# Patient Record
Sex: FEMALE | Race: WHITE | NOT HISPANIC OR LATINO | Employment: OTHER | URBAN - METROPOLITAN AREA
[De-identification: names, ages, dates, MRNs, and addresses within clinical notes are randomized per-mention and may not be internally consistent; named-entity substitution may affect disease eponyms.]

---

## 2021-07-09 ENCOUNTER — NURSE TRIAGE (OUTPATIENT)
Dept: NURSING | Facility: CLINIC | Age: 56
End: 2021-07-09

## 2023-12-29 ENCOUNTER — APPOINTMENT (OUTPATIENT)
Dept: GENERAL RADIOLOGY | Facility: CLINIC | Age: 58
End: 2023-12-29
Attending: EMERGENCY MEDICINE
Payer: COMMERCIAL

## 2023-12-29 ENCOUNTER — APPOINTMENT (OUTPATIENT)
Dept: ULTRASOUND IMAGING | Facility: CLINIC | Age: 58
End: 2023-12-29
Attending: EMERGENCY MEDICINE
Payer: COMMERCIAL

## 2023-12-29 ENCOUNTER — HOSPITAL ENCOUNTER (EMERGENCY)
Facility: CLINIC | Age: 58
Discharge: HOME | End: 2023-12-29
Attending: EMERGENCY MEDICINE | Admitting: EMERGENCY MEDICINE
Payer: COMMERCIAL

## 2023-12-29 VITALS
TEMPERATURE: 98 F | RESPIRATION RATE: 20 BRPM | OXYGEN SATURATION: 96 % | DIASTOLIC BLOOD PRESSURE: 84 MMHG | HEART RATE: 109 BPM | SYSTOLIC BLOOD PRESSURE: 144 MMHG

## 2023-12-29 DIAGNOSIS — M10.062 ACUTE IDIOPATHIC GOUT OF LEFT KNEE: ICD-10-CM

## 2023-12-29 LAB
ANION GAP SERPL CALCULATED.3IONS-SCNC: 13 MMOL/L (ref 7–15)
BASOPHILS # BLD AUTO: 0 10E3/UL (ref 0–0.2)
BASOPHILS NFR BLD AUTO: 1 %
BUN SERPL-MCNC: 27.2 MG/DL (ref 8–23)
CALCIUM SERPL-MCNC: 9.5 MG/DL (ref 8.6–10)
CHLORIDE SERPL-SCNC: 100 MMOL/L (ref 98–107)
CREAT SERPL-MCNC: 1.03 MG/DL (ref 0.51–0.95)
CRP SERPL-MCNC: 4.6 MG/L
DEPRECATED HCO3 PLAS-SCNC: 23 MMOL/L (ref 22–29)
EGFRCR SERPLBLD CKD-EPI 2021: 62 ML/MIN/1.73M2
EOSINOPHIL # BLD AUTO: 0.1 10E3/UL (ref 0–0.7)
EOSINOPHIL NFR BLD AUTO: 1 %
ERYTHROCYTE [DISTWIDTH] IN BLOOD BY AUTOMATED COUNT: 13.2 % (ref 10–15)
ERYTHROCYTE [SEDIMENTATION RATE] IN BLOOD BY WESTERGREN METHOD: 5 MM/HR (ref 0–30)
GLUCOSE SERPL-MCNC: 276 MG/DL (ref 70–99)
HCT VFR BLD AUTO: 42.9 % (ref 35–47)
HGB BLD-MCNC: 14.2 G/DL (ref 11.7–15.7)
IMM GRANULOCYTES # BLD: 0 10E3/UL
IMM GRANULOCYTES NFR BLD: 0 %
LYMPHOCYTES # BLD AUTO: 2.2 10E3/UL (ref 0.8–5.3)
LYMPHOCYTES NFR BLD AUTO: 27 %
MCH RBC QN AUTO: 28.6 PG (ref 26.5–33)
MCHC RBC AUTO-ENTMCNC: 33.1 G/DL (ref 31.5–36.5)
MCV RBC AUTO: 87 FL (ref 78–100)
MONOCYTES # BLD AUTO: 0.6 10E3/UL (ref 0–1.3)
MONOCYTES NFR BLD AUTO: 7 %
NEUTROPHILS # BLD AUTO: 5.4 10E3/UL (ref 1.6–8.3)
NEUTROPHILS NFR BLD AUTO: 64 %
NRBC # BLD AUTO: 0 10E3/UL
NRBC BLD AUTO-RTO: 0 /100
PLATELET # BLD AUTO: 185 10E3/UL (ref 150–450)
POTASSIUM SERPL-SCNC: 4.6 MMOL/L (ref 3.4–5.3)
RBC # BLD AUTO: 4.96 10E6/UL (ref 3.8–5.2)
SODIUM SERPL-SCNC: 136 MMOL/L (ref 135–145)
URATE SERPL-MCNC: 6.2 MG/DL (ref 2.4–5.7)
WBC # BLD AUTO: 8.4 10E3/UL (ref 4–11)

## 2023-12-29 PROCEDURE — 85025 COMPLETE CBC W/AUTO DIFF WBC: CPT | Performed by: EMERGENCY MEDICINE

## 2023-12-29 PROCEDURE — 36415 COLL VENOUS BLD VENIPUNCTURE: CPT | Performed by: EMERGENCY MEDICINE

## 2023-12-29 PROCEDURE — 99284 EMERGENCY DEPT VISIT MOD MDM: CPT | Mod: 25

## 2023-12-29 PROCEDURE — 93971 EXTREMITY STUDY: CPT | Mod: LT

## 2023-12-29 PROCEDURE — 80048 BASIC METABOLIC PNL TOTAL CA: CPT | Performed by: EMERGENCY MEDICINE

## 2023-12-29 PROCEDURE — 73562 X-RAY EXAM OF KNEE 3: CPT | Mod: LT

## 2023-12-29 PROCEDURE — 84550 ASSAY OF BLOOD/URIC ACID: CPT | Performed by: EMERGENCY MEDICINE

## 2023-12-29 PROCEDURE — 85652 RBC SED RATE AUTOMATED: CPT | Performed by: EMERGENCY MEDICINE

## 2023-12-29 PROCEDURE — 86140 C-REACTIVE PROTEIN: CPT | Performed by: EMERGENCY MEDICINE

## 2023-12-29 RX ORDER — INDOMETHACIN 50 MG/1
50 CAPSULE ORAL
Qty: 30 CAPSULE | Refills: 0 | Status: SHIPPED | OUTPATIENT
Start: 2023-12-29 | End: 2024-01-08

## 2023-12-29 ASSESSMENT — ACTIVITIES OF DAILY LIVING (ADL)
ADLS_ACUITY_SCORE: 35
ADLS_ACUITY_SCORE: 35

## 2023-12-29 NOTE — DISCHARGE INSTRUCTIONS
Rest your leg, elevate, you can try ice or heat but start the Indocin and take it 3 times a day for up to 10 days.  Once your pain is gone you can stop it.  Take it with food.  You can get 2 doses and yet today, take another dose tonight before bed.  Keep your appointment with your doctor in the next 2 weeks for a recheck.  If you develop high fevers with worsening pain redness and swelling of the knee, return to the ER.  At your doctor's appointment, you could discuss with your physician whether they would consider wanting you to be on allopurinol because your uric acid level is elevated.

## 2023-12-29 NOTE — ED NOTES
PIT/Triage Evaluation    Patient presented with knee pain. The patient report that she developed left knee pain yesterday after coming from work. She notes that after a couple hours the pain worsened, had trouble walking. She complains of swelling and pain shooting down front of her leg from the knee.  She took Tylenol with some relief and was able to walk on it a bit today. She has history of Gout in her feet, this feels different.  Veins prominent, feels swollen.  Denies any history of knee surgery.  No fevers or chills.    Exam is notable for:  Resp:               Non-labored  Neuro:             Alert and cooperative  MSkel:             Moving all extremities, no bone tenderness at L knee, no calf tenderness  Skin:                No rash or erythema appreciated in knee, prominent varicosities overlying patella, no surgical scars to knee               Appropriate interventions for symptom management were initiated if applicable.  Appropriate diagnostic tests were initiated if indicated.    Important information for subsequent clinician:    I briefly evaluated the patient and developed an initial plan of care. I discussed this plan and explained that this brief interaction does not constitute a full evaluation. Patient/family understands that they should wait to be fully evaluated and discuss any test results with another clinician prior to leaving the hospital.       Nguyen Draper MD  12/29/23 1057

## 2023-12-29 NOTE — ED PROVIDER NOTES
History     Chief Complaint:  Knee Pain     HPI   Afshan Jimenez is a 58 year old female with a history of gout, type II diabetes, and cancer presents to the ED for evaluation of knee pain. The patient report that she developed left knee pain yesterday after coming from work. She notes that after a couple hours the pain worsened, had trouble walking. She complains of swelling and pain shooting down front of her leg from the knee.  She didn't have a measured fever but notes that last night she was warm. She took Tylenol with some relief and was able to walk on it a bit today. Her blood sugar today was 302. She has history of gout in her toes.  Denies any history of knee surgery.  No fevers or chills.     Independent Historian:    None.    Review of External Notes:  None.       Medications:    Atorvastatin  Novolin  Allopurinol   Aspirin 81 MG    Past Medical History:    Gout   Cholecystitis   Cancer of parotid gland   Bone marrow transplant   Type II diabetes  Acute myeloblastic leukemia  Malignant neoplasm of roof of mouth    Past Surgical History:    Cholecystectomy   Salivary surgery  Sinus surgery   Mouth surgery     Physical Exam   Patient Vitals for the past 24 hrs:   BP Temp Temp src Pulse Resp SpO2   12/29/23 1157 113/75 98  F (36.7  C) Temporal 109 20 96 %      Physical Exam  Nursing note and vitals reviewed.    Constitutional:  Appears comfortable.    Cardiovascular:  Normal rate, regular rhythm.     DP pulse 2+.  Cap refill less than 2 seconds.  Musculoskeletal:  Range of motion of the left knee is normal but she has some pain in the knee.  Mild swelling.  There is only minimal erythema and it is not excessively warm.  No edema down to the lower leg.  Neurological:   Alert and oriented. Exhibits good muscle tone.      Sensation in the knee is normal she has decreased sensation in the lower leg chronically.  Skin:    Skin is warm and dry.  Very minimal erythema to the left knee.  No break in the  skin.  Psychiatric:   Behavior is normal. Appropriate mood and affect.     Judgment and thought content normal.     Emergency Department Course   Imaging:  US Lower Extremity Venous Duplex Left   Final Result   IMPRESSION:   1.  No deep venous thrombosis in the left lower extremity.   2.  Likely small knee effusion.      STEVE RIZO MD            SYSTEM ID:  [id number]      XR Knee Left 3 Views   Final Result   Impression:      1.  Left knee negative for fracture or dislocation. Normal joint   alignment. Mild/moderate tricompartmental lucency and sclerosis in the   medial and lateral femoral condyles, and in the proximal tibial   metaphysis, compatible with bone infarcts/AVN.      DIAZ MONTES DE OCA MD            SYSTEM ID:  [id number]        Report per radiology    Laboratory:  Labs Ordered and Resulted from Time of ED Arrival to Time of ED Departure   BASIC METABOLIC PANEL - Abnormal       Result Value    Sodium 136      Potassium 4.6      Chloride 100      Carbon Dioxide (CO2) 23      Anion Gap 13      Urea Nitrogen 27.2 (*)     Creatinine 1.03 (*)     GFR Estimate 62      Calcium 9.5      Glucose 276 (*)    URIC ACID - Abnormal    Uric Acid 6.2 (*)    ERYTHROCYTE SEDIMENTATION RATE AUTO - Normal    Erythrocyte Sedimentation Rate 5     CRP INFLAMMATION - Normal    CRP Inflammation 4.60     CBC WITH PLATELETS AND DIFFERENTIAL    WBC Count 8.4      RBC Count 4.96      Hemoglobin 14.2      Hematocrit 42.9      MCV 87      MCH 28.6      MCHC 33.1      RDW 13.2      Platelet Count 185      % Neutrophils 64      % Lymphocytes 27      % Monocytes 7      % Eosinophils 1      % Basophils 1      % Immature Granulocytes 0      NRBCs per 100 WBC 0      Absolute Neutrophils 5.4      Absolute Lymphocytes 2.2      Absolute Monocytes 0.6      Absolute Eosinophils 0.1      Absolute Basophils 0.0      Absolute Immature Granulocytes 0.0      Absolute NRBCs 0.0        Emergency Department Course &  Assessments:    Interventions:  Medications - No data to display I    Assessments:  1550 I obtained history and examined the patient as noted above.    Independent Interpretation (X-rays, CTs, rhythm strip):  I reviewed the x-ray and there is no obvious fracture.    Consultations/Discussion of Management or Tests:  None       Social Determinants of Health affecting care:  None.      Disposition:  The patient was discharged to home.     Impression & Plan    Medical Decision Making:  Patient comes in with fairly sudden onset yesterday of left knee pain.  She has a history of gout, uric acid is elevated, she does have diabetes and blood sugar is 276.  Creatinine is 1.03 but her BUN is up low but suggesting some mild dehydration.  GFR is normal, white count is normal and she has no fever here.  X-ray did not reveal any obvious problems.  Ultrasound is also negative.  She has gout.  Indocin has worked well for her in the past and because she is diabetic I am not going to use steroids.  She will be put on 50 mg Indocin 3 times a day until the pain is gone and then for an extra day or 2 up to 10 days.  She will follow-up with her doctor as scheduled in early January.  Because her uric acid is elevated, once her gout settles down, she should discuss with her doctor whether or not allopurinol might be an option for her.        Rest your leg, elevate, you can try ice or heat but start the Indocin and take it 3 times a day for up to 10 days.  Once your pain is gone you can stop it.  Take it with food.  You can get 2 doses and yet today, take another dose tonight before bed.  Keep your appointment with your doctor in the next 2 weeks for a recheck.  If you develop high fevers with worsening pain redness and swelling of the knee, return to the ER.  At your doctor's appointment, you could discuss with your physician whether they would consider wanting you to be on allopurinol because your uric acid level is  elevated.    Diagnosis:    ICD-10-CM    1. Acute idiopathic gout of left knee  M10.062            Discharge Medications:  New Prescriptions    INDOMETHACIN (INDOCIN) 50 MG CAPSULE    Take 1 capsule (50 mg) by mouth 3 times daily (with meals) for 10 days        Scribe Disclosure:  I, Mira PALACIOSTesfaye Gonzalez, am serving as a scribe at 4:02 PM on 12/29/2023 to document services personally performed by Dior Macias MD based on my observations and the provider's statements to me.    Scribe Disclosure:  I, Ashlyn Waller, am serving as a scribe  at 4:13 PM on 12/29/2023 to document services personally performed by Dior Macias MD based on my observations and the provider's statements to me.    12/29/2023   Dior Macias MD Powell, Tracy Alan, MD  12/29/23 1640

## 2023-12-29 NOTE — ED TRIAGE NOTES
Pt reports left knee red, swollen, hot to touch. Pt reports taking tylenol, iced the area.      Triage Assessment (Adult)       Row Name 12/29/23 1156          Triage Assessment    Airway WDL WDL

## 2024-03-24 ENCOUNTER — HEALTH MAINTENANCE LETTER (OUTPATIENT)
Age: 59
End: 2024-03-24

## 2024-07-15 ENCOUNTER — TRANSFERRED RECORDS (OUTPATIENT)
Dept: MULTI SPECIALTY CLINIC | Facility: CLINIC | Age: 59
End: 2024-07-15

## 2024-07-15 LAB — RETINOPATHY: NORMAL

## 2024-11-27 ENCOUNTER — TRANSFERRED RECORDS (OUTPATIENT)
Dept: HEALTH INFORMATION MANAGEMENT | Facility: CLINIC | Age: 59
End: 2024-11-27

## 2024-12-27 ENCOUNTER — HOSPITAL ENCOUNTER (INPATIENT)
Facility: CLINIC | Age: 59
LOS: 3 days | Discharge: HOME | DRG: 065 | End: 2024-12-30
Attending: EMERGENCY MEDICINE | Admitting: HOSPITALIST
Payer: COMMERCIAL

## 2024-12-27 ENCOUNTER — APPOINTMENT (OUTPATIENT)
Dept: MRI IMAGING | Facility: CLINIC | Age: 59
DRG: 065 | End: 2024-12-27
Attending: EMERGENCY MEDICINE
Payer: COMMERCIAL

## 2024-12-27 ENCOUNTER — APPOINTMENT (OUTPATIENT)
Dept: CT IMAGING | Facility: CLINIC | Age: 59
DRG: 065 | End: 2024-12-27
Attending: EMERGENCY MEDICINE
Payer: COMMERCIAL

## 2024-12-27 DIAGNOSIS — R91.8 PULMONARY NODULES: ICD-10-CM

## 2024-12-27 DIAGNOSIS — E78.5 DYSLIPIDEMIA: ICD-10-CM

## 2024-12-27 DIAGNOSIS — R29.898 RIGHT ARM WEAKNESS: ICD-10-CM

## 2024-12-27 DIAGNOSIS — I63.89 PARIETAL LOBE INFARCTION (H): Primary | ICD-10-CM

## 2024-12-27 LAB
ALBUMIN SERPL BCG-MCNC: 4.2 G/DL (ref 3.5–5.2)
ALP SERPL-CCNC: 87 U/L (ref 40–150)
ALT SERPL W P-5'-P-CCNC: 28 U/L (ref 0–50)
ANION GAP SERPL CALCULATED.3IONS-SCNC: 11 MMOL/L (ref 7–15)
APTT PPP: 26 SECONDS (ref 22–38)
AST SERPL W P-5'-P-CCNC: 35 U/L (ref 0–45)
ATRIAL RATE - MUSE: 98 BPM
BASOPHILS # BLD AUTO: 0 10E3/UL (ref 0–0.2)
BASOPHILS NFR BLD AUTO: 0 %
BILIRUB SERPL-MCNC: 0.5 MG/DL
BUN SERPL-MCNC: 19.1 MG/DL (ref 8–23)
CALCIUM SERPL-MCNC: 9.9 MG/DL (ref 8.8–10.4)
CHLORIDE SERPL-SCNC: 104 MMOL/L (ref 98–107)
CREAT SERPL-MCNC: 1.23 MG/DL (ref 0.51–0.95)
DIASTOLIC BLOOD PRESSURE - MUSE: NORMAL MMHG
EGFRCR SERPLBLD CKD-EPI 2021: 50 ML/MIN/1.73M2
EOSINOPHIL # BLD AUTO: 0.3 10E3/UL (ref 0–0.7)
EOSINOPHIL NFR BLD AUTO: 4 %
ERYTHROCYTE [DISTWIDTH] IN BLOOD BY AUTOMATED COUNT: 13.6 % (ref 10–15)
EST. AVERAGE GLUCOSE BLD GHB EST-MCNC: 126 MG/DL
GLUCOSE SERPL-MCNC: 106 MG/DL (ref 70–99)
HBA1C MFR BLD: 6 %
HCO3 SERPL-SCNC: 26 MMOL/L (ref 22–29)
HCT VFR BLD AUTO: 43.4 % (ref 35–47)
HGB BLD-MCNC: 14.1 G/DL (ref 11.7–15.7)
IMM GRANULOCYTES # BLD: 0 10E3/UL
IMM GRANULOCYTES NFR BLD: 0 %
INR PPP: 1.06 (ref 0.85–1.15)
INTERPRETATION ECG - MUSE: NORMAL
LYMPHOCYTES # BLD AUTO: 2.8 10E3/UL (ref 0.8–5.3)
LYMPHOCYTES NFR BLD AUTO: 39 %
MCH RBC QN AUTO: 28.4 PG (ref 26.5–33)
MCHC RBC AUTO-ENTMCNC: 32.5 G/DL (ref 31.5–36.5)
MCV RBC AUTO: 87 FL (ref 78–100)
MONOCYTES # BLD AUTO: 0.6 10E3/UL (ref 0–1.3)
MONOCYTES NFR BLD AUTO: 8 %
NEUTROPHILS # BLD AUTO: 3.4 10E3/UL (ref 1.6–8.3)
NEUTROPHILS NFR BLD AUTO: 48 %
NRBC # BLD AUTO: 0 10E3/UL
NRBC BLD AUTO-RTO: 0 /100
P AXIS - MUSE: 27 DEGREES
PLATELET # BLD AUTO: 159 10E3/UL (ref 150–450)
POTASSIUM SERPL-SCNC: 4.1 MMOL/L (ref 3.4–5.3)
PR INTERVAL - MUSE: 170 MS
PROT SERPL-MCNC: 7.3 G/DL (ref 6.4–8.3)
QRS DURATION - MUSE: 82 MS
QT - MUSE: 344 MS
QTC - MUSE: 439 MS
R AXIS - MUSE: -12 DEGREES
RBC # BLD AUTO: 4.97 10E6/UL (ref 3.8–5.2)
SODIUM SERPL-SCNC: 141 MMOL/L (ref 135–145)
SYSTOLIC BLOOD PRESSURE - MUSE: NORMAL MMHG
T AXIS - MUSE: 8 DEGREES
TROPONIN T SERPL HS-MCNC: 11 NG/L
VENTRICULAR RATE- MUSE: 98 BPM
WBC # BLD AUTO: 7.1 10E3/UL (ref 4–11)

## 2024-12-27 PROCEDURE — 250N000011 HC RX IP 250 OP 636: Performed by: EMERGENCY MEDICINE

## 2024-12-27 PROCEDURE — 83036 HEMOGLOBIN GLYCOSYLATED A1C: CPT | Performed by: HOSPITALIST

## 2024-12-27 PROCEDURE — 87040 BLOOD CULTURE FOR BACTERIA: CPT | Performed by: HOSPITALIST

## 2024-12-27 PROCEDURE — 36415 COLL VENOUS BLD VENIPUNCTURE: CPT | Performed by: EMERGENCY MEDICINE

## 2024-12-27 PROCEDURE — 99223 1ST HOSP IP/OBS HIGH 75: CPT | Performed by: HOSPITALIST

## 2024-12-27 PROCEDURE — A9585 GADOBUTROL INJECTION: HCPCS | Performed by: EMERGENCY MEDICINE

## 2024-12-27 PROCEDURE — 120N000001 HC R&B MED SURG/OB

## 2024-12-27 PROCEDURE — 250N000009 HC RX 250: Performed by: EMERGENCY MEDICINE

## 2024-12-27 PROCEDURE — 72158 MRI LUMBAR SPINE W/O & W/DYE: CPT

## 2024-12-27 PROCEDURE — 250N000013 HC RX MED GY IP 250 OP 250 PS 637: Performed by: HOSPITALIST

## 2024-12-27 PROCEDURE — 255N000002 HC RX 255 OP 636: Performed by: EMERGENCY MEDICINE

## 2024-12-27 PROCEDURE — 84484 ASSAY OF TROPONIN QUANT: CPT | Performed by: HOSPITALIST

## 2024-12-27 PROCEDURE — 85730 THROMBOPLASTIN TIME PARTIAL: CPT | Performed by: EMERGENCY MEDICINE

## 2024-12-27 PROCEDURE — 82040 ASSAY OF SERUM ALBUMIN: CPT | Performed by: EMERGENCY MEDICINE

## 2024-12-27 PROCEDURE — 85025 COMPLETE CBC W/AUTO DIFF WBC: CPT | Performed by: EMERGENCY MEDICINE

## 2024-12-27 PROCEDURE — 70553 MRI BRAIN STEM W/O & W/DYE: CPT

## 2024-12-27 PROCEDURE — 99285 EMERGENCY DEPT VISIT HI MDM: CPT | Mod: 25

## 2024-12-27 PROCEDURE — 250N000013 HC RX MED GY IP 250 OP 250 PS 637: Performed by: EMERGENCY MEDICINE

## 2024-12-27 PROCEDURE — 70498 CT ANGIOGRAPHY NECK: CPT

## 2024-12-27 PROCEDURE — 85610 PROTHROMBIN TIME: CPT | Performed by: EMERGENCY MEDICINE

## 2024-12-27 PROCEDURE — 70450 CT HEAD/BRAIN W/O DYE: CPT

## 2024-12-27 PROCEDURE — 36415 COLL VENOUS BLD VENIPUNCTURE: CPT | Performed by: HOSPITALIST

## 2024-12-27 PROCEDURE — 80061 LIPID PANEL: CPT | Performed by: HOSPITALIST

## 2024-12-27 PROCEDURE — 93005 ELECTROCARDIOGRAM TRACING: CPT

## 2024-12-27 RX ORDER — ASPIRIN 81 MG/1
81 TABLET, CHEWABLE ORAL DAILY
Status: DISCONTINUED | OUTPATIENT
Start: 2024-12-28 | End: 2024-12-30 | Stop reason: HOSPADM

## 2024-12-27 RX ORDER — ONDANSETRON 2 MG/ML
4 INJECTION INTRAMUSCULAR; INTRAVENOUS EVERY 6 HOURS PRN
Status: DISCONTINUED | OUTPATIENT
Start: 2024-12-27 | End: 2024-12-30 | Stop reason: HOSPADM

## 2024-12-27 RX ORDER — ASPIRIN 325 MG
325 TABLET ORAL ONCE
Status: COMPLETED | OUTPATIENT
Start: 2024-12-27 | End: 2024-12-27

## 2024-12-27 RX ORDER — ONDANSETRON 4 MG/1
4 TABLET, ORALLY DISINTEGRATING ORAL EVERY 6 HOURS PRN
Status: DISCONTINUED | OUTPATIENT
Start: 2024-12-27 | End: 2024-12-30 | Stop reason: HOSPADM

## 2024-12-27 RX ORDER — NICOTINE POLACRILEX 4 MG
15-30 LOZENGE BUCCAL
Status: DISCONTINUED | OUTPATIENT
Start: 2024-12-27 | End: 2024-12-30 | Stop reason: HOSPADM

## 2024-12-27 RX ORDER — DEXTROSE MONOHYDRATE 25 G/50ML
25-50 INJECTION, SOLUTION INTRAVENOUS
Status: DISCONTINUED | OUTPATIENT
Start: 2024-12-27 | End: 2024-12-30 | Stop reason: HOSPADM

## 2024-12-27 RX ORDER — LIDOCAINE 40 MG/G
CREAM TOPICAL
Status: DISCONTINUED | OUTPATIENT
Start: 2024-12-27 | End: 2024-12-30 | Stop reason: HOSPADM

## 2024-12-27 RX ORDER — IOPAMIDOL 755 MG/ML
67 INJECTION, SOLUTION INTRAVASCULAR ONCE
Status: COMPLETED | OUTPATIENT
Start: 2024-12-27 | End: 2024-12-27

## 2024-12-27 RX ORDER — GADOBUTROL 604.72 MG/ML
9 INJECTION INTRAVENOUS ONCE
Status: COMPLETED | OUTPATIENT
Start: 2024-12-27 | End: 2024-12-27

## 2024-12-27 RX ORDER — ACETAMINOPHEN 325 MG/10.15ML
650 LIQUID ORAL EVERY 4 HOURS PRN
Status: DISCONTINUED | OUTPATIENT
Start: 2024-12-27 | End: 2024-12-30 | Stop reason: HOSPADM

## 2024-12-27 RX ORDER — HYDRALAZINE HYDROCHLORIDE 20 MG/ML
10-20 INJECTION INTRAMUSCULAR; INTRAVENOUS
Status: DISCONTINUED | OUTPATIENT
Start: 2024-12-27 | End: 2024-12-30 | Stop reason: HOSPADM

## 2024-12-27 RX ORDER — ATORVASTATIN CALCIUM 40 MG/1
40 TABLET, FILM COATED ORAL EVERY EVENING
Status: DISCONTINUED | OUTPATIENT
Start: 2024-12-27 | End: 2024-12-30 | Stop reason: HOSPADM

## 2024-12-27 RX ORDER — ACETAMINOPHEN 325 MG/1
650 TABLET ORAL EVERY 4 HOURS PRN
Status: DISCONTINUED | OUTPATIENT
Start: 2024-12-27 | End: 2024-12-30 | Stop reason: HOSPADM

## 2024-12-27 RX ORDER — ACETAMINOPHEN 650 MG/1
650 SUPPOSITORY RECTAL EVERY 4 HOURS PRN
Status: DISCONTINUED | OUTPATIENT
Start: 2024-12-27 | End: 2024-12-30 | Stop reason: HOSPADM

## 2024-12-27 RX ORDER — LABETALOL HYDROCHLORIDE 5 MG/ML
10-20 INJECTION, SOLUTION INTRAVENOUS EVERY 10 MIN PRN
Status: DISCONTINUED | OUTPATIENT
Start: 2024-12-27 | End: 2024-12-30 | Stop reason: HOSPADM

## 2024-12-27 RX ADMIN — SODIUM CHLORIDE 90 ML: 9 INJECTION, SOLUTION INTRAVENOUS at 18:38

## 2024-12-27 RX ADMIN — ASPIRIN 325 MG ORAL TABLET 325 MG: 325 PILL ORAL at 18:27

## 2024-12-27 RX ADMIN — ATORVASTATIN CALCIUM 40 MG: 40 TABLET, FILM COATED ORAL at 19:34

## 2024-12-27 RX ADMIN — GADOBUTROL 9 ML: 604.72 INJECTION INTRAVENOUS at 17:05

## 2024-12-27 RX ADMIN — IOPAMIDOL 67 ML: 755 INJECTION, SOLUTION INTRAVENOUS at 18:38

## 2024-12-27 ASSESSMENT — ACTIVITIES OF DAILY LIVING (ADL)
ADLS_ACUITY_SCORE: 41

## 2024-12-27 NOTE — ED TRIAGE NOTES
Pt c/o R leg numbness starting yesterday, pt had similar symptoms 1 month ago w/ RUE, pt scheduled for an MRI on Tuesday, no other stroke symptoms besides numbness, hx DM, ABCD intact.       Triage Assessment (Adult)       Row Name 12/27/24 1224          Triage Assessment    Airway WDL WDL        Respiratory WDL    Respiratory WDL WDL        Skin Circulation/Temperature WDL    Skin Circulation/Temperature WDL WDL        Cardiac WDL    Cardiac WDL WDL        Peripheral/Neurovascular WDL    Peripheral Neurovascular WDL X  RLE numbness        Cognitive/Neuro/Behavioral WDL    Cognitive/Neuro/Behavioral WDL WDL

## 2024-12-27 NOTE — ED PROVIDER NOTES
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 The patient reports sudden onset numbness in her right upper extremity right before Thanksgiving. Afshan thought this might be due to a mini stroke but was seen at the ED and diagnosed with radial nerve palsy. For the past month, the numbness has been improving and is back to about 90% of her original function. Then, 4 days ago, Afshan states she felt the same numbness in her right leg and foot that has continued to worsen. She denies any other numbness or weakness aside from the right lower extremity or speech or vision changes. Patient called her Neurologist who recommended she go to the ED for further evaluation and work up. She has 3 MRIs scheduled for Tuesday of next week to evaluate the right upper extremity. Afshan follows with [facility] care.       Independent Historian   None    Review of External Notes   None    Past Medical History     Medical History and Problem List   Malignant neoplasm of oral cavity   Deaf  Acute cholecystitis   Gout  Acute myeloblastic leukemia in remission  Type 2 diabetes mellitus   Obesity   Cancer of parotid gland     Medications   Atorvastatin  Lorazepam  Meloxicam  Ozempic    Surgical History   Cholecystectomy  Left parotid gland surgery  Sinus surgery  Tumor removed from roof of mouth   Bone marrow transplant     Physical Exam     Patient Vitals for the past 24 hrs:   BP Temp Temp src Pulse Resp SpO2 Height Weight   12/27/24 1932 (!) 126/93 -- -- 99 18 98 % 1.676 m (5' 6\") 89.8 kg (198 lb)   12/27/24 1830 (!) 123/90 -- -- 104 20 -- -- --   12/27/24 1823 -- -- -- 104 10 98 % -- --   12/27/24 " 1820 121/82 -- -- 102 -- 92 % -- --   12/27/24 1220 104/60 97.5  F (36.4  C) Temporal 105 16 96 % -- 89.8 kg (198 lb)     Physical Exam  Constitutional: Alert, attentive, GCS 15   HENT:    Nose: Nose normal.   Eyes: EOM are normal, anicteric, conjugate gaze  CV: regular rate and rhythm   Chest: Effort normal and breath sounds clear without wheezing or rales, symmetric bilaterally   GI:  non tender. No distension. No guarding or rebound.    MSK: No LE edema, no tenderness to palpation of BLE.  Neurological: Alert, attentive, moving all extremities equally.  No facial droop, speech is normal  No drift, no dysdiadochokinesia, extremity strength is intact.  Skin: Skin is warm and dry.     Diagnostics     Lab Results   Labs Ordered and Resulted from Time of ED Arrival to Time of ED Departure   COMPREHENSIVE METABOLIC PANEL - Abnormal       Result Value    Sodium 141      Potassium 4.1      Carbon Dioxide (CO2) 26      Anion Gap 11      Urea Nitrogen 19.1      Creatinine 1.23 (*)     GFR Estimate 50 (*)     Calcium 9.9      Chloride 104      Glucose 106 (*)     Alkaline Phosphatase 87      AST 35      ALT 28      Protein Total 7.3      Albumin 4.2      Bilirubin Total 0.5     INR - Normal    INR 1.06     PARTIAL THROMBOPLASTIN TIME - Normal    aPTT 26     CBC WITH PLATELETS AND DIFFERENTIAL    WBC Count 7.1      RBC Count 4.97      Hemoglobin 14.1      Hematocrit 43.4      MCV 87      MCH 28.4      MCHC 32.5      RDW 13.6      Platelet Count 159      % Neutrophils 48      % Lymphocytes 39      % Monocytes 8      % Eosinophils 4      % Basophils 0      % Immature Granulocytes 0      NRBCs per 100 WBC 0      Absolute Neutrophils 3.4      Absolute Lymphocytes 2.8      Absolute Monocytes 0.6      Absolute Eosinophils 0.3      Absolute Basophils 0.0      Absolute Immature Granulocytes 0.0      Absolute NRBCs 0.0     BLOOD CULTURE   BLOOD CULTURE       Imaging   CTA Head Neck with Contrast   Final Result   IMPRESSION:    HEAD  CT:   1.  Known small foci of acute to subacute infarction in the left parietal lobe appreciated by MRI.   2.  Chronic lacunar infarct involving the left cerebellum, left parietal lobe, basal ganglia is seen on comparison MRI.   3.  No acute cranial hemorrhage, extra-axial collection, or midline shift.   4.  Complete/complete opacification of the left mastoid air cells.      HEAD CTA:    1.  No significant stenosis, aneurysm, or high flow vascular malformation identified.      NECK CTA:   1.  No hemodynamically significant stenosis within the vessels of the neck.   2.  0.4 cm pulmonary nodule right upper lobe. In a low-risk patient no routine follow-up required. In high risk patient recommend dedicated CT chest.      Head CT w/o contrast   Final Result   IMPRESSION:    HEAD CT:   1.  Known small foci of acute to subacute infarction in the left parietal lobe appreciated by MRI.   2.  Chronic lacunar infarct involving the left cerebellum, left parietal lobe, basal ganglia is seen on comparison MRI.   3.  No acute cranial hemorrhage, extra-axial collection, or midline shift.   4.  Complete/complete opacification of the left mastoid air cells.      HEAD CTA:    1.  No significant stenosis, aneurysm, or high flow vascular malformation identified.      NECK CTA:   1.  No hemodynamically significant stenosis within the vessels of the neck.   2.  0.4 cm pulmonary nodule right upper lobe. In a low-risk patient no routine follow-up required. In high risk patient recommend dedicated CT chest.      MR Lumbar Spine w/o & w Contrast   Final Result   IMPRESSION:   HEAD MRI:    1.  Several scattered small foci of acute infarct involving the left parietal lobe.   2.  Dense left mastoid effusion with contrast enhancement. This is concerning for mastoiditis. There is subtle pachymeningeal dural enhancement overlying the effusion concerning for meningitis and adjacent parenchymal contrast enhancement concerning for    early cerebritis.    3.  Additional nonspecific contrast enhancement at the left occipital cortex. This may represent areas of subacute ischemia, infection, or metastatic disease.      LUMBAR SPINE MRI:   1.  Mild degenerative changes without high-grade spinal canal or neural foraminal stenosis.      MR Brain w/o & w Contrast   Final Result   IMPRESSION:   HEAD MRI:    1.  Several scattered small foci of acute infarct involving the left parietal lobe.   2.  Dense left mastoid effusion with contrast enhancement. This is concerning for mastoiditis. There is subtle pachymeningeal dural enhancement overlying the effusion concerning for meningitis and adjacent parenchymal contrast enhancement concerning for    early cerebritis.   3.  Additional nonspecific contrast enhancement at the left occipital cortex. This may represent areas of subacute ischemia, infection, or metastatic disease.      LUMBAR SPINE MRI:   1.  Mild degenerative changes without high-grade spinal canal or neural foraminal stenosis.          Independent Interpretation   None    ED Course      Medications Administered   Medications   atorvastatin (LIPITOR) tablet 40 mg (40 mg Oral Not Given 12/27/24 1934)   gadobutrol (GADAVIST) injection 9 mL (9 mLs Intravenous $Given 12/27/24 1705)   sodium chloride (PF) 0.9% PF flush 10 mL (10 mLs Intravenous $Given 12/27/24 1705)   aspirin (ASA) tablet 325 mg (325 mg Oral $Given 12/27/24 1827)   Saline Flush - CT (90 mLs Intravenous $Given 12/27/24 1838)   iopamidol (ISOVUE-370) solution 67 mL (67 mLs Intravenous $Given 12/27/24 1838)       Procedures   Procedures     Discussion of Management   Stroke Neurology  Dr. Carrillo, Hospitalist    ED Course   ED Course as of 12/27/24 1939   Fri Dec 27, 2024   1016 I obtained history and examined the patient as noted above.        Additional Documentation  None    Medical Decision Making / Diagnosis     FILEMON   Afshan Jimenez is a 59 year old female past medical history significant for remote  lymphoma, status post whole body radiation, bone marrow transplant, parotid gland cancer as well as oral cavity cancer all in remission presenting for now right lower extremity tingling numbness and clumsiness that has improved since few days ago.  This is associated with right arm weakness she had 1 month ago that improved and was diagnosed as radial nerve palsy following neuroimaging at outside hospital.  She is following with neurology was due to have cervical brain and right-sided brachial plexus MRIs later this month but was urged to come here due to persistent right leg weakness.  I do not clearly appreciate any evidence of deficit on exam, we proceeded with brain MRI with and without as it is which she was due to have it several days as well as a lumbar MRI with and without however I will defer brachial plexus and cervical MRI as she is having right arm involvement at this time.  Unfortunately MRI reveals acute appearing left parietal lobe infarct, there is some question of mastoiditis however this appears chronic, she has no symptoms of infection I do not suspect meningitis, lumbar puncture deferred we hold off on empiric antibiotics.  CTA shows several other infarcts, I suspect her right arm weakness 1 month ago was likely secondary to stroke.  We will plan medicine admission, she was given aspirin per stroke recommendation.    Disposition   The patient was admitted to the hospital.     Diagnosis     ICD-10-CM    1. Parietal lobe infarction (H)  I63.89       2. Right arm weakness  R29.898            Noah Leach MD  [employer]  7:39 PM 12/27/24       Scribe Disclosure:  I, Baylee Bynumchepe, am serving as a scribe at 2:26 PM on 12/27/2024 to document services personally performed by Noah Leach MD based on my observations and the provider's statements to me.        Noah Leach MD  12/27/24 1939

## 2024-12-28 ENCOUNTER — APPOINTMENT (OUTPATIENT)
Dept: OCCUPATIONAL THERAPY | Facility: CLINIC | Age: 59
DRG: 065 | End: 2024-12-28
Attending: HOSPITALIST
Payer: COMMERCIAL

## 2024-12-28 ENCOUNTER — APPOINTMENT (OUTPATIENT)
Dept: CT IMAGING | Facility: CLINIC | Age: 59
DRG: 065 | End: 2024-12-28
Attending: NURSE PRACTITIONER
Payer: COMMERCIAL

## 2024-12-28 ENCOUNTER — APPOINTMENT (OUTPATIENT)
Dept: CARDIOLOGY | Facility: CLINIC | Age: 59
DRG: 065 | End: 2024-12-28
Attending: NURSE PRACTITIONER
Payer: COMMERCIAL

## 2024-12-28 LAB
ANION GAP SERPL CALCULATED.3IONS-SCNC: 11 MMOL/L (ref 7–15)
BUN SERPL-MCNC: 18.6 MG/DL (ref 8–23)
CALCIUM SERPL-MCNC: 9.5 MG/DL (ref 8.8–10.4)
CHLORIDE SERPL-SCNC: 103 MMOL/L (ref 98–107)
CHOLEST SERPL-MCNC: 189 MG/DL
CREAT SERPL-MCNC: 1.18 MG/DL (ref 0.51–0.95)
CRP SERPL-MCNC: <3 MG/L
EGFRCR SERPLBLD CKD-EPI 2021: 53 ML/MIN/1.73M2
ERYTHROCYTE [DISTWIDTH] IN BLOOD BY AUTOMATED COUNT: 13.6 % (ref 10–15)
ERYTHROCYTE [SEDIMENTATION RATE] IN BLOOD BY WESTERGREN METHOD: 10 MM/HR (ref 0–30)
GLUCOSE BLDC GLUCOMTR-MCNC: 101 MG/DL (ref 70–99)
GLUCOSE BLDC GLUCOMTR-MCNC: 118 MG/DL (ref 70–99)
GLUCOSE BLDC GLUCOMTR-MCNC: 118 MG/DL (ref 70–99)
GLUCOSE SERPL-MCNC: 148 MG/DL (ref 70–99)
HCO3 SERPL-SCNC: 24 MMOL/L (ref 22–29)
HCT VFR BLD AUTO: 39.2 % (ref 35–47)
HDLC SERPL-MCNC: 45 MG/DL
HGB BLD-MCNC: 12.9 G/DL (ref 11.7–15.7)
LDLC SERPL CALC-MCNC: 108 MG/DL
MCH RBC QN AUTO: 28.5 PG (ref 26.5–33)
MCHC RBC AUTO-ENTMCNC: 32.9 G/DL (ref 31.5–36.5)
MCV RBC AUTO: 87 FL (ref 78–100)
NONHDLC SERPL-MCNC: 144 MG/DL
PLATELET # BLD AUTO: 142 10E3/UL (ref 150–450)
POTASSIUM SERPL-SCNC: 4 MMOL/L (ref 3.4–5.3)
RADIOLOGIST FLAGS: NORMAL
RBC # BLD AUTO: 4.52 10E6/UL (ref 3.8–5.2)
SODIUM SERPL-SCNC: 138 MMOL/L (ref 135–145)
TRIGL SERPL-MCNC: 181 MG/DL
WBC # BLD AUTO: 6.1 10E3/UL (ref 4–11)

## 2024-12-28 PROCEDURE — 99418 PROLNG IP/OBS E/M EA 15 MIN: CPT | Mod: FS | Performed by: NURSE PRACTITIONER

## 2024-12-28 PROCEDURE — 250N000013 HC RX MED GY IP 250 OP 250 PS 637: Performed by: HOSPITALIST

## 2024-12-28 PROCEDURE — 999N000147 HC STATISTIC PT IP EVAL DEFER

## 2024-12-28 PROCEDURE — 97165 OT EVAL LOW COMPLEX 30 MIN: CPT | Mod: GO

## 2024-12-28 PROCEDURE — 85652 RBC SED RATE AUTOMATED: CPT | Performed by: NURSE PRACTITIONER

## 2024-12-28 PROCEDURE — 86140 C-REACTIVE PROTEIN: CPT | Performed by: NURSE PRACTITIONER

## 2024-12-28 PROCEDURE — 99221 1ST HOSP IP/OBS SF/LOW 40: CPT | Performed by: NURSE PRACTITIONER

## 2024-12-28 PROCEDURE — 120N000001 HC R&B MED SURG/OB

## 2024-12-28 PROCEDURE — 250N000011 HC RX IP 250 OP 636: Performed by: NURSE PRACTITIONER

## 2024-12-28 PROCEDURE — 255N000002 HC RX 255 OP 636: Performed by: NURSE PRACTITIONER

## 2024-12-28 PROCEDURE — 250N000013 HC RX MED GY IP 250 OP 250 PS 637: Performed by: NURSE PRACTITIONER

## 2024-12-28 PROCEDURE — 97530 THERAPEUTIC ACTIVITIES: CPT | Mod: GO

## 2024-12-28 PROCEDURE — 71260 CT THORAX DX C+: CPT

## 2024-12-28 PROCEDURE — 36415 COLL VENOUS BLD VENIPUNCTURE: CPT | Performed by: HOSPITALIST

## 2024-12-28 PROCEDURE — 99232 SBSQ HOSP IP/OBS MODERATE 35: CPT | Performed by: INTERNAL MEDICINE

## 2024-12-28 PROCEDURE — 999N000226 HC STATISTIC SLP IP EVAL DEFER: Performed by: SPEECH-LANGUAGE PATHOLOGIST

## 2024-12-28 PROCEDURE — 99223 1ST HOSP IP/OBS HIGH 75: CPT | Mod: FS | Performed by: NURSE PRACTITIONER

## 2024-12-28 PROCEDURE — 36415 COLL VENOUS BLD VENIPUNCTURE: CPT | Performed by: NURSE PRACTITIONER

## 2024-12-28 PROCEDURE — 999N000208 ECHOCARDIOGRAM COMPLETE

## 2024-12-28 PROCEDURE — 80048 BASIC METABOLIC PNL TOTAL CA: CPT | Performed by: HOSPITALIST

## 2024-12-28 PROCEDURE — 250N000009 HC RX 250: Performed by: NURSE PRACTITIONER

## 2024-12-28 PROCEDURE — 85041 AUTOMATED RBC COUNT: CPT | Performed by: HOSPITALIST

## 2024-12-28 RX ORDER — CLOPIDOGREL BISULFATE 75 MG/1
300 TABLET ORAL ONCE
Status: COMPLETED | OUTPATIENT
Start: 2024-12-28 | End: 2024-12-28

## 2024-12-28 RX ORDER — CLOPIDOGREL BISULFATE 75 MG/1
75 TABLET ORAL DAILY
Status: DISCONTINUED | OUTPATIENT
Start: 2024-12-29 | End: 2024-12-30 | Stop reason: HOSPADM

## 2024-12-28 RX ORDER — IOPAMIDOL 755 MG/ML
102 INJECTION, SOLUTION INTRAVASCULAR ONCE
Status: COMPLETED | OUTPATIENT
Start: 2024-12-28 | End: 2024-12-28

## 2024-12-28 RX ADMIN — ASPIRIN 81 MG CHEWABLE TABLET 81 MG: 81 TABLET CHEWABLE at 08:48

## 2024-12-28 RX ADMIN — IOPAMIDOL 102 ML: 755 INJECTION, SOLUTION INTRAVENOUS at 13:15

## 2024-12-28 RX ADMIN — ATORVASTATIN CALCIUM 40 MG: 40 TABLET, FILM COATED ORAL at 20:13

## 2024-12-28 RX ADMIN — SODIUM CHLORIDE 71 ML: 9 INJECTION, SOLUTION INTRAVENOUS at 13:15

## 2024-12-28 RX ADMIN — CLOPIDOGREL BISULFATE 300 MG: 75 TABLET ORAL at 16:07

## 2024-12-28 RX ADMIN — PERFLUTREN 10 ML: 6.52 INJECTION, SUSPENSION INTRAVENOUS at 12:40

## 2024-12-28 ASSESSMENT — ACTIVITIES OF DAILY LIVING (ADL)
ADLS_ACUITY_SCORE: 33
ADLS_ACUITY_SCORE: 32
ADLS_ACUITY_SCORE: 33
ADLS_ACUITY_SCORE: 32
ADLS_ACUITY_SCORE: 33
ADLS_ACUITY_SCORE: 33
PREVIOUS_RESPONSIBILITIES: MEAL PREP;HOUSEKEEPING;LAUNDRY;SHOPPING;MEDICATION MANAGEMENT;FINANCES;DRIVING;WORK
ADLS_ACUITY_SCORE: 33
ADLS_ACUITY_SCORE: 32
ADLS_ACUITY_SCORE: 32
ADLS_ACUITY_SCORE: 33
ADLS_ACUITY_SCORE: 32
ADLS_ACUITY_SCORE: 32
DEPENDENT_IADLS:: INDEPENDENT
ADLS_ACUITY_SCORE: 32
ADLS_ACUITY_SCORE: 32
ADLS_ACUITY_SCORE: 33
ADLS_ACUITY_SCORE: 33

## 2024-12-28 NOTE — PROGRESS NOTES
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, deaf in the left ear, diabetes mellitus, history of gout who presented to the ED with the chief complaint of evaluation of the numbness and admitted to the hospital on 12/27/2024        Multiple 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  Hyperlipidemia  *On admission presented with right lower extremity weakness and numbness of 4 days  *MRI brain shows several scattered small foci of acute infarct involving the left parietal lobe and also nonspecific contrast-enhancement of the left occipital cortex  *CT head-small foci of acute to subacute infarct in the left parietal lobe, chronic lacunar infarct involving the left cerebellum, left parietal lobe, basal ganglia, no hemorrhage, complete opacification of the left mastoid air cells  *CTA head-no significant stenosis, aneurysm or high flow malformation  *CTA neck, no significant stenosis, 0.4 cm pulmonary nodule in the right upper lobe  *Stroke neurology consulted with the ED physician and patient was loaded with full dose of aspirin   *TTE- normal LV function, no evidence of LV mass or tumor  *Lipid panel: , A1c 6%  *s/p Plavix load on 12/28/2024    - stroke neurology following, appreciate assistance  - continue with ASA 81mg daily  - Plavix 75mg daily starting 12/29  - CT chest/abd/pelvis completed-result pending  - neuro recommends AURA--> this will likely happen on Monday. Need to be ordered tomorrow  - blood culture is obtained on 12/27- no growth to date  -  therapy evaluation  -Permissive hypertension in the first 24 hours and PRNs available  -Continue with medications as per neurology with aspirin 81 mg daily and atorvastatin 40 mg  -Stroke education        ?  MRI finding of left mastoid effusion with contrast-enhancement and concerning for mastoiditis and there is subtle pachymeningeal dural enhancement overlying the effusion concerning for meningitis and adjacent parenchymal contrast enhancement concerning for early cerebritis  -Patient has no symptoms concerning for infection and her white count is also normal without any evidence of fever.  On exam there is no neck stiffness, no photophobia and she has no altered mental status  - CRP is normal  - prior MRIs in the past and at that time there was concern about complete opacification of the left mastoid air cells and questionable enhancement about the left external auditory canal  - discussed with neurology team, will consult ENT for further evaluation  -No indication of antibiotics at this point in time     Recent right radial nerve palsy  -Patient had outpatient workup done in [place] around [facility] and had numbness around the right arm and was diagnosed with right radial nerve palsy and follows with neuro in clinic and has outpatient MRI scanned  -Symptoms have clinically improved up to 90%     Incidental finding of 0.4 cm pulmonary nodule in the right upper lobe  -Will need further imaging as outpatient     Diabetes mellitus  -Recent HbA1c on 12/18/2024 is 6.1  -Patient is currently only taking Ozempic  -monitor BG today, if levels below threshold for sliding scale insulin, will discontinue tomorrow     CKD stage III  -Her recent creatinine has been around 1.33  -Creatinine at baseline  -Continue to monitor     History of gout  History of 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 Indication: Stroke, acute (48 hours)  Code Status: Full Code      Clinically Significant Risk Factors Present on Admission                             # Obesity: Estimated body mass index is 32.7 kg/m  as calculated from the following:    Height as of this encounter: 1.676 m (5' 6\").    Weight as of this encounter: 91.9 kg (202 lb 9.6 oz).              Social Drivers of Health    Housing Stability: High Risk (12/27/2024)    Housing Stability     Do you have housing? : Yes     Are you worried about losing your housing?: Yes   Financial Resource Strain: High Risk (12/27/2024)    Financial Resource Strain     Within the past 12 months, have you or your family members you live with been unable to get utilities (heat, electricity) when it was really needed?: Yes   Transportation Needs: High Risk (12/27/2024)    Transportation Needs     Within the past 12 months, has lack of transportation kept you from medical appointments, getting your medicines, non-medical meetings or appointments, work, or from getting things that you need?: Yes   Physical Activity: Insufficiently Active (7/26/2024)    Received from [facility]    Exercise Vital Sign     Days of Exercise per Week: 2 days     Minutes of Exercise per Session: 20 min   Social Connections: Unknown (11/27/2022)    Received from [facility], [facility]    Social Connection and Isolation Panel [NHANES]     Frequency of Communication with Friends and Family: More than three times a week     Frequency of Social Gatherings with Friends and Family: More than three times a week     Attends Hoahaoism Services: More than 4 times " per year     Active Member of Clubs or Organizations: Yes     Attends Club or Organization Meetings: More than 4 times per year          Disposition Plan     Medically Ready for Discharge: Anticipated in 2-4 Days will need AURA on Monday and further neurology recommendations.             Miles Osorio MD  Hospitalist Service  [facility]  Securely message with [employer] (more info)  Text page via [employer] Paging/Directory   ______________________________________________________________________    Interval History   Patient denies fever, chills, headache, n/v, neck stiffness.   She reports she feels improved today.     Physical Exam   Vital Signs:     BP: 111/67 Pulse: 98   Resp: 20 SpO2: 97 % O2 Device: None (Room air)    Weight: 202 lbs 9.6 oz    General Appearance: Alert, awake and no apparent distress  Respiratory: clear to auscultation bilaterally  Cardiovascular: regular rate and rhythm  GI: soft and non-tender      Medical Decision Making       MANAGEMENT DISCUSSED with the following over the past 24 hours: patient, RN, neurology team   NOTE(S)/MEDICAL RECORDS REVIEWED over the past 24 hours: neurology note, nursing note  Tests ORDERED & REVIEWED in the past 24 hours:  - BMP  - CBC  - A1c, lipid panel       Data     I have personally reviewed the following data over the past 24 hrs:    6.1  \   12.9   / 142 (L)     138 103 18.6 /  118 (H)   4.0 24 1.18 (H) \     Trop: N/A BNP: N/A     TSH: N/A T4: N/A A1C: N/A     Procal: N/A CRP: <3.00 Lactic Acid: N/A       INR:  1.06 PTT:  26   D-dimer:  N/A Fibrinogen:  N/A       Imaging results reviewed over the past 24 hrs:   Recent Results (from the past 24 hours)   MR Brain w/o & w Contrast    Narrative    EXAM: MR BRAIN W/O and W CONTRAST, MR LUMBAR SPINE W/O and W CONTRAST  LOCATION: [facility]  DATE: 12/27/2024    INDICATION: RUE and RLE weakness, hx of radiation exposure  COMPARISON: None.  CONTRAST: 9mL  Gadavist  TECHNIQUE:   1) Routine multiplanar multisequence head MRI without and with intravenous contrast.  2) Routine Lumbar Spine MRI without and with IV contrast.    FINDINGS:  HEAD MRI:  INTRACRANIAL CONTENTS: There are patchy areas of restricted diffusion consistent with acute infarct involving the left parietal centrum semiovale and left parietal cortex. Largest area of infarct measures 8 mm. No mass, acute hemorrhage, or extra-axial   fluid collections. Scattered nonspecific T2/FLAIR hyperintensities within the cerebral white matter most consistent with mild chronic microvascular ischemic change. There is chronic lacunar infarct of the left centrum semiovale. Chronic lacunar infarct   of the left cerebellar hemisphere. There is punctate focus of susceptibility artifact representing cavernoma with adjacent developmental venous anomaly at the right occipital lobe. Mild generalized cerebral atrophy. No hydrocephalus. Normal position of   the cerebellar tonsils. There is subtle pachymeningeal dural enhancement at the left temporal convexity overlying the left mastoid effusion. There is questionable focus of punctate contrast enhancement at the left temporal lobe without corresponding   signal abnormality on additional sequences best seen series 23 image 16. There is nonspecific curvilinear contrast enhancement at the left occipital cortex series 22 image 15 and series 23 image 28.    SELLA: No abnormality accounting for technique.    OSSEOUS STRUCTURES/SOFT TISSUES: Normal marrow signal. The major intracranial vascular flow voids are maintained.     ORBITS: No abnormality accounting for technique.     SINUSES/MASTOIDS: Mild to moderate mucosal thickening of left maxillary sinus. Complete/near complete opacification of the left mastoid air cells with associated contrast enhancement.. No apparent mass in the posterior nasopharynx or skull base.     LUMBAR SPINE:   Nomenclature is based on 5 lumbar type vertebral  bodies. Normal vertebral body heights, alignment and marrow signal. Normal distal spinal cord and cauda equina with conus medullaris at L1. Negative for pathologic contrast. No extraspinal abnormality.   Unremarkable visualized bony pelvis.    T12-L1: Normal disc height and signal. No herniation. Normal facets. No spinal canal or neural foraminal stenosis.     L1-L2: Normal disc height and signal. No herniation. Normal facets. No spinal canal or neural foraminal stenosis.    L2-L3: Normal disc height and signal. Mild disc bulge. Mild facet hypertrophy. No spinal canal or neural foraminal stenosis.     L3-L4: Disc desiccation and disc height loss. Mild disc bulge. Bilateral facet hypertrophy. No spinal canal stenosis. Mild bilateral neural foraminal stenosis. Mild bilateral subarticular stenosis.    L4-L5: Disc desiccation and disc height loss. Diffuse disc bulge. Left foraminal disc protrusion and annular fissure. Bilateral facet hypertrophy. No spinal canal stenosis. Minor bilateral neural foraminal stenosis.    L5-S1: Disc desiccation and disc height loss. Mild disc bulge. Bilateral facet hypertrophy. No spinal canal or foraminal stenosis.      Impression    IMPRESSION:  HEAD MRI:   1.  Several scattered small foci of acute infarct involving the left parietal lobe.  2.  Dense left mastoid effusion with contrast enhancement. This is concerning for mastoiditis. There is subtle pachymeningeal dural enhancement overlying the effusion concerning for meningitis and adjacent parenchymal contrast enhancement concerning for   early cerebritis.  3.  Additional nonspecific contrast enhancement at the left occipital cortex. This may represent areas of subacute ischemia, infection, or metastatic disease.    LUMBAR SPINE MRI:  1.  Mild degenerative changes without high-grade spinal canal or neural foraminal stenosis.   MR Lumbar Spine w/o & w Contrast    Narrative    EXAM: MR BRAIN W/O and W CONTRAST, MR LUMBAR SPINE W/O and W  CONTRAST  LOCATION: [facility]  DATE: 12/27/2024    INDICATION: RUE and RLE weakness, hx of radiation exposure  COMPARISON: None.  CONTRAST: 9mL Gadavist  TECHNIQUE:   1) Routine multiplanar multisequence head MRI without and with intravenous contrast.  2) Routine Lumbar Spine MRI without and with IV contrast.    FINDINGS:  HEAD MRI:  INTRACRANIAL CONTENTS: There are patchy areas of restricted diffusion consistent with acute infarct involving the left parietal centrum semiovale and left parietal cortex. Largest area of infarct measures 8 mm. No mass, acute hemorrhage, or extra-axial   fluid collections. Scattered nonspecific T2/FLAIR hyperintensities within the cerebral white matter most consistent with mild chronic microvascular ischemic change. There is chronic lacunar infarct of the left centrum semiovale. Chronic lacunar infarct   of the left cerebellar hemisphere. There is punctate focus of susceptibility artifact representing cavernoma with adjacent developmental venous anomaly at the right occipital lobe. Mild generalized cerebral atrophy. No hydrocephalus. Normal position of   the cerebellar tonsils. There is subtle pachymeningeal dural enhancement at the left temporal convexity overlying the left mastoid effusion. There is questionable focus of punctate contrast enhancement at the left temporal lobe without corresponding   signal abnormality on additional sequences best seen series 23 image 16. There is nonspecific curvilinear contrast enhancement at the left occipital cortex series 22 image 15 and series 23 image 28.    SELLA: No abnormality accounting for technique.    OSSEOUS STRUCTURES/SOFT TISSUES: Normal marrow signal. The major intracranial vascular flow voids are maintained.     ORBITS: No abnormality accounting for technique.     SINUSES/MASTOIDS: Mild to moderate mucosal thickening of left maxillary sinus. Complete/near complete opacification of the left mastoid air cells  with associated contrast enhancement.. No apparent mass in the posterior nasopharynx or skull base.     LUMBAR SPINE:   Nomenclature is based on 5 lumbar type vertebral bodies. Normal vertebral body heights, alignment and marrow signal. Normal distal spinal cord and cauda equina with conus medullaris at L1. Negative for pathologic contrast. No extraspinal abnormality.   Unremarkable visualized bony pelvis.    T12-L1: Normal disc height and signal. No herniation. Normal facets. No spinal canal or neural foraminal stenosis.     L1-L2: Normal disc height and signal. No herniation. Normal facets. No spinal canal or neural foraminal stenosis.    L2-L3: Normal disc height and signal. Mild disc bulge. Mild facet hypertrophy. No spinal canal or neural foraminal stenosis.     L3-L4: Disc desiccation and disc height loss. Mild disc bulge. Bilateral facet hypertrophy. No spinal canal stenosis. Mild bilateral neural foraminal stenosis. Mild bilateral subarticular stenosis.    L4-L5: Disc desiccation and disc height loss. Diffuse disc bulge. Left foraminal disc protrusion and annular fissure. Bilateral facet hypertrophy. No spinal canal stenosis. Minor bilateral neural foraminal stenosis.    L5-S1: Disc desiccation and disc height loss. Mild disc bulge. Bilateral facet hypertrophy. No spinal canal or foraminal stenosis.      Impression    IMPRESSION:  HEAD MRI:   1.  Several scattered small foci of acute infarct involving the left parietal lobe.  2.  Dense left mastoid effusion with contrast enhancement. This is concerning for mastoiditis. There is subtle pachymeningeal dural enhancement overlying the effusion concerning for meningitis and adjacent parenchymal contrast enhancement concerning for   early cerebritis.  3.  Additional nonspecific contrast enhancement at the left occipital cortex. This may represent areas of subacute ischemia, infection, or metastatic disease.    LUMBAR SPINE MRI:  1.  Mild degenerative changes  without high-grade spinal canal or neural foraminal stenosis.   Head CT w/o contrast    Narrative    EXAM: CT HEAD W/O CONTRAST, CTA HEAD NECK W CONTRAST  LOCATION: [facility]  DATE: 12/27/2024    INDICATION: new stroke  COMPARISON: MRI December 27, 2024  CONTRAST: 67ml isovue 370  TECHNIQUE: Head and neck CT angiogram with IV contrast. Noncontrast head CT followed by axial helical CT images of the head and neck vessels obtained during the arterial phase of intravenous contrast administration. Axial 2D reconstructed images and   multiplanar 3D MIP reconstructed images of the head and neck vessels were performed by the technologist. Dose reduction techniques were used. All stenosis measurements made according to NASCET criteria unless otherwise specified.    FINDINGS:   NONCONTRAST HEAD CT:   INTRACRANIAL CONTENTS: No intracranial hemorrhage, extraaxial collection, or mass effect.  Known small foci of acute to subacute infarction in the left parietal lobe are better appreciated by MRI. Small chronic left cerebellar, left parietal, and left   basal ganglia lacunar infarcts are noted. Normal parenchymal attenuation. Normal ventricles and sulci.     VISUALIZED ORBITS/SINUSES/MASTOIDS: No intraorbital abnormality. There is severe mucosal thickening of the left maxillary sinus. Complete/near complete opacification of the left mastoid air cells. No apparent mass in the posterior nasopharynx or skull   base.    BONES/SOFT TISSUES: No acute abnormality.    HEAD CTA:  ANTERIOR CIRCULATION: No stenosis/occlusion, aneurysm, or high flow vascular malformation. Standard [place] anatomy.    POSTERIOR CIRCULATION: No stenosis/occlusion, aneurysm, or high flow vascular malformation. Dominant left and smaller right vertebral artery contribute to a normal basilar artery.     DURAL VENOUS SINUSES: Expected enhancement of the major dural venous sinuses.    NECK CTA:  RIGHT CAROTID: No measurable  stenosis or dissection.    LEFT CAROTID: No measurable stenosis or dissection.    VERTEBRAL ARTERIES: No severe focal stenosis or dissection. Dominant left and smaller right vertebral arteries.    AORTIC ARCH: Classic aortic arch anatomy with no significant stenosis at the origin of the great vessels.    NONVASCULAR STRUCTURES: 0.4 cm right upper lobe pulmonary nodule..      Impression    IMPRESSION:   HEAD CT:  1.  Known small foci of acute to subacute infarction in the left parietal lobe appreciated by MRI.  2.  Chronic lacunar infarct involving the left cerebellum, left parietal lobe, basal ganglia is seen on comparison MRI.  3.  No acute cranial hemorrhage, extra-axial collection, or midline shift.  4.  Complete/complete opacification of the left mastoid air cells.    HEAD CTA:   1.  No significant stenosis, aneurysm, or high flow vascular malformation identified.    NECK CTA:  1.  No hemodynamically significant stenosis within the vessels of the neck.  2.  0.4 cm pulmonary nodule right upper lobe. In a low-risk patient no routine follow-up required. In high risk patient recommend dedicated CT chest.   CTA Head Neck with Contrast    Narrative    EXAM: CT HEAD W/O CONTRAST, CTA HEAD NECK W CONTRAST  LOCATION: [facility]  DATE: 12/27/2024    INDICATION: new stroke  COMPARISON: MRI December 27, 2024  CONTRAST: 67ml isovue 370  TECHNIQUE: Head and neck CT angiogram with IV contrast. Noncontrast head CT followed by axial helical CT images of the head and neck vessels obtained during the arterial phase of intravenous contrast administration. Axial 2D reconstructed images and   multiplanar 3D MIP reconstructed images of the head and neck vessels were performed by the technologist. Dose reduction techniques were used. All stenosis measurements made according to NASCET criteria unless otherwise specified.    FINDINGS:   NONCONTRAST HEAD CT:   INTRACRANIAL CONTENTS: No intracranial hemorrhage,  extraaxial collection, or mass effect.  Known small foci of acute to subacute infarction in the left parietal lobe are better appreciated by MRI. Small chronic left cerebellar, left parietal, and left   basal ganglia lacunar infarcts are noted. Normal parenchymal attenuation. Normal ventricles and sulci.     VISUALIZED ORBITS/SINUSES/MASTOIDS: No intraorbital abnormality. There is severe mucosal thickening of the left maxillary sinus. Complete/near complete opacification of the left mastoid air cells. No apparent mass in the posterior nasopharynx or skull   base.    BONES/SOFT TISSUES: No acute abnormality.    HEAD CTA:  ANTERIOR CIRCULATION: No stenosis/occlusion, aneurysm, or high flow vascular malformation. Standard [place] anatomy.    POSTERIOR CIRCULATION: No stenosis/occlusion, aneurysm, or high flow vascular malformation. Dominant left and smaller right vertebral artery contribute to a normal basilar artery.     DURAL VENOUS SINUSES: Expected enhancement of the major dural venous sinuses.    NECK CTA:  RIGHT CAROTID: No measurable stenosis or dissection.    LEFT CAROTID: No measurable stenosis or dissection.    VERTEBRAL ARTERIES: No severe focal stenosis or dissection. Dominant left and smaller right vertebral arteries.    AORTIC ARCH: Classic aortic arch anatomy with no significant stenosis at the origin of the great vessels.    NONVASCULAR STRUCTURES: 0.4 cm right upper lobe pulmonary nodule..      Impression    IMPRESSION:   HEAD CT:  1.  Known small foci of acute to subacute infarction in the left parietal lobe appreciated by MRI.  2.  Chronic lacunar infarct involving the left cerebellum, left parietal lobe, basal ganglia is seen on comparison MRI.  3.  No acute cranial hemorrhage, extra-axial collection, or midline shift.  4.  Complete/complete opacification of the left mastoid air cells.    HEAD CTA:   1.  No significant stenosis, aneurysm, or high flow vascular malformation  identified.    NECK CTA:  1.  No hemodynamically significant stenosis within the vessels of the neck.  2.  0.4 cm pulmonary nodule right upper lobe. In a low-risk patient no routine follow-up required. In high risk patient recommend dedicated CT chest.   Echocardiogram Complete    Narrative    [id number]  [id number]  [id number]^HORTENSIA^KYLIE^LUISANA     [facility]  Echocardiography Laboratory  [address] [place] [id number]     Name: ROSELINE BARCLAY  MRN: [id number]  : 1965  Study Date: 2024 12:23 PM  Age: 59 yrs  Gender: Female  Patient Location: [facility]  Reason For Study: CVA  Ordering Physician: KYLIE BAZAN  Referring Physician: ALYCIA PMD  Performed By: Ray Bar RDCS     BSA: 2.0 m2  Height: 66 in  Weight: 202 lb  HR: 62  BP: 111/67 mmHg  ______________________________________________________________________________  Procedure  Echocardiogram with two-dimensional, color and spectral Doppler. Definity (NDC  #[id number]) given intravenously.  ______________________________________________________________________________  Interpretation Summary     Contrast was used without apparent complications. No cardiac source of emboli  noted.  ______________________________________________________________________________  Left Ventricle  The left ventricle is normal in size. There is normal left ventricular wall  thickness. Left ventricular systolic function is normal. Left ventricular  diastolic function is normal. Normal left ventricular wall motion. No evidence  of left ventricular mass or tumors.     Right Ventricle  The right ventricle is normal in structure, function and size.     Atria  Normal left atrial size. Right atrial size is normal.     Mitral Valve  The mitral valve leaflets appear thickened, but open well.     Tricuspid Valve  Normal tricuspid valve.     Aortic Valve  Normal tricuspid aortic valve.     Pulmonic Valve  Normal pulmonic valve.     Vessels  The  aortic root is normal size. Normal size ascending aorta.     Pericardium  The pericardium appears normal.     Rhythm  Sinus rhythm was noted.  ______________________________________________________________________________  MMode/2D Measurements & Calculations  LVIDd: 3.4 cm  LVIDs: 2.0 cm  LVPWd: 0.92 cm     FS: 42.1 %  Ao root diam: 2.8 cm  LA dimension: 3.9 cm  asc Aorta Diam: 3.2 cm  LA/Ao: 1.4  Ao root diam index Ht(cm/m): 1.7  Ao root diam index BSA (cm/m2): 1.4  Asc Ao diam index BSA (cm/m2): 1.6  Asc Ao diam index Ht(cm/m): 1.9  LA Volume (BP): 34.8 ml  LA Volume Index (BP): 17.3 ml/m2     RWT: 0.54     Doppler Measurements & Calculations  MV E max rocco: 69.0 cm/sec  MV A max rocco: 84.7 cm/sec  MV E/A: 0.81  MV dec slope: 337.7 cm/sec2  MV dec time: 0.20 sec  PA acc time: 0.17 sec  TR max rocco: 227.0 cm/sec  TR max P.6 mmHg  E/E' av.7  Lateral E/e': 9.9  Medial E/e': 13.5  RV S Rocco: 12.1 cm/sec     ______________________________________________________________________________  Report approved by: Tyrone Howard MD on 2024 01:45 PM

## 2024-12-28 NOTE — PROGRESS NOTES
RECEIVING UNIT ED HANDOFF REVIEW    ED Nurse Handoff Report was reviewed by: Darling Gutierrez RN on December 27, 2024 at 10:53 PM

## 2024-12-28 NOTE — PLAN OF CARE
SLP: Orders received. Chart reviewed and discussed with care team. SLP not indicated due to no skilled intervention indicated. Passed the swallow screen and no reported difficulties swallowing. Her speech/language is intact during a conversation. Defer discharge recommendations to the medical team.  Will complete orders.

## 2024-12-28 NOTE — ED NOTES
"  [facility]    Stroke Telephone Note    I was called by Noah Leach on 12/27/24 regarding patient Afshan Jimenez. The patient is a 59 year old female with past medical history significant for AML in remission, parotid/palate cancer s/p radiation and type 2 diabetes mellitus presents with 4 days of R leg numbness. mri brain showed several L parietal infarcts, also some subtle dural thickening.     Vitals  BP: 121/82   Pulse: 104   Resp: 10   Temp: 97.5  F (36.4  C)   Weight: 89.8 kg (198 lb)    Imaging Findings  HEAD MRI:   1.  Several scattered small foci of acute infarct involving the left parietal lobe.  2.  Dense left mastoid effusion with contrast enhancement. This is concerning for mastoiditis. There is subtle pachymeningeal dural enhancement overlying the effusion concerning for meningitis and adjacent parenchymal contrast enhancement concerning for   early cerebritis.  3.  Additional nonspecific contrast enhancement at the left occipital cortex. This may represent areas of subacute ischemia, infection, or metastatic disease.    CTA head/neck: NA    Impression  Acute ischemic stroke of L parietal region due to embolic stroke of undetermined source (ESUS)      Recommendations  - Use orderset: \"Ischemic Stroke Routine Admission\" or \"Ischemic Stroke No Thrombolytics/No Thrombectomy ICU Admission\"  - Place Neurology IP Stroke Consult order   - CTA head and neck   - Neurochecks and Vital Signs every 4 hrs  - Permissive HTN; goal SBP < 220 mmHg  -  mg followed by Daily aspirin 81 mg for secondary stroke prevention  - Statin: Atorvastatin 40 mg qd  - Telemetry, EKG  - Bedside Glucose Monitoring  - A1c, Lipid Panel, Troponin x 3  - PT/OT/SLP  - Stroke Education  - Euthermia, Euglycemia    My recommendations are based on the information provided over the phone by Afshan ZARCO Demondnikole's in-person providers. They are not intended to replace the clinical judgment of her in-person " "providers. I was not requested to personally see or examine the patient at this time.     The Stroke Staff is Dr. Moon.    Delvis Concepcion MD  Vascular Neurology Fellow    To page me or covering stroke neurology team member, click here: AMCOM  Choose \"On Call\" tab at top, then select \"NEUROLOGY/ALL SITES\" from middle drop-down box, press Enter, then look for \"stroke\" or \"telestroke\" for your site.   "

## 2024-12-28 NOTE — PLAN OF CARE
Physical Therapy: Orders received. Chart reviewed and discussed with care team.? Physical Therapy not indicated after discussion with OT, pt was able to ambulate, complete stairs with OT at her baseline. No acute needs at this time. OT did think pt would benefit from OP PT for chronic generalized weakness, placed order in chart.? Defer discharge recommendations to medical team and OT.? Will complete orders.

## 2024-12-28 NOTE — CONSULTS
Care Management Initial Consult    General Information  Assessment completed with: Patient,    Type of CM/SW Visit: Initial Assessment    Primary Care Provider verified and updated as needed: Yes (In between providers / finding a new one)   Readmission within the last 30 days: no previous admission in last 30 days      Reason for Consult: discharge planning  Advance Care Planning: Advance Care Planning Reviewed: no concerns identified          Communication Assessment  Patient's communication style: spoken language (English or Bilingual)    Hearing Difficulty or Deaf: yes   Wear Glasses or Blind: yes    Cognitive  Cognitive/Neuro/Behavioral: WDL  Level of Consciousness: alert  Arousal Level: opens eyes spontaneously  Orientation: oriented x 4  Mood/Behavior: calm, cooperative  Best Language: 0 - No aphasia  Speech: clear, spontaneous, logical    Living Environment:   People in home: alone  sammie  Current living Arrangements: condominium      Able to return to prior arrangements: yes       Family/Social Support:  Care provided by: self  Provides care for: no one  Marital Status: Single  Support system: Sibling(s), Other (specify) ()          Description of Support System: Supportive         Current Resources:   Patient receiving home care services: No        Community Resources: None  Equipment currently used at home: raised toilet seat  Supplies currently used at home: None    Employment/Financial:  Employment Status: employed full-time, self-employed        Financial Concerns: none   Referral to Financial Worker: No       Does the patient's insurance plan have a 3 day qualifying hospital stay waiver?  No    Lifestyle & Psychosocial Needs:  Social Drivers of Health     Food Insecurity: Low Risk  (12/27/2024)    Food Insecurity     Within the past 12 months, did you worry that your food would run out before you got money to buy more?: No     Within the past 12 months, did the food you bought just  not last and you didn t have money to get more?: No   Depression: Not at risk (1/9/2024)    Received from [facility]    PHQ-2     PHQ-2 Score: 0   Housing Stability: High Risk (12/27/2024)    Housing Stability     Do you have housing? : Yes     Are you worried about losing your housing?: Yes   Tobacco Use: Low Risk  (9/26/2024)    Received from [facility]    Patient History     Smoking Tobacco Use: Never     Smokeless Tobacco Use: Never     Passive Exposure: Not on file   Financial Resource Strain: High Risk (12/27/2024)    Financial Resource Strain     Within the past 12 months, have you or your family members you live with been unable to get utilities (heat, electricity) when it was really needed?: Yes   Alcohol Use: Not At Risk (11/27/2022)    Received from [facility], [facility]    AUDIT-C     Frequency of Alcohol Consumption: Never     Average Number of Drinks: Not on file     Frequency of Binge Drinking: Not on file   Transportation Needs: High Risk (12/27/2024)    Transportation Needs     Within the past 12 months, has lack of transportation kept you from medical appointments, getting your medicines, non-medical meetings or appointments, work, or from getting things that you need?: Yes   Physical Activity: Insufficiently Active (7/26/2024)    Received from [facility]    Exercise Vital Sign     Days of Exercise per Week: 2 days     Minutes of Exercise per Session: 20 min   Interpersonal Safety: Low Risk  (12/27/2024)    Interpersonal Safety     Do you feel physically and emotionally safe where you currently live?: Yes     Within the past 12 months, have you been hit, slapped, kicked or otherwise physically hurt by someone?: No     Within the past 12 months, have you been humiliated or emotionally abused in other ways by your partner or ex-partner?: No   Stress: No Stress Concern Present (11/27/2022)    Received from [facility], [facility]    [employer] [place] [employer] - Occupational Stress  Questionnaire     Feeling of Stress : Only a little   Social Connections: Unknown (11/27/2022)    [facility]    Social Connection and Isolation Panel [NHANES]     Frequency of Communication with Friends and Family: More than three times a week     Frequency of Social Gatherings with Friends and Family: More than three times a week     Attends [employer] Services: More than 4 times per year     Active Member of Clubs or Organizations: Yes     Attends Club or Organization Meetings: More than 4 times per year     Marital Status: Not on file   Health Literacy: Not on file       Functional Status:  Prior to admission patient needed assistance:   Dependent ADLs:: Independent  Dependent IADLs:: Independent       Mental Health Status:  Mental Health Status: No Current Concerns       Chemical Dependency Status:  Chemical Dependency Status: No Current Concerns             Values/Beliefs:  Spiritual, Cultural Beliefs, Lutheran Practices, Values that affect care: no  Description of Beliefs that Will Affect Care: born again mónica alvarenga extremely important to pt            Discussed  Partnership in Safe Discharge Planning  document with patient/family: No    Additional Information:    Consult for discharge planning. Per H&P, patient is a 59 year old female who presented to the ED with the chief complaint of evaluation of the numbness and admitted to the hospital on 12/27/2024.    Writer reviewed chart and recommendations at discharge. Writer met with patient at bedside and introduced self and role. Writer confirmed patient's home address as accurate. Patient is currently between PCP's and is working on finding a new provider. Patient lives alone in a condo and has no services at home. Patient is self-employed full-time and has a  that she would like added to her contacts. Patient's only equipment at home is a toilet seat riser and patient does not need anything else at this time.      Consult acknowledged for SDOH concerns. Addressed financial, housing, and transportation concerns with client. Client reported no concerns in any of these areas.      Therapy recommending home with outpatient rehab.     Next Steps: No SW needs at this time    MARIJA Almaraz

## 2024-12-28 NOTE — ED NOTES
Unit floor contacted and states they are not ready yet for pt.  They will read the shed as soon as they can.

## 2024-12-28 NOTE — PROGRESS NOTES
12/28/24 1420   Appointment Info   Signing Clinician's Name / Credentials (OT) Veena Diaz, MS, OTR/L   Living Environment   People in Home alone   Current Living Arrangements condominium   Living Environment Comments Has elevator. Frequently takes stairs up to second floor unit   Self-Care   Usual Activity Tolerance good   Current Activity Tolerance moderate   Regular Exercise No   Equipment Currently Used at Home raised toilet seat  (Step in shower)   Fall history within last six months no   Activity/Exercise/Self-Care Comment Pt reports at baseline is independent in self-cares   Instrumental Activities of Daily Living (IADL)   Previous Responsibilities meal prep;housekeeping;laundry;shopping;medication management;finances;driving;work   IADL Comments Works full-time as . Also helps people with taxes. Pt reports she has lost 52 pounds since starting Ozempic in June 2024. Reports baseline weakness- unsure of exact cause. Was seeing outpatient PT for RUE radial nerve palsy.   General Information   Onset of Illness/Injury or Date of Surgery 12/27/24   Referring Physician Jaleesa Carrillo MD   Patient/Family Therapy Goal Statement (OT) Return home when able. 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 Brain MRI shows several scattered small foci of acute infarct involving the left parietal lobe and also nonspecific  contrast-enhancement of the left occipital cortex   Existing Precautions/Restrictions no known precautions/restrictions   Cognitive Status Examination   Orientation Status orientation to person, place and time   Affect/Mental Status (Cognitive) WFL   Follows Commands follows two-step commands  (Mild difficulty with 3 step commands)   Memory Deficit   (WFL)   Executive Function Deficit minimal deficit   Cognitive Status Comments Pt accurately and independently completed the following portions of SLUMS exam: naming animals in designated time, basic math from a word problem, saying 2 digit and 4 digit number series in reverse order, writing hours on a clock, immediate recall of 4 details of a short story. Pt made error with drawing hour hands on clock to correct lengths. Word finding impairmented noted on one occasion.   Visual Perception   Visual Impairment/Limitations WFL   Impact of Vision Impairment on Function (Vision) Intact peripheral vision, convergence/divergence, tracking, saccades.   Sensory   Sensory Comments Pt has baseline BLE neuropathy. Baseline RUE radial nerve palsy. Symmetric BLE sensation. Pt reports RLE sensory impairments are resolved   Strength Comprehensive (MMT)   Comment, General Manual Muscle Testing (MMT) Assessment 5/5 in LUE and BLEs. Baseline RUE radial nerve palsy- pt reports RUE nerve palsy is 90% recovered   Coordination   Upper Extremity Coordination No deficits were identified   Transfers   Transfer Comments Pt independent in ambulation in room and bathroom, bed mobility, sit to/from stand, navigating up/down 2 stairs   Activities of Daily Living   BADL Assessment/Intervention no deficits identified   Additional Documentation   (Pt independent in toileting, grooming, dressing)   Clinical Impression   Criteria for Skilled Therapeutic Interventions Met (OT) Yes, treatment indicated   OT Diagnosis Decline function   OT Total Evaluation Time   OT Eval, Low Complexity Minutes (34867) 16    OT Goals   Therapy Frequency (OT) One time eval and treatment   OT Predicted Duration/Target Date for Goal Attainment 12/29/24   OT Goals OT Goal 1;OT Goal 2   OT: Goal 1 Pt will engage in at least 10 minutes of functional activities in standing with SBA and no report of significant fatigue in order to progress activity tolerance needed for strenuous IADL tasks   OT: Goal 2 Pt will verbalize understanding of at least 4 techniques to maintain cognitive functioning   Interventions   Interventions Quick Adds Therapeutic Activity   Therapeutic Activities   Therapeutic Activity Minutes (71176) 9   Symptoms noted during/after treatment fatigue   Treatment Detail/Skilled Intervention Upon arrival pt very agreeable to therapy. BP WNL. Pt seen for a focus on enhancing activity tolerance to enhance strenuous IADL performance. Pt ambulated in [place] for 5 minutes and navigated up/down 8 stairs with distant supervision. Pt ambulates at a fair pace and moves cautiously due to baseline high level dynamic balance impairment. Pt participated in education regarding how to progress activity tolerance. Pt with excellent insight and asking regarding work skills and when she is no longer cognitively capable of completing job tasks. Pt participated in education regarding cognitive activities to maintain cognition including exercise, various brain activities. Pt verbalized understanding and denied questions or concerns regarding discharge home.   OT Discharge Planning   OT Plan DC   OT Discharge Recommendation (DC Rec) home with outpatient occupational therapy  (Outpatient PT)   OT Rationale for DC Rec Pt demonstrating ability to complete selfcares and mobility independently. Pt with chronic weakness and baseline level dynamic standing balance impairment and would benefit from outpatient PT services; pt in agreement. Possible high level cog impairment noted. Pt had high level job in finance. Recommend further thorough assessment by  outpatient OT; pt in agreement. No further acute care OT indicated; will sign off.   OT Brief overview of current status Goals of therapy will be to address safe mobility and make recs for d/c to next level of care. Pt and RN will continue to follow all falls risk precautions as documented by RN staff while hospitalized   Total Session Time   Timed Code Treatment Minutes 9   Total Session Time (sum of timed and untimed services) 25     Occupational Therapy Discharge Summary    Reason for therapy discharge:    All goals and outcomes met, no further needs identified.    Progress towards therapy goal(s). See goals on Care Plan [facility] record for goal details.  Goals met    Therapy recommendation(s):    Continued therapy is recommended.  Rationale/Recommendations:  OP OT and PT.

## 2024-12-28 NOTE — PHARMACY
Pharmacy Consult to evaluate for medication related stroke core measures    Afshan Jimenez, 59 year old female admitted for RUE numbness    on 12/27/2024.    Thrombolytic was not given because of Time from onset contraindications    VTE Prophylaxis SCDs /PCDs placed on 12/27, as appropriate prior to end of hospital day 2.    Antithrombotic: aspirin started on 12/27, as appropriate by end of hospital day 2. Continue antithrombotic therapy on discharge to meet quality measures, unless contraindicated.    Anticoagulation if history of A-fib/flutter: Patient does not have history of A-fib/flutter - anticoagulation not required for medication related stroke core measures.     LDL Cholesterol Calculated   Date Value Ref Range Status   12/27/2024 108 (H) <100 mg/dL Final       Patient currently receiving Lipitor (atorvastatin) continue statin on discharge to meet quality measures, unless contraindicated.    Recommendations: None at this time    Thank you for the consult.    Darling Boyd [facility] 12/28/2024 1:39 PM

## 2024-12-28 NOTE — PLAN OF CARE
Goal Outcome Evaluation:      Plan of Care Reviewed With: patient    Overall Patient Progress: no changeOverall Patient Progress: no change     Reason for Admission: multiple scattered infarcts  Cognitive/Mentation: A/Ox 4  Neuros/CMS: Stable w/ increased R foot numbness. (Baseline BLE numbness)  VS: stable on RA. SBP <220  Tele: NSR.  GI/: Continent  Pulmonary: LS clear.  Pain: denies.   Drains/Lines: PIV SL  Skin: Intact  Activity: SBA  Diet: Mod cho with thin liquids. Takes pills whole.   Therapies recs: pending  Discharge: pending  Aggression Stoplight Tool: green  End of shift summary: No acute events overnight.

## 2024-12-28 NOTE — PLAN OF CARE
Goal Outcome Evaluation:      Plan of Care Reviewed With: patient          Outcome Evaluation: discharge home with outpatient rehab

## 2024-12-28 NOTE — ED NOTES
"Patient refusing to change into a gown, patient was very concerned about changing out of their clothes. Patient was informed that we would like to place them on a monitor and monitor them due to their complaint and risk. Patient was not understanding as she \"didn't have to do that last time\". Patient was informed that last time could have been different circumstances and these circumstance would ask that. Patient was then amendable to changing into a gown, patient was placed on the monitors.   "

## 2024-12-28 NOTE — ED NOTES
[facility]  ED Nurse Handoff Report    ED Chief complaint: Numbness      ED Diagnosis:   Final diagnoses:   Parietal lobe infarction (H)   Right arm weakness       Code Status: Full Code    Allergies:   Allergies   Allergen Reactions    Codeine Sulfate Rash       Patient Story: patient reports sudden onset numbness in her right upper extremity right before Thanksgiving. Afshan thought this might be due to a mini stroke but was seen at the ED and diagnosed with radial nerve palsy. For the past month, the numbness has been improving and is back to about 90% of her original function. Then, 4 days ago, Afshan states she felt the same numbness in her right leg and foot that has continued to worsen. She denies any other numbness or weakness aside from the right lower extremity or speech or vision changes. Patient called her Neurologist who recommended she go to the ED for further evaluation and work up. She has 3 MRIs scheduled for Tuesday of next week to evaluate the right upper extremity. [facility] and [place] for primary care   Focused Assessment:  numbness     Treatments and/or interventions provided:   Labs Ordered and Resulted from Time of ED Arrival to Time of ED Departure   COMPREHENSIVE METABOLIC PANEL - Abnormal       Result Value    Sodium 141      Potassium 4.1      Carbon Dioxide (CO2) 26      Anion Gap 11      Urea Nitrogen 19.1      Creatinine 1.23 (*)     GFR Estimate 50 (*)     Calcium 9.9      Chloride 104      Glucose 106 (*)     Alkaline Phosphatase 87      AST 35      ALT 28      Protein Total 7.3      Albumin 4.2      Bilirubin Total 0.5     CBC WITH PLATELETS AND DIFFERENTIAL    WBC Count 7.1      RBC Count 4.97      Hemoglobin 14.1      Hematocrit 43.4      MCV 87      MCH 28.4      MCHC 32.5      RDW 13.6      Platelet Count 159      % Neutrophils 48      % Lymphocytes 39      % Monocytes 8      % Eosinophils 4      % Basophils 0      % Immature  Granulocytes 0      NRBCs per 100 WBC 0      Absolute Neutrophils 3.4      Absolute Lymphocytes 2.8      Absolute Monocytes 0.6      Absolute Eosinophils 0.3      Absolute Basophils 0.0      Absolute Immature Granulocytes 0.0      Absolute NRBCs 0.0     INR   PARTIAL THROMBOPLASTIN TIME   BLOOD CULTURE   BLOOD CULTURE     MR Lumbar Spine w/o & w Contrast   Final Result   IMPRESSION:   HEAD MRI:    1.  Several scattered small foci of acute infarct involving the left parietal lobe.   2.  Dense left mastoid effusion with contrast enhancement. This is concerning for mastoiditis. There is subtle pachymeningeal dural enhancement overlying the effusion concerning for meningitis and adjacent parenchymal contrast enhancement concerning for    early cerebritis.   3.  Additional nonspecific contrast enhancement at the left occipital cortex. This may represent areas of subacute ischemia, infection, or metastatic disease.      LUMBAR SPINE MRI:   1.  Mild degenerative changes without high-grade spinal canal or neural foraminal stenosis.      MR Brain w/o & w Contrast   Final Result   IMPRESSION:   HEAD MRI:    1.  Several scattered small foci of acute infarct involving the left parietal lobe.   2.  Dense left mastoid effusion with contrast enhancement. This is concerning for mastoiditis. There is subtle pachymeningeal dural enhancement overlying the effusion concerning for meningitis and adjacent parenchymal contrast enhancement concerning for    early cerebritis.   3.  Additional nonspecific contrast enhancement at the left occipital cortex. This may represent areas of subacute ischemia, infection, or metastatic disease.      LUMBAR SPINE MRI:   1.  Mild degenerative changes without high-grade spinal canal or neural foraminal stenosis.      CTA Head Neck with Contrast    (Results Pending)   Head CT w/o contrast    (Results Pending)     Medications   gadobutrol (GADAVIST) injection 9 mL (9 mLs Intravenous $Given 12/27/24 8291)    sodium chloride (PF) 0.9% PF flush 10 mL (10 mLs Intravenous $Given 12/27/24 1705)   aspirin (ASA) tablet 325 mg (325 mg Oral $Given 12/27/24 1827)   Saline Flush - CT (90 mLs Intravenous $Given 12/27/24 1838)   iopamidol (ISOVUE-370) solution 67 mL (67 mLs Intravenous $Given 12/27/24 1838)       Patient's response to treatments and/or interventions: tolerated     To be done/followed up on inpatient unit:  na    Does this patient have any cognitive concerns?:  na    Activity level - Baseline/Home:  Unknown  Activity Level - Current:   Unknown    Patient's Preferred language: English   Needed?: No    Isolation: None  Infection: Not Applicable  Patient tested for COVID 19 prior to admission: NO  Bariatric?: No    Vital Signs:   Vitals:    12/27/24 1220 12/27/24 1820 12/27/24 1823 12/27/24 1830   BP: 104/60 121/82  (!) 123/90   Pulse: 105 102 104 104   Resp: 16  10 20   Temp: 97.5  F (36.4  C)      TempSrc: Temporal      SpO2: 96% 92% 98%    Weight: 89.8 kg (198 lb)          Cardiac Rhythm:     Was the PSS-3 completed:   Yes  What interventions are required if any?               Family Comments:   OBS brochure/video discussed/provided to patient/family: Yes              Name of person given brochure if not patient:               Relationship to patient:     For the majority of the shift this patient's behavior was Green.   Behavioral interventions performed were .    ED NURSE PHONE NUMBER: [phone]

## 2024-12-28 NOTE — PHARMACY-ADMISSION MEDICATION HISTORY
Pharmacist Admission Medication History    Admission medication history is complete. The information provided in this note is only as accurate as the sources available at the time of the update.    Information Source(s): Patient and CareEverywhere/SureScripts via in-person    Pertinent Information: Patient reports she should be taking aspirin 81 mg but she is not. She had been on Ozempic 2 mg but reports her doctor told her today to reduce the dose to 1 mg.     Changes made to PTA medication list:  Added: ozempic  Deleted: hydrocodone-acetaminophen, indomethacin  Changed: None    Allergies reviewed with patient and updates made in EHR: yes    Medication History Completed By: Radha Jimenez RPH 12/27/2024 7:28 PM    PTA Med List   Medication Sig Last Dose/Taking    Semaglutide (OZEMPIC, 1 MG/DOSE, SC) Inject 2 mg subcutaneously once a week. On Mondays Past Week

## 2024-12-28 NOTE — H&P
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, deaf in the left ear, diabetes mellitus, history of gout who presented to the ED with the chief complaint of evaluation of the numbness and admitted to the hospital on 12/27/2024      Multiple 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  Hyperlipidemia  -On admission presented with right lower extremity weakness and numbness of 4 days  -MRI brain shows several scattered small foci of acute infarct involving the left parietal lobe and also nonspecific contrast-enhancement of the left occipital cortex  -CT head-small foci of acute to subacute infarct in the left parietal lobe, chronic lacunar infarct involving the left cerebellum, left parietal lobe, basal ganglia, no hemorrhage, complete opacification of the left mastoid air cells  -CTA head-no significant stenosis, aneurysm or high flow malformation  -CTA neck, no significant stenosis, 0.4 cm pulmonary nodule in the right upper lobe  -Stroke neurology consulted with the ED physician and patient was loaded with full dose of aspirin and then continue with aspirin 81 mg and atorvastatin 40 mg  -Neurochecks and neurosigns every 4 hours  -Lipid panel  -HbA1c  -Echo  -Physical therapy/Occupational Therapy/speech  -Permissive hypertension in the first 24 hours and PRNs available  -Continue with medications as per neurology with aspirin 81 mg daily and atorvastatin 40 mg  -Stroke education      ?  MRI finding of left mastoid effusion with contrast-enhancement and concerning  for mastoiditis and there is subtle pachymeningeal dural enhancement overlying the effusion concerning for meningitis and adjacent parenchymal contrast enhancement concerning for early cerebritis  -Patient has no symptoms concerning for infection and her white count is also normal without any evidence of fever  -As per my discussion with the ED physician as per neurology is likely an over read  -Blood cultures ordered  -I have also reviewed her prior MRIs in the past and at that time there was concern about complete opacification of the left mastoid air cells and questionable enhancement about the left external auditory canal  -On exam there is no neck stiffness, no photophobia and she has no altered mental status-  -No indication of antibiotics at this point in time    Recent right radial nerve palsy  -Patient had outpatient workup done in [place] around [facility] and had numbness around the right arm and was diagnosed with right radial nerve palsy and follows with neuro in clinic and has outpatient MRI scanned  -Symptoms have clinically improved up to 90%    Incidental finding of 0.4 cm pulmonary nodule in the right upper lobe  -Will need further imaging as outpatient    Diabetes mellitus  -Recent HbA1c on 12/18/2024 is 6.1  -Patient is currently only taking Ozempic  -Will order sliding scale insulin and moderate dose carbohydrate diet but patient did mention very clearly she does not want to take any insulin and will let the nurse know about it    CKD stage III  -Her recent creatinine has been around 1.33  -Creatinine seems to be stable around baseline at 1.23  -Continue to monitor    History of gout    History of 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  -Noted    History of 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  -Noted    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  Deaf in the left  ear  -Noted          Diet:  Moderate carb  DVT Prophylaxis: Pneumatic Compression Devices  Perez Catheter: Not present  Lines: None     Cardiac Monitoring: None  Code Status:   full code     Clinically Significant Risk Factors Present on Admission                                        Disposition Plan     Medically Ready for Discharge: Anticipated in 2-4 Days, will need stroke work up completed            Jaleesa Carrillo MD  Hospitalist Service  [facility]  Securely message with [facility] (more info)  Text page via [employer] Paging/Directory     This note was completed in part using dictation via the Dragon voice recognition software. Some word and grammatical errors may occur and must be interpreted in the appropriate clinical context. If there are any questions pertaining to this issue, please contact me for further clarification.      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    Patient mentioned that she was in her usual state of health until around Thanksgiving when she noticed right upper extremity numbness and was  diagnosed with a radial nerve palsy she was seen at [facility] and her numbness has improved to about 90% and she was supposed to get outpatient workup including MRIs.  Patient presented to the hospital as she has noticed that for the past 4 days she has noticed numbness in her right leg and foot but this time it is not improving.  She denies any vision changes, speech changes but does mention she has weakness over the right lower extremity.  Patient denies any recent fever, chills, nausea, vomiting, abdominal discomfort, shortness of breath, chest pain, diarrhea, constipation, any change in taste or smell.  She called her primary neurologist at [facility] who recommended her to go to the ED.    ED workup and course    Lab work done in the ED showed sodium of 141, potassium of 4.1, chloride of 104, bicarb of 26, BUN of 19.1 with creatinine of 1.23 with GFR of 50 and LFTs are stable with blood glucose of 106.    WBC count is normal at 7.1, hemoglobin of 14.1 with platelet count of 159      MRI brain shows several scattered small foci of acute infarct involving the left parietal lobe and nonspecific contrast-enhancement of the left occipital cortex and may represent ischemia, infection or metastatic disease and they also read it as dense left mastoid effusion with contrast-enhancement and concerning for mastoiditis and there is subtle pachymeningeal dural enhancement overlying the effusion concerning for meningitis and adjacent parenchymal contrast enhancement concerning for early cerebritis    MRI lumbar spine showed mild degenerative changes without any high-grade spinal canal or neural foramina stenosis.    I was called for an admit and spoke with Dr. Leach and he mentioned that he has discussed the MRI brain findings with the neurology team and the patient has no evidence of any fever or any concerning changes for infection and as per neurology it might be over read and patient does have prior history of  surgical intervention    Neurology recommended that patient be started with full dose aspirin 325 and vessel imaging including CTA head and neck has been ordered and patient will be admitted to the hospital for further workup    CT head-small foci of acute to subacute infarct in the left parietal lobe, chronic lacunar infarct involving the left cerebellum, left parietal lobe, basal ganglia, no hemorrhage, complete opacification of the left mastoid air cells    CTA head-no significant stenosis, aneurysm or high flow malformation  CTA neck, no significant stenosis, 0.4 cm pulmonary nodule in the right upper lobe        Past Medical History    Past Medical History:   Diagnosis Date    Deafness     left ear    Malignant neoplasm (H)     lymphoma       Past Surgical History   Past Surgical History:   Procedure Laterality Date    LAPAROSCOPIC CHOLECYSTECTOMY  10/24/2011    Procedure:LAPAROSCOPIC CHOLECYSTECTOMY; Laparoscopic Cholecystectomy; Surgeon:TIAGO MALDONADO; Location:[facility] OR    SALIVARY GLAND SURGERY      L parotid    SINUS SURGERY      tumor removed from roof of mouth         Prior to Admission Medications   Prior to Admission Medications   Prescriptions Last Dose Informant Patient Reported? Taking?   HYDROcodone-acetaminophen 5-325 MG per tablet   No No   Sig: Take 1-2 tablets by mouth every 4 hours as needed for pain   NO ACTIVE MEDICATIONS   Yes No   indomethacin (INDOCIN) 50 MG capsule   No No   Sig: Take 1 capsule (50 mg) by mouth 3 times daily (with meals) for 10 days      Facility-Administered Medications: None           Physical Exam   Vital Signs: Temp: 97.5  F (36.4  C) Temp src: Temporal BP: 121/82 Pulse: 104   Resp: 10 SpO2: 98 % O2 Device: None (Room air)    Weight: 198 lbs 0 oz        General: Patient appears comfortable and in no acute distress.  HEENT: Head is atraumatic, normocephalic.  Pupils are equal, round and reactive to light.  No scleral icterus. Oral mucosa is moist   Neck: Neck is  supple   Respiratory: Lungs are clear to auscultation bilaterally with no wheeze or crackles   Cardiovascular: Regular rate , S1 and S2 normal with no murmer or rubs or gallops  Abdomen:   soft , non tender , non distended and bowel sound present   Skin: No skin rashes or lesions to inspection or palpation.  Neurologic: Higher functions are within normal limits. No obvious defects in speech, language and memory. No facial droop  Musculoskeletal: Normal Range of motion over upper and lower extremities bilaterally   Psychiatric: cooperative      Medical Decision Making       Time spent in care of patient is 78 minutes and I reviewed labs including CBC, comprehensive metabolic panel, MRI of the brain, MRI of the lumbar spine and also reviewed other imaging and also reviewed scans done in in the past and also records from the [facility]      Data     I have personally reviewed the following data over the past 24 hrs:    7.1  \   14.1   / 159     141 104 19.1 /  106 (H)   4.1 26 1.23 (H) \     ALT: 28 AST: 35 AP: 87 TBILI: 0.5   ALB: 4.2 TOT PROTEIN: 7.3 LIPASE: N/A       Imaging results reviewed over the past 24 hrs:   Recent Results (from the past 24 hours)   MR Brain w/o & w Contrast    Narrative    EXAM: MR BRAIN W/O and W CONTRAST, MR LUMBAR SPINE W/O and W CONTRAST  LOCATION: [facility]  DATE: 12/27/2024    INDICATION: RUE and RLE weakness, hx of radiation exposure  COMPARISON: None.  CONTRAST: 9mL Gadavist  TECHNIQUE:   1) Routine multiplanar multisequence head MRI without and with intravenous contrast.  2) Routine Lumbar Spine MRI without and with IV contrast.    FINDINGS:  HEAD MRI:  INTRACRANIAL CONTENTS: There are patchy areas of restricted diffusion consistent with acute infarct involving the left parietal centrum semiovale and left parietal cortex. Largest area of infarct measures 8 mm. No mass, acute hemorrhage, or extra-axial   fluid collections. Scattered nonspecific T2/FLAIR  hyperintensities within the cerebral white matter most consistent with mild chronic microvascular ischemic change. There is chronic lacunar infarct of the left centrum semiovale. Chronic lacunar infarct   of the left cerebellar hemisphere. There is punctate focus of susceptibility artifact representing cavernoma with adjacent developmental venous anomaly at the right occipital lobe. Mild generalized cerebral atrophy. No hydrocephalus. Normal position of   the cerebellar tonsils. There is subtle pachymeningeal dural enhancement at the left temporal convexity overlying the left mastoid effusion. There is questionable focus of punctate contrast enhancement at the left temporal lobe without corresponding   signal abnormality on additional sequences best seen series 23 image 16. There is nonspecific curvilinear contrast enhancement at the left occipital cortex series 22 image 15 and series 23 image 28.    SELLA: No abnormality accounting for technique.    OSSEOUS STRUCTURES/SOFT TISSUES: Normal marrow signal. The major intracranial vascular flow voids are maintained.     ORBITS: No abnormality accounting for technique.     SINUSES/MASTOIDS: Mild to moderate mucosal thickening of left maxillary sinus. Complete/near complete opacification of the left mastoid air cells with associated contrast enhancement.. No apparent mass in the posterior nasopharynx or skull base.     LUMBAR SPINE:   Nomenclature is based on 5 lumbar type vertebral bodies. Normal vertebral body heights, alignment and marrow signal. Normal distal spinal cord and cauda equina with conus medullaris at L1. Negative for pathologic contrast. No extraspinal abnormality.   Unremarkable visualized bony pelvis.    T12-L1: Normal disc height and signal. No herniation. Normal facets. No spinal canal or neural foraminal stenosis.     L1-L2: Normal disc height and signal. No herniation. Normal facets. No spinal canal or neural foraminal stenosis.    L2-L3: Normal disc  height and signal. Mild disc bulge. Mild facet hypertrophy. No spinal canal or neural foraminal stenosis.     L3-L4: Disc desiccation and disc height loss. Mild disc bulge. Bilateral facet hypertrophy. No spinal canal stenosis. Mild bilateral neural foraminal stenosis. Mild bilateral subarticular stenosis.    L4-L5: Disc desiccation and disc height loss. Diffuse disc bulge. Left foraminal disc protrusion and annular fissure. Bilateral facet hypertrophy. No spinal canal stenosis. Minor bilateral neural foraminal stenosis.    L5-S1: Disc desiccation and disc height loss. Mild disc bulge. Bilateral facet hypertrophy. No spinal canal or foraminal stenosis.      Impression    IMPRESSION:  HEAD MRI:   1.  Several scattered small foci of acute infarct involving the left parietal lobe.  2.  Dense left mastoid effusion with contrast enhancement. This is concerning for mastoiditis. There is subtle pachymeningeal dural enhancement overlying the effusion concerning for meningitis and adjacent parenchymal contrast enhancement concerning for   early cerebritis.  3.  Additional nonspecific contrast enhancement at the left occipital cortex. This may represent areas of subacute ischemia, infection, or metastatic disease.    LUMBAR SPINE MRI:  1.  Mild degenerative changes without high-grade spinal canal or neural foraminal stenosis.   MR Lumbar Spine w/o & w Contrast    Narrative    EXAM: MR BRAIN W/O and W CONTRAST, MR LUMBAR SPINE W/O and W CONTRAST  LOCATION: [facility]  DATE: 12/27/2024    INDICATION: RUE and RLE weakness, hx of radiation exposure  COMPARISON: None.  CONTRAST: 9mL Gadavist  TECHNIQUE:   1) Routine multiplanar multisequence head MRI without and with intravenous contrast.  2) Routine Lumbar Spine MRI without and with IV contrast.    FINDINGS:  HEAD MRI:  INTRACRANIAL CONTENTS: There are patchy areas of restricted diffusion consistent with acute infarct involving the left parietal centrum  semiovale and left parietal cortex. Largest area of infarct measures 8 mm. No mass, acute hemorrhage, or extra-axial   fluid collections. Scattered nonspecific T2/FLAIR hyperintensities within the cerebral white matter most consistent with mild chronic microvascular ischemic change. There is chronic lacunar infarct of the left centrum semiovale. Chronic lacunar infarct   of the left cerebellar hemisphere. There is punctate focus of susceptibility artifact representing cavernoma with adjacent developmental venous anomaly at the right occipital lobe. Mild generalized cerebral atrophy. No hydrocephalus. Normal position of   the cerebellar tonsils. There is subtle pachymeningeal dural enhancement at the left temporal convexity overlying the left mastoid effusion. There is questionable focus of punctate contrast enhancement at the left temporal lobe without corresponding   signal abnormality on additional sequences best seen series 23 image 16. There is nonspecific curvilinear contrast enhancement at the left occipital cortex series 22 image 15 and series 23 image 28.    SELLA: No abnormality accounting for technique.    OSSEOUS STRUCTURES/SOFT TISSUES: Normal marrow signal. The major intracranial vascular flow voids are maintained.     ORBITS: No abnormality accounting for technique.     SINUSES/MASTOIDS: Mild to moderate mucosal thickening of left maxillary sinus. Complete/near complete opacification of the left mastoid air cells with associated contrast enhancement.. No apparent mass in the posterior nasopharynx or skull base.     LUMBAR SPINE:   Nomenclature is based on 5 lumbar type vertebral bodies. Normal vertebral body heights, alignment and marrow signal. Normal distal spinal cord and cauda equina with conus medullaris at L1. Negative for pathologic contrast. No extraspinal abnormality.   Unremarkable visualized bony pelvis.    T12-L1: Normal disc height and signal. No herniation. Normal facets. No spinal canal  or neural foraminal stenosis.     L1-L2: Normal disc height and signal. No herniation. Normal facets. No spinal canal or neural foraminal stenosis.    L2-L3: Normal disc height and signal. Mild disc bulge. Mild facet hypertrophy. No spinal canal or neural foraminal stenosis.     L3-L4: Disc desiccation and disc height loss. Mild disc bulge. Bilateral facet hypertrophy. No spinal canal stenosis. Mild bilateral neural foraminal stenosis. Mild bilateral subarticular stenosis.    L4-L5: Disc desiccation and disc height loss. Diffuse disc bulge. Left foraminal disc protrusion and annular fissure. Bilateral facet hypertrophy. No spinal canal stenosis. Minor bilateral neural foraminal stenosis.    L5-S1: Disc desiccation and disc height loss. Mild disc bulge. Bilateral facet hypertrophy. No spinal canal or foraminal stenosis.      Impression    IMPRESSION:  HEAD MRI:   1.  Several scattered small foci of acute infarct involving the left parietal lobe.  2.  Dense left mastoid effusion with contrast enhancement. This is concerning for mastoiditis. There is subtle pachymeningeal dural enhancement overlying the effusion concerning for meningitis and adjacent parenchymal contrast enhancement concerning for   early cerebritis.  3.  Additional nonspecific contrast enhancement at the left occipital cortex. This may represent areas of subacute ischemia, infection, or metastatic disease.    LUMBAR SPINE MRI:  1.  Mild degenerative changes without high-grade spinal canal or neural foraminal stenosis.

## 2024-12-28 NOTE — CONSULTS
[facility]    Stroke Consult Note    Reason for Consult:  Right leg numbness    Chief Complaint: Right leg Numbness       HPI  Afshan Jimenez is a 59 year old female who presents with 4 days of right leg numbness.  She reports that she was at store when she noticed her right leg felt numb. She was able to walk to her car and drove with her left leg to her work which was closer to her location. She called her neurologist at [facility] and was told to go the ED. However, the symptoms had resolved and she felt better so she did not go the ED on that day. On 12/27, she felt the right leg numbness again and came to the ED. She reports that around Thanksgiving, she had travelled to [place] and had experienced right arm numbness and weakness. She went to the ED and was diagnosed with radial nerve palsy. No CT/MRI was obtained per patient.     She also notes that she was on Ozempic for her DM and has generally felt weak since starting it. She notes that she has lost around 52 lbs. She denies fever, chills, night sweats. Today, she is endorsing occasional headaches mainly in the evenings before bedtime. She says she takes tylenol PM which seems to help with the headaches.     Stroke Evaluation Summarized    MRI/Head CT MRI:  Several scattered small foci of acute infarct involving the left parietal lobe.  2.  Dense left mastoid effusion with contrast enhancement. This is concerning for mastoiditis. There is subtle pachymeningeal dural enhancement overlying the effusion concerning for meningitis and adjacent parenchymal contrast enhancement concerning for   early cerebritis.  3.  Additional nonspecific contrast enhancement at the left occipital cortex. This may represent areas of subacute ischemia, infection, or metastatic disease.   Intracranial Vasculature No acute changes   Cervical Vasculature No acute changes     Echocardiogram Normal   EKG/Telemetry Normal sinus rhythm   Other Testing  "Not Applicable      LDL  12/27/2024: 108 mg/dL   A1C  12/27/2024: 6.0 %   Troponin 12/27/2024: 11 ng/L       Impression  Acute ischemic stroke of left parietal region due to embolic stroke of undetermined source (ESUS). There appears to be a more subacute infarct in the left occipital lobe. Additionally, right arm weakness that occurred around Thanksgiving would correlate with a subacute appearing left hemisphere infarct. Given multiple ages and vascular territories, need to rule out central cardiac source such as endocarditis, LV thrombus.     Recommendations   - Neurochecks Q 4 hours  - Blood pressure goal in patient <180, long term goal 130/80  - Load with Plavix 300 mg today, then DAPT with ASA 81 mg + Plavix 75 mg for 21 days, then ASA 81 mg alone indefinitely   - Statin: Atorvastatin 40 mg daily, LDL 12/27/24 108 (Goal 40-70).  - Telemetry, EKG  - Bedside Glucose Monitoring  - A1c 6.0, within goal <7.0  - PT/OT/SLP  - Stroke Education  - Euthermia, Euglycemia  - ESR/CRP pending   - Recommend AURA for further cardiac evaluation. Discussed with primary team.     2. Left mastoid effusion with contrast enhancement corning for mastoiditis. Patient does not have any concerning infection and WBC is normal. No fever to date.   - Blood cultures NGT  - ENT consult     Patient Follow-up    - final recommendation pending work-up    Thank you for this consult. We will continue to follow.     The Stroke Staff is Dr. Hayes.    Kacey Monroe NP Student  To page a member of the stroke/neurocritical care service, click here:  [facility]   Choose \"On Call\" tab at top, then search dropdown box for \"Neurology Adult\", select location, press Enter, then look for stroke/neuro ICU/telestroke.    I, Matilde Long CNP, was present with the medical/BEV student who participated in the service and in the documentation of the note.  I have verified the history and personally performed the physical exam and medical decision making.  I agree with " "the assessment and plan of care as documented in the note.       Matilde Long, BARTOLO    _____________________________________________________    Clinically Significant Risk Factors Present on Admission                             # Obesity: Estimated body mass index is 32.7 kg/m  as calculated from the following:    Height as of this encounter: 1.676 m (5' 6\").    Weight as of this encounter: 91.9 kg (202 lb 9.6 oz).              Past Medical History    Past Medical History:   Diagnosis Date    Deafness     left ear    Malignant neoplasm (H)     lymphoma     Medications   Home Meds  Prior to Admission medications    Medication Sig Start Date End Date Taking? Authorizing Provider   Semaglutide (OZEMPIC, 1 MG/DOSE, SC) Inject 2 mg subcutaneously once a week. On Mondays 11/27/24  Yes Unknown, Entered By History       Scheduled Meds  Current Facility-Administered Medications   Medication Dose Route Frequency Provider Last Rate Last Admin    aspirin (ASA) chewable tablet 81 mg  81 mg Oral or NG Tube Daily Jaleesa Carrillo MD   81 mg at 12/28/24 0848    atorvastatin (LIPITOR) tablet 40 mg  40 mg Oral QPM Jaleesa Carrillo MD   40 mg at 12/27/24 1934    insulin aspart (NovoLOG) injection (RAPID ACTING)  1-7 Units Subcutaneous TID AC Jaleesa Carrillo MD        insulin aspart (NovoLOG) injection (RAPID ACTING)  1-5 Units Subcutaneous At Bedtime Jaleesa Carrillo MD        sodium chloride (PF) 0.9% PF flush 3 mL  3 mL Intracatheter Q8H Jaleesa Carrillo MD   3 mL at 12/28/24 0848       Infusion Meds  Current Facility-Administered Medications   Medication Dose Route Frequency Provider Last Rate Last Admin       Allergies   Allergies   Allergen Reactions    Codeine Sulfate Rash          PHYSICAL EXAMINATION   Temp:  [97.5  F (36.4  C)] 97.5  F (36.4  C)  Pulse:  [] 96  Resp:  [10-20] 20  BP: ()/(60-99) 97/67  SpO2:  [92 %-98 %] 97 %    General Exam  General:  patient sitting up in chair without any acute distress    HEENT:  " normocephalic/atramatic  Cardio:  no chest pain  Pulmonary:  no respiratory distress  Abdomen:  normal round  Extremities:  no edema  Skin:  warm/dry     Neuro Exam  Mental Status:  alert, oriented x 3, follows commands, speech clear and fluent  Cranial Nerves:  visual fields intact, facial sensation intact and symmetric, facial movements symmetric, hearing not formally tested but intact to conversation, no dysarthria, tongue protrusion midline  Motor:  normal muscle tone and bulk, no abnormal movements, able to move all limbs spontaneously, strength 5/5 throughout upper and lower extremities  Reflexes:  not tested  Sensory:  baseline numbness in bilateral lower legs  Coordination:  normal finger-to-nose and heel-to-shin bilaterally without dysmetria  Station/Gait:  deferred    Stroke Scales    NIHSS 12/28/24 AT 10 am  1a. Level of Consciousness 0-->Alert, keenly responsive   1b. LOC Questions 0-->Answers both questions correctly   1c. LOC Commands 0-->Performs both tasks correctly   2.   Best Gaze 0-->Normal   3.   Visual 0-->No visual loss   4.   Facial Palsy 0-->Normal symmetrical movements   5a. Motor Arm, Left 0-->No drift, limb holds 90 (or 45) degrees for full 10 secs   5b. Motor Arm, Right 0-->No drift, limb holds 90 (or 45) degrees for full 10 secs   6a. Motor Leg, Left 0-->No drift, leg holds 30 degree position for full 5 secs   6b. Motor Leg, right 0-->No drift, leg holds 30 degree position for full 5 secs   7.   Limb Ataxia 0-->Absent   8.   Sensory 1-->Mild-to-moderate sensory loss, patient feels pinprick is less sharp or is dull on the affected side, or there is a loss of superficial pain with pinprick, but patient is aware of being touched   9.   Best Language 0-->No aphasia, normal   10. Dysarthria 0-->Normal   11. Extinction and Inattention  0-->No abnormality   Total 1 (12/28/24 10 am)       Imaging  I personally reviewed all imaging; relevant findings per HPI.    Labs Data   CBC  Recent Labs   Lab  12/28/24  0807 12/27/24  1451   WBC 6.1 7.1   RBC 4.52 4.97   HGB 12.9 14.1   HCT 39.2 43.4   * 159     Basic Metabolic Panel   Recent Labs   Lab 12/28/24  0807 12/28/24  0015 12/27/24  1451     --  141   POTASSIUM 4.0  --  4.1   CHLORIDE 103  --  104   CO2 24  --  26   BUN 18.6  --  19.1   CR 1.18*  --  1.23*   * 118* 106*   JOSE MARIA 9.5  --  9.9     Liver Panel  Recent Labs   Lab 12/27/24  1451   PROTTOTAL 7.3   ALBUMIN 4.2   BILITOTAL 0.5   ALKPHOS 87   AST 35   ALT 28     INR    Recent Labs   Lab Test 12/27/24  1902   INR 1.06           Stroke Consult Data Data   This was a non-emergent, non-telestroke consult.  I have personally spent a total of 30 minutes providing care today, time spent in reviewing medical records and devising the plan as recorded above.

## 2024-12-29 LAB
ANION GAP SERPL CALCULATED.3IONS-SCNC: 12 MMOL/L (ref 7–15)
BUN SERPL-MCNC: 15.3 MG/DL (ref 8–23)
CALCIUM SERPL-MCNC: 9.5 MG/DL (ref 8.8–10.4)
CHLORIDE SERPL-SCNC: 105 MMOL/L (ref 98–107)
CREAT SERPL-MCNC: 1.12 MG/DL (ref 0.51–0.95)
EGFRCR SERPLBLD CKD-EPI 2021: 56 ML/MIN/1.73M2
ERYTHROCYTE [DISTWIDTH] IN BLOOD BY AUTOMATED COUNT: 13.4 % (ref 10–15)
GLUCOSE BLDC GLUCOMTR-MCNC: 112 MG/DL (ref 70–99)
GLUCOSE BLDC GLUCOMTR-MCNC: 113 MG/DL (ref 70–99)
GLUCOSE BLDC GLUCOMTR-MCNC: 175 MG/DL (ref 70–99)
GLUCOSE BLDC GLUCOMTR-MCNC: 99 MG/DL (ref 70–99)
GLUCOSE SERPL-MCNC: 163 MG/DL (ref 70–99)
HCO3 SERPL-SCNC: 23 MMOL/L (ref 22–29)
HCT VFR BLD AUTO: 40.3 % (ref 35–47)
HGB BLD-MCNC: 13.6 G/DL (ref 11.7–15.7)
MCH RBC QN AUTO: 28.8 PG (ref 26.5–33)
MCHC RBC AUTO-ENTMCNC: 33.7 G/DL (ref 31.5–36.5)
MCV RBC AUTO: 85 FL (ref 78–100)
PLATELET # BLD AUTO: 154 10E3/UL (ref 150–450)
POTASSIUM SERPL-SCNC: 4.1 MMOL/L (ref 3.4–5.3)
RBC # BLD AUTO: 4.72 10E6/UL (ref 3.8–5.2)
SODIUM SERPL-SCNC: 140 MMOL/L (ref 135–145)
WBC # BLD AUTO: 6.6 10E3/UL (ref 4–11)

## 2024-12-29 PROCEDURE — 85049 AUTOMATED PLATELET COUNT: CPT | Performed by: INTERNAL MEDICINE

## 2024-12-29 PROCEDURE — 82565 ASSAY OF CREATININE: CPT | Performed by: INTERNAL MEDICINE

## 2024-12-29 PROCEDURE — 120N000001 HC R&B MED SURG/OB

## 2024-12-29 PROCEDURE — 250N000013 HC RX MED GY IP 250 OP 250 PS 637: Performed by: HOSPITALIST

## 2024-12-29 PROCEDURE — 99232 SBSQ HOSP IP/OBS MODERATE 35: CPT | Performed by: INTERNAL MEDICINE

## 2024-12-29 PROCEDURE — 36415 COLL VENOUS BLD VENIPUNCTURE: CPT | Performed by: INTERNAL MEDICINE

## 2024-12-29 PROCEDURE — 250N000013 HC RX MED GY IP 250 OP 250 PS 637: Performed by: NURSE PRACTITIONER

## 2024-12-29 RX ADMIN — CLOPIDOGREL BISULFATE 75 MG: 75 TABLET ORAL at 09:05

## 2024-12-29 RX ADMIN — ASPIRIN 81 MG CHEWABLE TABLET 81 MG: 81 TABLET CHEWABLE at 09:05

## 2024-12-29 RX ADMIN — ATORVASTATIN CALCIUM 40 MG: 40 TABLET, FILM COATED ORAL at 20:40

## 2024-12-29 ASSESSMENT — ACTIVITIES OF DAILY LIVING (ADL)
ADLS_ACUITY_SCORE: 33
ADLS_ACUITY_SCORE: 32
ADLS_ACUITY_SCORE: 33
ADLS_ACUITY_SCORE: 35
ADLS_ACUITY_SCORE: 35
ADLS_ACUITY_SCORE: 32
ADLS_ACUITY_SCORE: 32
ADLS_ACUITY_SCORE: 33
ADLS_ACUITY_SCORE: 32
ADLS_ACUITY_SCORE: 35
ADLS_ACUITY_SCORE: 35
ADLS_ACUITY_SCORE: 33
ADLS_ACUITY_SCORE: 35
ADLS_ACUITY_SCORE: 33
ADLS_ACUITY_SCORE: 32
ADLS_ACUITY_SCORE: 35
ADLS_ACUITY_SCORE: 35
ADLS_ACUITY_SCORE: 33

## 2024-12-29 NOTE — PROGRESS NOTES
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, deaf in the left ear, diabetes mellitus, history of gout who presented to the ED with the chief complaint of evaluation of the numbness and admitted to the hospital on 12/27/2024        Multiple 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  Hyperlipidemia  *On admission presented with right lower extremity weakness and numbness of 4 days  *MRI brain shows several scattered small foci of acute infarct involving the left parietal lobe and also nonspecific contrast-enhancement of the left occipital cortex  *CT head-small foci of acute to subacute infarct in the left parietal lobe, chronic lacunar infarct involving the left cerebellum, left parietal lobe, basal ganglia, no hemorrhage, complete opacification of the left mastoid air cells  *CTA head-no significant stenosis, aneurysm or high flow malformation  *CTA neck, no significant stenosis, 0.4 cm pulmonary nodule in the right upper lobe  *Stroke neurology consulted with the ED physician and patient was loaded with full dose of aspirin   *TTE- normal LV function, no evidence of LV mass or tumor  *Lipid panel: , A1c 6%  *s/p Plavix load on 12/28/2024  - CT chest/abd/pelvis completed-without acute finding. Refer to full report and below for other incidental findings.      - stroke neurology following, appreciate assistance  - continue with ASA 81mg daily + Plavix 75mg daily x 21 days, then ASA 81 mg alone indefinitely   - AURA has been ordered by neurology for  tomorrow,  keep NPO after MN  - continue Atorvastatin 40mg daily  - blood culture is obtained on 12/27- no growth to date  - therapy evaluation  -Permissive hypertension in the first 24 hours and PRNs available, BP controlled without meds          ?  MRI finding of left mastoid effusion with contrast-enhancement and concerning for mastoiditis and there is subtle pachymeningeal dural enhancement overlying the effusion concerning for meningitis and adjacent parenchymal contrast enhancement concerning for early cerebritis  -Patient has no symptoms concerning for infection and her white count is also normal without any evidence of fever.  On exam there is no neck stiffness, no photophobia and she has no altered mental status  - CRP is normal  - prior MRIs in the past and at that time there was concern about complete opacification of the left mastoid air cells and questionable enhancement about the left external auditory canal  - discussed with neurology team, ENT consulted for further evaluation, pending at this time  -No indication of antibiotics at this point in time     Recent right radial nerve palsy  -Patient had outpatient workup done in [place] around [facility] and had numbness around the right arm and was diagnosed with right radial nerve palsy and follows with neuro in clinic and has outpatient MRI scanned  -Symptoms have clinically improved up to 90%    Linear, nodular soft tissue thickening within the right lower lobe, (incidental)  which appears infectious/inflammatory per CT report. Patient reports she had URI symptoms recently that have resolved. She reports no longer has cough. Denies shortness of breath. Currently afebrile, normal wbc, no cough or shortness of breath. Defer antibiotics at this time with lack of symptoms. Discussed with patient     Incidental finding of 0.4 cm pulmonary nodule in the right upper lobe(on CT of the neck)  - CT chest showing-scattered small pulmonary nodules, largest  "measuring 7 mm the left upper lobe. These are indeterminate in the setting of previous malignancy.   - will need follow up as outpatient, ? Pulmonary referral at discharge  - findings discussed with patient     1.6 cm cystic lesion of the pancreatic head, likely a sidebranch IPMN.(Incidental on CT)  -> recommended follow up per radiology report based on size--> CT or MRI Q6mos x 1 yr, then yearly x 2 yrs, then lengthen interval up to 2 yrs of no change.   - findings discussed with patient.       Diabetes mellitus  -Recent HbA1c on 12/18/2024 is 6.1  -Patient is currently only taking Ozempic--on hold inpt  -BG below threshold for sliding scale insulin, discontinued on 12/29/2024. Discontinue ac/hs BG checks.   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 Indication: Stroke, acute (48 hours)  Code Status: Full Code      Clinically Significant Risk Factors                              # Obesity: Estimated body mass index is 32.7 kg/m  as calculated from the following:    Height as of this encounter: 1.676 m (5' 6\").    Weight as of this encounter: 91.9 kg (202 lb 9.6 oz)., PRESENT ON ADMISSION     # Financial/Environmental Concerns: none         Social Drivers of Health    Housing Stability: High Risk (12/27/2024)    " Housing Stability     Do you have housing? : Yes     Are you worried about losing your housing?: Yes   Financial Resource Strain: High Risk (12/27/2024)    Financial Resource Strain     Within the past 12 months, have you or your family members you live with been unable to get utilities (heat, electricity) when it was really needed?: Yes   Transportation Needs: High Risk (12/27/2024)    Transportation Needs     Within the past 12 months, has lack of transportation kept you from medical appointments, getting your medicines, non-medical meetings or appointments, work, or from getting things that you need?: Yes   Physical Activity: Insufficiently Active (7/26/2024)    Received from [facility]    Exercise Vital Sign     Days of Exercise per Week: 2 days     Minutes of Exercise per Session: 20 min   Social Connections: Unknown (11/27/2022)    Received from [facility], [facility]    Social Connection and Isolation Panel [NHANES]     Frequency of Communication with Friends and Family: More than three times a week     Frequency of Social Gatherings with Friends and Family: More than three times a week     Attends [employer] Services: More than 4 times per year     Active Member of Clubs or Organizations: Yes     Attends Club or Organization Meetings: More than 4 times per year          Disposition Plan     Medically Ready for Discharge: Anticipated in 2-4 Days will need AURA on Monday and further neurology recommendations.             Miles Osorio MD  Hospitalist Service  [facility]  Securely message with Ricci (more info)  Text page via [facility] Paging/Directory   ______________________________________________________________________    Interval History   Patient reports she feels overall weak today. Reports had multiple episodes of loose stools yesterday, last one this am. States this happens when she eats something that does not agree with her and believes, it is the quesadilla she ate  yesterday. She reports diarrhea is currently resolved.   She denies cough, shortness of breath, fever. She reports recent URI symptoms, cough that is resolved at this time.   Patient had multiple questions that were answered to the best of my ability.     Physical Exam   Vital Signs: Temp: 98  F (36.7  C) Temp src: Oral BP: 91/65 Pulse: 105   Resp: 18 SpO2: 96 % O2 Device: None (Room air)    Weight: 202 lbs 9.6 oz    General Appearance: Alert, awake and no apparent distress  Respiratory: clear to auscultation bilaterally  Cardiovascular: regular rate and rhythm  GI: soft and non-tender      Medical Decision Making       MANAGEMENT DISCUSSED with the following over the past 24 hours: patient, RN   NOTE(S)/MEDICAL RECORDS REVIEWED over the past 24 hours: nursing note  Tests ORDERED & REVIEWED in the past 24 hours:  - BMP  - CBC  - blood culture     CT abdomen/pelvis report    Data     I have personally reviewed the following data over the past 24 hrs:    6.6  \   13.6   / 154     140 105 15.3 /  175 (H)   4.1 23 1.12 (H) \     Procal: N/A CRP: N/A Lactic Acid: N/A         Imaging results reviewed over the past 24 hrs:   Recent Results (from the past 24 hours)   Echocardiogram Complete    Narrative    [id number]  [id number]  [id number]^HORTENSIA^KYLIE^LUISANA     [facility]  Echocardiography Laboratory  [address] [place], [place] [address]     Name: ROSELINE BARCLAY  MRN: [id number]  : 1965  Study Date: 2024 12:23 PM  Age: 59 yrs  Gender: Female  Patient Location: [facility]  Reason For Study: CVA  Ordering Physician: KYLIE BAZAN  Referring Physician: NO PMD  Performed By: Ray Bar RDCS     BSA: 2.0 m2  Height: 66 in  Weight: 202 lb  HR: 62  BP: 111/67 mmHg  ______________________________________________________________________________  Procedure  Echocardiogram with two-dimensional, color and spectral Doppler. Definity (NDC  #50350-807) given  intravenously.  ______________________________________________________________________________  Interpretation Summary     Contrast was used without apparent complications. No cardiac source of emboli  noted.  ______________________________________________________________________________  Left Ventricle  The left ventricle is normal in size. There is normal left ventricular wall  thickness. Left ventricular systolic function is normal. Left ventricular  diastolic function is normal. Normal left ventricular wall motion. No evidence  of left ventricular mass or tumors.     Right Ventricle  The right ventricle is normal in structure, function and size.     Atria  Normal left atrial size. Right atrial size is normal.     Mitral Valve  The mitral valve leaflets appear thickened, but open well.     Tricuspid Valve  Normal tricuspid valve.     Aortic Valve  Normal tricuspid aortic valve.     Pulmonic Valve  Normal pulmonic valve.     Vessels  The aortic root is normal size. Normal size ascending aorta.     Pericardium  The pericardium appears normal.     Rhythm  Sinus rhythm was noted.  ______________________________________________________________________________  MMode/2D Measurements & Calculations  LVIDd: 3.4 cm  LVIDs: 2.0 cm  LVPWd: 0.92 cm     FS: 42.1 %  Ao root diam: 2.8 cm  LA dimension: 3.9 cm  asc Aorta Diam: 3.2 cm  LA/Ao: 1.4  Ao root diam index Ht(cm/m): 1.7  Ao root diam index BSA (cm/m2): 1.4  Asc Ao diam index BSA (cm/m2): 1.6  Asc Ao diam index Ht(cm/m): 1.9  LA Volume (BP): 34.8 ml  LA Volume Index (BP): 17.3 ml/m2     RWT: 0.54     Doppler Measurements & Calculations  MV E max rocco: 69.0 cm/sec  MV A max rocco: 84.7 cm/sec  MV E/A: 0.81  MV dec slope: 337.7 cm/sec2  MV dec time: 0.20 sec  PA acc time: 0.17 sec  TR max rocco: 227.0 cm/sec  TR max P.6 mmHg  E/E' av.7  Lateral E/e': 9.9  Medial E/e': 13.5  RV S Rocco: 12.1 cm/sec      ______________________________________________________________________________  Report approved by: Tyrone Howard MD on 12/28/2024 01:45 PM         CT Chest/Abdomen/Pelvis w Contrast   Result Value    Radiologist flags Pancreatic cyst    Narrative    EXAM: CT CHEST/ABDOMEN/PELVIS W CONTRAST  LOCATION: [facility]  DATE: 12/28/2024    INDICATION: Acute CVA; previous AML, status post bone marrow transplant in 1992; left parotid gland cancer.  COMPARISON: None available.  TECHNIQUE: CT scan of the chest, abdomen, and pelvis was performed following injection of IV contrast. Multiplanar reformats were obtained. Dose reduction techniques were used.   CONTRAST: 102 mL Isovue-370.    FINDINGS:    LUNGS AND PLEURA: Mild diffuse bronchial wall thickening. Mosaic attenuation of the pulmonary parenchyma, suggesting small vessels or small airways disease. No large focal airspace consolidation. Linear, nodular soft tissue thickening within the right   lower lobe, which appears infectious/inflammatory, series 4 image 139.     Scattered small pulmonary nodules, largest measuring 7 mm on the left upper lobe, with thick-walled cystic appearance, series 4 image 91.    No pneumothorax.    No pleural effusion.     MEDIASTINUM/AXILLAE: No mediastinal/hilar adenopathy.     Thoracic esophagus is unremarkable.    No axillary lymphadenopathy.    Chest wall is unremarkable.    No thoracic aortic aneurysm.    Heart is normal in size. No pericardial effusion.    CORONARY ARTERY CALCIFICATION: None.    HEPATOBILIARY: Mild hepatic steatosis.    Cholecystectomy.    No intrahepatic or intrahepatic biliary ductal dilatation.    PANCREAS: Enhances normally. No peripancreatic inflammatory fat stranding. There is a 1.6 cm cystic lesion of the pancreatic head, series 3 image 138.    SPLEEN: Enhances normally. Normal size.    ADRENAL GLANDS: Normal.    KIDNEYS: Both kidneys enhance symmetrically, without hydronephrosis.  Low-attenuation subcentimeter renal lesion(s). These are compatible with small benign cysts and no specific imaging evaluation or follow-up is recommended.    No nephroureterolithiasis.    Urinary bladder is unremarkable.    PELVIC ORGANS: No suspicious abnormality.    BOWEL: No evidence of acute gastrointestinal inflammation or obstruction. Colonic diverticulosis. Normal appendix.    No intraperitoneal free fluid or free air.    LYMPH NODES: No suspicious abdominal or pelvic lymphadenopathy.    VASCULATURE: No abdominal aortic aneurysm. Mild atheromatous disease.    MUSCULOSKELETAL: No suspicious abnormality. Diffuse idiopathic skeletal hyperostosis of the thoracic spine.    OTHER: No additionally significant abnormalities.      Impression    IMPRESSION:   1.  No acute CT abnormality of the chest, abdomen, or pelvis.  2.  Linear, nodular soft tissue thickening within the right lower lobe, which appears infectious/inflammatory.  3.  Scattered small pulmonary nodules, largest measuring 7 mm the left upper lobe. These are indeterminate in the setting of previous malignancy.  4.  Mild hepatic steatosis.  5.  Colonic diverticulosis.  6.  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 Follow-up recommendations, as per below.      REFERENCE:  International Consensus Guidelines for Management of IPMN and MCN of the Pancreas. Pancreatology (2017) 738-753.    Asymptomatic patient without high-risk stigmata of malignancy (obstructive jaundice with cystic lesion in head of pancreas, enhancing mural nodule >=5mm, or main pancreatic duct >10 mm), and without worrisome features (pancreatitis, cyst >3 cm, enhancing   mural nodule <5mm, thickened/enhancing cyst walls, main duct size 5-9mm, abrupt change in caliber of pancreatic duct with distal pancreatic atrophy, lymphadenopathy, increased serum level of , cyst growth rate >= 5mm/2 yrs).    Size of largest cyst:  Less than 1 cm: CT or MRI with  contrast in 5 months, then Q2 yrs if stable.    1-2 cm: CT or MRI Q6mos x 1 yr,, then yearly x 2 yrs, then lengthen interval up to 2 yrs of no change.    2-3 cm: EUS in 3-6 months, then lengthening interval up to 1 yr, alternating EUS and MRI as appropriate.  Consider surgery in young fit patients with need for prolonged surveillance.     Greater than 3 cm: Close surveillance alternating MRI with EUS every 3-6 months. Strongly consider surgery in young, fit patients.    Consider Gastroenterology consultation for all categories of pancreatic cysts.        [Recommend Follow Up: Pancreatic cyst]    This report will be copied to the [facility] to ensure a provider acknowledges the finding.

## 2024-12-29 NOTE — CONSULTS
ENT Consult -- asked to see this patient due to left mastoid mucosal disease noted on brain MRI on 12/27/24.  Patient had history of left parotidectomy for carcinoma in mid 1990's.  Subsequent left ear surgery as well as radiation therapy to left parotid/neck area for treatment of her reported parotid carcinoma.  She had MRI brain imaging in 2022 that also showed left mastoid air cell opacification.   Patient denies any new or increasing ear pain.  No recent fevers, otorrhea, pain or swelling over left mastoid area.  WBC normal.   Normal right ear canal and TM exam.  Left ear lobule deformity related to prior surgery.  Left ear canal dry, no pus.  Dry cerumen medial ear canal covers anterior TM but posterior TM appears intact, no erythema, granulation tissue or visible middle ear fluid. No postauricular swelling or tenderness.  Remainder of exam normal.   Reviewed MRI scan.  Left middle ear is clear, aerated.  Partial left mastoid air cell opacification, no bony erosion or overlying soft tissue edema.     A/P -- Patient has no clinical signs to suggest acute mastoiditis. No middle ear inflammation on exam or MRI.  I suspect the mucosal disease in the left mastoid is a chronic issue of no clinical significance given similar findings on MRI in 2022.  Likely chronic scarring of mastoid mucosa secondary to prior left ear surgery and radiation therapy to that area.  No specific treatment for that finding is indicated at this time, no need for antibiotics from ENT standpoint or any surgical intervention at this time.  She does not need any immediate ENT follow up after discharge. Please call if questions or other concerns.

## 2024-12-29 NOTE — PROGRESS NOTES
"Brief Stroke Progress Note:     Interval events:    Chart reviewed, following peripherally today while awaiting AURA.  Evaluated by ENT with no clinical signs to suggest acute mastoiditis.    Assessment and Plan     1. Acute ischemic stroke of left parietal region due to embolic stroke of undetermined source (ESUS). There appears to be a more subacute infarct in the left occipital lobe. Additionally, right arm weakness that occurred around Thanksgiving would correlate with a subacute appearing left hemisphere infarct. Given multiple ages and vascular territories, need to rule out central cardiac source such as endocarditis, LV thrombus.      Recommendations   - Neurochecks Q 4 hours, okay for every 8 checks overnight  - Anticipate plan will be for DAPT with ASA 81 mg + Plavix 75 mg for 21 days, then ASA 81 mg alone indefinitely.  - Started on atorvastatin 40 mg daily, goal LDL 40-70  - A1c 6.0, within goal <7.0  - Inpatient goal SBP <180, long term goal BP <130/80 with tighter control associated with decreased overall CV risk, if tolerated  - PT/OT/SLP  - Stroke Education  - Euthermia, Euglycemia    Pending Evaluation  - Recommend AURA for further cardiac evaluation (ordered). NPO after midnight. Discussed with primary team.   - If inpatient workup unrevealing, will need Ziopatch      2. Left mastoid effusion with contrast enhancement corning for mastoiditis. Patient does not have any concerning infection and WBC is normal. No fever to date.   - Blood cultures NGT  - ENT consulted, appreciate recommendations    DVT Prophylaxis: SCD    Patient Follow-up    - final recommendation pending work-up  - Follow up with PCP for long term monitoring of pulmonary nodules, pancreatic cyst    We will follow for results of AURA.    Matilde Long, CNP  Vascular Neurology    To page me or covering stroke neurology team member, click here: AMCOM  Choose \"On Call\" tab at top, then select \"NEUROLOGY/ALL SITES\" from middle drop-down " "box, press Enter, then look for \"stroke\" or \"telestroke\" for your site.  "

## 2024-12-29 NOTE — PLAN OF CARE
Reason for Admission: pt is here with multiple small infarcts    Cognitive/Mentation: A/Ox 4  Neuros/CMS: Intact ex some WFD, BLE numbness in feet, R>L, but baseline for patient  VS: stable on RA. SBP <180  Tele: NSR. Intermittent tachycardia  /GI: Continent. Last BM 12/28.   Pulmonary: LS clear.  Pain: denies.     Drains/Lines: L PIV SL  Skin: WNL  Activity: IND  Diet: mod carb with thin liquids. Takes pills whole.     Therapies recs: outpatient therapies   Discharge: home, pending workup    Aggression Stoplight Tool: green    End of shift summary: plan for AURA on Monday. ENT consult placed. CT chest abdomen and pelvis completed, ECHO completed. Plan to discontinue sliding scale insulin if pt remains under threshold tomorrow.

## 2024-12-29 NOTE — PLAN OF CARE
"Goal Outcome Evaluation:      Plan of Care Reviewed With: patient    Overall Patient Progress: improvingOverall Patient Progress: improving     Reason for Admission: multiple scattered infarcts  Cognitive/Mentation: A/Ox 4  Neuros/CMS: some WFD. Per pt, numbness is back to baseline.   VS: stable on RA. SBP <180  Tele: NSR.  GI/: Continent  Pulmonary: LS clear.  Pain: denies.   Drains/Lines: PIV SL  Skin: Intact  Activity: Independent in room  Diet: Mod cho with thin liquids. Takes pills whole.   Therapies recs: home w/ outpatient OT  Discharge: pending  Aggression Stoplight Tool: yellow d/t agitation when awoken  End of shift summary: Pt is very angry when awoken for neuro checks. I educated her on the importance of these. Her IV also looks red and she says it \"feels infected\" but she won't let me turn the light on to get a good look at it or flush it.       "

## 2024-12-30 VITALS
BODY MASS INDEX: 32.56 KG/M2 | HEART RATE: 100 BPM | TEMPERATURE: 98 F | DIASTOLIC BLOOD PRESSURE: 87 MMHG | WEIGHT: 202.6 LBS | HEIGHT: 66 IN | RESPIRATION RATE: 18 BRPM | SYSTOLIC BLOOD PRESSURE: 136 MMHG | OXYGEN SATURATION: 97 %

## 2024-12-30 LAB
GLUCOSE BLDC GLUCOMTR-MCNC: 107 MG/DL (ref 70–99)
GLUCOSE BLDC GLUCOMTR-MCNC: 115 MG/DL (ref 70–99)
GLUCOSE BLDC GLUCOMTR-MCNC: 82 MG/DL (ref 70–99)
LVEF ECHO: NORMAL

## 2024-12-30 PROCEDURE — 258N000003 HC RX IP 258 OP 636: Performed by: INTERNAL MEDICINE

## 2024-12-30 PROCEDURE — 999N000183 HC STATISTIC TEE INCLUDES SEDATION

## 2024-12-30 PROCEDURE — 250N000013 HC RX MED GY IP 250 OP 250 PS 637: Performed by: NURSE PRACTITIONER

## 2024-12-30 PROCEDURE — B24BZZ4 ULTRASONOGRAPHY OF HEART WITH AORTA, TRANSESOPHAGEAL: ICD-10-PCS | Performed by: INTERNAL MEDICINE

## 2024-12-30 PROCEDURE — 250N000009 HC RX 250: Performed by: INTERNAL MEDICINE

## 2024-12-30 PROCEDURE — 250N000013 HC RX MED GY IP 250 OP 250 PS 637: Performed by: HOSPITALIST

## 2024-12-30 PROCEDURE — 999N000184 HC STATISTIC TELEMETRY

## 2024-12-30 PROCEDURE — 250N000011 HC RX IP 250 OP 636: Performed by: INTERNAL MEDICINE

## 2024-12-30 PROCEDURE — 99239 HOSP IP/OBS DSCHRG MGMT >30: CPT | Performed by: INTERNAL MEDICINE

## 2024-12-30 RX ORDER — BENZOCAINE/MENTHOL 6 MG-10 MG
1 LOZENGE MUCOUS MEMBRANE 3 TIMES DAILY PRN
Status: DISCONTINUED | OUTPATIENT
Start: 2024-12-30 | End: 2024-12-30 | Stop reason: HOSPADM

## 2024-12-30 RX ORDER — NALOXONE HYDROCHLORIDE 0.4 MG/ML
0.4 INJECTION, SOLUTION INTRAMUSCULAR; INTRAVENOUS; SUBCUTANEOUS
Status: DISCONTINUED | OUTPATIENT
Start: 2024-12-30 | End: 2024-12-30 | Stop reason: HOSPADM

## 2024-12-30 RX ORDER — FLUMAZENIL 0.1 MG/ML
0.2 INJECTION, SOLUTION INTRAVENOUS
Status: DISCONTINUED | OUTPATIENT
Start: 2024-12-30 | End: 2024-12-30 | Stop reason: HOSPADM

## 2024-12-30 RX ORDER — ASPIRIN 81 MG/1
81 TABLET ORAL DAILY
Qty: 90 TABLET | Refills: 0 | Status: SHIPPED | OUTPATIENT
Start: 2024-12-30

## 2024-12-30 RX ORDER — NALOXONE HYDROCHLORIDE 0.4 MG/ML
0.2 INJECTION, SOLUTION INTRAMUSCULAR; INTRAVENOUS; SUBCUTANEOUS
Status: DISCONTINUED | OUTPATIENT
Start: 2024-12-30 | End: 2024-12-30 | Stop reason: HOSPADM

## 2024-12-30 RX ORDER — LIDOCAINE HYDROCHLORIDE 40 MG/ML
1.5 SOLUTION TOPICAL ONCE
Status: COMPLETED | OUTPATIENT
Start: 2024-12-30 | End: 2024-12-30

## 2024-12-30 RX ORDER — CLOPIDOGREL BISULFATE 75 MG/1
75 TABLET ORAL DAILY
Qty: 19 TABLET | Refills: 0 | Status: SHIPPED | OUTPATIENT
Start: 2024-12-31 | End: 2025-01-19

## 2024-12-30 RX ORDER — ACETAMINOPHEN 325 MG/1
650 TABLET ORAL EVERY 4 HOURS PRN
Status: DISCONTINUED | OUTPATIENT
Start: 2024-12-30 | End: 2024-12-30 | Stop reason: HOSPADM

## 2024-12-30 RX ORDER — MAGNESIUM HYDROXIDE/ALUMINUM HYDROXICE/SIMETHICONE 120; 1200; 1200 MG/30ML; MG/30ML; MG/30ML
30 SUSPENSION ORAL EVERY 8 HOURS PRN
Status: DISCONTINUED | OUTPATIENT
Start: 2024-12-30 | End: 2024-12-30 | Stop reason: HOSPADM

## 2024-12-30 RX ORDER — FENTANYL CITRATE 50 UG/ML
50 INJECTION, SOLUTION INTRAMUSCULAR; INTRAVENOUS
Status: COMPLETED | OUTPATIENT
Start: 2024-12-30 | End: 2024-12-30

## 2024-12-30 RX ORDER — GLYCOPYRROLATE 0.2 MG/ML
0.1 INJECTION, SOLUTION INTRAMUSCULAR; INTRAVENOUS ONCE
Status: COMPLETED | OUTPATIENT
Start: 2024-12-30 | End: 2024-12-30

## 2024-12-30 RX ORDER — LIDOCAINE 50 MG/G
OINTMENT TOPICAL ONCE
Status: COMPLETED | OUTPATIENT
Start: 2024-12-30 | End: 2024-12-30

## 2024-12-30 RX ORDER — DEXTROSE MONOHYDRATE 25 G/50ML
9.5 INJECTION, SOLUTION INTRAVENOUS
Status: DISCONTINUED | OUTPATIENT
Start: 2024-12-30 | End: 2024-12-30 | Stop reason: HOSPADM

## 2024-12-30 RX ORDER — SODIUM CHLORIDE 9 MG/ML
1000 INJECTION, SOLUTION INTRAVENOUS CONTINUOUS
Status: DISCONTINUED | OUTPATIENT
Start: 2024-12-30 | End: 2024-12-30 | Stop reason: HOSPADM

## 2024-12-30 RX ORDER — FENTANYL CITRATE 50 UG/ML
25 INJECTION, SOLUTION INTRAMUSCULAR; INTRAVENOUS
Status: DISCONTINUED | OUTPATIENT
Start: 2024-12-30 | End: 2024-12-30 | Stop reason: HOSPADM

## 2024-12-30 RX ORDER — ATORVASTATIN CALCIUM 40 MG/1
40 TABLET, FILM COATED ORAL EVERY EVENING
Qty: 30 TABLET | Refills: 0 | Status: SHIPPED | OUTPATIENT
Start: 2024-12-30

## 2024-12-30 RX ADMIN — FENTANYL CITRATE 50 MCG: 50 INJECTION, SOLUTION INTRAMUSCULAR; INTRAVENOUS at 15:03

## 2024-12-30 RX ADMIN — FENTANYL CITRATE 25 MCG: 50 INJECTION, SOLUTION INTRAMUSCULAR; INTRAVENOUS at 15:15

## 2024-12-30 RX ADMIN — MIDAZOLAM 1 MG: 1 INJECTION INTRAMUSCULAR; INTRAVENOUS at 15:02

## 2024-12-30 RX ADMIN — LIDOCAINE: 50 OINTMENT TOPICAL at 14:44

## 2024-12-30 RX ADMIN — TOPICAL ANESTHETIC 1 ML: 200 SPRAY DENTAL; PERIODONTAL at 14:58

## 2024-12-30 RX ADMIN — GLYCOPYRROLATE 0.1 MG: 0.2 INJECTION, SOLUTION INTRAMUSCULAR; INTRAVENOUS at 14:44

## 2024-12-30 RX ADMIN — SODIUM CHLORIDE 500 ML: 9 INJECTION, SOLUTION INTRAVENOUS at 14:36

## 2024-12-30 RX ADMIN — CLOPIDOGREL BISULFATE 75 MG: 75 TABLET ORAL at 08:24

## 2024-12-30 RX ADMIN — MIDAZOLAM 1 MG: 1 INJECTION INTRAMUSCULAR; INTRAVENOUS at 15:14

## 2024-12-30 RX ADMIN — ASPIRIN 81 MG CHEWABLE TABLET 81 MG: 81 TABLET CHEWABLE at 08:24

## 2024-12-30 RX ADMIN — MIDAZOLAM 0.5 MG: 1 INJECTION INTRAMUSCULAR; INTRAVENOUS at 15:18

## 2024-12-30 ASSESSMENT — ACTIVITIES OF DAILY LIVING (ADL)
ADLS_ACUITY_SCORE: 35
ADLS_ACUITY_SCORE: 39
ADLS_ACUITY_SCORE: 35
ADLS_ACUITY_SCORE: 39

## 2024-12-30 NOTE — PROGRESS NOTES
"Patient still down for AURA this afternoon, stroke team following peripherally for results.  Please contact us with any questions.      Claudette Wei PA-C  Vascular Neurology    To page me or covering stroke neurology team member, click here: AMCOM  Choose \"On Call\" tab at top, then select \"NEUROLOGY/ALL SITES\" from middle drop-down box, press Enter, then look for \"stroke\" or \"telestroke\" for your site.   "

## 2024-12-30 NOTE — PLAN OF CARE
Reason for Admission: pt is here with multiple small infarcts     Cognitive/Mentation: A/Ox 4  Neuros/CMS: Intact ex BLE numbness in feet back to baseline per patient  VS: stable on RA. SBP <180  Tele: NSR. Intermittent tachycardia  /GI: Continent. Last BM 12/29.   Pulmonary: LS clear.  Pain: denies.      Drains/Lines: L PIV SL  Skin: WNL  Activity: IND  Diet: mod carb with thin liquids. Takes pills whole.      Therapies recs: outpatient therapies   Discharge: home, pending workup     Aggression Stoplight Tool: green     End of shift summary: plan for AURA tomorrow 12/30. NPO at midnight.

## 2024-12-30 NOTE — DISCHARGE SUMMARY
"[facility]  Hospitalist Discharge Summary      Date of Admission:  12/27/2024  Date of Discharge:  12/30/2024  Discharging Provider: Miles Osorio MD  Discharge Service: Hospitalist Service    Discharge Diagnoses   See below    Clinically Significant Risk Factors     # Obesity: Estimated body mass index is 32.7 kg/m  as calculated from the following:    Height as of this encounter: 1.676 m (5' 6\").    Weight as of this encounter: 91.9 kg (202 lb 9.6 oz).       Follow-ups Needed After Discharge   Follow-up Appointments       Hospital to Primary Care - Establish PCP Referral      Please be aware that coverage of these services is subject to the terms and limitations of your health insurance plan.  Call member services at your health plan with any benefit or coverage questions.    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, 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, deaf in the left ear, diabetes mellitus, history of gout who presented to the ED with the chief complaint of evaluation of the numbness and admitted to the hospital on 12/27/2024        Multiple 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  Hyperlipidemia  *On admission presented with right lower extremity weakness and numbness of 4 days  *MRI brain shows several " scattered small foci of acute infarct involving the left parietal lobe and also nonspecific contrast-enhancement of the left occipital cortex  *CT head-small foci of acute to subacute infarct in the left parietal lobe, chronic lacunar infarct involving the left cerebellum, left parietal lobe, basal ganglia, no hemorrhage, complete opacification of the left mastoid air cells  *CTA head-no significant stenosis, aneurysm or high flow malformation  *CTA neck, no significant stenosis, 0.4 cm pulmonary nodule in the right upper lobe  *Stroke neurology consulted with the ED physician and patient was loaded with full dose of aspirin   *TTE- normal LV function, no evidence of LV mass or tumor  *Lipid panel: , A1c 6%  *s/p Plavix load on 12/28/2024  - CT chest/abd/pelvis completed-without acute finding. Refer to full report and below for other incidental findings.  *AURA- normal LV function, no valve disease, no atrial shunt seen    - stroke neurology followed during hospital stay  - continue with ASA 81mg daily + Plavix 75mg daily x 21 days, then ASA 81 mg alone indefinitely   - continue Atorvastatin 40mg daily  - blood culture is obtained on 12/27- no growth to date  - therapy evaluation--> rec OP PT/OT  - BP controlled without meds       ?  MRI finding of left mastoid effusion with contrast-enhancement and concerning for mastoiditis and there is subtle pachymeningeal dural enhancement overlying the effusion concerning for meningitis and adjacent parenchymal contrast enhancement concerning for early cerebritis  -Patient has no symptoms concerning for infection and her white count is also normal without any evidence of fever.  On exam there is no neck stiffness, no photophobia and she has no altered mental status  - CRP is normal  - prior MRIs in the past and at that time there was concern about complete opacification of the left mastoid air cells and questionable enhancement about the left external auditory canal  -  Appreciate ENT consult-- no clinical signs to suggest acute mastoiditis. Findings likely chronic from prior surgery and radiation therapy to that area.  no specific treatment. Refer to consult note for details.    Recent right radial nerve palsy  -Patient had outpatient workup done in [place] around [facility] and had numbness around the right arm and was diagnosed with right radial nerve palsy and follows with neuro in clinic and has outpatient MRI scanned  -Symptoms have clinically improved up to 90%    Linear, nodular soft tissue thickening within the right lower lobe, (incidental)  which appears infectious/inflammatory per CT report. Patient reports she had URI symptoms recently that have resolved. She reports no longer has cough. Denies shortness of breath. Currently afebrile, normal wbc, no cough or shortness of breath. Defer antibiotics at this time with lack of symptoms. Discussed with patient     Incidental finding of 0.4 cm pulmonary nodule in the right upper lobe(on CT of the neck)  - CT chest showing-scattered small pulmonary nodules, largest measuring 7 mm the left upper lobe. These are indeterminate in the setting of previous malignancy.   - Pulmonary referral at discharge made  - findings discussed with patient     1.6 cm cystic lesion of the pancreatic head, likely a sidebranch IPMN.(Incidental on CT)  -> recommended follow up per radiology report based on size--> CT or MRI Q6mos x 1 yr, then yearly x 2 yrs, then lengthen interval up to 2 yrs of no change.   - findings discussed with patient.   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  Deaf in the left ear  -Noted       Consultations This Hospital Stay   NEUROLOGY IP STROKE CONSULT  SPEECH LANGUAGE PATH ADULT IP CONSULT  PHARMACY IP CONSULT  PHARMACY IP CONSULT  PHARMACY IP CONSULT  PHYSICAL THERAPY ADULT IP CONSULT  OCCUPATIONAL THERAPY ADULT IP CONSULT  REHAB ADMISSIONS LIAISON IP CONSULT  CARE MANAGEMENT / SOCIAL WORK IP CONSULT  CARE MANAGEMENT / SOCIAL WORK IP CONSULT  ENT IP CONSULT  SMOKING CESSATION PROGRAM IP CONSULT    Code Status   Full Code    Time Spent on this Encounter   I, Miles Osorio MD, personally saw the patient today and spent 35 minutes discharging this patient.       Miles Osorio MD  [facility] NEUROSCIENCE UNIT  [address] [place] [address]  Phone: [phone]  ______________________________________________________________________    Physical Exam   Vital Signs: Temp: 98  F (36.7  C) Temp src: Oral BP: 136/87 Pulse: 100   Resp: 18 SpO2: 97 % O2 Device: None (Room air) Oxygen Delivery: 2 LPM  Weight: 202 lbs 9.6 oz  See progress note       Primary Care Physician   Physician No Ref-Primary    Discharge Orders      Physical Therapy  Referral      Occupational Therapy  Referral      Hospital to Primary Care - Establish PCP Referral      Adult Pulmonary Medicine  Referral      Reason for your hospital stay    You were admitted to the hospital for stroke.     Activity    Your activity upon discharge: activity as tolerated     Discharge Instructions    Follow up with your primary care doctor regarding the cyst on pancrease. You will need follow up imaging and you could also get referred to gastroenterology by your primary care.     Diet    Follow this diet upon discharge: Regular       Significant Results and Procedures   Most Recent 3 CBC's:  Recent Labs   Lab Test  12/29/24  1019 12/28/24  0807 12/27/24  1451   WBC 6.6 6.1 7.1   HGB 13.6 12.9 14.1   MCV 85 87 87    142* 159     Most Recent 3 BMP's:  Recent Labs   Lab Test 12/30/24  1646 12/30/24  1143 12/30/24  0908 12/29/24  1118 12/29/24  1019 12/28/24  1137 12/28/24  0807 12/28/24  0015 12/27/24  1451   NA  --   --   --   --  140  --  138  --  141   POTASSIUM  --   --   --   --  4.1  --  4.0  --  4.1   CHLORIDE  --   --   --   --  105  --  103  --  104   CO2  --   --   --   --  23  --  24  --  26   BUN  --   --   --   --  15.3  --  18.6  --  19.1   CR  --   --   --   --  1.12*  --  1.18*  --  1.23*   ANIONGAP  --   --   --   --  12  --  11  --  11   JOSE MARIA  --   --   --   --  9.5  --  9.5  --  9.9   GLC 82 107* 115*   < > 163*   < > 148*   < > 106*    < > = values in this interval not displayed.   ,   Results for orders placed or performed during the hospital encounter of 12/27/24   MR Brain w/o & w Contrast    Narrative    EXAM: MR BRAIN W/O and W CONTRAST, MR LUMBAR SPINE W/O and W CONTRAST  LOCATION: [facility]  DATE: 12/27/2024    INDICATION: RUE and RLE weakness, hx of radiation exposure  COMPARISON: None.  CONTRAST: 9mL Gadavist  TECHNIQUE:   1) Routine multiplanar multisequence head MRI without and with intravenous contrast.  2) Routine Lumbar Spine MRI without and with IV contrast.    FINDINGS:  HEAD MRI:  INTRACRANIAL CONTENTS: There are patchy areas of restricted diffusion consistent with acute infarct involving the left parietal centrum semiovale and left parietal cortex. Largest area of infarct measures 8 mm. No mass, acute hemorrhage, or extra-axial   fluid collections. Scattered nonspecific T2/FLAIR hyperintensities within the cerebral white matter most consistent with mild chronic microvascular ischemic change. There is chronic lacunar infarct of the left centrum semiovale. Chronic lacunar infarct   of the left cerebellar hemisphere. There is punctate focus of susceptibility  artifact representing cavernoma with adjacent developmental venous anomaly at the right occipital lobe. Mild generalized cerebral atrophy. No hydrocephalus. Normal position of   the cerebellar tonsils. There is subtle pachymeningeal dural enhancement at the left temporal convexity overlying the left mastoid effusion. There is questionable focus of punctate contrast enhancement at the left temporal lobe without corresponding   signal abnormality on additional sequences best seen series 23 image 16. There is nonspecific curvilinear contrast enhancement at the left occipital cortex series 22 image 15 and series 23 image 28.    SELLA: No abnormality accounting for technique.    OSSEOUS STRUCTURES/SOFT TISSUES: Normal marrow signal. The major intracranial vascular flow voids are maintained.     ORBITS: No abnormality accounting for technique.     SINUSES/MASTOIDS: Mild to moderate mucosal thickening of left maxillary sinus. Complete/near complete opacification of the left mastoid air cells with associated contrast enhancement.. No apparent mass in the posterior nasopharynx or skull base.     LUMBAR SPINE:   Nomenclature is based on 5 lumbar type vertebral bodies. Normal vertebral body heights, alignment and marrow signal. Normal distal spinal cord and cauda equina with conus medullaris at L1. Negative for pathologic contrast. No extraspinal abnormality.   Unremarkable visualized bony pelvis.    T12-L1: Normal disc height and signal. No herniation. Normal facets. No spinal canal or neural foraminal stenosis.     L1-L2: Normal disc height and signal. No herniation. Normal facets. No spinal canal or neural foraminal stenosis.    L2-L3: Normal disc height and signal. Mild disc bulge. Mild facet hypertrophy. No spinal canal or neural foraminal stenosis.     L3-L4: Disc desiccation and disc height loss. Mild disc bulge. Bilateral facet hypertrophy. No spinal canal stenosis. Mild bilateral neural foraminal stenosis. Mild  bilateral subarticular stenosis.    L4-L5: Disc desiccation and disc height loss. Diffuse disc bulge. Left foraminal disc protrusion and annular fissure. Bilateral facet hypertrophy. No spinal canal stenosis. Minor bilateral neural foraminal stenosis.    L5-S1: Disc desiccation and disc height loss. Mild disc bulge. Bilateral facet hypertrophy. No spinal canal or foraminal stenosis.      Impression    IMPRESSION:  HEAD MRI:   1.  Several scattered small foci of acute infarct involving the left parietal lobe.  2.  Dense left mastoid effusion with contrast enhancement. This is concerning for mastoiditis. There is subtle pachymeningeal dural enhancement overlying the effusion concerning for meningitis and adjacent parenchymal contrast enhancement concerning for   early cerebritis.  3.  Additional nonspecific contrast enhancement at the left occipital cortex. This may represent areas of subacute ischemia, infection, or metastatic disease.    LUMBAR SPINE MRI:  1.  Mild degenerative changes without high-grade spinal canal or neural foraminal stenosis.   MR Lumbar Spine w/o & w Contrast    Narrative    EXAM: MR BRAIN W/O and W CONTRAST, MR LUMBAR SPINE W/O and W CONTRAST  LOCATION: [facility]  DATE: 12/27/2024    INDICATION: RUE and RLE weakness, hx of radiation exposure  COMPARISON: None.  CONTRAST: 9mL Gadavist  TECHNIQUE:   1) Routine multiplanar multisequence head MRI without and with intravenous contrast.  2) Routine Lumbar Spine MRI without and with IV contrast.    FINDINGS:  HEAD MRI:  INTRACRANIAL CONTENTS: There are patchy areas of restricted diffusion consistent with acute infarct involving the left parietal centrum semiovale and left parietal cortex. Largest area of infarct measures 8 mm. No mass, acute hemorrhage, or extra-axial   fluid collections. Scattered nonspecific T2/FLAIR hyperintensities within the cerebral white matter most consistent with mild chronic microvascular ischemic  change. There is chronic lacunar infarct of the left centrum semiovale. Chronic lacunar infarct   of the left cerebellar hemisphere. There is punctate focus of susceptibility artifact representing cavernoma with adjacent developmental venous anomaly at the right occipital lobe. Mild generalized cerebral atrophy. No hydrocephalus. Normal position of   the cerebellar tonsils. There is subtle pachymeningeal dural enhancement at the left temporal convexity overlying the left mastoid effusion. There is questionable focus of punctate contrast enhancement at the left temporal lobe without corresponding   signal abnormality on additional sequences best seen series 23 image 16. There is nonspecific curvilinear contrast enhancement at the left occipital cortex series 22 image 15 and series 23 image 28.    SELLA: No abnormality accounting for technique.    OSSEOUS STRUCTURES/SOFT TISSUES: Normal marrow signal. The major intracranial vascular flow voids are maintained.     ORBITS: No abnormality accounting for technique.     SINUSES/MASTOIDS: Mild to moderate mucosal thickening of left maxillary sinus. Complete/near complete opacification of the left mastoid air cells with associated contrast enhancement.. No apparent mass in the posterior nasopharynx or skull base.     LUMBAR SPINE:   Nomenclature is based on 5 lumbar type vertebral bodies. Normal vertebral body heights, alignment and marrow signal. Normal distal spinal cord and cauda equina with conus medullaris at L1. Negative for pathologic contrast. No extraspinal abnormality.   Unremarkable visualized bony pelvis.    T12-L1: Normal disc height and signal. No herniation. Normal facets. No spinal canal or neural foraminal stenosis.     L1-L2: Normal disc height and signal. No herniation. Normal facets. No spinal canal or neural foraminal stenosis.    L2-L3: Normal disc height and signal. Mild disc bulge. Mild facet hypertrophy. No spinal canal or neural foraminal stenosis.      L3-L4: Disc desiccation and disc height loss. Mild disc bulge. Bilateral facet hypertrophy. No spinal canal stenosis. Mild bilateral neural foraminal stenosis. Mild bilateral subarticular stenosis.    L4-L5: Disc desiccation and disc height loss. Diffuse disc bulge. Left foraminal disc protrusion and annular fissure. Bilateral facet hypertrophy. No spinal canal stenosis. Minor bilateral neural foraminal stenosis.    L5-S1: Disc desiccation and disc height loss. Mild disc bulge. Bilateral facet hypertrophy. No spinal canal or foraminal stenosis.      Impression    IMPRESSION:  HEAD MRI:   1.  Several scattered small foci of acute infarct involving the left parietal lobe.  2.  Dense left mastoid effusion with contrast enhancement. This is concerning for mastoiditis. There is subtle pachymeningeal dural enhancement overlying the effusion concerning for meningitis and adjacent parenchymal contrast enhancement concerning for   early cerebritis.  3.  Additional nonspecific contrast enhancement at the left occipital cortex. This may represent areas of subacute ischemia, infection, or metastatic disease.    LUMBAR SPINE MRI:  1.  Mild degenerative changes without high-grade spinal canal or neural foraminal stenosis.   CTA Head Neck with Contrast    Narrative    EXAM: CT HEAD W/O CONTRAST, CTA HEAD NECK W CONTRAST  LOCATION: [facility]  DATE: 12/27/2024    INDICATION: new stroke  COMPARISON: MRI December 27, 2024  CONTRAST: 67ml isovue 370  TECHNIQUE: Head and neck CT angiogram with IV contrast. Noncontrast head CT followed by axial helical CT images of the head and neck vessels obtained during the arterial phase of intravenous contrast administration. Axial 2D reconstructed images and   multiplanar 3D MIP reconstructed images of the head and neck vessels were performed by the technologist. Dose reduction techniques were used. All stenosis measurements made according to NASCET criteria unless  otherwise specified.    FINDINGS:   NONCONTRAST HEAD CT:   INTRACRANIAL CONTENTS: No intracranial hemorrhage, extraaxial collection, or mass effect.  Known small foci of acute to subacute infarction in the left parietal lobe are better appreciated by MRI. Small chronic left cerebellar, left parietal, and left   basal ganglia lacunar infarcts are noted. Normal parenchymal attenuation. Normal ventricles and sulci.     VISUALIZED ORBITS/SINUSES/MASTOIDS: No intraorbital abnormality. There is severe mucosal thickening of the left maxillary sinus. Complete/near complete opacification of the left mastoid air cells. No apparent mass in the posterior nasopharynx or skull   base.    BONES/SOFT TISSUES: No acute abnormality.    HEAD CTA:  ANTERIOR CIRCULATION: No stenosis/occlusion, aneurysm, or high flow vascular malformation. Standard [place] anatomy.    POSTERIOR CIRCULATION: No stenosis/occlusion, aneurysm, or high flow vascular malformation. Dominant left and smaller right vertebral artery contribute to a normal basilar artery.     DURAL VENOUS SINUSES: Expected enhancement of the major dural venous sinuses.    NECK CTA:  RIGHT CAROTID: No measurable stenosis or dissection.    LEFT CAROTID: No measurable stenosis or dissection.    VERTEBRAL ARTERIES: No severe focal stenosis or dissection. Dominant left and smaller right vertebral arteries.    AORTIC ARCH: Classic aortic arch anatomy with no significant stenosis at the origin of the great vessels.    NONVASCULAR STRUCTURES: 0.4 cm right upper lobe pulmonary nodule..      Impression    IMPRESSION:   HEAD CT:  1.  Known small foci of acute to subacute infarction in the left parietal lobe appreciated by MRI.  2.  Chronic lacunar infarct involving the left cerebellum, left parietal lobe, basal ganglia is seen on comparison MRI.  3.  No acute cranial hemorrhage, extra-axial collection, or midline shift.  4.  Complete/complete opacification of the left mastoid air  cells.    HEAD CTA:   1.  No significant stenosis, aneurysm, or high flow vascular malformation identified.    NECK CTA:  1.  No hemodynamically significant stenosis within the vessels of the neck.  2.  0.4 cm pulmonary nodule right upper lobe. In a low-risk patient no routine follow-up required. In high risk patient recommend dedicated CT chest.   Head CT w/o contrast    Narrative    EXAM: CT HEAD W/O CONTRAST, CTA HEAD NECK W CONTRAST  LOCATION: [facility]  DATE: 12/27/2024    INDICATION: new stroke  COMPARISON: MRI December 27, 2024  CONTRAST: 67ml isovue 370  TECHNIQUE: Head and neck CT angiogram with IV contrast. Noncontrast head CT followed by axial helical CT images of the head and neck vessels obtained during the arterial phase of intravenous contrast administration. Axial 2D reconstructed images and   multiplanar 3D MIP reconstructed images of the head and neck vessels were performed by the technologist. Dose reduction techniques were used. All stenosis measurements made according to NASCET criteria unless otherwise specified.    FINDINGS:   NONCONTRAST HEAD CT:   INTRACRANIAL CONTENTS: No intracranial hemorrhage, extraaxial collection, or mass effect.  Known small foci of acute to subacute infarction in the left parietal lobe are better appreciated by MRI. Small chronic left cerebellar, left parietal, and left   basal ganglia lacunar infarcts are noted. Normal parenchymal attenuation. Normal ventricles and sulci.     VISUALIZED ORBITS/SINUSES/MASTOIDS: No intraorbital abnormality. There is severe mucosal thickening of the left maxillary sinus. Complete/near complete opacification of the left mastoid air cells. No apparent mass in the posterior nasopharynx or skull   base.    BONES/SOFT TISSUES: No acute abnormality.    HEAD CTA:  ANTERIOR CIRCULATION: No stenosis/occlusion, aneurysm, or high flow vascular malformation. Standard [place] anatomy.    POSTERIOR CIRCULATION:  No stenosis/occlusion, aneurysm, or high flow vascular malformation. Dominant left and smaller right vertebral artery contribute to a normal basilar artery.     DURAL VENOUS SINUSES: Expected enhancement of the major dural venous sinuses.    NECK CTA:  RIGHT CAROTID: No measurable stenosis or dissection.    LEFT CAROTID: No measurable stenosis or dissection.    VERTEBRAL ARTERIES: No severe focal stenosis or dissection. Dominant left and smaller right vertebral arteries.    AORTIC ARCH: Classic aortic arch anatomy with no significant stenosis at the origin of the great vessels.    NONVASCULAR STRUCTURES: 0.4 cm right upper lobe pulmonary nodule..      Impression    IMPRESSION:   HEAD CT:  1.  Known small foci of acute to subacute infarction in the left parietal lobe appreciated by MRI.  2.  Chronic lacunar infarct involving the left cerebellum, left parietal lobe, basal ganglia is seen on comparison MRI.  3.  No acute cranial hemorrhage, extra-axial collection, or midline shift.  4.  Complete/complete opacification of the left mastoid air cells.    HEAD CTA:   1.  No significant stenosis, aneurysm, or high flow vascular malformation identified.    NECK CTA:  1.  No hemodynamically significant stenosis within the vessels of the neck.  2.  0.4 cm pulmonary nodule right upper lobe. In a low-risk patient no routine follow-up required. In high risk patient recommend dedicated CT chest.   CT Chest/Abdomen/Pelvis w Contrast     Value    Radiologist flags Pancreatic cyst    Narrative    EXAM: CT CHEST/ABDOMEN/PELVIS W CONTRAST  LOCATION: [facility]  DATE: 12/28/2024    INDICATION: Acute CVA; previous AML, status post bone marrow transplant in 1992; left parotid gland cancer.  COMPARISON: None available.  TECHNIQUE: CT scan of the chest, abdomen, and pelvis was performed following injection of IV contrast. Multiplanar reformats were obtained. Dose reduction techniques were used.   CONTRAST: 102 mL  [id number].    FINDINGS:    LUNGS AND PLEURA: Mild diffuse bronchial wall thickening. Mosaic attenuation of the pulmonary parenchyma, suggesting small vessels or small airways disease. No large focal airspace consolidation. Linear, nodular soft tissue thickening within the right   lower lobe, which appears infectious/inflammatory, series 4 image 139.     Scattered small pulmonary nodules, largest measuring 7 mm on the left upper lobe, with thick-walled cystic appearance, series 4 image 91.    No pneumothorax.    No pleural effusion.     MEDIASTINUM/AXILLAE: No mediastinal/hilar adenopathy.     Thoracic esophagus is unremarkable.    No axillary lymphadenopathy.    Chest wall is unremarkable.    No thoracic aortic aneurysm.    Heart is normal in size. No pericardial effusion.    CORONARY ARTERY CALCIFICATION: None.    HEPATOBILIARY: Mild hepatic steatosis.    Cholecystectomy.    No intrahepatic or intrahepatic biliary ductal dilatation.    PANCREAS: Enhances normally. No peripancreatic inflammatory fat stranding. There is a 1.6 cm cystic lesion of the pancreatic head, series 3 image 138.    SPLEEN: Enhances normally. Normal size.    ADRENAL GLANDS: Normal.    KIDNEYS: Both kidneys enhance symmetrically, without hydronephrosis. Low-attenuation subcentimeter renal lesion(s). These are compatible with small benign cysts and no specific imaging evaluation or follow-up is recommended.    No nephroureterolithiasis.    Urinary bladder is unremarkable.    PELVIC ORGANS: No suspicious abnormality.    BOWEL: No evidence of acute gastrointestinal inflammation or obstruction. Colonic diverticulosis. Normal appendix.    No intraperitoneal free fluid or free air.    LYMPH NODES: No suspicious abdominal or pelvic lymphadenopathy.    VASCULATURE: No abdominal aortic aneurysm. Mild atheromatous disease.    MUSCULOSKELETAL: No suspicious abnormality. Diffuse idiopathic skeletal hyperostosis of the thoracic spine.    OTHER: No  additionally significant abnormalities.      Impression    IMPRESSION:   1.  No acute CT abnormality of the chest, abdomen, or pelvis.  2.  Linear, nodular soft tissue thickening within the right lower lobe, which appears infectious/inflammatory.  3.  Scattered small pulmonary nodules, largest measuring 7 mm the left upper lobe. These are indeterminate in the setting of previous malignancy.  4.  Mild hepatic steatosis.  5.  Colonic diverticulosis.  6.  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 Follow-up recommendations, as per below.      REFERENCE:  International Consensus Guidelines for Management of IPMN and MCN of the Pancreas. Pancreatology (2017) 379-050.    Asymptomatic patient without high-risk stigmata of malignancy (obstructive jaundice with cystic lesion in head of pancreas, enhancing mural nodule >=5mm, or main pancreatic duct >10 mm), and without worrisome features (pancreatitis, cyst >3 cm, enhancing   mural nodule <5mm, thickened/enhancing cyst walls, main duct size 5-9mm, abrupt change in caliber of pancreatic duct with distal pancreatic atrophy, lymphadenopathy, increased serum level of , cyst growth rate >= 5mm/2 yrs).    Size of largest cyst:  Less than 1 cm: CT or MRI with contrast in 5 months, then Q2 yrs if stable.    1-2 cm: CT or MRI Q6mos x 1 yr,, then yearly x 2 yrs, then lengthen interval up to 2 yrs of no change.    2-3 cm: EUS in 3-6 months, then lengthening interval up to 1 yr, alternating EUS and MRI as appropriate.  Consider surgery in young fit patients with need for prolonged surveillance.     Greater than 3 cm: Close surveillance alternating MRI with EUS every 3-6 months. Strongly consider surgery in young, fit patients.    Consider Gastroenterology consultation for all categories of pancreatic cysts.        [Recommend Follow Up: Pancreatic cyst]    This report will be copied to the [facility] to ensure a provider acknowledges the  finding.        Echocardiogram Complete    Narrative    [id number]  [id number]  [id number]  [id number]^HORTENSIA^KYLIE^LUISANA     [facility]  Echocardiography Laboratory  [address] [place] [address]     Name: ROSELINE BARCLAY  MRN: [id number]  : 1965  Study Date: 2024 12:23 PM  Age: 59 yrs  Gender: Female  Patient Location: [facility]  Reason For Study: CVA  Ordering Physician: KYLIE BAZAN  Referring Physician: ALYCIA PMD  Performed By: Ray Bar RDCS     BSA: 2.0 m2  Height: 66 in  Weight: 202 lb  HR: 62  BP: 111/67 mmHg  ______________________________________________________________________________  Procedure  Echocardiogram with two-dimensional, color and spectral Doppler. Definity (NDC  #91576-901) given intravenously.  ______________________________________________________________________________  Interpretation Summary     Contrast was used without apparent complications. No cardiac source of emboli  noted.  ______________________________________________________________________________  Left Ventricle  The left ventricle is normal in size. There is normal left ventricular wall  thickness. Left ventricular systolic function is normal. Left ventricular  diastolic function is normal. Normal left ventricular wall motion. No evidence  of left ventricular mass or tumors.     Right Ventricle  The right ventricle is normal in structure, function and size.     Atria  Normal left atrial size. Right atrial size is normal.     Mitral Valve  The mitral valve leaflets appear thickened, but open well.     Tricuspid Valve  Normal tricuspid valve.     Aortic Valve  Normal tricuspid aortic valve.     Pulmonic Valve  Normal pulmonic valve.     Vessels  The aortic root is normal size. Normal size ascending aorta.     Pericardium  The pericardium appears normal.     Rhythm  Sinus rhythm was noted.  ______________________________________________________________________________  MMode/2D  Measurements & Calculations  LVIDd: 3.4 cm  LVIDs: 2.0 cm  LVPWd: 0.92 cm     FS: 42.1 %  Ao root diam: 2.8 cm  LA dimension: 3.9 cm  asc Aorta Diam: 3.2 cm  LA/Ao: 1.4  Ao root diam index Ht(cm/m): 1.7  Ao root diam index BSA (cm/m2): 1.4  Asc Ao diam index BSA (cm/m2): 1.6  Asc Ao diam index Ht(cm/m): 1.9  LA Volume (BP): 34.8 ml  LA Volume Index (BP): 17.3 ml/m2     RWT: 0.54     Doppler Measurements & Calculations  MV E max rocco: 69.0 cm/sec  MV A max rocco: 84.7 cm/sec  MV E/A: 0.81  MV dec slope: 337.7 cm/sec2  MV dec time: 0.20 sec  PA acc time: 0.17 sec  TR max rocco: 227.0 cm/sec  TR max P.6 mmHg  E/E' av.7  Lateral E/e': 9.9  Medial E/e': 13.5  RV S Rocco: 12.1 cm/sec     ______________________________________________________________________________  Report approved by: Tyrone Howard MD on 2024 01:45 PM         Transesophageal Echocardiogram     Value    LVEF  60%    Narrative    [id number]  [id number]  [id number]^HORTENSIA^KYLIE^LUISANA     [facility]  Echocardiography Laboratory  [address] [place] [address]     Name: ROSELINE BARCLAY  MRN: [id number]  : 1965  Study Date: 2024 03:01 PM  Age: 59 yrs  Gender: Female  Patient Location: [facility]  Reason For Study: CVA  Ordering Physician: KYLIE BAZAN  Performed By: Jodi Sandhu     BSA: 2.0 m2  Height: 66 in  Weight: 202 lb  HR: 107  BP: 134/84 mmHg  ______________________________________________________________________________  Procedure  Transesophageal Echocardiogram with two-dimensional, color and spectral  Doppler. AURA Probe serial #[id number] was used during the procedure. The heart  rate, respiratory rate and response to care were monitored throughout the  procedure with the assistance of the nurse.  ______________________________________________________________________________  Interpretation Summary     1. The left ventricle is normal in structure, function and size. The visual  ejection fraction is  estimated at 60%.  2. The right ventricle is normal in structure, function and size.  3. No valve disease.  4. There is no atrial shunt seen. A contrast injection (Bubble Study) was  performed that was negative for flow across the interatrial septum.     No changes from echo 12-28-24.  ______________________________________________________________________________  AURA  I determined this patient to be an appropriate candidate for the planned  sedation and procedure and have reassessed the patient immediately prior to  sedation and procedure. Total sedation time: 26 min minutes of continuous  bedside 1:1 monitoring. Versed (2.5mg) was given intravenously. Fentanyl  (75mcg) was given intravenously. Consent to the procedure was obtained prior  to sedation. The transesophageal probe was passed without difficulty. There  were no complications associated with this procedure.     Left Ventricle  The left ventricle is normal in structure, function and size. There is normal  left ventricular wall thickness. The visual ejection fraction is estimated at  60%. Normal left ventricular wall motion.     Right Ventricle  The right ventricle is normal in structure, function and size.     Atria  Normal left atrial size. Right atrial size is normal. There is no atrial shunt  seen. A contrast injection (Bubble Study) was performed that was negative for  flow across the interatrial septum. No thrombus is detected in the left atrial  appendage.     Mitral Valve  The mitral valve is normal in structure and function.     Tricuspid Valve  The tricuspid valve is normal in structure and function.     Aortic Valve  Normal tricuspid aortic valve.     Pulmonic Valve  The pulmonic valve is normal in structure and function.     Vessels  Normal ascending, transverse (arch), and descending aorta. Normal pulmonary  vein velocity.     Pericardial/Pleural  There is no pericardial effusion.     Rhythm  Sinus rhythm was  noted.  ______________________________________________________________________________  Report approved by: Migue Ferrari MD on 12/30/2024 04:28 PM     ______________________________________________________________________________          Discharge Medications   Current Discharge Medication List        START taking these medications    Details   aspirin 81 MG EC tablet Take 1 tablet (81 mg) by mouth daily.  Qty: 90 tablet, Refills: 0    Comments: Future refills by PCP  Associated Diagnoses: Parietal lobe infarction (H)      atorvastatin (LIPITOR) 40 MG tablet Take 1 tablet (40 mg) by mouth every evening.  Qty: 30 tablet, Refills: 0    Comments: Future refills by PCP  Physician No Ref-Primary with phone number None.  Associated Diagnoses: Parietal lobe infarction (H); Dyslipidemia      clopidogrel (PLAVIX) 75 MG tablet Take 1 tablet (75 mg) by mouth or NG Tube daily for 19 days.  Qty: 19 tablet, Refills: 0    Associated Diagnoses: Parietal lobe infarction (H)           CONTINUE these medications which have NOT CHANGED    Details   Semaglutide (OZEMPIC, 1 MG/DOSE, SC) Inject 2 mg subcutaneously once a week. On Mondays           Allergies   Allergies   Allergen Reactions    Codeine Sulfate Rash

## 2024-12-30 NOTE — PLAN OF CARE
Goal Outcome Evaluation:      Plan of Care Reviewed With: patient    Overall Patient Progress: improvingOverall Patient Progress: improving     Reason for Admission: multiple scattered infarcts  Cognitive/Mentation: A/Ox 4  Neuros/CMS: some WFD. Per pt, numbness is back to baseline. RUE slightly weak, slow/deliberate FTN.  VS: stable on RA. SBP <180  Tele: NSR.  GI/: Continent  Pulmonary: LS clear.  Pain: denies.   Drains/Lines: L PIV SL  Skin: Intact  Activity: Independent in room  Diet: NPO for anesthesia  Therapies recs: home w/ outpatient OT  Discharge: pending  Aggression Stoplight Tool:   End of shift summary: Plan for AURA today.

## 2024-12-30 NOTE — PROGRESS NOTES
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, deaf in the left ear, diabetes mellitus, history of gout who presented to the ED with the chief complaint of evaluation of the numbness and admitted to the hospital on 12/27/2024        Multiple 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  Hyperlipidemia  *On admission presented with right lower extremity weakness and numbness of 4 days  *MRI brain shows several scattered small foci of acute infarct involving the left parietal lobe and also nonspecific contrast-enhancement of the left occipital cortex  *CT head-small foci of acute to subacute infarct in the left parietal lobe, chronic lacunar infarct involving the left cerebellum, left parietal lobe, basal ganglia, no hemorrhage, complete opacification of the left mastoid air cells  *CTA head-no significant stenosis, aneurysm or high flow malformation  *CTA neck, no significant stenosis, 0.4 cm pulmonary nodule in the right upper lobe  *Stroke neurology consulted with the ED physician and patient was loaded with full dose of aspirin   *TTE- normal LV function, no evidence of LV mass or tumor  *Lipid panel: , A1c 6%  *s/p Plavix load on 12/28/2024  - CT chest/abd/pelvis completed-without acute finding. Refer to full report and below for other incidental findings.    - stroke neurology following, appreciate assistance  - continue with ASA 81mg daily + Plavix 75mg daily x 21 days, then ASA 81 mg alone indefinitely   - AURA planned for today  - continue Atorvastatin  40mg daily  - blood culture is obtained on 12/27- no growth to date  - therapy evaluation--> rec OP PT/OT  - BP controlled without meds          ?  MRI finding of left mastoid effusion with contrast-enhancement and concerning for mastoiditis and there is subtle pachymeningeal dural enhancement overlying the effusion concerning for meningitis and adjacent parenchymal contrast enhancement concerning for early cerebritis  -Patient has no symptoms concerning for infection and her white count is also normal without any evidence of fever.  On exam there is no neck stiffness, no photophobia and she has no altered mental status  - CRP is normal  - prior MRIs in the past and at that time there was concern about complete opacification of the left mastoid air cells and questionable enhancement about the left external auditory canal  - Appreciate ENT consult-- no clinical signs to suggest acute mastoiditis. Findings likely chronic from prior surgery and radiation therapy to that area.  no specific treatment. Refer to consult note for details.    Recent right radial nerve palsy  -Patient had outpatient workup done in [place] around [facility] and had numbness around the right arm and was diagnosed with right radial nerve palsy and follows with neuro in clinic and has outpatient MRI scanned  -Symptoms have clinically improved up to 90%    Linear, nodular soft tissue thickening within the right lower lobe, (incidental)  which appears infectious/inflammatory per CT report. Patient reports she had URI symptoms recently that have resolved. She reports no longer has cough. Denies shortness of breath. Currently afebrile, normal wbc, no cough or shortness of breath. Defer antibiotics at this time with lack of symptoms. Discussed with patient     Incidental finding of 0.4 cm pulmonary nodule in the right upper lobe(on CT of the neck)  - CT chest showing-scattered small pulmonary nodules, largest measuring 7 mm the left upper lobe.  "These are indeterminate in the setting of previous malignancy.   - Pulmonary referral at discharge made  - findings discussed with patient     1.6 cm cystic lesion of the pancreatic head, likely a sidebranch IPMN.(Incidental on CT)  -> recommended follow up per radiology report based on size--> CT or MRI Q6mos x 1 yr, then yearly x 2 yrs, then lengthen interval up to 2 yrs of no change.   - findings discussed with patient.   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 Indication: Stroke, acute (48 hours)  Code Status: Full Code      Clinically Significant Risk Factors                              # Obesity: Estimated body mass index is 32.7 kg/m  as calculated from the following:    Height as of this encounter: 1.676 m (5' 6\").    Weight as of this encounter: 91.9 kg (202 lb 9.6 oz)., PRESENT ON ADMISSION     # Financial/Environmental Concerns: none         Social Drivers of Health    Housing Stability: High Risk (12/27/2024)    Housing Stability     Do you have housing? : Yes     Are you worried about losing your housing?: Yes   Financial Resource Strain: High Risk " (12/27/2024)    Financial Resource Strain     Within the past 12 months, have you or your family members you live with been unable to get utilities (heat, electricity) when it was really needed?: Yes   Transportation Needs: High Risk (12/27/2024)    Transportation Needs     Within the past 12 months, has lack of transportation kept you from medical appointments, getting your medicines, non-medical meetings or appointments, work, or from getting things that you need?: Yes   Physical Activity: Insufficiently Active (7/26/2024)    Received from [facility]    Exercise Vital Sign     Days of Exercise per Week: 2 days     Minutes of Exercise per Session: 20 min   Social Connections: Unknown (11/27/2022)    Received from [facility], [facility]    Social Connection and Isolation Panel [NHANES]     Frequency of Communication with Friends and Family: More than three times a week     Frequency of Social Gatherings with Friends and Family: More than three times a week     Attends Cheondoism Services: More than 4 times per year     Active Member of Clubs or Organizations: Yes     Attends Club or Organization Meetings: More than 4 times per year          Disposition Plan     Medically Ready for Discharge: Anticipated Today pending AURA finding         Miles Osorio MD  Hospitalist Service  [facility]  Securely message with [employer] (more info)  Text page via [facility] Paging/Directory   ______________________________________________________________________    Interval History   Patient reports she is doing good. Awaiting AURA. She would like to go home later today if AURA without acute finding.     Physical Exam   Vital Signs: Temp: 98.4  F (36.9  C) Temp src: Oral BP: 103/73 Pulse: 98   Resp: 16 SpO2: 96 % O2 Device: None (Room air)    Weight: 202 lbs 9.6 oz    General Appearance: Alert, awake and no apparent distress  Respiratory: clear to auscultation bilaterally  Cardiovascular: regular rate and  rhythm  GI: soft and non-tender      Medical Decision Making       MANAGEMENT DISCUSSED with the following over the past 24 hours: patient, RN   NOTE(S)/MEDICAL RECORDS REVIEWED over the past 24 hours: nursing note, neurology note  Tests ORDERED & REVIEWED in the past 24 hours:  - blood culture, glucose         Data     I have personally reviewed the following data over the past 24 hrs:    6.6  \   13.6   / 154     140 105 15.3 /  99   4.1 23 1.12 (H) \       Imaging results reviewed over the past 24 hrs:   No results found for this or any previous visit (from the past 24 hours).

## 2024-12-30 NOTE — PROCEDURES
[facility]    Procedure: AURA    Date/Time: 12/30/2024 3:34 PM    Performed by: Migue Ferrari MD  Authorized by: Migue Ferrari MD      UNIVERSAL PROTOCOL   Site Marked: NA  Prior Images Obtained and Reviewed:  Yes  Required items: Required blood products, implants, devices and special equipment available    Patient identity confirmed:  Verbally with patient  Patient was reevaluated immediately before administering moderate or deep sedation or anesthesia  Confirmation Checklist:  Patient's identity using two indicators  Time out: Immediately prior to the procedure a time out was called    Universal Protocol: the Joint Commission Universal Protocol was followed      SEDATION  Patient Sedated: Yes    Vital signs: Vital signs monitored during sedation      PROCEDURE  Describe Procedure: AURA Note    Indication: CVA    Informed consent was signed by the patient.  A timeout was taken.    Sedation:  Versed 2.5mg  Fentanyl 75mcg    Findings:  LV: EF 55%  RV: normal  LA: no thrombus  Mitral valve: normal  Aortic valve: normal  Tricuspid valve: normal  Atrial septum: no PFO, negative bubble study  Aorta: normal    No complications.    Migue Ferrari MD  Cardiology - [facility]  Pager: [phone]  Text Page  December 30, 2024      Patient Tolerance:  Patient tolerated the procedure well with no immediate complications  Length of time physician/provider present for 1:1 monitoring during sedation: 15

## 2024-12-30 NOTE — PROGRESS NOTES
Care Management Discharge Note    Discharge Date: 12/31/2024       Discharge Disposition: Outpatient Rehab (PT, OT, SLP, Cardiac or Pulmonary)    Discharge Services:      Discharge DME:      Discharge Transportation:      Private pay costs discussed: Not applicable    Does the patient's insurance plan have a 3 day qualifying hospital stay waiver?  No    PAS Confirmation Code:    Patient/family educated on Medicare website which has current facility and service quality ratings:      Education Provided on the Discharge Plan:    Persons Notified of Discharge Plans: Pt  Patient/Family in Agreement with the Plan:      Handoff Referral Completed: No, handoff not indicated or clinically appropriate    Additional Information:  Writer was asked to set up appointment for the Pt to establish PCP. Earliest available was:    JAN 17, 2025   New ED/Hospital Follow Up 9:10 AM  [address], [address]  [facility] [address]  [phone]              Supa Kolb RN, BSN, Care Coordinator

## 2024-12-30 NOTE — PLAN OF CARE
Goal Outcome Evaluation:    Pt here with multiple scattered infarcts. A&Ox4. Neuros mild WFD, numbness in BLE, slight weakness in R arm. Removable palate in mouth. L ear deaf. VSS on RA; SBP goal <180. Tele NSR. NPO diet, for AURA, thin liquids; advanced to reg, thin diet after AURA tolerating well. BG monitored Q4 while NPO. Takes pills whole. Up independently, continent. Denies pain. Down to care suites at 1400 for AURA. PIV removed d/t pink, tender site and new R PIV inserted for AURA. Pt scoring green on the Aggression Stop Light Tool. Plan to discharge to home w/ outpatient OT and follow up with [facility] for MRIs tomorrow at 0600. Discharging to home with friend. All belongings given to patient before discharge. IV and tele removed prior to discharge. Discharge education and stroke education given to patient; no further questions.

## 2024-12-30 NOTE — PROGRESS NOTES
Care Suites Procedure Nursing Note    Patient Information  Name: Afshan Jimenez  Age: 59 year old    Procedure  Procedure: AURA  Procedure start time: 1500  Procedure complete time: 1530  Concerns/abnormal assessment: no immediate  If abnormal assessment, provider notified: N/A  Plan/Other: Proceed as planned.    1400 A/O. Pt denies difficulty swallowing or sleep apnea. No dentures or loose teeth. 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  NPO after midnight.  Pre/ post procedure instructions given to pt pre procedure w/ verbal understanding received.  All questions & concerns addressed.    1500 MD Sedation Assessment completed. Consent signed at this time.   Time Out done at this time.    AURA: Pt tolerated  well. VSS.   Total sedation given - 2.5 mg Versed & 75 mcg Fentanyl.         ~1630  Pt transferred to 1719-1 per cart. Detailed report called to bedside RN.  Resp even & unlabored upon transfer. Pt  A/O. Pt to be NPO until 1700. Both pt & nurse informed.          Danii Cramer RN

## 2024-12-30 NOTE — PRE-PROCEDURE
GENERAL PRE-PROCEDURE:   Procedure:  AURA  Date/Time:  12/30/2024 2:51 PM    Written consent obtained?: Yes    Risks and benefits: Risks, benefits and alternatives were discussed    Consent given by:  Patient  Patient states understanding of procedure being performed: Yes    Patient's understanding of procedure matches consent: Yes    Procedure consent matches procedure scheduled: Yes    Expected level of sedation:  Moderate  Appropriately NPO:  Yes  ASA Class:  1  Mallampati  :  Grade 1- soft palate, uvula, tonsillar pillars, and posterior pharyngeal wall visible  Lungs:  Lungs clear with good breath sounds bilaterally  Heart:  Normal heart sounds and rate  History & Physical reviewed:  History and physical reviewed and no updates needed  Statement of review:  I have reviewed the lab findings, diagnostic data, medications, and the plan for sedation

## 2024-12-30 NOTE — DISCHARGE INSTRUCTIONS
AURA  (Transesophageal Echocardiogram)  Discharge Instructions    After you go home:    Have an adult stay with you for 6 hours.       For 24 hours - due to the sedation you received:  Relax and take it easy.  Do NOT make any important or legal decisions.  Do NOT drive or operate machines at home or at work.  Do NOT drink alcohol.    Diet:  You may resume your normal diet, but no scratchy foods for two days.  If your throat is sore, eat cold, bland or soft foods.  You may have heartburn if the tube used in the exam entered your stomach.  If so:   - Do not eat acidic and spicy foods.   - Do not eat three hours before bedtime. Clear liquids are okay.   - When lying down, use two pillows to raise your head.    Medicines:    Take your medications, including blood thinners, unless your provider tells you not to.  If you have stopped any medicines, check with your provider about when to restart them.  You may take Tylenol (Acetaminophen) if your throat is sore.  You may take antacids if you have heartburn.      Follow Up Appointments:    Follow up with your cardiologist at [facility] of patient preference as instructed.  Follow up with your primary care provider as needed.    Call the clinic if:    You have heartburn that is severe or lasts more than 72 hours.  You have a sore throat that feels worse after 72 hours.  You have shortness of breath, neck pain, chest pain, fever, chills, coughing up blood, or other unusual signs.  Questions or concerns      [facility] [place]:    [phone] [facility] (7 days a week)    Or you can contact your provider via My Chart

## 2024-12-31 ENCOUNTER — PATIENT OUTREACH (OUTPATIENT)
Dept: CARE COORDINATION | Facility: CLINIC | Age: 59
End: 2024-12-31
Payer: COMMERCIAL

## 2024-12-31 ENCOUNTER — PATIENT OUTREACH (OUTPATIENT)
Dept: PULMONOLOGY | Facility: CLINIC | Age: 59
End: 2024-12-31
Payer: COMMERCIAL

## 2024-12-31 ENCOUNTER — ORDERS ONLY (AUTO-RELEASED) (OUTPATIENT)
Dept: MEDSURG UNIT | Facility: CLINIC | Age: 59
End: 2024-12-31
Payer: COMMERCIAL

## 2024-12-31 ENCOUNTER — TELEPHONE (OUTPATIENT)
Dept: NEUROLOGY | Facility: CLINIC | Age: 59
End: 2024-12-31
Payer: COMMERCIAL

## 2024-12-31 ENCOUNTER — TRANSFERRED RECORDS (OUTPATIENT)
Dept: HEALTH INFORMATION MANAGEMENT | Facility: CLINIC | Age: 59
End: 2024-12-31
Payer: COMMERCIAL

## 2024-12-31 DIAGNOSIS — R91.8 PULMONARY NODULES: Primary | ICD-10-CM

## 2024-12-31 DIAGNOSIS — I63.89 PARIETAL LOBE INFARCTION (H): ICD-10-CM

## 2024-12-31 DIAGNOSIS — I63.89 PARIETAL LOBE INFARCTION (H): Primary | ICD-10-CM

## 2024-12-31 NOTE — PROGRESS NOTES
Stroke team was following peripherally for results of AUAR. It appears late afternoon yesterday once completed hospitalist contacted Stroke fellow Dr. Caridad Dalla who ordered ziopatch monitor. It does not appear that stroke follow-up order was placed and patient was discharged. Ordered stroke follow-up for 6-8 weeks and contacted stroke coordinator to ensure she is scheduled.  Also, given the finding of mastoid enhancement, discussed with vascular neurologist Dr. Ariana Alvarez and would recommend follow-up MRI in 1 month to ensure resolution and no concern for infection.

## 2024-12-31 NOTE — TELEPHONE ENCOUNTER
"Faxed imaging request to [facility] Medical Records at F: [phone].    \"ATTN: Imaging Release  REGARDING: Afshan Jimenez  1965  FROM: SASHA Hardin  RN Stroke Neurology Care Coordinator  [facility]    Please push imaging completed today  to [facility] PACS. Call Lashawn at [phone] once this is completed. Thank you!\"        Will check back in chart later this week for imaging studies.       Lashawn Prescott BS, RN, SCRN  RN Stroke Neurology Care Coordinator  [facility] Service Line    "

## 2024-12-31 NOTE — PROGRESS NOTES
New Patient Nurse Navigator Note     Referring provider: Dr Osorio, Hospital Team,  [place]    Referring Clinic/Organization: [facility]     Referred to: Interventional Pulm/ Lung Nodule Clinic    Requested provider (if applicable): First available - did not specify     Referral Received: 12/30/24       Evaluation for : pulmonary nodules     Clinical History (per Nurse review of records provided):      12/27/2024 -  12/30/2024 ED to 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  No acute CT abnormality of the chest, abdomen, or pelvis.  2.  Linear, nodular soft tissue thickening within the right lower lobe, which appears infectious/inflammatory.  3.  Scattered small pulmonary nodules, largest measuring 7 mm the left upper lobe. These are indeterminate in the setting of previous malignancy.  4.  Mild hepatic steatosis.  5.  Colonic diverticulosis.  6.  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 Follow-up recommendations, as per below.      Clinical Assessment / Barriers to Care (Per Nurse):    [facility] team is referring pt to Lung Nodule Clinic for evaluation of pulmonary nodules on recent CT. Hx of AML s/p BMT and parotid cancer in mid-1990s. Will proceed to book next available Lung Nodule Clinic with PFTs prior to.        Records Location: [facility]     Records Needed:     N/A    Additional testing needed prior to consult:     PFT preferred        Luis Ramos, RN, BSN, OCN  Oncology New Patient Nurse  [facility]  [phone]

## 2024-12-31 NOTE — TELEPHONE ENCOUNTER
Spoke to patient, confirmed upcoming stroke follow up in February. Discussed need for repeat imaging discussed order and timeline provided imaging scheduling- [phone]      Patient states she had imaging of her arm/shoulder this morning, with [facility]. Wants to ensure we have those images as well.    RALPH WASSERMAN, CMA

## 2024-12-31 NOTE — PROVIDER NOTIFICATION
MD Notification    Notified Person: MD    Notified Person Name: Katlin Bishop    Notification Date/Time: 5:55pm 12/30/24    Notification Interaction: Z-good messaging    Purpose of Notification: Patient discharging, please place discharge recs and referral.     Orders Received:     Comments: Orders placed

## 2024-12-31 NOTE — PROGRESS NOTES
Clinic Care Coordination Contact  Transitions of Care Outreach  Chief Complaint   Patient presents with    Clinic Care Coordination - Post Hospital       Most Recent Admission Date: 12/27/2024   Most Recent Admission Diagnosis: Right arm weakness - R29.898  Parietal lobe infarction (H) - I63.89     Most Recent Discharge Date: 12/30/2024   Most Recent Discharge Diagnosis: Parietal lobe infarction (H) - I63.89  Right arm weakness - R29.898  Dyslipidemia - E78.5  Pulmonary nodules - R91.8     Transitions of Care Assessment    Discharge Assessment  How are you doing now that you are home?: good  How are your symptoms? (Red Flag symptoms escalate to triage hotline per guidelines): Improved  Do you know how to contact your clinic care team if you have future questions or changes to your health status? : Yes  Does the patient have their discharge instructions? : Yes  Does the patient have questions regarding their discharge instructions? : No  Were you started on any new medications or were there changes to any of your previous medications? : Yes  Does the patient have all of their medications?: Yes  Do you have questions regarding any of your medications? : No  Do you have all of your needed medical supplies or equipment (DME)?  (i.e. oxygen tank, CPAP, cane, etc.): Yes                  Follow up Plan     Discharge Follow-Up  Discharge follow up appointment scheduled in alignment with recommended follow up timeframe or Transitions of Risk Category? (Low = within 30 days; Moderate= within 14 days; High= within 7 days): Yes    Future Appointments   Date Time Provider Department Center   1/17/2025  9:30 AM Ana Zaldivar DNP [facility]    2/28/2025 10:00 AM Kennedi Alberts APRN CNP [facility]       Outpatient Plan as outlined on AVS reviewed with patient.    For any urgent concerns, please contact our 24 hour nurse triage line: [phone] ([phone])       JERONIMO Rose               Physical Therapy Daily Treatment Note  [facility] [place]  [address], [address]  [place] [address]     Patient: Madhu Santoro  : 1945  Referring practitioner: Damian Sanchez MD  Today's Date: 2024    VISIT#: 9    Visit Diagnoses:    ICD-10-CM ICD-9-CM   1. Decreased strength of lower extremity  R29.898 729.89   2. Other abnormalities of gait and mobility  R26.89 781.2       Subjective   Madhu Santoro reports:  I still just feel really weak.  I went to the neurologist Tuesday (), they told me there's a strong indication I may have Parkinson's, and discussed some medications to try.     Objective   See Exercise, Manual, and Modality Logs for complete treatment.   Note: any exercises/interventions not performed today have been marked as deferred on flowsheets.     Patient Education: HEP review  Exercise rationale/ pain free exercise performance  Alternate exercise positions  Verbal/Tactile cues to ensure correct exercise performance/technique       Assessment/Plan  Patient demonstrates good tolerance to program today, with noting continued fatigue throughout.  Short frequent rest breaks used throughout session.  Noted several instances of brief freezing, notably with turning during amb from straight line path.       Progress per Plan of Care and Progress strengthening /stabilization /functional activity          Timed:         Manual Therapy:    -     mins  19888;     Therapeutic Exercise:    15     mins  56944;     Neuromuscular Ankush:   10    mins  09556;    Therapeutic Activity:     5     mins  97137;     Gait Training:           mins  28113;     Ultrasound:          mins  33808;    Ionto:                                   mins  16175  Self Care:                            mins  09406    Un-Timed:  Electrical Stimulation:         mins  10972 ( );  Dry Needling          mins self-pay  Traction          mins 19235  Re-Eval                                mins  08665  Group Therapy           ___ mins 96726    Timed Treatment:   30   mins   Total Treatment:     45   mins    FREDY Lemus License #[id number]  Physical Therapist Assistant

## 2025-01-01 LAB
BACTERIA BLD CULT: NO GROWTH
BACTERIA BLD CULT: NO GROWTH

## 2025-01-02 NOTE — TELEPHONE ENCOUNTER
Spoke with [facility] medical records. Confirmed receipt of imaging request and provided them with clinic fax number to send the reports to.     Pt is noted to be scheduled with stroke BEV on 2/28, with MRI scheduled prior. So no further face management needed in that regard.     Routing to [facility], please keep an eye out for imaging reports and send to HIMs for scanning. Thank you!      Lashawn Prescott BS, RN, SCRN  RN Stroke Neurology Care Coordinator  [facility] Service Line

## 2025-01-09 ENCOUNTER — NURSE TRIAGE (OUTPATIENT)
Dept: FAMILY MEDICINE | Facility: CLINIC | Age: 60
End: 2025-01-09
Payer: COMMERCIAL

## 2025-01-09 ENCOUNTER — APPOINTMENT (OUTPATIENT)
Dept: MRI IMAGING | Facility: CLINIC | Age: 60
End: 2025-01-09
Attending: EMERGENCY MEDICINE
Payer: COMMERCIAL

## 2025-01-09 ENCOUNTER — HOSPITAL ENCOUNTER (EMERGENCY)
Facility: CLINIC | Age: 60
Discharge: HOME | End: 2025-01-09
Attending: EMERGENCY MEDICINE
Payer: COMMERCIAL

## 2025-01-09 VITALS
WEIGHT: 204.2 LBS | SYSTOLIC BLOOD PRESSURE: 123 MMHG | HEART RATE: 99 BPM | DIASTOLIC BLOOD PRESSURE: 73 MMHG | OXYGEN SATURATION: 97 % | TEMPERATURE: 97.5 F | RESPIRATION RATE: 14 BRPM | BODY MASS INDEX: 32.96 KG/M2

## 2025-01-09 DIAGNOSIS — Z86.73 HISTORY OF CVA (CEREBROVASCULAR ACCIDENT): ICD-10-CM

## 2025-01-09 DIAGNOSIS — R20.2 PARESTHESIAS: ICD-10-CM

## 2025-01-09 LAB
ALBUMIN SERPL BCG-MCNC: 4.3 G/DL (ref 3.5–5.2)
ALP SERPL-CCNC: 88 U/L (ref 40–150)
ALT SERPL W P-5'-P-CCNC: 32 U/L (ref 0–50)
ANION GAP SERPL CALCULATED.3IONS-SCNC: 13 MMOL/L (ref 7–15)
APTT PPP: 25 SECONDS (ref 22–38)
AST SERPL W P-5'-P-CCNC: 46 U/L (ref 0–45)
ATRIAL RATE - MUSE: 99 BPM
BASOPHILS # BLD AUTO: 0 10E3/UL (ref 0–0.2)
BASOPHILS NFR BLD AUTO: 0 %
BILIRUB SERPL-MCNC: 0.5 MG/DL
BUN SERPL-MCNC: 19.1 MG/DL (ref 8–23)
CALCIUM SERPL-MCNC: 10.2 MG/DL (ref 8.8–10.4)
CHLORIDE SERPL-SCNC: 104 MMOL/L (ref 98–107)
CREAT SERPL-MCNC: 1.46 MG/DL (ref 0.51–0.95)
DIASTOLIC BLOOD PRESSURE - MUSE: NORMAL MMHG
EGFRCR SERPLBLD CKD-EPI 2021: 41 ML/MIN/1.73M2
EOSINOPHIL # BLD AUTO: 0.4 10E3/UL (ref 0–0.7)
EOSINOPHIL NFR BLD AUTO: 4 %
ERYTHROCYTE [DISTWIDTH] IN BLOOD BY AUTOMATED COUNT: 13.8 % (ref 10–15)
GLUCOSE SERPL-MCNC: 123 MG/DL (ref 70–99)
HCO3 SERPL-SCNC: 24 MMOL/L (ref 22–29)
HCT VFR BLD AUTO: 44.7 % (ref 35–47)
HGB BLD-MCNC: 15 G/DL (ref 11.7–15.7)
HOLD SPECIMEN: NORMAL
IMM GRANULOCYTES # BLD: 0 10E3/UL
IMM GRANULOCYTES NFR BLD: 0 %
INR PPP: 0.98 (ref 0.85–1.15)
INTERPRETATION ECG - MUSE: NORMAL
LYMPHOCYTES # BLD AUTO: 2.9 10E3/UL (ref 0.8–5.3)
LYMPHOCYTES NFR BLD AUTO: 32 %
MCH RBC QN AUTO: 29.2 PG (ref 26.5–33)
MCHC RBC AUTO-ENTMCNC: 33.6 G/DL (ref 31.5–36.5)
MCV RBC AUTO: 87 FL (ref 78–100)
MONOCYTES # BLD AUTO: 0.7 10E3/UL (ref 0–1.3)
MONOCYTES NFR BLD AUTO: 8 %
NEUTROPHILS # BLD AUTO: 5 10E3/UL (ref 1.6–8.3)
NEUTROPHILS NFR BLD AUTO: 55 %
NRBC # BLD AUTO: 0 10E3/UL
NRBC BLD AUTO-RTO: 0 /100
P AXIS - MUSE: 22 DEGREES
PLATELET # BLD AUTO: 199 10E3/UL (ref 150–450)
POTASSIUM SERPL-SCNC: 4.2 MMOL/L (ref 3.4–5.3)
PR INTERVAL - MUSE: 168 MS
PROT SERPL-MCNC: 7.9 G/DL (ref 6.4–8.3)
QRS DURATION - MUSE: 82 MS
QT - MUSE: 354 MS
QTC - MUSE: 454 MS
R AXIS - MUSE: -20 DEGREES
RBC # BLD AUTO: 5.14 10E6/UL (ref 3.8–5.2)
SODIUM SERPL-SCNC: 141 MMOL/L (ref 135–145)
SYSTOLIC BLOOD PRESSURE - MUSE: NORMAL MMHG
T AXIS - MUSE: 0 DEGREES
TROPONIN T SERPL HS-MCNC: 15 NG/L
VENTRICULAR RATE- MUSE: 99 BPM
WBC # BLD AUTO: 9.1 10E3/UL (ref 4–11)

## 2025-01-09 PROCEDURE — 255N000002 HC RX 255 OP 636: Performed by: EMERGENCY MEDICINE

## 2025-01-09 PROCEDURE — 70553 MRI BRAIN STEM W/O & W/DYE: CPT

## 2025-01-09 PROCEDURE — 36415 COLL VENOUS BLD VENIPUNCTURE: CPT | Performed by: EMERGENCY MEDICINE

## 2025-01-09 PROCEDURE — 82565 ASSAY OF CREATININE: CPT | Performed by: EMERGENCY MEDICINE

## 2025-01-09 PROCEDURE — 85025 COMPLETE CBC W/AUTO DIFF WBC: CPT | Performed by: EMERGENCY MEDICINE

## 2025-01-09 PROCEDURE — 82435 ASSAY OF BLOOD CHLORIDE: CPT | Performed by: EMERGENCY MEDICINE

## 2025-01-09 PROCEDURE — 36592 COLLECT BLOOD FROM PICC: CPT | Performed by: EMERGENCY MEDICINE

## 2025-01-09 PROCEDURE — 85730 THROMBOPLASTIN TIME PARTIAL: CPT | Performed by: EMERGENCY MEDICINE

## 2025-01-09 PROCEDURE — A9585 GADOBUTROL INJECTION: HCPCS | Performed by: EMERGENCY MEDICINE

## 2025-01-09 PROCEDURE — 70544 MR ANGIOGRAPHY HEAD W/O DYE: CPT

## 2025-01-09 PROCEDURE — 85610 PROTHROMBIN TIME: CPT | Performed by: EMERGENCY MEDICINE

## 2025-01-09 PROCEDURE — 70549 MR ANGIOGRAPH NECK W/O&W/DYE: CPT

## 2025-01-09 PROCEDURE — 99285 EMERGENCY DEPT VISIT HI MDM: CPT | Mod: 25

## 2025-01-09 PROCEDURE — 84155 ASSAY OF PROTEIN SERUM: CPT | Performed by: EMERGENCY MEDICINE

## 2025-01-09 PROCEDURE — 84484 ASSAY OF TROPONIN QUANT: CPT | Performed by: EMERGENCY MEDICINE

## 2025-01-09 PROCEDURE — 93005 ELECTROCARDIOGRAM TRACING: CPT

## 2025-01-09 RX ORDER — GADOBUTROL 604.72 MG/ML
10 INJECTION INTRAVENOUS ONCE
Status: COMPLETED | OUTPATIENT
Start: 2025-01-09 | End: 2025-01-09

## 2025-01-09 RX ADMIN — GADOBUTROL 10 ML: 604.72 INJECTION INTRAVENOUS at 17:59

## 2025-01-09 ASSESSMENT — COLUMBIA-SUICIDE SEVERITY RATING SCALE - C-SSRS
1. IN THE PAST MONTH, HAVE YOU WISHED YOU WERE DEAD OR WISHED YOU COULD GO TO SLEEP AND NOT WAKE UP?: NO
6. HAVE YOU EVER DONE ANYTHING, STARTED TO DO ANYTHING, OR PREPARED TO DO ANYTHING TO END YOUR LIFE?: NO
2. HAVE YOU ACTUALLY HAD ANY THOUGHTS OF KILLING YOURSELF IN THE PAST MONTH?: NO

## 2025-01-09 ASSESSMENT — ACTIVITIES OF DAILY LIVING (ADL)
ADLS_ACUITY_SCORE: 56

## 2025-01-09 NOTE — ED TRIAGE NOTES
Pt sts she had slurred speech around 1230 which has resolved. Slight ascending numbness right foot     Triage Assessment (Adult)       Row Name 01/09/25 1511          Triage Assessment    Airway WDL WDL        Respiratory WDL    Respiratory WDL WDL        Skin Circulation/Temperature WDL    Skin Circulation/Temperature WDL WDL        Cardiac WDL    Cardiac WDL WDL        Peripheral/Neurovascular WDL    Peripheral Neurovascular WDL WDL        Cognitive/Neuro/Behavioral WDL    Cognitive/Neuro/Behavioral WDL WDL

## 2025-01-09 NOTE — TELEPHONE ENCOUNTER
"Nurse Triage SBAR    Is this a 2nd Level Triage? NO    Situation: Patient called stating she had sudden onset of slurred speech, and discomfort/heaviness in her R arm while talking with her friend.     Background: Per chart review, patient was recently in the hospital and has an upcoming appointment for \"stroke symptoms\" with Ana Zaldivar NP on 1/17/25.     Assessment: Patient states she was sitting and talking with her friend when her speech became slurred. She states it has since resolved but her friend made her call in to the clinic. She states she also has R arm heaviness/discomfort which is still present now.     Protocol Recommended Disposition:   Call  Now    Recommendation: Per protocol, patient was advised to call  Now. She asked if her friend can drive her to the hospital. Writer advised to call  Now again, explained these are red flag symptoms and can be an indication of a stroke and time is of the essence. Notified her EMS may determine she can go to ED by car but my recommendation now is call 911 for prompt assessment. She states she will call  now. She had no further questions or concerns.     Routing to provider for awareness.     TIMOTEO VelasquezN, RN        Reason for Disposition   Sudden onset of numbness of the face, arm or leg on one side of the body and present now    Additional Information   Negative: SEVERE weakness (e.g., unable to walk or barely able to walk, requires support) and new-onset or getting worse   Negative: Difficult to awaken or acting confused (e.g., disoriented, slurred speech)   Negative: Sudden onset of weakness of the face, arm or leg on one side of the body and present now (Exception: Bell's palsy suspected: weakness on one side of the face developing over hours to days, with no other symptoms.)    Protocols used: Neurologic Deficit-A-OH    "

## 2025-01-09 NOTE — PROGRESS NOTES
"    [facility]    Stroke Telephone Note    I was called by Jay Moreno on 01/09/25 regarding patient Afshan Jimenez. The patient is a 59 year old female with past medical history significant for AML in remission, Ischemic stroke (12/24), parotid/palate cancer s/p radiation and type 2 diabetes mellitus presents with R sided numbness and slurred speech since 1230 PM. Dysarthria has resolved. Continues to have R leg numbness. No other focal deficits.     Vitals  BP: 124/85   Pulse: 105   Resp: 22   Temp: 97.5  F (36.4  C)   Weight: 92.6 kg (204 lb 3.2 oz)    Imaging Findings  CT head: NA  CTA head/neck: NA    Impression    Right leg numbness  Dysarthria    Recommendations  MRI brain  MRA head and neck    My recommendations are based on the information provided over the phone by Afshan Jimenez's in-person providers. They are not intended to replace the clinical judgment of her in-person providers. I was not requested to personally see or examine the patient at this time.     The Stroke Staff is Dr. Valdivia.    Delvis Concepcion MD  Vascular Neurology Fellow    To page me or covering stroke neurology team member, click here: AMCOM  Choose \"On Call\" tab at top, then select \"NEUROLOGY/ALL SITES\" from middle drop-down box, press Enter, then look for \"stroke\" or \"telestroke\" for your site.   "

## 2025-01-09 NOTE — ED NOTES
Pt concerned about the amount of radiation in the MRI scan. Pt educated that there is not radiation in MRI since it uses magnets.

## 2025-01-09 NOTE — TELEPHONE ENCOUNTER
Ana Zaldivar DNP  [facility]8 minutes ago (12:38 PM)     Agree with plan, thank you.    Ana Zaldivar DNP, APRN, CNP

## 2025-01-09 NOTE — ED PROVIDER NOTES
"  Emergency Department Note      History of Present Illness     Chief Complaint   Stroke Symptoms    HPI   Afshan Jimenez is a 59 year old female with a recent history of parietal lobe infarction, type 2 diabetes, and cancer who presents to the ED via EMS for evaluation of stroke symptoms. Afshan reports that she was admitted for a stroke at the end of last month, and she recovered from this without residual symptoms. Afshan notes that she was with a friend this afternoon when around 1230 she began to slur her speech, and she then developed worsening right foot numbness, which has been extending up her leg to her knee where it \"softens\" out. The slurred speech did not last long, and specifically only about 10 seconds apparently. She notes bilateral toe numbness at baseline. Afshan states that she has also had right-sided pain for the last 2-3 days. She denies nausea, vomiting, headache, chest pain, or any other symptoms.    Independent Historian   None    Review of External Notes   I reviewed the discharge summary from 12/30/2024, when she was admitted for a stroke.   I reviewed the nurse triage phone note from today.    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     Physical Exam     Patient Vitals for the past 24 hrs:   BP Temp Temp src Pulse Resp SpO2 Weight "   01/09/25 1839 -- -- -- 101 14 -- --   01/09/25 1837 -- -- -- 99 -- -- --   01/09/25 1836 -- -- -- 101 14 97 % --   01/09/25 1830 123/73 -- -- -- -- -- --   01/09/25 1639 -- -- -- 103 18 95 % --   01/09/25 1624 (!) 127/109 -- -- 100 25 97 % --   01/09/25 1609 124/85 -- -- 105 22 97 % --   01/09/25 1526 -- -- -- -- 15 96 % 92.6 kg (204 lb 3.2 oz)   01/09/25 1510 112/76 97.5  F (36.4  C) Oral 117 18 98 % --     Physical Exam  Nursing note and vitals reviewed.  Constitutional:  Oriented to person, place, and time. Cooperative.   HENT:   Nose:    Nose normal.   Mouth/Throat:   Mucous membranes are normal.   Eyes:    Conjunctivae normal and EOM are normal.      Pupils are equal, round, and reactive to light.   Neck:    Trachea normal.   Cardiovascular:  Normal rate, regular rhythm, normal heart sounds and normal pulses. No murmur heard.  Pulmonary/Chest:  Effort normal and breath sounds normal.   Abdominal:   Soft. Normal appearance and bowel sounds are normal.      There is no tenderness.      There is no rebound and no CVA tenderness.   Musculoskeletal:  Extremities atraumatic x 4.   Lymphadenopathy:  No cervical adenopathy.   Neurological:   Alert and oriented to person, place, and time. Normal strength.  Subjective decrease sensation to light touch to the right lower leg but sensation intact elsewhere.  Visual fields intact.     No cranial nerve deficit. GCS eye subscore is 4. GCS verbal subscore is 5. GCS motor subscore is 6.   Skin:    Skin is intact. No rash noted.   Psychiatric:   Normal mood and affect.    Diagnostics     Lab Results   Labs Ordered and Resulted from Time of ED Arrival to Time of ED Departure   COMPREHENSIVE METABOLIC PANEL - Abnormal       Result Value    Sodium 141      Potassium 4.2      Carbon Dioxide (CO2) 24      Anion Gap 13      Urea Nitrogen 19.1      Creatinine 1.46 (*)     GFR Estimate 41 (*)     Calcium 10.2      Chloride 104      Glucose 123 (*)     Alkaline Phosphatase 88      AST  46 (*)     ALT 32      Protein Total 7.9      Albumin 4.3      Bilirubin Total 0.5     TROPONIN T, HIGH SENSITIVITY - Abnormal    Troponin T, High Sensitivity 15 (*)    INR - Normal    INR 0.98     PARTIAL THROMBOPLASTIN TIME - Normal    aPTT 25     CBC WITH PLATELETS AND DIFFERENTIAL    WBC Count 9.1      RBC Count 5.14      Hemoglobin 15.0      Hematocrit 44.7      MCV 87      MCH 29.2      MCHC 33.6      RDW 13.8      Platelet Count 199      % Neutrophils 55      % Lymphocytes 32      % Monocytes 8      % Eosinophils 4      % Basophils 0      % Immature Granulocytes 0      NRBCs per 100 WBC 0      Absolute Neutrophils 5.0      Absolute Lymphocytes 2.9      Absolute Monocytes 0.7      Absolute Eosinophils 0.4      Absolute Basophils 0.0      Absolute Immature Granulocytes 0.0      Absolute NRBCs 0.0     TROPONIN T, HIGH SENSITIVITY     Imaging   MR Brain w/o & w Contrast   Final Result   IMPRESSION:         HEAD MRI:    1.  Multiple subacute infarcts throughout the left cerebral hemisphere, evolved as expected compared to the prior.   2.  Small area of enhancement along distal pericallosal branches which could represent varix or distal pericallosal branch aneurysm, unchanged.      HEAD MRA:    1.  No evidence of large vessel occlusion or high-grade stenosis.      NECK MRA:   1.  No evidence of large vessel occlusion or high-grade stenosis.   2.  Probable wall thickening and irregularity involving the left distal common carotid artery with mild stenosis and possible perivascular inflammatory change, unchanged. This may be atherosclerotic, however inflammatory process (e.g. vasculitis) cannot    be entirely excluded.      MRA Angiogram Head w/o Contrast   Final Result   IMPRESSION:         HEAD MRI:    1.  Multiple subacute infarcts throughout the left cerebral hemisphere, evolved as expected compared to the prior.   2.  Small area of enhancement along distal pericallosal branches which could represent varix or  distal pericallosal branch aneurysm, unchanged.      HEAD MRA:    1.  No evidence of large vessel occlusion or high-grade stenosis.      NECK MRA:   1.  No evidence of large vessel occlusion or high-grade stenosis.   2.  Probable wall thickening and irregularity involving the left distal common carotid artery with mild stenosis and possible perivascular inflammatory change, unchanged. This may be atherosclerotic, however inflammatory process (e.g. vasculitis) cannot    be entirely excluded.      MRA Angiogram Neck w/o & w Contrast   Final Result   IMPRESSION:         HEAD MRI:    1.  Multiple subacute infarcts throughout the left cerebral hemisphere, evolved as expected compared to the prior.   2.  Small area of enhancement along distal pericallosal branches which could represent varix or distal pericallosal branch aneurysm, unchanged.      HEAD MRA:    1.  No evidence of large vessel occlusion or high-grade stenosis.      NECK MRA:   1.  No evidence of large vessel occlusion or high-grade stenosis.   2.  Probable wall thickening and irregularity involving the left distal common carotid artery with mild stenosis and possible perivascular inflammatory change, unchanged. This may be atherosclerotic, however inflammatory process (e.g. vasculitis) cannot    be entirely excluded.         EKG   ECG taken at 1547, ECG read at 1558  Normal sinus rhythm   Minimal voltage criteria for LVH, may be normal variant (R in aVL)   Possible anterior infarct, age undetermined    No significant changes as compared to prior, dated 12/27/2024.  Rate 99 bpm. ME interval 168 ms. QRS duration 82 ms. QT/QTc 352/454 ms. P-R-T axes 22 -20 0.    Independent Interpretation   None    ED Course      Medications Administered   Medications   gadobutrol (GADAVIST) injection 10 mL (10 mLs Intravenous $Given 1/9/25 7395)     Procedures   Procedures     Discussion of Management   Neurology, Dr. Concepcion    ED Course   ED Course as of 01/09/25 2049   Thu  Jan 09, 2025   1530 I obtained history and examined the patient as noted above.    1533 I spoke to stroke/neuro, Dr. Concepcion, regarding the patient's pertinent medical history and prior strokes, symptoms presentation today, and my assessment of the patient.    1549 I rechecked and updated the patient.      Additional Documentation  None    Medical Decision Making / Diagnosis     CMS Diagnoses: None    MIPS       None    MDM   Afshan Jimenez is a 59 year old female who came in for further evaluation of recurrent strokelike symptoms.  Given her recent stroke, I did not call a code stroke, as she is not a candidate for TNK.  I spoke with the stroke neurology fellow right away, Dr. Concepcion, who agreed that we should proceed directly to MRI.  Her MRIs do not appear to show anything acute.  I subsequently spoke with Dr. Moon, who reviewed everything as well, and he feels that the patient is safe for discharge at this point based on her symptoms and MRI findings.  She is instructed to follow-up with soon as possible with neurology and certainly to return with any concerns or worsening symptoms.    Disposition   The patient was discharged.     Diagnosis     ICD-10-CM    1. Paresthesias  R20.2       2. History of CVA (cerebrovascular accident)  Z86.73          Discharge Medications   New Prescriptions    No medications on file     I, Veena Perales, am serving as a scribe at 3:22 PM on 1/9/2025 to document services personally performed by Jay Moreno MD based on my observations and the provider's statements to me.        Jay Moreno MD  01/09/25 8561

## 2025-01-10 NOTE — ED NOTES
Arranged transportation for this patient.  Refused for another set of vital signs prior discharge.   OFFICE VISIT    Cheo Giron  [id number]      Cheo Giron is a 76 year old male returning for further evaluation and management of urolithiasis.    He is s/p cystolitholpaxy and B stent placement in Oct 2021, followed by bilateral URS Dec 2021. Stents have been removed.  Here for F/U. KUB not done yet.      Current Outpatient Medications   Medication Sig   • acetaminophen (TYLENOL) 325 MG tablet Take 650 mg by mouth every 4 hours as needed for Pain or Fever.   • Menthol, Topical Analgesic, (Biofreeze) 4 % Gel Apply topically every 12 hours as needed. Apply to right knee.   • midodrine (PROAMATINE) 10 MG tablet Take 10 mg by mouth 3 times daily.   • nystatin (MYCOSTATIN) 730954 UNIT/GM cream Apply topically 3 times daily. Apply to groin areas.   • triamcinolone 0.025 % lotion Apply topically 2 times daily. Indications: Itching Apply to BLE   • Dextromethorphan-guaiFENesin (Robitussin Cough+Chest Ash DM) 5-100 MG/5ML Liquid Take 10 mLs by mouth every 4 to 6 hours as needed (Cough congestion).   • loperamide (IMODIUM) 2 MG capsule Take 2 mg by mouth every 12 hours as needed for Diarrhea.    • folic acid (FOLATE) 1 MG tablet TAKE 1 TABLET BY MOUTH ONCE DAILY FOR SUPPLEMENT   • potassium CHLORIDE (KLOR-CON) 20 MEQ packet MIX 1 PACKET IN AT LEAST 120 ML OF COLD WATER OR JUICE AND DRINK ONCE DAILY   • atorvastatin (LIPITOR) 40 MG tablet TAKE 1 TABLET BY MOUTH ONCE DAILY AT BEDTIME FOR CORONARY ARTERY DISEASE   • furosemide (LASIX) 20 MG tablet TAKE 1 TABLET BY MOUTH ONCE DAILY FOR COPD   • Multaq 400 MG Tab TAKE 1 TABLET BY MOUTH TWICE DAILY FOR HEART RHYTHM (TAKE WITH MEALS)   • gabapentin (NEURONTIN) 300 MG capsule TAKE 1 CAPSULE BY MOUTH THREE TIMES DAILY FOR NEUROPATHY   • ipratropium (ATROVENT HFA) 17 MCG/ACT inhaler Inhale 2 puffs into the lungs every 6 hours. Indications: Chronic Obstructive Lung Disease   • Dimethicone (Blanquita Cleanse & Protect) 2 % Lotion Apply 1 application topically 2 times daily.   •  clotrimazole-betamethasone (LOTRISONE) 1-0.05 % cream Apply 1 application topically 2 times daily. Apply to buttocks/skin irritation, repeat as necessary.   • mirtazapine (REMERON) 15 MG tablet Take 0.5 tablets by mouth at bedtime. Indications: Appetite stimulant Give 1/2 tab = 7.5 mg   • ondansetron (ZOFRAN) 4 MG tablet Take 1 tablet by mouth every 8 hours as needed for Nausea. Indications: Nausea and Vomiting   • hydrOXYzine (ATARAX) 25 MG tablet Take 1 tablet by mouth every 8 hours as needed for Itching.   • albuterol 108 (90 Base) MCG/ACT inhaler Inhale 2 puffs into the lungs every 4 hours as needed for Wheezing.   • DULoxetine HCl 30 MG Capsule Delayed Release Sprinkle Take 1 capsule by mouth daily. Indications: Depression.   • metoPROLOL tartrate (LOPRESSOR) 25 MG tablet Take 0.5 tablets by mouth every 12 hours. (Patient taking differently: Take 12.5 mg by mouth every 12 hours. Indications: High Blood Pressure Disorder )     No current facility-administered medications for this visit.       ALLERGIES:  Not on File      EXAM:    Vital Signs:   Visit Vitals  /75   Pulse 68   Temp 97.3 °F (36.3 °C) (Oral)   Ht 5' 6\" (1.676 m)   Wt 70.3 kg (154 lb 15.7 oz)   SpO2 97%   BMI 25.01 kg/m²       Labs:    Lab Results   Component Value Date    SODIUM 141 12/08/2021    POTASSIUM 5.1 12/08/2021    CHLORIDE 109 (H) 12/08/2021    CO2 21 (L) 12/08/2021    BUN 27 (H) 12/08/2021    CREATININE 1.70 (H) 12/08/2021    GLUCOSE 69 (L) 12/08/2021     Creatinine (mg/dL)   Date Value   12/08/2021 1.70 (H)   11/15/2021 1.43 (H)   11/08/2021 1.15     No results found for: PSA      Imaging:    KUB- pending      Assessment and Plan:    Bladder stone- treated  Kidney stone- treated  Chronic Ang catheter    Needs F/U KUB.    We discussed his chronic retention.  He has not been established with a urologist and I reviewed Care everywhere do not see any prior urological evaluations.  He states his catheter was inserted a few years ago.   Does not sound like he was ever offered a bladder outlet procedure.  Given the chronic retention, bladder stone, and history of kidney stones with a chronic indwelling catheter, I think any effort possible should be made the catheter.  Based on prior endoscopic evaluation, I believe he would be an appropriate candidate for a BPH procedure.  He was noted to have a large median lobe with marked prostate enlargement - would be best suited for Aquablation or HoLEP. Will appeal for Aquablation.        Antoni Fitch MD  [facility]  Pgr: [phone]

## 2025-01-15 LAB — CV ZIO PRELIM RESULTS: NORMAL

## 2025-01-17 ENCOUNTER — TELEPHONE (OUTPATIENT)
Dept: GASTROENTEROLOGY | Facility: CLINIC | Age: 60
End: 2025-01-17

## 2025-01-17 DIAGNOSIS — R93.3 ABNORMAL FINDING ON GI TRACT IMAGING: Primary | ICD-10-CM

## 2025-01-17 NOTE — TELEPHONE ENCOUNTER
Advanced [facility]oscopy     Referring provider: Ana Zaldivar DNP     Referred to: [facility]     Provider Requested: na     Referral Received: 01/17/25     Records received: Epic     Images received: PACS    Insurance Coverage: [facility]    Evaluation for: pancreatic cyst     Clinical History (per RN review):     Incidental finding of pancreatic cyst during admission for CVA, is on Plavix       Study Result    Narrative & Impression   EXAM: CT CHEST/ABDOMEN/PELVIS W CONTRAST  LOCATION: [facility]  DATE: 12/28/2024     INDICATION: Acute CVA; previous AML, status post bone marrow transplant in 1992; left parotid gland cancer.  COMPARISON: None available.  TECHNIQUE: CT scan of the chest, abdomen, and pelvis was performed following injection of IV contrast. Multiplanar reformats were obtained. Dose reduction techniques were used.   CONTRAST: 102 mL Isovue-370.     FINDINGS:     LUNGS AND PLEURA: Mild diffuse bronchial wall thickening. Mosaic attenuation of the pulmonary parenchyma, suggesting small vessels or small airways disease. No large focal airspace consolidation. Linear, nodular soft tissue thickening within the right   lower lobe, which appears infectious/inflammatory, series 4 image 139.     Scattered small pulmonary nodules, largest measuring 7 mm on the left upper lobe, with thick-walled cystic appearance, series 4 image 91.     No pneumothorax.     No pleural effusion.     MEDIASTINUM/AXILLAE: No mediastinal/hilar adenopathy.      Thoracic esophagus is unremarkable.     No axillary lymphadenopathy.     Chest wall is unremarkable.     No thoracic aortic aneurysm.     Heart is normal in size. No pericardial effusion.     CORONARY ARTERY CALCIFICATION: None.     HEPATOBILIARY: Mild hepatic steatosis.     Cholecystectomy.     No intrahepatic or intrahepatic biliary ductal dilatation.     PANCREAS: Enhances normally. No peripancreatic inflammatory fat stranding. There is a  1.6 cm cystic lesion of the pancreatic head, series 3 image 138.     SPLEEN: Enhances normally. Normal size.     ADRENAL GLANDS: Normal.     KIDNEYS: Both kidneys enhance symmetrically, without hydronephrosis. Low-attenuation subcentimeter renal lesion(s). These are compatible with small benign cysts and no specific imaging evaluation or follow-up is recommended.     No nephroureterolithiasis.     Urinary bladder is unremarkable.     PELVIC ORGANS: No suspicious abnormality.     BOWEL: No evidence of acute gastrointestinal inflammation or obstruction. Colonic diverticulosis. Normal appendix.     No intraperitoneal free fluid or free air.     LYMPH NODES: No suspicious abdominal or pelvic lymphadenopathy.     VASCULATURE: No abdominal aortic aneurysm. Mild atheromatous disease.     MUSCULOSKELETAL: No suspicious abnormality. Diffuse idiopathic skeletal hyperostosis of the thoracic spine.     OTHER: No additionally significant abnormalities.                                                                      IMPRESSION:   1.  No acute CT abnormality of the chest, abdomen, or pelvis.  2.  Linear, nodular soft tissue thickening within the right lower lobe, which appears infectious/inflammatory.  3.  Scattered small pulmonary nodules, largest measuring 7 mm the left upper lobe. These are indeterminate in the setting of previous malignancy.  4.  Mild hepatic steatosis.  5.  Colonic diverticulosis.  6.  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 Follow-up recommendations, as per below.       Provider review date: 01/17/25    Provider Decision for clinic consultation/Orders:            Referral updates/Patient contacted:

## 2025-01-20 ENCOUNTER — TELEPHONE (OUTPATIENT)
Dept: FAMILY MEDICINE | Facility: CLINIC | Age: 60
End: 2025-01-20

## 2025-01-20 NOTE — TELEPHONE ENCOUNTER
Outgoing call to Patient.  RN reviewed Provider's message with Patient and assisted her in getting scheduled for an appt with Dr. Hill to est care within February as OK'd with Dr. Hill.    Appointments in Next Year      Feb 07, 2025 8:00 AM  (Arrive by 7:40 AM)  Provider Visit with Sterling Hill MD  [facility] ([facility] ) [phone]       Mercedes MILLS,  Triage RN  [facility] Internal Medicine

## 2025-01-20 NOTE — TELEPHONE ENCOUNTER
Ok to change Feb appt to me.  RE Neurology, below is the note from Neurology while she was in hospital.  I believe she is scheduled appropriately:       Stroke team was following peripherally for results of AURA. It appears late afternoon yesterday once completed hospitalist contacted Stroke fellow Dr. Caridad Dalal who ordered ziopatch monitor. It does not appear that stroke follow-up order was placed and patient was discharged. Ordered stroke follow-up for 6-8 weeks and contacted stroke coordinator to ensure she is scheduled.  Also, given the finding of mastoid enhancement, discussed with vascular neurologist Dr. Ariana Alvarez and would recommend follow-up MRI in 1 month to ensure resolution and no concern for infection.        Sterling Hill MD on 1/20/2025 at 2:24 PM

## 2025-01-20 NOTE — TELEPHONE ENCOUNTER
Afshan called the [facility] regarding her weight management program. Referral placed by Alisha Miller for weight management in [facility]. Patient is going to establish care with you in march. In February, patient is going to see Dr. Michele for the weight management.  1) Can February appointment be switched to you?    2) Can she get a stat referral to neurology? She can only get in end of February.    SASHA Jhaveri  [facility] Triage

## 2025-01-20 NOTE — TELEPHONE ENCOUNTER
Patient calling in to see if she should keep 1/31/2025 MRI?    Patient has since been seen in the ED and had several MRI's.    Does she need another?    Patient tried to call Neuro office and was transferred many times until she got [facility] and this RN is routing to Neuro and provider she has appt with.    SASHA Byrd  [facility]  [phone]    [facility]   Monday  - Thursday 7 AM - 6 PM    Friday  7 AM - 5 PM     -Please call your clinic for assistance from a nurse after hours.

## 2025-01-21 NOTE — TELEPHONE ENCOUNTER
Routing to BEV pool to please review and advise. It appears that follow up MRI for 1/31 was to evaluate the potential inflammatory enhancement seen previously. Imaging from 1/9 appears to still show enhancement and it was not significantly changed?     Should pt keep MRI scheduled on 1/31?      Lashawn Prescott BS, RN, SCRN  RN Stroke Neurology Care Coordinator  [facility] Neuroscience Service Line

## 2025-01-23 NOTE — TELEPHONE ENCOUNTER
Per Claudette JONES and Dr. Neri, pt is advised to keep scheduled MRI appt on 1/31 to ensure stability.     LDVM with pt advising she keep appt as scheduled. Also sent [employer] message.      Lashawn MAHMOOD, RN, SCRN  RN Stroke Neurology Care Coordinator  [facility] Neuroscience Service Line

## 2025-01-28 NOTE — TELEPHONE ENCOUNTER
Per Melody Montoya  See below. Let's get MRI/MRCP w/wo contrast now and then follow up with me in clinic after that     Imaging ordered.Discussed plan with patient, she would like to add this study to her scheduled brain MRI if possible on 1/31, transferred her to scheduling at [facility] [phone]    Will schedule clinic after MRCP is completed.    Swetha Costa, RN Care Coordinator

## 2025-01-31 ENCOUNTER — ANCILLARY PROCEDURE (OUTPATIENT)
Dept: MRI IMAGING | Facility: CLINIC | Age: 60
End: 2025-01-31
Attending: PHYSICIAN ASSISTANT
Payer: COMMERCIAL

## 2025-01-31 DIAGNOSIS — I63.89 PARIETAL LOBE INFARCTION (H): ICD-10-CM

## 2025-01-31 PROCEDURE — A9585 GADOBUTROL INJECTION: HCPCS | Performed by: PHYSICIAN ASSISTANT

## 2025-01-31 PROCEDURE — 70553 MRI BRAIN STEM W/O & W/DYE: CPT

## 2025-01-31 PROCEDURE — 255N000002 HC RX 255 OP 636: Performed by: PHYSICIAN ASSISTANT

## 2025-01-31 RX ORDER — GADOBUTROL 604.72 MG/ML
10 INJECTION INTRAVENOUS ONCE
Status: COMPLETED | OUTPATIENT
Start: 2025-01-31 | End: 2025-01-31

## 2025-01-31 RX ADMIN — GADOBUTROL 10 ML: 604.72 INJECTION INTRAVENOUS at 08:21

## 2025-02-05 ENCOUNTER — ANCILLARY PROCEDURE (OUTPATIENT)
Dept: MRI IMAGING | Facility: CLINIC | Age: 60
End: 2025-02-05
Payer: COMMERCIAL

## 2025-02-05 DIAGNOSIS — R93.3 ABNORMAL FINDING ON GI TRACT IMAGING: ICD-10-CM

## 2025-02-05 PROCEDURE — A9585 GADOBUTROL INJECTION: HCPCS

## 2025-02-05 PROCEDURE — 74183 MRI ABD W/O CNTR FLWD CNTR: CPT

## 2025-02-05 PROCEDURE — 255N000002 HC RX 255 OP 636

## 2025-02-05 RX ORDER — GADOBUTROL 604.72 MG/ML
9 INJECTION INTRAVENOUS ONCE
Status: COMPLETED | OUTPATIENT
Start: 2025-02-05 | End: 2025-02-05

## 2025-02-05 RX ADMIN — GADOBUTROL 9 ML: 604.72 INJECTION INTRAVENOUS at 16:23

## 2025-02-06 ENCOUNTER — PATIENT OUTREACH (OUTPATIENT)
Dept: GASTROENTEROLOGY | Facility: CLINIC | Age: 60
End: 2025-02-06
Payer: COMMERCIAL

## 2025-02-06 NOTE — TELEPHONE ENCOUNTER
This was a referral I discussed with Dr. Moise who recommended MRCP prior to visit. Just reviewed MRI and no need for EUS at this time so can we get her set up in clinic with to discuss next steps? In person or virtual ok.     Called to discuss plan with patient, visit scheduled 2/18 with Melody Montoya.  MACKENZIE

## 2025-02-07 PROBLEM — E66.9 OBESITY WITH BODY MASS INDEX 30 OR GREATER: Status: ACTIVE | Noted: 2020-03-02

## 2025-02-07 PROBLEM — Z94.81 BONE MARROW TRANSPLANT STATUS (H): Status: ACTIVE | Noted: 2022-08-16

## 2025-02-18 ENCOUNTER — VIRTUAL VISIT (OUTPATIENT)
Dept: GASTROENTEROLOGY | Facility: CLINIC | Age: 60
End: 2025-02-18
Payer: COMMERCIAL

## 2025-02-18 VITALS — HEIGHT: 66 IN | WEIGHT: 198 LBS | BODY MASS INDEX: 31.82 KG/M2

## 2025-02-18 DIAGNOSIS — K86.2 PANCREAS CYST: Primary | ICD-10-CM

## 2025-02-18 PROCEDURE — 98001 SYNCH AUDIO-VIDEO NEW LOW 30: CPT

## 2025-02-18 ASSESSMENT — PAIN SCALES - GENERAL: PAINLEVEL_OUTOF10: NO PAIN (0)

## 2025-02-18 NOTE — NURSING NOTE
Current patient location:  [address] [address]  [facility] [address]    Is the patient currently in the state of [place]? YES    Visit mode: VIDEO    If the visit is dropped, the patient can be reconnected by:VIDEO VISIT: Text to cell phone:   Telephone Information:   Mobile [phone]       Will anyone else be joining the visit? NO  (If patient encounters technical issues they should call [phone])    Are changes needed to the allergy or medication list? No    Are refills needed on medications prescribed by this physician? NO    Rooming Documentation:  Questionnaire(s) completed    Reason for visit: Consult    Emma SWANSON

## 2025-02-18 NOTE — PROGRESS NOTES
Virtual Visit Details    Type of service:  Video Visit     Originating Location (pt. Location): Home    Distant Location (provider location):  Off-site  Platform used for Video Visit: Libzet

## 2025-02-18 NOTE — LETTER
2/18/2025      Afshan Jimenez  [address] [address]  [facility] [address]      Dear Colleague,    Thank you for referring your patient, Afshan Jimenez, to the [facility] [place]. Please see a copy of my visit note below.    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    Had follow up MRI/MRCP which reviewed in detail today. This showed multiple pancreatic cysts, the largest measuring 1.8 x 1.3 cm in the head. An additional 2 subcentimeter cysts also noted. No worrisome features, asymptomatic. No history of pancreatitis. 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 Explained the epidemiology of pancreatic cysts and how common they are. Also explained the pre-malignant nature of IPMNs in general but that the overall risk is actually quite low. Recommend ongoing surveillance imaging be done in 6 months with repeat MRI/MRCP to ensure stability and at that point if stable then the risk of transformation is exceedingly low. If stable at that point, then would recommend follow up MRI/MRCP in 18 months per the updated [place] guidelines. Patient is agreeable with this plan.     RECOMMENDATIONS:  -- MRI MRCP in 6 months, follow up with me after    RTC earlier if unexplained weight loss of > 10 lb, jaundice, diagnosis of acute pancreatitis or chronic diarrhea lasting more than 2 weeks.     All patient's questions were answered and they agreed with this plan.     It was a pleasure to participate in the care of this patient; please contact  "us with any further questions.  A total of 40 minutes was spent on the day of the visit doing chart review, history and exam, documentation, coordination of care, and further activities per the note.     Melody Montoya PA-C  Division of Gastroenterology, Hepatology, and Nutrition  [facility]  2/18/2025      OBJECTIVE:  Physical Exam:    Constitutional: Ht 1.676 m (5' 6\")   Wt 89.8 kg (198 lb)   BMI 31.96 kg/m    General: Alert, no distress, well-appearing  HEENT: Atraumatic, normocephalic, sclera anicteric  Respiratory: Breathing unlabored on RA. Able to speak in full sentences without increased effort.   Skin: No jaundice  Neurologic: AAOx3, no obvious neurologic abnormality  Psychiatric: Normal Affect, appropriate mood    IMAGING:  EXAM: MR ABDOMEN MRCP W/O and W CONTRAST  LOCATION: [facility]  DATE: 2/5/2025                                                                IMPRESSION:  1.  Pancreatic cystic lesions noted. No worrisome enhancement. These are compatible with pancreatic cystic neoplasms.  2.  Fatty liver.  3.  A few incidental hemorrhagic renal cysts bilaterally.     EXAM: CT CHEST/ABDOMEN/PELVIS W CONTRAST  LOCATION: [facility]  DATE: 12/28/2024                                                            IMPRESSION:   1.  No acute CT abnormality of the chest, abdomen, or pelvis.  2.  Linear, nodular soft tissue thickening within the right lower lobe, which appears infectious/inflammatory.  3.  Scattered small pulmonary nodules, largest measuring 7 mm the left upper lobe. These are indeterminate in the setting of previous malignancy.  4.  Mild hepatic steatosis.  5.  Colonic diverticulosis.  6.  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 Follow-up recommendations, as per below.       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     See above for imaging.     Patient denies any current abdominal pain, jaundice, frequent diarrhea, or unintentional weight loss. Reports she is on ozempic, lost 50 lbs since June of 2024.     Patient denies any family history of pancreatic disease or personal history of pancreatitis.    Never smoker. No alcohol use, no history of heavy alcohol use.       Virtual Visit Details    Type of service:  Video Visit     Originating Location (pt. Location): {video visit patient location:361327::\"Home\"}  {PROVIDER LOCATION On-site should be selected for visits conducted from your clinic location or adjoining [facility] hospital, academic office, or other nearby [facility] building. Off-site should be selected for all other provider locations, including home:229096}  Distant Location (provider location):  {virtual location provider:243273}  Platform used for Video Visit: {Virtual Visit Platforms:098531::\"AcadiaSoft\"}      Again, thank you for allowing me to participate in the care of your patient.        Sincerely,        Melody Montoya PA-C    Electronically signed"

## 2025-02-19 NOTE — PATIENT INSTRUCTIONS
It was a pleasure meeting with you today and discussing your healthcare plan. Below is a summary of what we covered:    -- MRI in 6 months, follow up with me after that      Please call my office at [phone] if you have any questions.       See below for any additional questions and scheduling guidelines.    Sign up for [employer]gnam: [employer]am patient portal serves as a secure platform for accessing your medical records from the [facility]. Additionally, [employer] facilitates easy, timely, and secure messaging with your care team. If you have not signed up, you may do so by using the provided code or calling [phone].    Coordinating your care after your visit:  There are multiple options for scheduling your follow-up care based on your provider's recommendation.    How do I schedule a follow-up clinic appointment:   After your appointment, you may receive scheduling assistance with the Clinic Coordinators by having a seat in the waiting room and a Clinic Coordinator will call you up to schedule.  Virtual visits or after you leave the clinic:  Your provider has placed a follow-up order in the [employer] portal for scheduling your return appointment. A member of the scheduling team will contact you to schedule.  [place] Scheduling: Timely scheduling through [employer] is advised to ensure appointment availability.   Call to schedule: You may schedule your follow-up appointment(s) by calling [phone], option 1.    How do I schedule my endoscopy or colonoscopy procedure:  If a procedure, such as a colonoscopy or upper endoscopy was ordered by your provider, the scheduling team will contact you to schedule this procedure. Or you may choose to call to schedule at   [phone], option 2.  Please allow 20-30 minutes when scheduling a procedure.    How do I get my blood work done? To get your blood work done, you need to schedule a lab appointment at an [facility]. There are multiple ways to  schedule:   At the clinic: The Clinic Coordinator you meet after your visit can help you schedule a lab appointment.   [employer] scheduling: [employer] offers online lab scheduling at all [facility] laboratory locations.   Call to schedule: You can call [phone] to schedule your lab appointment.    How do I schedule my imaging study: To schedule imaging studies, such as CT scans, ultrasounds, MRIs, or X-rays, contact Imaging Services at [phone].    How do I schedule a referral to another doctor: If your provider recommended a referral to another specialist(s), the referral order was placed by your provider. You will receive a phone call to schedule this referral, or you may choose to call the number attached to the referral to self-schedule.    For Post-Visit Question(s):  For any inquiries following today's visit:  Please utilize [employer] messaging and allow 48 hours for reply or contact the Call Center during normal business hours at [phone], option 3.  For Emergent After-hours questions, contact the On-Call GI Fellow through the [facility]  at [phone].  In addition, you may contact your Nurse directly using the provided contact information.    Test Results:  Test results will be accessible via [employer] in compliance with the 21st Century Cures Act. This means that your results will be available to you at the same time as your provider. Often you may see your results before your provider does. Results are reviewed by staff within two weeks with communication follow-up. Results may be released in the patient portal prior to your care team review.    Prescription Refill(s):  Medication prescribed by your provider will be addressed during your visit. For future refills, please coordinate with your pharmacy. If you have not had a recent clinic visit or routine labs, for your safety, your provider may not be able to refill your prescription.         Sincerely,    Melody Montoya  ROMELIA  Division of Gastroenterology, Hepatology, and Nutrition  [facility]

## 2025-02-21 ENCOUNTER — MYC MEDICAL ADVICE (OUTPATIENT)
Dept: FAMILY MEDICINE | Facility: CLINIC | Age: 60
End: 2025-02-21
Payer: COMMERCIAL

## 2025-02-21 DIAGNOSIS — M10.9 GOUT, UNSPECIFIED CAUSE, UNSPECIFIED CHRONICITY, UNSPECIFIED SITE: Primary | ICD-10-CM

## 2025-02-21 DIAGNOSIS — E11.59 TYPE 2 DIABETES MELLITUS WITH OTHER CIRCULATORY COMPLICATION, WITHOUT LONG-TERM CURRENT USE OF INSULIN (H): ICD-10-CM

## 2025-02-24 RX ORDER — SEMAGLUTIDE 1.34 MG/ML
1 INJECTION, SOLUTION SUBCUTANEOUS WEEKLY
Qty: 9 ML | Refills: 1 | Status: SHIPPED | OUTPATIENT
Start: 2025-02-24

## 2025-02-24 RX ORDER — INDOMETHACIN 50 MG/1
50 CAPSULE ORAL 3 TIMES DAILY PRN
Status: SHIPPED
Start: 2025-02-24

## 2025-02-24 NOTE — TELEPHONE ENCOUNTER
To PCP:    Patient requesting refill of Ozempic but dosage decreased from 2 mg to 1 mg.    Also updating PCP on Gout medication - Indomethacin.    Thank you.    Crystal Day RN  [facility]

## 2025-03-05 ENCOUNTER — TELEPHONE (OUTPATIENT)
Dept: NEUROLOGY | Facility: CLINIC | Age: 60
End: 2025-03-05
Payer: COMMERCIAL

## 2025-03-05 NOTE — TELEPHONE ENCOUNTER
Per Checo Alberts check out report     Return in about 3 months (around 5/28/2025) for stroke, with GHISLAINE Alberts only, 60 min.

## 2025-03-12 NOTE — TELEPHONE ENCOUNTER
"Called pt to schedule stroke return visit, left message to call back. Patient was seen by the stroke team about 4 weeks ago, at that time it was recommended patient follow up with the stroke team to check in, review plan and optimize overall stroke risk management and we would like to offer an appointment.     When patient calls back: please schedule Stroke Return with Kennedi Alberts CNP however if no availability with this provider, ok to schedule with a different stroke BEV, any visit type as per patient preference, appointment notes \"3 month follow up for stroke\".      RALPH WASSERMAN CMA    "

## 2025-03-26 ENCOUNTER — ONCOLOGY VISIT (OUTPATIENT)
Dept: PULMONOLOGY | Facility: CLINIC | Age: 60
End: 2025-03-26
Attending: INTERNAL MEDICINE
Payer: COMMERCIAL

## 2025-03-26 VITALS
BODY MASS INDEX: 31.64 KG/M2 | OXYGEN SATURATION: 98 % | WEIGHT: 196 LBS | HEART RATE: 73 BPM | DIASTOLIC BLOOD PRESSURE: 62 MMHG | SYSTOLIC BLOOD PRESSURE: 93 MMHG

## 2025-03-26 DIAGNOSIS — R91.8 PULMONARY NODULES: ICD-10-CM

## 2025-03-26 LAB
DLCOUNC-%PRED-PRE: 102 %
DLCOUNC-PRE: 21.2 ML/MIN/MMHG
DLCOUNC-PRED: 20.7 ML/MIN/MMHG
ERV-%PRED-PRE: 63 %
ERV-PRE: 0.74 L
ERV-PRED: 1.17 L
EXPTIME-PRE: 6.57 SEC
FEF2575-%PRED-PRE: 64 %
FEF2575-PRE: 1.44 L/SEC
FEF2575-PRED: 2.22 L/SEC
FEFMAX-%PRED-PRE: 75 %
FEFMAX-PRE: 4.96 L/SEC
FEFMAX-PRED: 6.56 L/SEC
FEV1-%PRED-PRE: 85 %
FEV1-PRE: 2.11 L
FEV1FEV6-PRE: 72 %
FEV1FEV6-PRED: 81 %
FEV1FVC-PRE: 72 %
FEV1FVC-PRED: 80 %
FEV1SVC-PRE: 72 %
FEV1SVC-PRED: 71 %
FIFMAX-PRE: 2.19 L/SEC
FRCPLETH-%PRED-PRE: 87 %
FRCPLETH-PRE: 2.65 L
FRCPLETH-PRED: 3.04 L
FVC-%PRED-PRE: 94 %
FVC-PRE: 2.92 L
FVC-PRED: 3.1 L
IC-%PRED-PRE: 93 %
IC-PRE: 2.17 L
IC-PRED: 2.33 L
RVPLETH-%PRED-PRE: 95 %
RVPLETH-PRE: 1.91 L
RVPLETH-PRED: 2.01 L
TLCPLETH-%PRED-PRE: 91 %
TLCPLETH-PRE: 4.83 L
TLCPLETH-PRED: 5.27 L
VA-%PRED-PRE: 91 %
VA-PRE: 4.56 L
VC-%PRED-PRE: 84 %
VC-PRE: 2.91 L
VC-PRED: 3.44 L

## 2025-03-26 PROCEDURE — 99204 OFFICE O/P NEW MOD 45 MIN: CPT | Performed by: INTERNAL MEDICINE

## 2025-03-26 PROCEDURE — 94729 DIFFUSING CAPACITY: CPT | Performed by: INTERNAL MEDICINE

## 2025-03-26 PROCEDURE — 94726 PLETHYSMOGRAPHY LUNG VOLUMES: CPT | Performed by: INTERNAL MEDICINE

## 2025-03-26 PROCEDURE — 94375 RESPIRATORY FLOW VOLUME LOOP: CPT | Performed by: INTERNAL MEDICINE

## 2025-03-26 PROCEDURE — 94150 VITAL CAPACITY TEST: CPT | Performed by: INTERNAL MEDICINE

## 2025-03-26 ASSESSMENT — PAIN SCALES - GENERAL: PAINLEVEL_OUTOF10: MODERATE PAIN (6)

## 2025-03-26 NOTE — NURSING NOTE
Chief Complaint   Patient presents with    New Patient      - Pulmonary nodules       Vitals:    03/26/25 0918   BP: 93/62   BP Location: Left arm   Patient Position: Sitting   Cuff Size: Adult Large   Pulse: 73   SpO2: 98%   Weight: 88.9 kg (196 lb)       Body mass index is 31.64 kg/m .    TOM Aquino

## 2025-03-26 NOTE — PROGRESS NOTES
[facility] SPECIALTY [address] [address]  Phone: [phone]  Fax: [phone]    [facility] Nodule Clinic Initial Visit    Patient:  Afshan Jimenez, Date of birth 1965  Date of Visit:  03/26/2025  Referring Provider Miles Osorio      Assessment & Plan      Afshan is a 58 yo with incidental lung nodules. She has a complicated medical history since leukemia and bone marrow transplant in 1922 treated at [place] and full body radiation was part of the regimen.     Lung nodules - incidental during imaging in December when evaluating for stroke symptoms. There are a few and they are not particularly concerning in appearance. She does have a history of cancers, presumably related to radiation. I recommend a follow up CT at her next availability. Depending on the results of the CT, we will determine the next steps. If the nodules are all resolved, we might not need additional follow up at all.     Medical Decision Making             Liliane Robbins MD           Reason for Visit  Afshan Jimenez is a 59 year old female who is referred by Dr Osorio for lung nodules  Pulmonary HPI    - incidental lung nodules seen on chest imaging when hospitalized for stroke  - she had a series of strokes manifest in November and December 2024      Other active medical problems include:   - strokes in Nov / Dec 2024 - delayed diagnosis unknown etiology at this time  - peripheral neuropathy attributed to diabetes and prior medications   - bone marrow transplant for leukemia at [place] 1992 treated with full body radiation  - parotid cancer s/p surgery 1995  - oral cancer 2010 s/p surgery     ROS Pulmonary  Dyspnea: No, Cough: No, Chest pain: No, Wheezing: No, Sputum Production: No, Hemoptysis: No  A complete ROS was otherwise negative except as noted in the HPI.    The patient was seen and examined by Liliane Robbins MD   Current  Outpatient Medications   Medication Sig Dispense Refill    aspirin 81 MG EC tablet Take 1 tablet (81 mg) by mouth daily. 90 tablet 0    atorvastatin (LIPITOR) 40 MG tablet Take 1 tablet (40 mg) by mouth every evening. 90 tablet 1    indomethacin (INDOCIN) 50 MG capsule Take 1 capsule (50 mg) by mouth 3 times daily as needed for moderate pain (Gout).      Semaglutide, 1 MG/DOSE, (OZEMPIC, 1 MG/DOSE,) 4 MG/3ML pen Inject 1 mg subcutaneously once a week. On Mondays 9 mL 1     No current facility-administered medications for this visit.     Allergies   Allergen Reactions    Codeine Sulfate Rash     Social History     Socioeconomic History    Marital status: Single     Spouse name: Not on file    Number of children: Not on file    Years of education: Not on file    Highest education level: Not on file   Occupational History    Not on file   Tobacco Use    Smoking status: Never    Smokeless tobacco: Not on file   Substance and Sexual Activity    Alcohol use: Yes    Drug use: Not on file    Sexual activity: Not on file   Other Topics Concern    Not on file   Social History Narrative    Mostly office/administrative and financial work, 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      Social Drivers of Health     Financial Resource Strain: Low Risk  (1/17/2025)    Financial Resource Strain     Within the past 12 months, have you or your family members you live with been unable to get utilities (heat, electricity) when it was really needed?: No   Recent Concern: Financial Resource Strain - High Risk (12/27/2024)    Financial Resource Strain     Within the past 12 months, have you or your family members you live with been unable to get utilities (heat, electricity) when it was really needed?: Yes   Food Insecurity: Low Risk  (1/17/2025)    Food Insecurity     Within the past 12 months, did you worry that your food would run out before you got money to buy more?: No     Within the past 12 months, did  the food you bought just not last and you didn t have money to get more?: No   Transportation Needs: Low Risk  (1/17/2025)    Transportation Needs     Within the past 12 months, has lack of transportation kept you from medical appointments, getting your medicines, non-medical meetings or appointments, work, or from getting things that you need?: No   Recent Concern: Transportation Needs - High Risk (12/27/2024)    Transportation Needs     Within the past 12 months, has lack of transportation kept you from medical appointments, getting your medicines, non-medical meetings or appointments, work, or from getting things that you need?: Yes   Physical Activity: Insufficiently Active (7/26/2024)    Received from [facility]    Exercise Vital Sign     Days of Exercise per Week: 2 days     Minutes of Exercise per Session: 20 min   Stress: No Stress Concern Present (11/27/2022)    Received from [facility], [facility]    [place] [employer] - Occupational Stress Questionnaire     Feeling of Stress : Only a little   Social Connections: Unknown (11/27/2022)    Received from [facility], [facility]    Social Connection and Isolation Panel [NHANES]     Frequency of Communication with Friends and Family: More than three times a week     Frequency of Social Gatherings with Friends and Family: More than three times a week     Attends [employer] Services: More than 4 times per year     Active Member of Clubs or Organizations: Yes     Attends Club or Organization Meetings: More than 4 times per year     Marital Status: Not on file   Interpersonal Safety: Low Risk  (12/27/2024)    Interpersonal Safety     Do you feel physically and emotionally safe where you currently live?: Yes     Within the past 12 months, have you been hit, slapped, kicked or otherwise physically hurt by someone?: No     Within the past 12 months, have you been humiliated or emotionally abused in other ways by your partner or ex-partner?: No    Housing Stability: Low Risk  (1/17/2025)    Housing Stability     Do you have housing? : Yes     Are you worried about losing your housing?: No   Recent Concern: Housing Stability - High Risk (12/27/2024)    Housing Stability     Do you have housing? : Yes     Are you worried about losing your housing?: Yes     Past Medical History:   Diagnosis Date    Deafness     left ear    Diabetes mellitus (H)     Malignant neoplasm (H)     leukemia    Malignant neoplasm of parotid gland (H)      Past Surgical History:   Procedure Laterality Date    LAPAROSCOPIC CHOLECYSTECTOMY  10/24/2011    Procedure:LAPAROSCOPIC CHOLECYSTECTOMY; Laparoscopic Cholecystectomy; Surgeon:TIAGO MALDONADO; Location:[facility] OR    SALIVARY GLAND SURGERY      L parotid    SINUS SURGERY      tumor removed from roof of mouth       Family History   Problem Relation Age of Onset    Breast Cancer Mother     Testicular cancer Brother     Pancreatic Cancer Maternal Uncle     Chronic Obstructive Pulmonary Disease Maternal Uncle     LUNG DISEASE No family hx of        Exam:   BP 93/62 (BP Location: Left arm, Patient Position: Sitting, Cuff Size: Adult Large)   Pulse 73   Wt 88.9 kg (196 lb)   SpO2 98%   BMI 31.64 kg/m    GENERAL APPEARANCE: Well developed, well nourished, alert, and in no apparent distress.  RESP: normal percussion, good air flow throughout.  No crackles. No rhonchi. No wheezes.  PSYCH: mentation appears normal. and affect normal/bright  Results:  - My interpretation of the images relevant for this visit includes: Dec 2024 CT chest Mild diffuse bronchial wall thickening. Mosaic attenuation. Linear, nodular soft tissue thickening within the right lower lobe, which appears infectious/inflammatory, series 4 image 139.   Scattered small pulmonary nodules, largest measuring 7 mm on the left upper lobe, with thick-walled cystic appearance, series 4 image 91.   - My interpretation of the PFT's relevant for this visit includes: Normal

## 2025-03-26 NOTE — PATIENT INSTRUCTIONS
There are a few spots on the lungs that are probably nothing to worry about.     I recommend a repeat CT soon (okay to wait until April 15).   Call to schedule  [phone]    Depending on the CT results, we will determine the next steps.

## 2025-04-07 ENCOUNTER — OFFICE VISIT (OUTPATIENT)
Dept: FAMILY MEDICINE | Facility: CLINIC | Age: 60
End: 2025-04-07
Payer: COMMERCIAL

## 2025-04-07 ENCOUNTER — ANCILLARY PROCEDURE (OUTPATIENT)
Dept: CT IMAGING | Facility: CLINIC | Age: 60
End: 2025-04-07
Attending: INTERNAL MEDICINE
Payer: COMMERCIAL

## 2025-04-07 VITALS
WEIGHT: 196 LBS | DIASTOLIC BLOOD PRESSURE: 60 MMHG | RESPIRATION RATE: 16 BRPM | BODY MASS INDEX: 31.5 KG/M2 | TEMPERATURE: 96.8 F | HEART RATE: 106 BPM | SYSTOLIC BLOOD PRESSURE: 82 MMHG | HEIGHT: 66 IN | OXYGEN SATURATION: 97 %

## 2025-04-07 DIAGNOSIS — R91.8 PULMONARY NODULES: ICD-10-CM

## 2025-04-07 DIAGNOSIS — M10.9 GOUT, UNSPECIFIED CAUSE, UNSPECIFIED CHRONICITY, UNSPECIFIED SITE: ICD-10-CM

## 2025-04-07 DIAGNOSIS — E11.59 TYPE 2 DIABETES MELLITUS WITH OTHER CIRCULATORY COMPLICATION, WITHOUT LONG-TERM CURRENT USE OF INSULIN (H): Primary | ICD-10-CM

## 2025-04-07 DIAGNOSIS — K13.79 LESION OF HARD PALATE: ICD-10-CM

## 2025-04-07 DIAGNOSIS — K86.2 PANCREAS CYST: ICD-10-CM

## 2025-04-07 DIAGNOSIS — I63.89 PARIETAL LOBE INFARCTION (H): ICD-10-CM

## 2025-04-07 LAB
EST. AVERAGE GLUCOSE BLD GHB EST-MCNC: 126 MG/DL
HBA1C MFR BLD: 6 % (ref 0–5.6)

## 2025-04-07 PROCEDURE — 83036 HEMOGLOBIN GLYCOSYLATED A1C: CPT | Performed by: INTERNAL MEDICINE

## 2025-04-07 PROCEDURE — 71250 CT THORAX DX C-: CPT

## 2025-04-07 PROCEDURE — 3078F DIAST BP <80 MM HG: CPT | Performed by: INTERNAL MEDICINE

## 2025-04-07 PROCEDURE — 3074F SYST BP LT 130 MM HG: CPT | Performed by: INTERNAL MEDICINE

## 2025-04-07 PROCEDURE — G2211 COMPLEX E/M VISIT ADD ON: HCPCS | Performed by: INTERNAL MEDICINE

## 2025-04-07 PROCEDURE — 36415 COLL VENOUS BLD VENIPUNCTURE: CPT | Performed by: INTERNAL MEDICINE

## 2025-04-07 PROCEDURE — 1125F AMNT PAIN NOTED PAIN PRSNT: CPT | Performed by: INTERNAL MEDICINE

## 2025-04-07 PROCEDURE — 99214 OFFICE O/P EST MOD 30 MIN: CPT | Performed by: INTERNAL MEDICINE

## 2025-04-07 RX ORDER — INDOMETHACIN 50 MG/1
50 CAPSULE ORAL 3 TIMES DAILY PRN
Qty: 10 CAPSULE | Refills: 0 | Status: SHIPPED | OUTPATIENT
Start: 2025-04-07

## 2025-04-07 ASSESSMENT — PAIN SCALES - GENERAL: PAINLEVEL_OUTOF10: MODERATE PAIN (5)

## 2025-04-07 NOTE — PROGRESS NOTES
Assessment & Plan     Type 2 diabetes mellitus with other circulatory complication, without long-term current use of insulin (H)  Patient with a good A1c of 6.0.  Initially the patient was motivated to reduce medication and we discussed holding Ozempic.  However she now wishes to continue Ozempic and see nutrition.  We will have her return to clinic with an A1c prior in 3 months.  Can assess the ongoing need for Ozempic at that time.  - Hemoglobin A1c  - Hemoglobin A1c    Gout, unspecified cause, unspecified chronicity, unspecified site  Patient takes indomethacin as needed.  Usually 50 mg does well for her.  She takes it quite sparingly.  Refill sent.  - indomethacin (INDOCIN) 50 MG capsule  Dispense: 10 capsule; Refill: 0        Pancreas cyst  Has seen GI.  Surveillance MRI in 6 months was suggested.  The patient will follow up with this and with GI.    Parietal lobe infarction (H)  Has seen neurology since her last visit.  She is maintained on aspirin and statin.    Hard palate:  The patient has 2 holes in her hard palate from ENT surgeries from years ago.  She is wondering about having this fixed with a graft.  The patient will pursue this at [facility].    The longitudinal plan of care for the diagnosis(es)/condition(s) as documented were addressed during this visit. Due to the added complexity in care, I will continue to support Afshan in the subsequent management and with ongoing continuity of care.      32 minutes spent by me on the date of the encounter doing chart review, history and exam, documentation and further activities per the note    Subjective   Afshan is a 59 year old, presenting for the following health issues:  Follow Up        4/7/2025     9:53 AM   Additional Questions   Roomed by Maryann       We're meeting Afshan for the 2nd time.      DM  Medication noted.  A1c reviewed. She's been on ozempic since June.  Lost 50lbs but has plateau.  She is interested in stopping Ozempic.  She plans to see  "nutrition as well.      Pancreatic cyst  Has seen GI. Future repeat MRI is recommended.      CVA  Since our last visit has seen Neurology and is compliant with ASA and statin.        History of Present Illness       Reason for visit:  Follow up plus recommendations for next steps    She eats 2-3 servings of fruits and vegetables daily.She consumes 0 sweetened beverage(s) daily.She exercises with enough effort to increase her heart rate 9 or less minutes per day.  She exercises with enough effort to increase her heart rate 3 or less days per week.   She is taking medications regularly.                Objective    BP (!) 82/60   Pulse 106   Temp 96.8  F (36  C) (Tympanic)   Resp 16   Ht 1.676 m (5' 6\")   Wt 88.9 kg (196 lb)   SpO2 97%   BMI 31.64 kg/m    Body mass index is 31.64 kg/m .  Wt Readings from Last 3 Encounters:   04/07/25 88.9 kg (196 lb)   03/26/25 88.9 kg (196 lb)   02/28/25 91.2 kg (201 lb 1.6 oz)       Physical Exam   GENERAL: alert and no distress  NECK: no adenopathy, no asymmetry, masses, or scars  RESP: lungs clear to auscultation - no rales, rhonchi or wheezes  CV: regular rate and rhythm, normal S1 S2, no S3 or S4, no murmur, click or rub, no peripheral edema  ABDOMEN: soft, nontender, no hepatosplenomegaly, no masses and bowel sounds normal  MS: no gross musculoskeletal defects noted, no edema            Signed Electronically by: Sterling Hill MD    "

## 2025-04-08 DIAGNOSIS — R91.1 LUNG NODULE: Primary | ICD-10-CM

## 2025-04-12 ENCOUNTER — HEALTH MAINTENANCE LETTER (OUTPATIENT)
Age: 60
End: 2025-04-12

## 2025-04-18 ENCOUNTER — APPOINTMENT (OUTPATIENT)
Dept: CT IMAGING | Facility: CLINIC | Age: 60
End: 2025-04-18
Attending: EMERGENCY MEDICINE
Payer: COMMERCIAL

## 2025-04-18 ENCOUNTER — HOSPITAL ENCOUNTER (INPATIENT)
Facility: CLINIC | Age: 60
LOS: 6 days | Discharge: NURSING, REHAB OR LONG-TERM CARE | End: 2025-04-24
Attending: EMERGENCY MEDICINE | Admitting: INTERNAL MEDICINE
Payer: COMMERCIAL

## 2025-04-18 DIAGNOSIS — I63.9 ACUTE CVA (CEREBROVASCULAR ACCIDENT) (H): Primary | ICD-10-CM

## 2025-04-18 LAB
ANION GAP SERPL CALCULATED.3IONS-SCNC: 13 MMOL/L (ref 7–15)
APTT PPP: 27 SECONDS (ref 22–38)
BASOPHILS # BLD AUTO: 0 10E3/UL (ref 0–0.2)
BASOPHILS NFR BLD AUTO: 0 %
BUN SERPL-MCNC: 23.5 MG/DL (ref 8–23)
CALCIUM SERPL-MCNC: 10 MG/DL (ref 8.8–10.4)
CHLORIDE SERPL-SCNC: 101 MMOL/L (ref 98–107)
CREAT SERPL-MCNC: 1.24 MG/DL (ref 0.51–0.95)
EGFRCR SERPLBLD CKD-EPI 2021: 50 ML/MIN/1.73M2
EOSINOPHIL # BLD AUTO: 0.2 10E3/UL (ref 0–0.7)
EOSINOPHIL NFR BLD AUTO: 2 %
ERYTHROCYTE [DISTWIDTH] IN BLOOD BY AUTOMATED COUNT: 13.2 % (ref 10–15)
GLUCOSE BLDC GLUCOMTR-MCNC: 121 MG/DL (ref 70–99)
GLUCOSE SERPL-MCNC: 129 MG/DL (ref 70–99)
HCO3 SERPL-SCNC: 26 MMOL/L (ref 22–29)
HCT VFR BLD AUTO: 45 % (ref 35–47)
HGB BLD-MCNC: 14.6 G/DL (ref 11.7–15.7)
IMM GRANULOCYTES # BLD: 0 10E3/UL
IMM GRANULOCYTES NFR BLD: 0 %
INR PPP: 0.97 (ref 0.85–1.15)
LYMPHOCYTES # BLD AUTO: 4.2 10E3/UL (ref 0.8–5.3)
LYMPHOCYTES NFR BLD AUTO: 44 %
MCH RBC QN AUTO: 28.4 PG (ref 26.5–33)
MCHC RBC AUTO-ENTMCNC: 32.4 G/DL (ref 31.5–36.5)
MCV RBC AUTO: 88 FL (ref 78–100)
MONOCYTES # BLD AUTO: 0.7 10E3/UL (ref 0–1.3)
MONOCYTES NFR BLD AUTO: 7 %
NEUTROPHILS # BLD AUTO: 4.3 10E3/UL (ref 1.6–8.3)
NEUTROPHILS NFR BLD AUTO: 46 %
NRBC # BLD AUTO: 0 10E3/UL
NRBC BLD AUTO-RTO: 0 /100
PLATELET # BLD AUTO: 179 10E3/UL (ref 150–450)
POTASSIUM SERPL-SCNC: 4.4 MMOL/L (ref 3.4–5.3)
RBC # BLD AUTO: 5.14 10E6/UL (ref 3.8–5.2)
SODIUM SERPL-SCNC: 140 MMOL/L (ref 135–145)
TROPONIN T SERPL HS-MCNC: 8 NG/L
WBC # BLD AUTO: 9.4 10E3/UL (ref 4–11)

## 2025-04-18 PROCEDURE — 70496 CT ANGIOGRAPHY HEAD: CPT

## 2025-04-18 PROCEDURE — 80048 BASIC METABOLIC PNL TOTAL CA: CPT | Performed by: EMERGENCY MEDICINE

## 2025-04-18 PROCEDURE — 99285 EMERGENCY DEPT VISIT HI MDM: CPT | Mod: 25

## 2025-04-18 PROCEDURE — 258N000003 HC RX IP 258 OP 636: Performed by: INTERNAL MEDICINE

## 2025-04-18 PROCEDURE — 250N000009 HC RX 250: Performed by: EMERGENCY MEDICINE

## 2025-04-18 PROCEDURE — 96374 THER/PROPH/DIAG INJ IV PUSH: CPT | Mod: 59

## 2025-04-18 PROCEDURE — 83735 ASSAY OF MAGNESIUM: CPT | Performed by: INTERNAL MEDICINE

## 2025-04-18 PROCEDURE — G0508 CRIT CARE TELEHEA CONSULT 60: HCPCS | Mod: G0 | Performed by: PSYCHIATRY & NEUROLOGY

## 2025-04-18 PROCEDURE — 85730 THROMBOPLASTIN TIME PARTIAL: CPT | Performed by: EMERGENCY MEDICINE

## 2025-04-18 PROCEDURE — 70450 CT HEAD/BRAIN W/O DYE: CPT

## 2025-04-18 PROCEDURE — 36415 COLL VENOUS BLD VENIPUNCTURE: CPT | Performed by: EMERGENCY MEDICINE

## 2025-04-18 PROCEDURE — 84484 ASSAY OF TROPONIN QUANT: CPT | Performed by: EMERGENCY MEDICINE

## 2025-04-18 PROCEDURE — 250N000009 HC RX 250: Performed by: STUDENT IN AN ORGANIZED HEALTH CARE EDUCATION/TRAINING PROGRAM

## 2025-04-18 PROCEDURE — 85610 PROTHROMBIN TIME: CPT | Performed by: EMERGENCY MEDICINE

## 2025-04-18 PROCEDURE — 93005 ELECTROCARDIOGRAM TRACING: CPT

## 2025-04-18 PROCEDURE — 85025 COMPLETE CBC W/AUTO DIFF WBC: CPT | Performed by: EMERGENCY MEDICINE

## 2025-04-18 PROCEDURE — 99223 1ST HOSP IP/OBS HIGH 75: CPT | Performed by: INTERNAL MEDICINE

## 2025-04-18 PROCEDURE — 250N000011 HC RX IP 250 OP 636: Performed by: STUDENT IN AN ORGANIZED HEALTH CARE EDUCATION/TRAINING PROGRAM

## 2025-04-18 PROCEDURE — 99418 PROLNG IP/OBS E/M EA 15 MIN: CPT | Performed by: INTERNAL MEDICINE

## 2025-04-18 PROCEDURE — 3E03317 INTRODUCTION OF OTHER THROMBOLYTIC INTO PERIPHERAL VEIN, PERCUTANEOUS APPROACH: ICD-10-PCS | Performed by: EMERGENCY MEDICINE

## 2025-04-18 PROCEDURE — 250N000011 HC RX IP 250 OP 636: Performed by: EMERGENCY MEDICINE

## 2025-04-18 PROCEDURE — 200N000001 HC R&B ICU

## 2025-04-18 PROCEDURE — 82962 GLUCOSE BLOOD TEST: CPT

## 2025-04-18 RX ORDER — IOPAMIDOL 755 MG/ML
67 INJECTION, SOLUTION INTRAVASCULAR ONCE
Status: COMPLETED | OUTPATIENT
Start: 2025-04-18 | End: 2025-04-18

## 2025-04-18 RX ORDER — HYDRALAZINE HYDROCHLORIDE 20 MG/ML
10 INJECTION INTRAMUSCULAR; INTRAVENOUS EVERY 30 MIN PRN
Status: DISCONTINUED | OUTPATIENT
Start: 2025-04-18 | End: 2025-04-19

## 2025-04-18 RX ORDER — LABETALOL HYDROCHLORIDE 5 MG/ML
10 INJECTION, SOLUTION INTRAVENOUS EVERY 10 MIN PRN
Status: DISCONTINUED | OUTPATIENT
Start: 2025-04-18 | End: 2025-04-19

## 2025-04-18 RX ORDER — SODIUM CHLORIDE 9 MG/ML
INJECTION, SOLUTION INTRAVENOUS CONTINUOUS PRN
Status: DISCONTINUED | OUTPATIENT
Start: 2025-04-18 | End: 2025-04-24 | Stop reason: HOSPADM

## 2025-04-18 RX ADMIN — SODIUM CHLORIDE 1000 ML: 9 INJECTION, SOLUTION INTRAVENOUS at 22:39

## 2025-04-18 RX ADMIN — SODIUM CHLORIDE 100 ML: 9 INJECTION, SOLUTION INTRAVENOUS at 20:31

## 2025-04-18 RX ADMIN — Medication 22.5 MG: at 21:00

## 2025-04-18 RX ADMIN — IOPAMIDOL 67 ML: 755 INJECTION, SOLUTION INTRAVENOUS at 20:30

## 2025-04-18 ASSESSMENT — ACTIVITIES OF DAILY LIVING (ADL)
ADLS_ACUITY_SCORE: 56

## 2025-04-19 ENCOUNTER — APPOINTMENT (OUTPATIENT)
Dept: SPEECH THERAPY | Facility: CLINIC | Age: 60
End: 2025-04-19
Attending: INTERNAL MEDICINE
Payer: COMMERCIAL

## 2025-04-19 ENCOUNTER — APPOINTMENT (OUTPATIENT)
Dept: PHYSICAL THERAPY | Facility: CLINIC | Age: 60
End: 2025-04-19
Attending: INTERNAL MEDICINE
Payer: COMMERCIAL

## 2025-04-19 ENCOUNTER — APPOINTMENT (OUTPATIENT)
Dept: ULTRASOUND IMAGING | Facility: CLINIC | Age: 60
End: 2025-04-19
Attending: INTERNAL MEDICINE
Payer: COMMERCIAL

## 2025-04-19 ENCOUNTER — APPOINTMENT (OUTPATIENT)
Dept: CT IMAGING | Facility: CLINIC | Age: 60
End: 2025-04-19
Attending: INTERNAL MEDICINE
Payer: COMMERCIAL

## 2025-04-19 ENCOUNTER — APPOINTMENT (OUTPATIENT)
Dept: CT IMAGING | Facility: CLINIC | Age: 60
End: 2025-04-19
Attending: NURSE PRACTITIONER
Payer: COMMERCIAL

## 2025-04-19 ENCOUNTER — APPOINTMENT (OUTPATIENT)
Dept: MRI IMAGING | Facility: CLINIC | Age: 60
End: 2025-04-19
Attending: INTERNAL MEDICINE
Payer: COMMERCIAL

## 2025-04-19 LAB
ABO + RH BLD: NORMAL
ANION GAP SERPL CALCULATED.3IONS-SCNC: 12 MMOL/L (ref 7–15)
APTT PPP: 27 SECONDS (ref 22–38)
APTT PPP: 28 SECONDS (ref 22–38)
ATRIAL RATE - MUSE: 106 BPM
BLD GP AB SCN SERPL QL: NEGATIVE
BLD PROD TYP BPU: NORMAL
BLD PROD TYP BPU: NORMAL
BLOOD COMPONENT TYPE: NORMAL
BLOOD COMPONENT TYPE: NORMAL
BUN SERPL-MCNC: 19.2 MG/DL (ref 8–23)
CALCIUM SERPL-MCNC: 9 MG/DL (ref 8.8–10.4)
CHLORIDE SERPL-SCNC: 107 MMOL/L (ref 98–107)
CHOLEST SERPL-MCNC: 120 MG/DL
CODING SYSTEM: NORMAL
CODING SYSTEM: NORMAL
CORTIS SERPL-MCNC: 18.9 UG/DL
CREAT SERPL-MCNC: 0.98 MG/DL (ref 0.51–0.95)
DIASTOLIC BLOOD PRESSURE - MUSE: NORMAL MMHG
EGFRCR SERPLBLD CKD-EPI 2021: 66 ML/MIN/1.73M2
ERYTHROCYTE [DISTWIDTH] IN BLOOD BY AUTOMATED COUNT: 12.9 % (ref 10–15)
ERYTHROCYTE [DISTWIDTH] IN BLOOD BY AUTOMATED COUNT: 13.1 % (ref 10–15)
FIBRINOGEN PPP-MCNC: 353 MG/DL (ref 170–510)
FIBRINOGEN PPP-MCNC: 358 MG/DL (ref 170–510)
GLUCOSE BLDC GLUCOMTR-MCNC: 102 MG/DL (ref 70–99)
GLUCOSE BLDC GLUCOMTR-MCNC: 108 MG/DL (ref 70–99)
GLUCOSE BLDC GLUCOMTR-MCNC: 119 MG/DL (ref 70–99)
GLUCOSE BLDC GLUCOMTR-MCNC: 124 MG/DL (ref 70–99)
GLUCOSE BLDC GLUCOMTR-MCNC: 126 MG/DL (ref 70–99)
GLUCOSE BLDC GLUCOMTR-MCNC: 135 MG/DL (ref 70–99)
GLUCOSE BLDC GLUCOMTR-MCNC: 151 MG/DL (ref 70–99)
GLUCOSE SERPL-MCNC: 133 MG/DL (ref 70–99)
HCO3 SERPL-SCNC: 22 MMOL/L (ref 22–29)
HCT VFR BLD AUTO: 39.6 % (ref 35–47)
HCT VFR BLD AUTO: 40.6 % (ref 35–47)
HDLC SERPL-MCNC: 54 MG/DL
HGB BLD-MCNC: 12.9 G/DL (ref 11.7–15.7)
HGB BLD-MCNC: 13.1 G/DL (ref 11.7–15.7)
INR PPP: 1.07 (ref 0.85–1.15)
INR PPP: 1.09 (ref 0.85–1.15)
INTERPRETATION ECG - MUSE: NORMAL
ISSUE DATE AND TIME: NORMAL
ISSUE DATE AND TIME: NORMAL
LDLC SERPL CALC-MCNC: 47 MG/DL
MAGNESIUM SERPL-MCNC: 1.9 MG/DL (ref 1.7–2.3)
MCH RBC QN AUTO: 28.6 PG (ref 26.5–33)
MCH RBC QN AUTO: 28.7 PG (ref 26.5–33)
MCHC RBC AUTO-ENTMCNC: 32.3 G/DL (ref 31.5–36.5)
MCHC RBC AUTO-ENTMCNC: 32.6 G/DL (ref 31.5–36.5)
MCV RBC AUTO: 88 FL (ref 78–100)
MCV RBC AUTO: 89 FL (ref 78–100)
NONHDLC SERPL-MCNC: 66 MG/DL
P AXIS - MUSE: 27 DEGREES
PLATELET # BLD AUTO: 148 10E3/UL (ref 150–450)
PLATELET # BLD AUTO: 155 10E3/UL (ref 150–450)
POTASSIUM SERPL-SCNC: 4.6 MMOL/L (ref 3.4–5.3)
PR INTERVAL - MUSE: 180 MS
QRS DURATION - MUSE: 80 MS
QT - MUSE: 338 MS
QTC - MUSE: 448 MS
R AXIS - MUSE: -12 DEGREES
RADIOLOGIST FLAGS: ABNORMAL
RBC # BLD AUTO: 4.49 10E6/UL (ref 3.8–5.2)
RBC # BLD AUTO: 4.58 10E6/UL (ref 3.8–5.2)
SODIUM SERPL-SCNC: 141 MMOL/L (ref 135–145)
SPECIMEN EXP DATE BLD: NORMAL
SYSTOLIC BLOOD PRESSURE - MUSE: NORMAL MMHG
T AXIS - MUSE: 7 DEGREES
TRIGL SERPL-MCNC: 96 MG/DL
TROPONIN T SERPL HS-MCNC: 8 NG/L
UNIT ABO/RH: NORMAL
UNIT ABO/RH: NORMAL
UNIT NUMBER: NORMAL
UNIT NUMBER: NORMAL
UNIT STATUS: NORMAL
UNIT STATUS: NORMAL
UNIT TYPE ISBT: 5100
UNIT TYPE ISBT: 5100
VENTRICULAR RATE- MUSE: 106 BPM
WBC # BLD AUTO: 10.2 10E3/UL (ref 4–11)
WBC # BLD AUTO: 8.1 10E3/UL (ref 4–11)

## 2025-04-19 PROCEDURE — 99232 SBSQ HOSP IP/OBS MODERATE 35: CPT | Performed by: HOSPITALIST

## 2025-04-19 PROCEDURE — 70450 CT HEAD/BRAIN W/O DYE: CPT

## 2025-04-19 PROCEDURE — 99223 1ST HOSP IP/OBS HIGH 75: CPT

## 2025-04-19 PROCEDURE — 70450 CT HEAD/BRAIN W/O DYE: CPT | Mod: 77

## 2025-04-19 PROCEDURE — 255N000002 HC RX 255 OP 636: Performed by: INTERNAL MEDICINE

## 2025-04-19 PROCEDURE — 85610 PROTHROMBIN TIME: CPT | Performed by: STUDENT IN AN ORGANIZED HEALTH CARE EDUCATION/TRAINING PROGRAM

## 2025-04-19 PROCEDURE — 97530 THERAPEUTIC ACTIVITIES: CPT | Mod: GP

## 2025-04-19 PROCEDURE — 200N000001 HC R&B ICU

## 2025-04-19 PROCEDURE — 80048 BASIC METABOLIC PNL TOTAL CA: CPT | Performed by: INTERNAL MEDICINE

## 2025-04-19 PROCEDURE — 99222 1ST HOSP IP/OBS MODERATE 55: CPT | Mod: GC | Performed by: STUDENT IN AN ORGANIZED HEALTH CARE EDUCATION/TRAINING PROGRAM

## 2025-04-19 PROCEDURE — 250N000009 HC RX 250: Performed by: STUDENT IN AN ORGANIZED HEALTH CARE EDUCATION/TRAINING PROGRAM

## 2025-04-19 PROCEDURE — 70553 MRI BRAIN STEM W/O & W/DYE: CPT

## 2025-04-19 PROCEDURE — 36415 COLL VENOUS BLD VENIPUNCTURE: CPT | Performed by: INTERNAL MEDICINE

## 2025-04-19 PROCEDURE — 250N000011 HC RX IP 250 OP 636: Performed by: INTERNAL MEDICINE

## 2025-04-19 PROCEDURE — P9012 CRYOPRECIPITATE EACH UNIT: HCPCS | Performed by: STUDENT IN AN ORGANIZED HEALTH CARE EDUCATION/TRAINING PROGRAM

## 2025-04-19 PROCEDURE — 93880 EXTRACRANIAL BILAT STUDY: CPT

## 2025-04-19 PROCEDURE — 250N000011 HC RX IP 250 OP 636: Performed by: STUDENT IN AN ORGANIZED HEALTH CARE EDUCATION/TRAINING PROGRAM

## 2025-04-19 PROCEDURE — 85384 FIBRINOGEN ACTIVITY: CPT | Performed by: STUDENT IN AN ORGANIZED HEALTH CARE EDUCATION/TRAINING PROGRAM

## 2025-04-19 PROCEDURE — 92610 EVALUATE SWALLOWING FUNCTION: CPT | Mod: GN | Performed by: SPEECH-LANGUAGE PATHOLOGIST

## 2025-04-19 PROCEDURE — 97162 PT EVAL MOD COMPLEX 30 MIN: CPT | Mod: GP

## 2025-04-19 PROCEDURE — 250N000013 HC RX MED GY IP 250 OP 250 PS 637: Performed by: INTERNAL MEDICINE

## 2025-04-19 PROCEDURE — 85027 COMPLETE CBC AUTOMATED: CPT | Performed by: STUDENT IN AN ORGANIZED HEALTH CARE EDUCATION/TRAINING PROGRAM

## 2025-04-19 PROCEDURE — 82533 TOTAL CORTISOL: CPT | Performed by: INTERNAL MEDICINE

## 2025-04-19 PROCEDURE — 86900 BLOOD TYPING SEROLOGIC ABO: CPT | Performed by: INTERNAL MEDICINE

## 2025-04-19 PROCEDURE — 36415 COLL VENOUS BLD VENIPUNCTURE: CPT | Performed by: STUDENT IN AN ORGANIZED HEALTH CARE EDUCATION/TRAINING PROGRAM

## 2025-04-19 PROCEDURE — A9585 GADOBUTROL INJECTION: HCPCS | Performed by: INTERNAL MEDICINE

## 2025-04-19 PROCEDURE — 80061 LIPID PANEL: CPT | Performed by: INTERNAL MEDICINE

## 2025-04-19 PROCEDURE — 85730 THROMBOPLASTIN TIME PARTIAL: CPT | Performed by: STUDENT IN AN ORGANIZED HEALTH CARE EDUCATION/TRAINING PROGRAM

## 2025-04-19 PROCEDURE — 82962 GLUCOSE BLOOD TEST: CPT

## 2025-04-19 PROCEDURE — 258N000003 HC RX IP 258 OP 636: Performed by: INTERNAL MEDICINE

## 2025-04-19 RX ORDER — ACETAMINOPHEN 325 MG/1
650 TABLET ORAL EVERY 4 HOURS PRN
Status: DISCONTINUED | OUTPATIENT
Start: 2025-04-19 | End: 2025-04-24 | Stop reason: HOSPADM

## 2025-04-19 RX ORDER — NALOXONE HYDROCHLORIDE 0.4 MG/ML
0.2 INJECTION, SOLUTION INTRAMUSCULAR; INTRAVENOUS; SUBCUTANEOUS
Status: DISCONTINUED | OUTPATIENT
Start: 2025-04-19 | End: 2025-04-24 | Stop reason: HOSPADM

## 2025-04-19 RX ORDER — GADOBUTROL 604.72 MG/ML
9 INJECTION INTRAVENOUS ONCE
Status: COMPLETED | OUTPATIENT
Start: 2025-04-19 | End: 2025-04-19

## 2025-04-19 RX ORDER — NALOXONE HYDROCHLORIDE 0.4 MG/ML
0.4 INJECTION, SOLUTION INTRAMUSCULAR; INTRAVENOUS; SUBCUTANEOUS
Status: DISCONTINUED | OUTPATIENT
Start: 2025-04-19 | End: 2025-04-24 | Stop reason: HOSPADM

## 2025-04-19 RX ORDER — ATORVASTATIN CALCIUM 40 MG/1
40 TABLET, FILM COATED ORAL EVERY EVENING
Status: DISCONTINUED | OUTPATIENT
Start: 2025-04-19 | End: 2025-04-24 | Stop reason: HOSPADM

## 2025-04-19 RX ORDER — ONDANSETRON 4 MG/1
4 TABLET, ORALLY DISINTEGRATING ORAL EVERY 6 HOURS PRN
Status: DISCONTINUED | OUTPATIENT
Start: 2025-04-19 | End: 2025-04-24 | Stop reason: HOSPADM

## 2025-04-19 RX ORDER — LABETALOL HYDROCHLORIDE 5 MG/ML
10 INJECTION, SOLUTION INTRAVENOUS EVERY 10 MIN PRN
Status: DISCONTINUED | OUTPATIENT
Start: 2025-04-19 | End: 2025-04-24 | Stop reason: HOSPADM

## 2025-04-19 RX ORDER — HYDRALAZINE HYDROCHLORIDE 20 MG/ML
10 INJECTION INTRAMUSCULAR; INTRAVENOUS EVERY 30 MIN PRN
Status: DISCONTINUED | OUTPATIENT
Start: 2025-04-19 | End: 2025-04-24 | Stop reason: HOSPADM

## 2025-04-19 RX ORDER — PROCHLORPERAZINE 25 MG
25 SUPPOSITORY, RECTAL RECTAL EVERY 12 HOURS PRN
Status: DISCONTINUED | OUTPATIENT
Start: 2025-04-19 | End: 2025-04-24 | Stop reason: HOSPADM

## 2025-04-19 RX ORDER — NICOTINE POLACRILEX 4 MG
15-30 LOZENGE BUCCAL
Status: DISCONTINUED | OUTPATIENT
Start: 2025-04-19 | End: 2025-04-22

## 2025-04-19 RX ORDER — DEXTROSE MONOHYDRATE 25 G/50ML
25-50 INJECTION, SOLUTION INTRAVENOUS
Status: DISCONTINUED | OUTPATIENT
Start: 2025-04-19 | End: 2025-04-22

## 2025-04-19 RX ORDER — OXYCODONE HYDROCHLORIDE 5 MG/1
5 TABLET ORAL EVERY 4 HOURS PRN
Status: DISCONTINUED | OUTPATIENT
Start: 2025-04-19 | End: 2025-04-20

## 2025-04-19 RX ORDER — ASPIRIN 81 MG/1
81 TABLET ORAL DAILY
Status: DISCONTINUED | OUTPATIENT
Start: 2025-04-19 | End: 2025-04-19

## 2025-04-19 RX ORDER — LIDOCAINE 40 MG/G
CREAM TOPICAL
Status: DISCONTINUED | OUTPATIENT
Start: 2025-04-19 | End: 2025-04-24 | Stop reason: HOSPADM

## 2025-04-19 RX ORDER — ONDANSETRON 2 MG/ML
4 INJECTION INTRAMUSCULAR; INTRAVENOUS EVERY 6 HOURS PRN
Status: DISCONTINUED | OUTPATIENT
Start: 2025-04-19 | End: 2025-04-24 | Stop reason: HOSPADM

## 2025-04-19 RX ORDER — ACETAMINOPHEN 650 MG/1
650 SUPPOSITORY RECTAL EVERY 4 HOURS PRN
Status: DISCONTINUED | OUTPATIENT
Start: 2025-04-19 | End: 2025-04-24 | Stop reason: HOSPADM

## 2025-04-19 RX ORDER — PROCHLORPERAZINE MALEATE 10 MG
10 TABLET ORAL EVERY 6 HOURS PRN
Status: DISCONTINUED | OUTPATIENT
Start: 2025-04-19 | End: 2025-04-24 | Stop reason: HOSPADM

## 2025-04-19 RX ORDER — HYDRALAZINE HYDROCHLORIDE 20 MG/ML
10 INJECTION INTRAMUSCULAR; INTRAVENOUS EVERY 10 MIN PRN
Status: DISCONTINUED | OUTPATIENT
Start: 2025-04-19 | End: 2025-04-24 | Stop reason: HOSPADM

## 2025-04-19 RX ADMIN — ACETAMINOPHEN 650 MG: 325 TABLET, FILM COATED ORAL at 01:14

## 2025-04-19 RX ADMIN — LABETALOL HYDROCHLORIDE 10 MG: 5 INJECTION INTRAVENOUS at 11:10

## 2025-04-19 RX ADMIN — SODIUM CHLORIDE 1000 ML: 9 INJECTION, SOLUTION INTRAVENOUS at 05:05

## 2025-04-19 RX ADMIN — LABETALOL HYDROCHLORIDE 10 MG: 5 INJECTION INTRAVENOUS at 19:40

## 2025-04-19 RX ADMIN — LABETALOL HYDROCHLORIDE 10 MG: 5 INJECTION INTRAVENOUS at 21:03

## 2025-04-19 RX ADMIN — GADOBUTROL 9 ML: 604.72 INJECTION INTRAVENOUS at 04:11

## 2025-04-19 RX ADMIN — LABETALOL HYDROCHLORIDE 10 MG: 5 INJECTION INTRAVENOUS at 14:37

## 2025-04-19 RX ADMIN — TRANEXAMIC ACID 1 G: 1 INJECTION, SOLUTION INTRAVENOUS at 05:25

## 2025-04-19 RX ADMIN — ATORVASTATIN CALCIUM 40 MG: 40 TABLET, FILM COATED ORAL at 20:17

## 2025-04-19 RX ADMIN — LABETALOL HYDROCHLORIDE 10 MG: 5 INJECTION INTRAVENOUS at 08:29

## 2025-04-19 RX ADMIN — LABETALOL HYDROCHLORIDE 10 MG: 5 INJECTION INTRAVENOUS at 06:41

## 2025-04-19 ASSESSMENT — ACTIVITIES OF DAILY LIVING (ADL)
ADLS_ACUITY_SCORE: 56
ADLS_ACUITY_SCORE: 67
ADLS_ACUITY_SCORE: 56
ADLS_ACUITY_SCORE: 67
ADLS_ACUITY_SCORE: 48
ADLS_ACUITY_SCORE: 47
ADLS_ACUITY_SCORE: 56
ADLS_ACUITY_SCORE: 48
ADLS_ACUITY_SCORE: 56
ADLS_ACUITY_SCORE: 47
ADLS_ACUITY_SCORE: 48
ADLS_ACUITY_SCORE: 47
ADLS_ACUITY_SCORE: 67
ADLS_ACUITY_SCORE: 47
ADLS_ACUITY_SCORE: 56
ADLS_ACUITY_SCORE: 67
ADLS_ACUITY_SCORE: 67
ADLS_ACUITY_SCORE: 56
ADLS_ACUITY_SCORE: 56

## 2025-04-19 NOTE — INTERIM SUMMARY
I was paged by the ED with MRI finding which showed hemorrhagic transformation, acute intraparenchymal hematoma involving the left occipital area.  The patient was examined again through the camera, she has had headache even before the tenecteplase administration, 2/10 then she noted some increasing intensity of headache up to 5/10.  She also has new right homonymous hemianopsia.  NIH stroke scale is 5 based on hemianopia, right arm ataxia, and mild aphasia.      Plan:  - Reversal agents ordered  - Tranexamic acid, and 2 of 5 units of cryoprecipitate  - Fibrinogen level  - Repeat head CT in 4 hours  - Consult neurosurgery  - Systolic blood pressure below 140, labetalol and hydralazine as needed ordered  - Neurocheck every 1 hour  - Admission to the ICU for close monitoring    Caridad Dalal MD  Stroke fellow

## 2025-04-19 NOTE — ED TRIAGE NOTES
Arrives with concern for stroke.  Right arm weakness and right facial droop.  Last known well about 15 min pta. Hx of 3 strokes.

## 2025-04-19 NOTE — PROGRESS NOTES
SLP: Received a call from the nurse regarding diet wondering if she could have an increase in diet level and nurse will cut up the food into smaller pieces. Diet changed to regular easy to chew with nursing to cut up. SLP will follow up on 4/20/25.      04/19/25 3131   Appointment Info   Signing Clinician's Name / Credentials (SLP) Hoa Cotto MS CCC SLP   General Information   Onset of Illness/Injury or Date of Surgery 04/19/25   Referring Physician Cameron, Ray   Patient/Family Therapy Goal Statement (SLP) Patient did not state   Pertinent History of Current Problem Afshan Jimenez is a 59 year old female on Plavix and ASA who presented with sudden onset of right arm weakness, ataxia, right facial droop, and dysarthria overnight. Pt has a prior hx of stroke, reports RUE wrist weakness around Thanksgiving 2024. Patient reports right upper extremity weakness and numbness which developed approximately 15 to 20 minutes prior to arrival in the emergency department with LKN 8pm. She states she cannot control the right upper extremity and is quite weak in the right hand to . She denies lower extremity symptoms, denies headache.   Type of Evaluation   Type of Evaluation Swallow Evaluation   Oral Motor   Oral Musculature anomalies present   Structural Abnormalities none present   Mucosal Quality adequate   Dentition (Oral Motor)   Dentition (Oral Motor) natural dentition;adequate dentition   Facial Symmetry (Oral Motor)   Facial Symmetry (Oral Motor) right side impairment   Right Side Facial Asymmetry minimal impairment   Lip Function (Oral Motor)   Lip Range of Motion (Oral Motor) retraction impairment   Retraction, Lip Range of Motion right side;minimal impairment   Tongue Function (Oral Motor)   Tongue Coordination/Speed (Oral Motor) reduced rate   Tongue ROM (Oral Motor) depression is impaired;elevation is impaired;protrusion is impaired;lateralization is impaired;retraction is impaired   Retraction, Tongue  ROM Impairment (Oral Motor) bilateral;minimal impairment   Protrusion, Tongue ROM Impairment (Oral Motor) right side;minimal impairment   Depression, Tongue ROM Impairment (Oral Motor) right side;minimal impairment   Lateralization, Tongue ROM Impairment (Oral Motor) right side;minimal impairment   Elevation, Tongue ROM Impairment (Oral Motor) bilateral;moderate impairment   Jaw Function (Oral Motor)   Jaw Function (Oral Motor) WNL   Cough/Swallow/Gag Reflex (Oral Motor)   Soft Palate/Velum (Oral Motor) elevation decreased bilaterally   Volitional Throat Clear/Cough (Oral Motor) impaired;reduced strength   Volitional Swallow (Oral Motor) WNL   Vocal Quality/Secretion Management (Oral Motor)   Vocal Quality (Oral Motor) WFL   General Swallowing Observations   Past History of Dysphagia 12/28/24 admitted with stroke but no SLP needs regular diet and thin liquids at that time.   Respiratory Support room air   Current Diet/Method of Nutritional Intake (General Swallowing Observations, NIS) NPO   Swallowing Evaluation Clinical swallow evaluation   Clinical Swallow Evaluation   Feeding Assistance frequent cues/help required   Clinical Swallow Evaluation Textures Trialed thin liquids;pureed;solid foods   Clinical Swallow Eval: Thin Liquid Texture Trial   Mode of Presentation, Thin Liquids cup;self-fed   Volume of Liquid or Food Presented 4 oz of water   Oral Phase of Swallow premature pharyngeal entry   Pharyngeal Phase of Swallow intact   Diagnostic Statement No immediate or delayed sx of aspiration.   Clinical Swallow Evaluation: Puree Solid Texture Trial   Mode of Presentation, Puree spoon;fed by clinician   Volume of Puree Presented 6 teaspoons   Oral Phase, Puree WFL   Pharyngeal Phase, Puree intact   Diagnostic Statement WFL   Clinical Swallow Evaluation: Solid Food Texture Trial   Mode of Presentation self-fed   Volume Presented 1/2 grahm cracker   Oral Phase delayed AP movement;residue in oral cavity;premature  pharyngeal entry   Oral Residue right anterior lateral sulci   Pharyngeal Phase impaired;repeated swallows   Diagnostic Statement Increased time for mastication with reduced bolus control during AP movement and minimal/mild oral residue.   Swallowing Recommendations   Diet Consistency Recommendations soft & bite-sized (level 6);thin liquids (level 0)   Supervision Level for Intake close supervision needed   Mode of Delivery Recommendations bolus size, small;food moistened;no straws;slow rate of intake   Postural Recommendations none   Swallowing Maneuver Recommendations alternate food and liquid intake   Monitoring/Assistance Required (Eating/Swallowing) check mouth frequently for oral residue/pocketing;stop eating activities when fatigue is present;monitor for cough or change in vocal quality with intake   Recommended Feeding/Eating Techniques (Swallow Eval) maintain upright sitting position for eating;maintain upright posture during/after eating for 30 minutes;minimize distractions during oral intake;provide assist with feeding;set-up and prepare tray   Medication Administration Recommendations, Swallowing (SLP) As tolerated   Instrumental Assessment Recommendations VFSS (videofluoroscopic swallowing study)   Comment, Swallowing Recommendations Pending diet tolerance maybe indicated.   General Therapy Interventions   Planned Therapy Interventions Dysphagia Treatment   Dysphagia treatment Modified diet education;Instruction of safe swallow strategies   Clinical Impression   Criteria for Skilled Therapeutic Interventions Met (SLP Eval) Yes, treatment indicated   SLP Diagnosis Mild/moderate oral and pharyngeal dysphagia   Risks & Benefits of therapy have been explained evaluation/treatment results reviewed;care plan/treatment goals reviewed;risks/benefits reviewed;current/potential barriers reviewed;participants voiced agreement with care plan;participants included;patient   Clinical Impression Comments Patient  presents with mild to moderate oral and pharyngeal dysphagia at bedside. Patient with right sided weakness. She demonstrated premature entry of thin liquids without immediate or dealyed sx of aspiration. Mastication was mildly prolonged for solids with decreased bolus coordination and AP movement of the bolus. Minimal/mild oral residue after the swallow cleared with a liquid rinse.   Recommend: 1. IDDSI level 6 soft/bites size and thin liquids. 2. Upright, encorage her to lookk to the right, no straws, smalll bites/sips, and alternate liquids/solids. Hold diet if overt sx of aspiration present or changes in her respiratory status.   SLP Total Evaluation Time   Eval: oral/pharyngeal swallow function, clinical swallow Minutes (24302) 18   SLP Goals   Therapy Frequency (SLP Eval) 5 times/week   SLP Predicted Duration/Target Date for Goal Attainment 04/30/25   SLP Goals Swallow   SLP: Safely tolerate diet without signs/symptoms of aspiration Regular diet;Thin liquids;With use of swallow precautions;With assistance/supervision   SLP Discharge Planning   SLP Plan Meal f/u level 6 and thins. Advance as tolerated.   SLP Discharge Recommendation [facility] patient will benefit from intensive, interdisciplinary therapy.  Anticipate will be able to tolerate 3 hours of therapy per day   SLP Rationale for DC Rec Patient's swallow function is below her baseline.   SLP Brief overview of current status  Mild to moderate oral and pharyngeal dysphagia
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
## 2025-04-19 NOTE — PROGRESS NOTES
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 She     Right upper extremity ataxia and weakness, slurred speech: Suspect some of her symptoms may actually have been recrudescence of prior stroke as she describes lightheadedness with standing on Ozempic, poor oral intake, blood pressures in the 80s systolic range during recent care visits as an outpatient.  Watershed and embolic infarcts in left frontal parietal region: On MRI noted to have multiple watershed and embolic type infarcts in the left frontoparietal region as well as interval evolutionary change of prior infarcts.  History of left parietal and occipital strokes: December 2024, embolic strokes of undetermined source.  Negative AURA at that time  - Received tenecteplase in the emergency department  - Initial post tenecteplase and every 2 hour neurochecks  -Left temporal bone CT as below  - Aspirin and Plavix on hold 24 hours following tenecteplase  - Follow-up head CT  - MRI ordered and obtained; now found to have post tenecteplase intraparenchymal hemorrhage  - Note outpatient consideration for implantable loop recorder through neurology visit 2/28/2025.  This can be pursued as an outpatient as placement would not be available over the weekend regardless  - Cardiac telemetry    Post tenecteplase intraparenchymal hemorrhage: Hyperacute hemorrhage involving left parieto-occipital region as well as adjacent subdural hematoma of 1 to 2 mm.  - Every hour neurochecks or per neuro recs  - Systolic blood pressure goal less than 140  - Hydralazine, labetalol as  needed for blood pressure greater than 140  - Receiving cryoprecipitate and transaminase acid per stroke neurology  - Stroke critical care following  - PT/OT/SLP    NSGY Recommendations:  - No urgent/emergent neurosurgical intervention  - Head CT tomorrow (0600, ordered), hemicrani watch  -  -Stroke Neurology involved              - would patient benefit from carotid endarterectomy?  -HOB>30   -SBP<140  -Hold all anticoagulation  -Hold sedation medications to maintain acurate exam    Symptomatic hypotension: Describes lightheadedness, leaning on a wall when ambulating at home, blood pressures in the 80 systolic range as an outpatient with poor oral intake on Ozempic  - Discontinue Ozempic.  Discussed with patient on admission  - Received 2 L normal saline bolus in the emergency department  - Can trend orthostatic blood pressure and pulse when off of tenecteplase precautions    Left mastoid effusion and enhancement: Known on prior imaging and stable.  No mastoid tenderness on palpation.  Was seen by ENT during last hospitalization and this was felt to need no anti-infectives, no need for intervention.  Attributed to prior surgical treatment and radiation of the left parotid for prior parotid cancer.  Has been seen by [facility] as well and it is felt that radiation may have damaged her eustachian tube resulting in this fluid collection.  - On MRI, some enhancement within left mastoid air cells was felt to be new and radiology recommended temporal bone CT.  With current acute intracranial hemorrhage following tenecteplase, will await clinical stability prior to pursuing temporal bone CT.     History of AML status post bone marrow transplant: Donor bone marrow transplant from her sister in 1992  Parotid cancer status post radiation and surgical resection on the left    Pancreas cyst, likely IPMN:  - Surveillance MRI in 6 months would be due in June  - Referred through GI by her primary care team.    Type 2 diabetes:  "On Ozempic, no longer utilizing insulin.  Well-controlled with hemoglobin A1c of 6 point 0/7/25.  - Discontinue Ozempic.  See above regarding symptomatic hypotension  - TIDAC glucose checks with medium dose sliding scale insulin available    Pulmonary nodules: Has followed with pulmonary nodule clinic.  Generally felt not to be concerning with plan for follow-up CT to ensure stability.    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 Indication: Stroke, acute (48 hours)  Code Status: No CPR- Pre-arrest intubation OK      Clinically Significant Risk Factors Present on Admission                 # Drug Induced Platelet Defect: home medication list includes an antiplatelet medication             # Obesity: Estimated body mass index is 32.25 kg/m  as calculated from the following:    Height as of this encounter: 1.689 m (5' 6.5\").    Weight as of this encounter: 92 kg (202 lb 13.2 oz).       # Financial/Environmental Concerns:           Social Drivers of Health    Housing Stability: Low Risk  (1/17/2025)    Housing Stability     Do you have housing? : Yes     Are you worried about losing your housing?: No   Recent Concern: Housing Stability - High Risk (12/27/2024)    Housing Stability     Do you have housing? : Yes     Are you worried about losing your housing?: Yes   Financial Resource Strain: Low Risk  (1/17/2025)    Financial Resource Strain     Within the past 12 months, have you or your family members you live with been unable to get utilities (heat, electricity) when it was really needed?: No   Recent Concern: Financial Resource Strain - High Risk " (12/27/2024)    Financial Resource Strain     Within the past 12 months, have you or your family members you live with been unable to get utilities (heat, electricity) when it was really needed?: Yes   Transportation Needs: Low Risk  (1/17/2025)    Transportation Needs     Within the past 12 months, has lack of transportation kept you from medical appointments, getting your medicines, non-medical meetings or appointments, work, or from getting things that you need?: No   Recent Concern: Transportation Needs - High Risk (12/27/2024)    Transportation Needs     Within the past 12 months, has lack of transportation kept you from medical appointments, getting your medicines, non-medical meetings or appointments, work, or from getting things that you need?: Yes   Physical Activity: Insufficiently Active (7/26/2024)    Received from [facility]    Exercise Vital Sign     Days of Exercise per Week: 2 days     Minutes of Exercise per Session: 20 min   Social Connections: Unknown (11/27/2022)    Received from [facility], [facility]    Social Connection and Isolation Panel [NHANES]     Frequency of Communication with Friends and Family: More than three times a week     Frequency of Social Gatherings with Friends and Family: More than three times a week     Attends Rastafari Services: More than 4 times per year     Active Member of Clubs or Organizations: Yes     Attends Club or Organization Meetings: More than 4 times per year          Disposition Plan     Medically Ready for Discharge: Anticipated in 2-4 Days             Ly Hutchins MD  Hospitalist Service  [facility]  Securely message with [employer] (more info)  Text page via [facility] Paging/Directory   ______________________________________________________________________    Interval History   Pt moved from ED to ICU. Neuro stroke and neurosurg following. No fevers. Doesn't endorse pain.    Physical Exam   Vital Signs: Temp: 97.8  F (36.6  C)  Temp src: Temporal BP: (!) 144/93 Pulse: 106   Resp: 13 SpO2: 98 % O2 Device: None (Room air)    Weight: 202 lbs 13.17 oz    Alert, oriented, aphasic but follows basic commands  Tachycardic  Lungs clear  Abd soft  Moving all ext    Medical Decision Making       45 MINUTES SPENT BY ME on the date of service doing chart review, history, exam, documentation & further activities per the note.      Data     I have personally reviewed the following data over the past 24 hrs:    8.1  \   12.9   / 148 (L)     141 107 19.2 /  119 (H)   4.6 22 0.98 (H) \     Trop: 8 BNP: N/A     INR:  1.07 PTT:  28   D-dimer:  N/A Fibrinogen:  358       Imaging results reviewed over the past 24 hrs:   Recent Results (from the past 24 hours)   CT Head w/o Contrast    Narrative    EXAM: CTA HEAD NECK W CONTRAST, CT HEAD W/O CONTRAST  LOCATION: [facility]  DATE: 4/18/2025    INDICATION: Code Stroke, evaluate for LVO. PLEASE READ IMMEDIATELY. Right arm weakness and facial droop.  COMPARISON: MRI brain 1/31/2025, CTA head and neck 12/27/2024  CONTRAST: 67mL Isovue 370  TECHNIQUE: Head and neck CT angiogram with IV contrast. Noncontrast head CT followed by axial helical CT images of the head and neck vessels obtained during the arterial phase of intravenous contrast administration. Axial 2D reconstructed images and   multiplanar 3D MIP reconstructed images of the head and neck vessels were performed by the technologist. Dose reduction techniques were used. All stenosis measurements made according to NASCET criteria unless otherwise specified.    FINDINGS:   NONCONTRAST HEAD CT:   INTRACRANIAL CONTENTS: No intracranial hemorrhage, extraaxial collection, or mass effect.  No CT evidence of acute infarct. Mild presumed chronic small vessel ischemic changes. Mild generalized volume loss. No hydrocephalus.     VISUALIZED ORBITS/SINUSES/MASTOIDS: No intraorbital abnormality. No paranasal sinus mucosal disease. Complete/near  complete opacification of the left mastoid air cells. No apparent mass in the posterior nasopharynx or skull base.    BONES/SOFT TISSUES: No acute abnormality.    HEAD CTA:  ANTERIOR CIRCULATION: No stenosis/occlusion, aneurysm, or high flow vascular malformation. Standard [place] anatomy.    POSTERIOR CIRCULATION: No stenosis/occlusion, aneurysm, or high flow vascular malformation. Dominant left and smaller right vertebral artery contribute to a normal basilar artery.     DURAL VENOUS SINUSES: Expected enhancement of the major dural venous sinuses.    NECK CTA:  RIGHT CAROTID: No measurable stenosis or dissection.    LEFT CAROTID: Atherosclerotic plaque results in 50% stenosis in the left ICA. No dissection.    VERTEBRAL ARTERIES: No focal stenosis or dissection. Dominant left and smaller right vertebral arteries.    AORTIC ARCH: Classic aortic arch anatomy with no significant stenosis at the origin of the great vessels.    NONVASCULAR STRUCTURES: Heterogeneous thyroid gland with multiple scattered nodules, largest 1.7 cm on the left      Impression    IMPRESSION:   HEAD CT:  1.  No CT evidence for acute intracranial process.  2.  Brain atrophy and presumed chronic microvascular ischemic changes as above.  3.  Left mastoid effusion.    HEAD CTA:  Normal CTA [place].    NECK CTA:  1.  50% stenosis of the left ICA origin.  2.  Heterogeneous thyroid gland with multiple nodules.    3.  Dr. Dow discussed the above findings by telephone with Dr. Pichardo at 8:53 PM central time 4/18/2025.     CTA Head Neck with Contrast    Narrative    EXAM: CTA HEAD NECK W CONTRAST, CT HEAD W/O CONTRAST  LOCATION: [facility]  DATE: 4/18/2025    INDICATION: Code Stroke, evaluate for LVO. PLEASE READ IMMEDIATELY. Right arm weakness and facial droop.  COMPARISON: MRI brain 1/31/2025, CTA head and neck 12/27/2024  CONTRAST: 67mL Isovue 370  TECHNIQUE: Head and neck CT angiogram with IV contrast.  Noncontrast head CT followed by axial helical CT images of the head and neck vessels obtained during the arterial phase of intravenous contrast administration. Axial 2D reconstructed images and   multiplanar 3D MIP reconstructed images of the head and neck vessels were performed by the technologist. Dose reduction techniques were used. All stenosis measurements made according to NASCET criteria unless otherwise specified.    FINDINGS:   NONCONTRAST HEAD CT:   INTRACRANIAL CONTENTS: No intracranial hemorrhage, extraaxial collection, or mass effect.  No CT evidence of acute infarct. Mild presumed chronic small vessel ischemic changes. Mild generalized volume loss. No hydrocephalus.     VISUALIZED ORBITS/SINUSES/MASTOIDS: No intraorbital abnormality. No paranasal sinus mucosal disease. Complete/near complete opacification of the left mastoid air cells. No apparent mass in the posterior nasopharynx or skull base.    BONES/SOFT TISSUES: No acute abnormality.    HEAD CTA:  ANTERIOR CIRCULATION: No stenosis/occlusion, aneurysm, or high flow vascular malformation. Standard [place] anatomy.    POSTERIOR CIRCULATION: No stenosis/occlusion, aneurysm, or high flow vascular malformation. Dominant left and smaller right vertebral artery contribute to a normal basilar artery.     DURAL VENOUS SINUSES: Expected enhancement of the major dural venous sinuses.    NECK CTA:  RIGHT CAROTID: No measurable stenosis or dissection.    LEFT CAROTID: Atherosclerotic plaque results in 50% stenosis in the left ICA. No dissection.    VERTEBRAL ARTERIES: No focal stenosis or dissection. Dominant left and smaller right vertebral arteries.    AORTIC ARCH: Classic aortic arch anatomy with no significant stenosis at the origin of the great vessels.    NONVASCULAR STRUCTURES: Heterogeneous thyroid gland with multiple scattered nodules, largest 1.7 cm on the left      Impression    IMPRESSION:   HEAD CT:  1.  No CT evidence for acute  intracranial process.  2.  Brain atrophy and presumed chronic microvascular ischemic changes as above.  3.  Left mastoid effusion.    HEAD CTA:  Normal CTA [place].    NECK CTA:  1.  50% stenosis of the left ICA origin.  2.  Heterogeneous thyroid gland with multiple nodules.    3.  Dr. Dow discussed the above findings by telephone with Dr. Pichardo at 8:53 PM central time 4/18/2025.     MR Brain w/o & w Contrast   Result Value    Radiologist flags Acute hemorrhage (AA)    Narrative    EXAM: MR BRAIN WITHOUT AND WITH CONTRAST  LOCATION: [facility]  DATE: 04/19/2025    INDICATION: Stroke.  COMPARISON: CTA head and neck 04/18/2025, brain MRI and MRA head and neck 01/09/2025 and 01/31/2025.  CONTRAST: 9 mL Gadavist.  TECHNIQUE: Routine multiplanar multisequence head MRI without and with intravenous contrast.    FINDINGS:  INTRACRANIAL CONTENTS: Interval development of a hyperacute hemorrhage in the left parieto-occipital region measuring 4.3 x 2.8 cm AP by ML with mild associated edema and mild mass effect. No midline shift. Thin adjacent subdural hematoma measuring 1-2   mm. Interval development of multiple new watershed and embolic-type infarcts in the left frontoparietal region. Interval evolutionary change of the previously seen infarcts. No mass, acute hemorrhage, or extra-axial fluid collections. Age-related and   chronic ischemic changes. Normal position of the cerebellar tonsils.    SELLA: No abnormality accounting for technique.    OSSEOUS STRUCTURES/SOFT TISSUES: Normal marrow signal. The major intracranial vascular flow-voids are maintained.     ORBITS: No abnormality accounting for technique.     SINUSES/MASTOIDS: No paranasal sinus mucosal disease. Enhancement is seen within the left mastoid air cells which are noted to be completely opacified on the CT head. This enhancement appears progressive. Question mild adjacent intracranial enhancement   in the extra-axial space.        Impression    IMPRESSION:  1.  Interval development of a hyperacute vertebral hemorrhage in the left parieto-occipital region with mild adjacent edema and small adjacent subdural hemorrhage. Mild mass effect without midline shift    2.  Multiple additional small scattered areas of chronic hemosiderin are unchanged    3.  Several new watershed and embolic-type infarcts are seen in the left frontoparietal region. Interval evolutionary change of the previously seen infarcts    4.  Abnormal enhancement within the completely opacified left mastoid air cells. The enhancement is progressive or new since the prior study. Question mild adjacent intracranial enhancement. Temporal bone CT suggested for further evaluation.      [Critical Result: Acute hemorrhage]    Finding was identified on 04/19/2025, 4:31 AM CDT.     Dr. Barnes was contacted by me on 04/19/2025, 4:54 AM CDT and verbalized understanding of the critical result.        US Carotid Bilateral    Narrative    EXAM: US CAROTID BILATERAL  LOCATION: [facility]  DATE: 4/19/2025    INDICATION: Left ICA stenosis  COMPARISON: CTA head and neck 04/18/2025  TECHNIQUE: Duplex exam performed utilizing 2D gray-scale imaging, Doppler interrogation with color-flow and spectral waveform analysis. The percent diameter stenosis is determined using Updated Recommendations for Carotid Stenosis Interpretation Criteria   from [employer] Testing.    FINDINGS:    RIGHT: No significant plaque is seen at the right carotid bifurcation, or within the internal carotid artery . Normal velocities in the ECA. Antegrade flow within the vertebral artery.     LEFT: Mild plaque at the distal left common carotid artery, approaching the bifurcation. The peak systolic velocity in the ICA is less than 180 cm/sec, consistent with less than 50% stenosis. Normal velocities in the ECA. Antegrade flow within the   vertebral artery.    VELOCITY CHART:  CCA   Right: 93 cm/s    Left: 103 cm/s  ICA   Right: 69 cm/s   Left: 82 cm/s  ECA   Right: 60 cm/s   Left: 70 cm/s  ICA/CCA PSV Ratio   Right: 0.74   Left: 0.80      Impression    IMPRESSION:  1.  No significant plaque is seen in the right carotid arterial system.  2.  Mild plaque at the distal left common carotid artery, approaching the bifurcation. Velocities consistent with less than 50% stenosis in the right internal carotid artery.  3.  Flow within the vertebral arteries is antegrade.   CT Head w/o Contrast - to be done in 4 hours    Narrative    EXAM: CT HEAD W/O CONTRAST  LOCATION: [facility]  DATE: 4/19/2025    INDICATION: 4 hour follow up CT for hemorrhagic transformation post thrombolysis (tenecteplase).  COMPARISON: Brain MRI: 4/19/2025. Head CT: 4/18/2025.  TECHNIQUE: Routine CT Head without IV contrast. Multiplanar reformats. Dose reduction techniques were used.    FINDINGS:  INTRACRANIAL CONTENTS: Redemonstration of parenchymal hemorrhage at the left parietal/occipital junction as seen on same-day brain MRI. No significant increase in size of dominant hematoma, which measures approximately 37 x 39 x 35 mm (AP by TV by cc).   Similar thin subdural collection over the left cerebral convexity, measuring 4 mm in maximum thickness. No significant progressive mass effect. Asymmetric narrowing of the left atrium is similar to prior. No midline shift. No new distinct sites of acute   intracranial hemorrhage. Multiple small recent infarcts in the left cerebral hemisphere are better seen on recent MRI. Mild presumed chronic small vessel ischemic changes. Mild generalized volume loss. No hydrocephalus.     VISUALIZED ORBITS/SINUSES/MASTOIDS: No intraorbital abnormality. No paranasal sinus mucosal disease. Similar large left mastoid effusion.    BONES/SOFT TISSUES: No acute abnormality.      Impression    IMPRESSION:    1.  Similar parenchymal hematoma at the left parietal/occipital junction compared to same day  brain MRI. Similar thin subdural collection over the left cerebral convexity, measuring 4 mm in maximum thickness. No significant progressive mass effect.  2.  No new sites of acute intracranial hemorrhage.  3.  Multiple small recent infarcts in the left cerebral hemisphere are better seen on MRI.

## 2025-04-19 NOTE — ED PROVIDER NOTES
Emergency Department Note      History of Present Illness     Chief Complaint   Stroke Symptoms    HPI   Afshan Jimenez is a 59 year old female with a history of a previous stroke who states she is on aspirin and Plavix.  She presents with right upper extremity weakness and numbness which developed approximately 15 to 20 minutes prior to arrival in the emergency department.  She states she cannot control the right upper extremity and is quite weak in the right hand to .  She denies lower extremity symptoms, denies headache.    Independent Historian   She has a friend that supplements history as noted above.    Review of External Notes   Discharge summary from December 30, 2024 for a stroke with multiple scattered acute infarcts was reviewed    Past Medical History   Medical History and Problem List   Past Medical History:   Diagnosis Date    Deafness     Diabetes mellitus (H)     Malignant neoplasm (H)     Malignant neoplasm of parotid gland (H)    Embolic strokes left parietal lobe  Hyperlipidemia  Right radial nerve palsy  Pulmonary nodules  Chronic kidney disease    Medications   aspirin 81 MG EC tablet  atorvastatin (LIPITOR) 40 MG tablet  indomethacin (INDOCIN) 50 MG capsule  Semaglutide, 1 MG/DOSE, (OZEMPIC, 1 MG/DOSE,) 4 MG/3ML pen    Clopidogrel    Surgical History   Past Surgical History:   Procedure Laterality Date    LAPAROSCOPIC CHOLECYSTECTOMY  10/24/2011    Procedure:LAPAROSCOPIC CHOLECYSTECTOMY; Laparoscopic Cholecystectomy; Surgeon:TIAGO MALDONADO; Location:[facility] OR    SALIVARY GLAND SURGERY      L parotid    SINUS SURGERY      tumor removed from roof of mouth         Physical Exam   Patient Vitals for the past 24 hrs:   BP Temp Temp src Pulse Resp SpO2 Weight   04/18/25 2245 (!) 136/99 -- -- 105 20 -- --   04/18/25 2230 129/90 -- -- 105 12 -- --   04/18/25 2215 (!) 150/88 -- -- 105 12 -- --   04/18/25 2200 137/70 -- -- 104 -- 97 % --   04/18/25 2145 (!) 134/91 -- -- 101 12 97 % --    04/18/25 2130 137/84 -- -- 106 24 96 % --   04/18/25 2115 (!) 131/91 -- -- 108 13 95 % --   04/18/25 2106 -- 98.4  F (36.9  C) Oral -- -- -- --   04/18/25 2100 (!) 158/86 -- -- -- -- -- --   04/18/25 2035 (!) 136/98 -- -- 100 14 -- --   04/18/25 2028 (!) 142/79 -- -- 108 18 96 % 89.8 kg (198 lb)     Physical Exam  General: Alert, interactive   Head:  Scalp is atraumatic  Eyes:  No scleral icterus  ENT:      Nose:  The external nose is normal  Ears:  External ears are normal      Neck:  Normal range of motion.      There is no rigidity.    Trachea is in the midline         CV:  Tachycardia  Resp:  Breath sounds are clear bilaterally    Non-labored, no retractions or accessory muscle use      MS:  Weakness in the right upper extremity  Skin:  Warm and dry, No rash or lesions noted.  Neuro:   Weakness in the right upper extremity    National Institutes of Health Stroke Scale  Exam Interval: Baseline   Score    Level of consciousness: (0)   Alert, keenly responsive    LOC questions: (0)   Answers both questions correctly    LOC commands: (0)   Performs both tasks correctly    Best gaze: (0)   Normal    Visual: (0)   No visual loss    Facial palsy: (1)   Minor paralysis (flat nasolabial fold, smile asymmetry)    Motor arm (left): (0)   No drift    Motor arm (right): (2)   Some effort against gravity    Motor leg (left): (0)   No drift    Motor leg (right): (0)   No drift    Limb ataxia: (1)   Present in one limb    Sensory: (0)   Normal- no sensory loss    Best language: (0)   Normal- no aphasia    Dysarthria: (1)   Mild to moderate dysarthria    Extinction and inattention: (0)   No abnormality        Total Score:  5       Psych: Awake. Alert.  Normal affect.      Appropriate interactions.    Diagnostics   Lab Results   Labs Ordered and Resulted from Time of ED Arrival to Time of ED Departure   BASIC METABOLIC PANEL - Abnormal       Result Value    Sodium 140      Potassium 4.4      Chloride 101      Carbon Dioxide  (CO2) 26      Anion Gap 13      Urea Nitrogen 23.5 (*)     Creatinine 1.24 (*)     GFR Estimate 50 (*)     Calcium 10.0      Glucose 129 (*)    GLUCOSE BY METER - Abnormal    GLUCOSE BY METER POCT 121 (*)    INR - Normal    INR 0.97     PARTIAL THROMBOPLASTIN TIME - Normal    aPTT 27     TROPONIN T, HIGH SENSITIVITY - Normal    Troponin T, High Sensitivity 8     GLUCOSE MONITOR NURSING POCT   CBC WITH PLATELETS AND DIFFERENTIAL    WBC Count 9.4      RBC Count 5.14      Hemoglobin 14.6      Hematocrit 45.0      MCV 88      MCH 28.4      MCHC 32.4      RDW 13.2      Platelet Count 179      % Neutrophils 46      % Lymphocytes 44      % Monocytes 7      % Eosinophils 2      % Basophils 0      % Immature Granulocytes 0      NRBCs per 100 WBC 0      Absolute Neutrophils 4.3      Absolute Lymphocytes 4.2      Absolute Monocytes 0.7      Absolute Eosinophils 0.2      Absolute Basophils 0.0      Absolute Immature Granulocytes 0.0      Absolute NRBCs 0.0     TROPONIN T, HIGH SENSITIVITY     Imaging   CTA Head Neck with Contrast   Final Result   IMPRESSION:    HEAD CT:   1.  No CT evidence for acute intracranial process.   2.  Brain atrophy and presumed chronic microvascular ischemic changes as above.   3.  Left mastoid effusion.      HEAD CTA:   Normal CTA [place].      NECK CTA:   1.  50% stenosis of the left ICA origin.   2.  Heterogeneous thyroid gland with multiple nodules.      3.  Dr. Dow discussed the above findings by telephone with Dr. Pichardo at 8:53 PM central time 4/18/2025.         CT Head w/o Contrast   Final Result   IMPRESSION:    HEAD CT:   1.  No CT evidence for acute intracranial process.   2.  Brain atrophy and presumed chronic microvascular ischemic changes as above.   3.  Left mastoid effusion.      HEAD CTA:   Normal CTA [place].      NECK CTA:   1.  50% stenosis of the left ICA origin.   2.  Heterogeneous thyroid gland with multiple nodules.      3.  Dr. Dow discussed the above  findings by telephone with Dr. Pichardo at 8:53 PM central time 4/18/2025.         US Carotid Bilateral    (Results Pending)   MR Brain w/o & w Contrast    (Results Pending)     EKG   ECG taken at 2129, ECG read at 2133  Sinus tachycardia  Minimal voltage criteria for LVH, may be normal variant (R in aVL)  Anterolateral infarct, age undetermined  Abnormal ECG   No significant change as compared to prior, dated 1/9/2025.  Rate 106 bpm. KS interval 180 ms. QRS duration 80 ms. QT/QTc 338/448 ms. P-R-T axes 27 -12 7.    Independent Interpretation   CT Head: No intracranial hemorrhage or midline shift.    ED Course    Medications Administered   Medications   sodium chloride 0.9 % infusion (has no administration in time range)   labetalol (NORMODYNE/TRANDATE) injection 10 mg (has no administration in time range)   hydrALAZINE (APRESOLINE) injection 10 mg (has no administration in time range)   niCARdipine 40 mg in 200 mL NS (CARDENE) infusion (has no administration in time range)   sodium chloride 0.9% BOLUS 1,000 mL (1,000 mLs Intravenous $New Bag 4/18/25 2239)   iopamidol (ISOVUE-370) solution 67 mL (67 mLs Intravenous $Given 4/18/25 2030)     And   sodium chloride 0.9 % bag for CT scan flush (100 mLs As instructed $Given 4/18/25 2031)   tenecteplase (TNKase) injection 22.5 mg (22.5 mg Intravenous $Given 4/18/25 2100)     Procedures   Procedures     Discussion of Management   Dr. Dalal from stroke neurology, he has consented the patient for tenecteplase  Dr. Barnes from the hospitalist service  ED Course    Upon arrival the patient was brought back into a room  Tier 1 code stroke was activated upon my arrival  I connected with Dr. Dalal who has consented the patient for tenecteplase which was administered in the emergency department    Additional Documentation  None    Medical Decision Making / Diagnosis   CMS Diagnoses: None    MIPS       None    MDM   Afshandelon Jimenez is a 59 year old female presenting with right upper  extremity weakness, she does have a history of embolic strokes.  She states she has continued on her antiplatelet medications.  Upon arrival tier 1 code stroke was activated, CT and CT angiogram of the head and neck were obtained as noted above, laboratory workup is reassuring.  Given the ongoing symptoms and the history of embolic stroke the patient received tenecteplase at the recommendation of the stroke neurology team.  She was subsequently admitted to the hospitalist service for further evaluation and treatment.  Patient's  in the right upper extremity did improve after administration of the tenecteplase.  There is no signs of an acute cardiac etiology, signs of intracranial hemorrhage, or more concerning illness.      Critical Care  The critical condition was: Stroke with Intervention    Critical interventions performed or strongly considered: Thrombolytics    Critical care time was 30 minutes exclusive of time spent on separately billable procedures.   Disposition   The patient was admitted to the hospital.     Diagnosis     ICD-10-CM    1. Acute CVA (cerebrovascular accident) (H)  I63.9            MD Kylee Baldwin Brandon Thomas, MD  04/18/25 5949

## 2025-04-19 NOTE — ED NOTES
Report given to SASHA Reina.  Bedside neuro preformed together.  Patient no longer has any right arm drift, still does have some right arm ataxia but it has substantially improved.  Patients headache remains unchanged.  Vitals stable.

## 2025-04-19 NOTE — CONSULTS
[facility]    Neurosurgery Consultation     Date of Admission:  4/18/2025  Date of Consult (When I saw the patient): 04/19/25    Assessment & Plan   59 year old female o n ASA 81mg past medical history significant for AML in remission, ischemic stroke, parotid/parotid cancer s/p radiation and diabetes mellitus presented on 4/18/2025 with acute onset right sided weakness and dysarthria. NSGY consulted for hemorrhagic conversion.     Last well known 4/18/25 2000. .CT head was negative for any acute hemorrhage. CTA head and neck was negative for any actionable large vessel occlusion but showed 50% stenosis of left ICA. She was evaluated by teleneurology with an initial NIH stroke scale of 4, she was deemed to be candidate for TNK, TNK was administered at 2100.  MRI brain revealed interval development of hyperacute hemorrhage in the left parietal occipital region with adjacent edema and several watershed/embolic appearing infarcts involving left frontotemporal region.TNK was reversed with TXA and cryo. Per ED provider, patient has had improvement in exam following TNK.     Patient states last night she developed symptoms similar to prior to her previous stroke 11/2024. Is able to acknowledge that she is on anticoagulation but not able to recall medication names. Currently anxious with mild headache.     On exam patient has right vision cuts, right pronator drift, and decreased sensation in the right upper extremity.     Imaging reviewed and discussed with patient. Repeat head CT stable with left parieto-occipital IPH, thin SDH over left cerebral conveixty 4mm, no new sites of acute ICH. Multiple small recent infarcts in the left cerebral hemisphere.     Imaging:  Imaging Interpretation provided by radiologist.   EXAM: CTA HEAD NECK W CONTRAST, CT HEAD W/O CONTRAST  LOCATION: [facility]  DATE: 4/18/2025     INDICATION: Code Stroke, evaluate for LVO. PLEASE READ  IMMEDIATELY. Right arm weakness and facial droop.  COMPARISON: MRI brain 1/31/2025, CTA head and neck 12/27/2024  CONTRAST: 67mL Isovue 370  TECHNIQUE: Head and neck CT angiogram with IV contrast. Noncontrast head CT followed by axial helical CT images of the head and neck vessels obtained during the arterial phase of intravenous contrast administration. Axial 2D reconstructed images and   multiplanar 3D MIP reconstructed images of the head and neck vessels were performed by the technologist. Dose reduction techniques were used. All stenosis measurements made according to NASCET criteria unless otherwise specified.     FINDINGS:   NONCONTRAST HEAD CT:   INTRACRANIAL CONTENTS: No intracranial hemorrhage, extraaxial collection, or mass effect.  No CT evidence of acute infarct. Mild presumed chronic small vessel ischemic changes. Mild generalized volume loss. No hydrocephalus.      VISUALIZED ORBITS/SINUSES/MASTOIDS: No intraorbital abnormality. No paranasal sinus mucosal disease. Complete/near complete opacification of the left mastoid air cells. No apparent mass in the posterior nasopharynx or skull base.     BONES/SOFT TISSUES: No acute abnormality.     HEAD CTA:  ANTERIOR CIRCULATION: No stenosis/occlusion, aneurysm, or high flow vascular malformation. Standard [place] anatomy.     POSTERIOR CIRCULATION: No stenosis/occlusion, aneurysm, or high flow vascular malformation. Dominant left and smaller right vertebral artery contribute to a normal basilar artery.      DURAL VENOUS SINUSES: Expected enhancement of the major dural venous sinuses.     NECK CTA:  RIGHT CAROTID: No measurable stenosis or dissection.     LEFT CAROTID: Atherosclerotic plaque results in 50% stenosis in the left ICA. No dissection.     VERTEBRAL ARTERIES: No focal stenosis or dissection. Dominant left and smaller right vertebral arteries.     AORTIC ARCH: Classic aortic arch anatomy with no significant stenosis at the origin of the  great vessels.     NONVASCULAR STRUCTURES: Heterogeneous thyroid gland with multiple scattered nodules, largest 1.7 cm on the left                                                          IMPRESSION:   HEAD CT:  1.  No CT evidence for acute intracranial process.  2.  Brain atrophy and presumed chronic microvascular ischemic changes as above.  3.  Left mastoid effusion.     HEAD CTA:  Normal CTA [place].     NECK CTA:  1.  50% stenosis of the left ICA origin.  2.  Heterogeneous thyroid gland with multiple nodules.      EXAM: MR BRAIN WITHOUT AND WITH CONTRAST  LOCATION: [facility]  DATE: 04/19/2025     INDICATION: Stroke.  COMPARISON: CTA head and neck 04/18/2025, brain MRI and MRA head and neck 01/09/2025 and 01/31/2025.  CONTRAST: 9 mL Gadavist.  TECHNIQUE: Routine multiplanar multisequence head MRI without and with intravenous contrast.     FINDINGS:  INTRACRANIAL CONTENTS: Interval development of a hyperacute hemorrhage in the left parieto-occipital region measuring 4.3 x 2.8 cm AP by ML with mild associated edema and mild mass effect. No midline shift. Thin adjacent subdural hematoma measuring 1-2   mm. Interval development of multiple new watershed and embolic-type infarcts in the left frontoparietal region. Interval evolutionary change of the previously seen infarcts. No mass, acute hemorrhage, or extra-axial fluid collections. Age-related and   chronic ischemic changes. Normal position of the cerebellar tonsils.     SELLA: No abnormality accounting for technique.     OSSEOUS STRUCTURES/SOFT TISSUES: Normal marrow signal. The major intracranial vascular flow-voids are maintained.      ORBITS: No abnormality accounting for technique.      SINUSES/MASTOIDS: No paranasal sinus mucosal disease. Enhancement is seen within the left mastoid air cells which are noted to be completely opacified on the CT head. This enhancement appears progressive. Question mild adjacent intracranial  enhancement   in the extra-axial space.                                                                       IMPRESSION:  1.  Interval development of a hyperacute vertebral hemorrhage in the left parieto-occipital region with mild adjacent edema and small adjacent subdural hemorrhage. Mild mass effect without midline shift     2.  Multiple additional small scattered areas of chronic hemosiderin are unchanged     3.  Several new watershed and embolic-type infarcts are seen in the left frontoparietal region. Interval evolutionary change of the previously seen infarcts     4.  Abnormal enhancement within the completely opacified left mastoid air cells. The enhancement is progressive or new since the prior study. Question mild adjacent intracranial enhancement. Temporal bone CT suggested for further evaluation.    Labs 4/19/25:  Sodium 141  Hgb 13.1    INR 1.09  PTT 27  Fibrinogen 353    NS Recommendations:  - No urgent/emergent neurosurgical intervention  - Head CT tomorrow (0600, ordered), hemicrani watch  -  -Stroke Neurology involved   - would patient benefit from carotid endarterectomy?  -HOB>30   -SBP<140  -Hold all anticoagulation  -Hold sedation medications to maintain acurate exam  -Okay for Q2 neuro checks from NSGY, defer to stroke     Please contact on call [employer] via [employer] system for questions or concerns.     Discussed with ICU nursing staff and hospitalist    I have discussed the following assessment and plan with Dr. Michaud who is in agreement with initial plan and will follow up with further consultation recommendations.    Marjan Serra PA-C  [facility] Neurosurgery  [address] [address]    Text page via [employer] Paging/Directory    Code Status    No CPR- Pre-arrest intubation OK    Reason for Consult   Reason for consult: Hemorrhagic conversion    Primary Care Physician   [place]    Chief Complaint   Right arm  weakness, Right facial droop, ataxia    History is obtained from the patient and chart review    History of Present Illness   Afshan Jimenez is a 59 year old female past medical history significant for AML in remission, ischemic stroke, parotid/parotid cancer s/p radiation and diabetes mellitus presented on 4/18/2025 with acute onset right sided weakness and dysarthria. Last well known 4/18/25 2000. .CT head was negative for any acute hemorrhage. CTA head and neck was negative for any actionable large vessel occlusion but showed 50% stenosis of left ICA. She was evaluated by teleneurology with an initial NIH stroke scale of 4, she was deemed to be candidate for TNK, TNK was administered at 2100.  MRI brain revealed interval development of hyperacute hemorrhage in the left parietal occipital region with adjacent edema and several watershed/embolic appearing infarcts involving left frontotemporal region.TNK was reversed with TXA and cryo. Per ED provider, patient has had improvement in exam following TNK.     Patient states last night she developed symptoms similar to prior to her previous stroke 11/2024. Is able to acknowledge that she is on anticoagulation but not able to recall medication names. Currently anxious with mild headache.     Past Medical History   I have reviewed this patient's medical history and updated it with pertinent information if needed.   Past Medical History:   Diagnosis Date    Deafness     left ear    Diabetes mellitus (H)     Malignant neoplasm (H)     leukemia    Malignant neoplasm of parotid gland (H)        Past Surgical History   I have reviewed this patient's surgical history and updated it with pertinent information if needed.  Past Surgical History:   Procedure Laterality Date    LAPAROSCOPIC CHOLECYSTECTOMY  10/24/2011    Procedure:LAPAROSCOPIC CHOLECYSTECTOMY; Laparoscopic Cholecystectomy; Surgeon:TIAGO MALDONADO; Location:[facility] OR    SALIVARY GLAND SURGERY      L parotid     "SINUS SURGERY      tumor removed from roof of mouth         Prior to Admission Medications   Prior to Admission Medications   Prescriptions Last Dose Informant Patient Reported? Taking?   Semaglutide, 1 MG/DOSE, (OZEMPIC, 1 MG/DOSE,) 4 MG/3ML pen 4/7/2025  No Yes   Sig: Inject 1 mg subcutaneously once a week. On Mondays   aspirin 81 MG EC tablet 4/18/2025  No Yes   Sig: Take 1 tablet (81 mg) by mouth daily.   atorvastatin (LIPITOR) 40 MG tablet   No Yes   Sig: Take 1 tablet (40 mg) by mouth every evening.   indomethacin (INDOCIN) 50 MG capsule   No Yes   Sig: Take 1 capsule (50 mg) by mouth 3 times daily as needed for moderate pain (Gout).      Facility-Administered Medications: None     Allergies   Allergies   Allergen Reactions    Codeine Sulfate Rash       Social History   I have reviewed this patient's social history and updated it with pertinent information if needed. Afshan Jimenez  reports that she has never smoked. She has never used smokeless tobacco. She reports current alcohol use.    Family History   I have reviewed this patient's family history and updated it with pertinent information if needed.   Family History   Problem Relation Age of Onset    Breast Cancer Mother     Testicular cancer Brother     Pancreatic Cancer Maternal Uncle     Chronic Obstructive Pulmonary Disease Maternal Uncle     LUNG DISEASE No family hx of        ROS: 10 point ROS negative other than the symptoms noted above in the HPI.    Physical Exam   Temp: 98.3  F (36.8  C) Temp src: Oral BP: (!) 138/95 Pulse: 107   Resp: 16 SpO2: 97 % O2 Device: None (Room air)    Vital Signs with Ranges  Temp:  [98.3  F (36.8  C)-98.4  F (36.9  C)] 98.3  F (36.8  C)  Pulse:  [] 107  Resp:  [12-24] 16  BP: (127-158)/(70-99) 138/95  SpO2:  [95 %-99 %] 97 %  202 lbs 13.17 oz     , Blood pressure (!) 138/95, pulse 107, temperature 98.3  F (36.8  C), temperature source Oral, resp. rate 16, height 1.689 m (5' 6.5\"), weight 92 kg (202 lb 13.2 " oz), SpO2 97%.  202 lbs 13.17 oz    Sitting upright in ICU bed with nursing at bedside  HEENT:  Normocephalic, atraumatic.  PERRL.  Right vision cut.   NEUROLOGICAL EXAMINATION:   Mental status:  Alert and Oriented (self, location, time), speech is fluent.  Cranial nerves:  II-XII intact.   Motor:   Shoulder Abduction:       Right:  5/5   Left:  5/5  Elbow Flexion:                Right:  5/5   Left:  5/5  Elbow Extension:            Right:  5/5   Left:  5/5  interosseus :                  Right:  5/5   Left:  5/5  Hip Flexion:                    Right: 5/5  Left:  5/5  Knee Flexion:                 Right:  5/5  Left:  5/5  Knee Extension:             Right:  5/5  Left:  5/5  Dorsiflexion:                   Right:  5/5  Left:  5/5  Plantar Flexion:             Right:  5/5  Left:  5/5  EHL:                              Right:  5/5  Left:  5/5  Sensation:  Decreased sensation right upper extremity   Reflexes:  Negative Clonus Bilaterally. Negative Haynes's Bilaterally.    Coordination:  Smooth finger to nose testing.   Right pronator drift  Gait:  Not formally assessed.     Data     CBC RESULTS:   Recent Labs   Lab Test 04/19/25  0614   WBC 10.2   RBC 4.58   HGB 13.1   HCT 40.6   MCV 89   MCH 28.6   MCHC 32.3   RDW 12.9        Basic Metabolic Panel:  Lab Results   Component Value Date     04/19/2025     07/15/2013      Lab Results   Component Value Date    POTASSIUM 4.6 04/19/2025    POTASSIUM 4.2 07/15/2013     Lab Results   Component Value Date    CHLORIDE 107 04/19/2025    CHLORIDE 105 07/15/2013     Lab Results   Component Value Date    JOSE MARIA 9.0 04/19/2025    JOSE MARIA 9.7 07/15/2013     Lab Results   Component Value Date    CO2 22 04/19/2025    CO2 29 07/15/2013     Lab Results   Component Value Date    BUN 19.2 04/19/2025    BUN 18 07/15/2013     Lab Results   Component Value Date    CR 0.98 04/19/2025    CR 0.98 07/15/2013     Lab Results   Component Value Date     04/19/2025      04/19/2025    GLC 99 07/15/2013     INR:  Lab Results   Component Value Date    INR 1.09 04/19/2025    INR 0.97 04/18/2025    INR 0.98 01/09/2025    INR 1.06 12/27/2024

## 2025-04-19 NOTE — PROGRESS NOTES
NSGY PAGER NOTE    Afshan Jimenez is a 59 year old female on Plavix and ASA who presented with sudden onset of right arm weakness, ataxia, right facial droop, and dysarthria overnight. Pt has a prior hx of stroke, reports RUE wrist weakness around Thanksgiving 2024. Patient reports right upper extremity weakness and numbness which developed approximately 15 to 20 minutes prior to arrival in the emergency department with LKN 8pm. She states she cannot control the right upper extremity and is quite weak in the right hand to . She denies lower extremity symptoms, denies headache.     At the time, CT head no acute abnormalities and CTA no lVO but there is l ICA stenosis predominantly soft plaque. TNK given at 2100 per stroke neurology. Patient's exam improved overnight. MRI brain, ECHO, and US Carotid ordered by stroke neurology.    MRI brain this morning showed hemorrhagic transformation, acute hemorrhage in the left parieto-occipital region measuring 4.3 x 2.8 cm AP by ML with mild associated edema and mild mass effect. No midline shift. Thin adjacent subdural hematoma measuring 1-2 mm. Per ED physician, patient's exam is still improved from admission.     NSGY Recommendations:  -Stroke Neurology involved  -HOB>30  -Repeat CTH at 0700  -Keep patient NPO now  -SBP<140  -Hold all anticoagulation  -Hold sedation medications to maintain acurate exam  -Agree with Stroke Neurology recommendation for ICU admission under hospitalist services  -Q1 hr neuro checks  -Full NSGY consult to follow today    Patient's history, imaging, and plan reviewed with Dr. Do Corinne Fanta MSN, AGACNP-[facility] Neurosurgery  [facility]  Tel [phone]  Text page via [employer] Paging/Directory    Imaging  EXAM: MR BRAIN WITHOUT AND WITH CONTRAST  LOCATION: [facility]  DATE: 04/19/2025     INDICATION: Stroke.  COMPARISON: CTA head and neck 04/18/2025, brain MRI and MRA head and neck  01/09/2025 and 01/31/2025.  CONTRAST: 9 mL Gadavist.  TECHNIQUE: Routine multiplanar multisequence head MRI without and with intravenous contrast.     FINDINGS:  INTRACRANIAL CONTENTS: Interval development of a hyperacute hemorrhage in the left parieto-occipital region measuring 4.3 x 2.8 cm AP by ML with mild associated edema and mild mass effect. No midline shift. Thin adjacent subdural hematoma measuring 1-2   mm. Interval development of multiple new watershed and embolic-type infarcts in the left frontoparietal region. Interval evolutionary change of the previously seen infarcts. No mass, acute hemorrhage, or extra-axial fluid collections. Age-related and   chronic ischemic changes. Normal position of the cerebellar tonsils.     SELLA: No abnormality accounting for technique.     OSSEOUS STRUCTURES/SOFT TISSUES: Normal marrow signal. The major intracranial vascular flow-voids are maintained.      ORBITS: No abnormality accounting for technique.      SINUSES/MASTOIDS: No paranasal sinus mucosal disease. Enhancement is seen within the left mastoid air cells which are noted to be completely opacified on the CT head. This enhancement appears progressive. Question mild adjacent intracranial enhancement   in the extra-axial space.     IMPRESSION:  1.  Interval development of a hyperacute vertebral hemorrhage in the left parieto-occipital region with mild adjacent edema and small adjacent subdural hemorrhage. Mild mass effect without midline shift  2.  Multiple additional small scattered areas of chronic hemosiderin are unchanged  3.  Several new watershed and embolic-type infarcts are seen in the left frontoparietal region. Interval evolutionary change of the previously seen infarcts  4.  Abnormal enhancement within the completely opacified left mastoid air cells. The enhancement is progressive or new since the prior study. Question mild adjacent intracranial enhancement. Temporal bone CT suggested for further  evaluation    EXAM: CTA HEAD NECK W CONTRAST, CT HEAD W/O CONTRAST - 4/18/2025     INDICATION: Code Stroke, evaluate for LVO. PLEASE READ IMMEDIATELY. Right arm weakness and facial droop.  COMPARISON: MRI brain 1/31/2025, CTA head and neck 12/27/2024  CONTRAST: 67mL Isovue 370  TECHNIQUE: Head and neck CT angiogram with IV contrast. Noncontrast head CT followed by axial helical CT images of the head and neck vessels obtained during the arterial phase of intravenous contrast administration. Axial 2D reconstructed images and   multiplanar 3D MIP reconstructed images of the head and neck vessels were performed by the technologist. Dose reduction techniques were used. All stenosis measurements made according to NASCET criteria unless otherwise specified.     FINDINGS:   NONCONTRAST HEAD CT:   INTRACRANIAL CONTENTS: No intracranial hemorrhage, extraaxial collection, or mass effect.  No CT evidence of acute infarct. Mild presumed chronic small vessel ischemic changes. Mild generalized volume loss. No hydrocephalus.      VISUALIZED ORBITS/SINUSES/MASTOIDS: No intraorbital abnormality. No paranasal sinus mucosal disease. Complete/near complete opacification of the left mastoid air cells. No apparent mass in the posterior nasopharynx or skull base.     BONES/SOFT TISSUES: No acute abnormality.     HEAD CTA:  ANTERIOR CIRCULATION: No stenosis/occlusion, aneurysm, or high flow vascular malformation. Standard [place] anatomy.     POSTERIOR CIRCULATION: No stenosis/occlusion, aneurysm, or high flow vascular malformation. Dominant left and smaller right vertebral artery contribute to a normal basilar artery.      DURAL VENOUS SINUSES: Expected enhancement of the major dural venous sinuses.     NECK CTA:  RIGHT CAROTID: No measurable stenosis or dissection.     LEFT CAROTID: Atherosclerotic plaque results in 50% stenosis in the left ICA. No dissection.     VERTEBRAL ARTERIES: No focal stenosis or dissection. Dominant left  and smaller right vertebral arteries.     AORTIC ARCH: Classic aortic arch anatomy with no significant stenosis at the origin of the great vessels.     NONVASCULAR STRUCTURES: Heterogeneous thyroid gland with multiple scattered nodules, largest 1.7 cm on the left    IMPRESSION:   HEAD CT:  1.  No CT evidence for acute intracranial process.  2.  Brain atrophy and presumed chronic microvascular ischemic changes as above.  3.  Left mastoid effusion.     HEAD CTA:  Normal CTA [place].     NECK CTA:  1.  50% stenosis of the left ICA origin.  2.  Heterogeneous thyroid gland with multiple nodules.     3.  Dr. Dow discussed the above findings by telephone with Dr. Pichardo at 8:53 PM central time 4/18/2025.

## 2025-04-19 NOTE — ED NOTES
Patient states she has no family around. Her friend that brought her in has left.  Her name is Viri and her number [phone].  She states this is a new friend and doesn't want much shared with her.  Her other friend is named Ericka Lucas [phone], this friend is closer and her  and okay to share medical information with.

## 2025-04-19 NOTE — PHARMACY-ADMISSION MEDICATION HISTORY
Pharmacist Admission Medication History    Admission medication history is complete. The information provided in this note is only as accurate as the sources available at the time of the update.    Information Source(s): Patient via in-person    Pertinent Information: None    Changes made to PTA medication list:  Added: None  Deleted: None  Changed: None    Allergies reviewed with patient and updates made in EHR: no    Medication History Completed By: Taran Knight [facility] 4/18/2025 9:26 PM    PTA Med List   Medication Sig Last Dose/Taking    aspirin 81 MG EC tablet Take 1 tablet (81 mg) by mouth daily. 4/18/2025    atorvastatin (LIPITOR) 40 MG tablet Take 1 tablet (40 mg) by mouth every evening. Taking    indomethacin (INDOCIN) 50 MG capsule Take 1 capsule (50 mg) by mouth 3 times daily as needed for moderate pain (Gout). Taking As Needed    Semaglutide, 1 MG/DOSE, (OZEMPIC, 1 MG/DOSE,) 4 MG/3ML pen Inject 1 mg subcutaneously once a week. On Mondays 4/7/2025

## 2025-04-19 NOTE — PROGRESS NOTES
04/19/25 1521   Appointment Info   Signing Clinician's Name / Credentials (PT) Laura Munoz, PT, DPT   Living Environment   People in Home alone   Current Living Arrangements condominium   Home Accessibility no concerns   Transportation Anticipated car, drives self   Living Environment Comments Pt lives alone in 3rd floor condo, has elevator but does do stairs as well   Self-Care   Usual Activity Tolerance good   Current Activity Tolerance fair   Regular Exercise No   Equipment Currently Used at Home none   Fall history within last six months no   Activity/Exercise/Self-Care Comment At baseline pt is independent without assistive device, works as    General Information   Onset of Illness/Injury or Date of Surgery 04/18/25   Referring Physician Barnes, Mk Bell MD   Patient/Family Therapy Goals Statement (PT) To get better   Pertinent History of Current Problem (include personal factors and/or comorbidities that impact the POC) Pt is 59 year old female adm on 4/18/25 for evaluation of right upper extremity ataxia, weakness, and slurred speech. Pt with prior history of CVA. Initial head CT on admission showed no acute abnormalities. CTA showed no IVO but there is L ICA stenosis predominantly soft plaque. Pt was given TNK and MRI showed hemorrhagic transformation, acute intraparenchymal hematoma left occipital area. PMH includes AML in remission, ischemic CVA, parotid cancer s/p radiation, DM   Existing Precautions/Restrictions fall   Cognition   Affect/Mental Status (Cognition) anxious  (impulsive)   Orientation Status (Cognition) oriented x 3   Follows Commands (Cognition) follows one-step commands;increased processing time needed;physical/tactile prompts required;repetition of directions required;verbal cues/prompting required   Cognitive Status Comments Pt anxious and impulsive, decreased insight into deficits, responds well to redirection and direct cues   Pain Assessment   Patient  Currently in Pain No   Integumentary/Edema   Integumentary/Edema no deficits were identifed   Posture    Posture Forward head position   Range of Motion (ROM)   Range of Motion ROM is WFL   Strength (Manual Muscle Testing)   Strength (Manual Muscle Testing) strength is WFL   Strength Comments No focal weakness in LEs   Bed Mobility   Comment, (Bed Mobility) CGA   Transfers   Comment, (Transfers) Min assist   Gait/Stairs (Locomotion)   Comment, (Gait/Stairs) Min assist of 1   Balance   Balance Comments Pt close supervision for sitting balance, min assist for standing   Clinical Impression   Criteria for Skilled Therapeutic Intervention Yes, treatment indicated   PT Diagnosis (PT) Impaired mobility   Influenced by the following impairments Visual field cut/neglect, decreased balance, decreased activity tolerance, cognitive status   Functional limitations due to impairments Decreased safety with functional mobility tasks   Clinical Presentation (PT Evaluation Complexity) evolving   Clinical Presentation Rationale Current presentation, [facility]   Clinical Decision Making (Complexity) moderate complexity   Planned Therapy Interventions (PT) balance training;bed mobility training;gait training;home exercise program;neuromuscular re-education;patient/family education;stair training;strengthening;transfer training   Risk & Benefits of therapy have been explained evaluation/treatment results reviewed;care plan/treatment goals reviewed;risks/benefits reviewed;current/potential barriers reviewed;participants voiced agreement with care plan;participants included;patient   PT Total Evaluation Time   PT Eval, Moderate Complexity Minutes (80929) 10   Physical Therapy Goals   PT Frequency Daily   PT Predicted Duration/Target Date for Goal Attainment 04/26/25   PT Goals Bed Mobility;Transfers;Gait;Stairs   PT: Bed Mobility Independent;Supine to/from sit;Rolling   PT: Transfers Independent;Sit to/from stand;Bed to/from chair   PT: Gait  Independent;Greater than 200 feet   PT: Stairs Independent;Greater than 10 stairs   Interventions   Interventions Quick Adds Gait Training;Therapeutic Activity;Therapeutic Procedure;Neuromuscular Re-ed   Therapeutic Activity   Therapeutic Activities: dynamic activities to improve functional performance Minutes (66252) 30   Symptoms Noted During/After Treatment Fatigue   Treatment Detail/Skilled Intervention Pt supine in bed on arrival, stating she needed to use the bathroom. Pt able to roll very well in bed and can lift buttocks up off bed in hooklying position therefore decided to ambulate to toilet in room. Pt needing redirection throughout for safety as she can be impulsive and has decreased insight into deficits. Pt moves supine to sit at EOB with CGA, keeps head turned to the left but will turn to midline and beyond with visual cues. Pt sit to stand with min assist, initially weight shifted to the left, difficulty fully achieving upright, midline position. With visual cues and bilateral hand held assist, pt achieves full upright standing in midline position. Pt ambulates 20' to toilet with B hand held assist/min assist for balance but most especially to navigate unfamiliar environment with visual field cut. Pt CGA for toilet transfer. Pt able to perform own pericare though does demonstrate R UE ataxia and difficulty performing this task with R hand, needed to use left hand. Pt ambulates 10' to sink with min assist and verbal/visual cues. Pt ambulates back to bed with min assist of 1, did not need hand held assist but needs cueing due to visual deficits. Pt returns to supine in bed with SBA. Pt positioned for comfort, needs within reach, vital signs stable, bed alarm activated, visitor present in room at completion of session.   PT Discharge Planning   PT Plan Standing balance, ambulation with scanning environment, education   PT Discharge Recommendation (DC Rec) Acute [facility]hab [facility]-Motivated patient will benefit  from intensive, interdisciplinary therapy.  Anticipate will be able to tolerate 3 hours of therapy per day   PT Rationale for DC Rec Pt is below baseline level of function, would benefit from intensive inpatient rehabilitation program to optimize functional recovery.   PT Brief overview of current status Goals of therapy will be to address safe mobility and make recs for d/c to next level of care. Pt and RN will continue to follow all falls risk precautions as documented by RN staff while hospitalized   PT Total Distance Amb During Session (feet) 40   Physical Therapy Time and Intention   Timed Code Treatment Minutes 30   Total Session Time (sum of timed and untimed services) 40

## 2025-04-19 NOTE — PLAN OF CARE
Problem: Adult Inpatient Plan of Care  Goal: Plan of Care Review    Outcome: Progressing  Flowsheets (Taken 4/19/2025 1812)  Outcome Evaluation: Pt admitted to ICU this AM (0720) for one hour neuros following TNK and subsequent TXA infusion. Completed order for cryoprecipitate after blood consent obtained.  Pt alert/oriented x4, follows directions, note right field cut, right arm drift intermittetly noted.  After PT eval, assisted with one to toilet, unsteady and reliant on nursing with vision challenge. Assisted with meals, small bite size foods/sips of liquid between bites, reminders required, glucose remains controlled. Emotional support provided.  Plan of Care Reviewed With: patient  Overall Patient Progress: improving     Problem: Stroke, Intracerebral Hemorrhage  Goal: Optimal Coping  Outcome: Progressing  Intervention: Support Psychosocial Response to Stroke  Recent Flowsheet Documentation  Taken 4/19/2025 1600 by Erin Davidson RN  Family/Support System Care: involvement promoted  Taken 4/19/2025 1200 by Erin Davidson RN  Family/Support System Care: involvement promoted  Taken 4/19/2025 0800 by Erin Davidson RN  Family/Support System Care: involvement promoted  Goal: Optimal Cerebral Tissue Perfusion  Outcome: Progressing  Goal: Optimal Pain Control and Function  Outcome: Progressing  Goal: Effective Oxygenation and Ventilation  Outcome: Progressing  Intervention: Optimize Oxygenation and Ventilation  Recent Flowsheet Documentation  Taken 4/19/2025 1600 by Erin Davidson RN  Cough And Deep Breathing: done independently per patient  Head of Bed (HOB) Positioning: HOB at 30 degrees  Taken 4/19/2025 1200 by Erin Davidson RN  Cough And Deep Breathing: done independently per patient  Head of Bed (HOB) Positioning: HOB at 30 degrees  Taken 4/19/2025 0800 by Erin Davidson RN  Cough And Deep Breathing: done independently per patient  Head of Bed (HOB) Positioning: HOB at 30  degrees  Goal: Safe and Effective Swallow  Outcome: Progressing  Goal: Effective Urinary Elimination  Outcome: Progressing   Goal Outcome Evaluation:

## 2025-04-19 NOTE — CONSULTS
"      [facility]     Stroke Code Note          History of Present Illness     Chief Complaint: Stroke Symptoms      Afshan Jimenez is a 59 year old female who presented with sudden onset of right arm weakness, ataxia, right facial droop, and dysarthria.  She recently had stroke, and MRI of the brain on January 9 showed multifocal ischemic stroke on the left hemisphere.  She completely recovered, however while she was attending the [employer], around 8:00 in the afternoon she felt right arm paresthesia followed by dysarthria and right arm weakness.         Past Medical History     Stroke risk factors: Previous stroke    Preadmission antithrombotic regimen: Aspirin    Modified Hudspeth Score (Pre-morbid)  0-No deficits                   Assessment and Plan       1.  Sudden onset of right arm ataxia, right facial droop, and dysarthria, suspected lacunar syndrome.  2.  Left ICA stenosis, will order carotid ultrasound     Intravenous Thrombolysis  Risks (including potential for bleeding and death), benefits, and alternatives to thrombolytic therapy were discussed with Patient. Tenecteplase (TNK) administered without delay.     Endovascular Treatment  Not initiated due to absence of proximal vessel occlusion     Plan:  - Use orderset: \"Ischemic Stroke Post-Thrombolytics/Thrombectomy ICU Admission\"  - Okay to get admitted to the [facility]  - Carotid ultrasound  - Neurochecks and vital signs per post-thrombolytic orders and monitor closely for any evidence of CNS hemorrhage, bleeding, or orolingual angioedema  - Goal  / 105  - Hold all antithrombotic and anticoagulant medications for 24 hrs post-thrombolytic  - Hold pharmacologic VTE prophylaxis for 24 hrs post-thrombolytic  - Statin:  Continue PTA Lipitor 40, goal of LDL between 40 and 70  - Repeat Head CT 24 hrs post-thrombolytic  - MRI Brain with and without contrast   - TTE (with Bubble Study if age 60 yrs or less)  - Telemetry, EKG  - Bedside " "Glucose Monitoring  - A1c, Lipid Panel, Troponin x 3  - PT/OT/SLP  - Stroke Education  - Euthermia, Euglycemia     ___________________________________________________________________    The Stroke Staff is Dr. Neri.    Caridad Dalal MD  Vascular Neurology Fellow    To page me or covering stroke neurology team member, click here: [facility]  Choose \"On Call\" tab at top, then select \"NEUROLOGY/ALL SITES\" from middle drop-down box, press Enter, then look for \"stroke\" or \"telestroke\" for your site.  ___________________________________________________________________        Imaging/Labs   (personally reviewed)    CT head: No acute finding, no hemorrhage  CTA head/neck: No large vessel occlusion, mild left ICA stenosis  CT Perfusion head: Not applicable          Physical Examination     BP: (!) 158/86   Pulse: 100   Resp: 14   Temp: 98.4  F (36.9  C)   Temp src: Oral   SpO2: 96 %       Weight: 89.8 kg (198 lb)    Wt Readings from Last 2 Encounters:   04/18/25 89.8 kg (198 lb)   04/07/25 88.9 kg (196 lb)       Neuro Exam  Mental Status:  alert, oriented x 3, follows commands, mild dysarthria  Cranial Nerves:  visual fields intact (tested by nurse), EOMI with normal smooth pursuit, facial sensation intact and symmetric (tested by nurse), hearing not formally tested but intact to conversation, no dysarthria, shoulder shrug equal bilaterally, tongue protrusion midline, right facial droop  Motor: Right arm 4/5, drifting noted, otherwise 5/5  Reflexes:  unable to test (telestroke)  Sensory:   Mild to right forearm paresthesia  Coordination: Right arm ataxia  Station/Gait:  unable to test due to telestroke        Stroke Scales       NIHSS  1a. Level of Consciousness 0-->Alert, keenly responsive   1b. LOC Questions 0-->Answers both questions correctly   1c. LOC Commands 0-->Performs both tasks correctly   2.   Best Gaze 0-->Normal   3.   Visual 0-->No visual loss   4.   Facial Palsy 0-->Normal symmetrical movements   5a. Motor Arm, " Left 0-->No drift, limb holds 90 (or 45) degrees for full 10 secs   5b. Motor Arm, Right 1-->Drift, limb holds 90 (or 45) degrees, but drifts down before full 10 secs, does not hit bed or other support   6a. Motor Leg, Left 0-->No drift, leg holds 30 degree position for full 5 secs   6b. Motor Leg, right 0-->No drift, leg holds 30 degree position for full 5 secs   7.   Limb Ataxia 1-->Present in one limb   8.   Sensory 1-->Mild-to-moderate sensory loss, patient feels pinprick is less sharp or is dull on the affected side, or there is a loss of superficial pain with pinprick, but patient is aware of being touched   9.   Best Language 0-->No aphasia, normal   10. Dysarthria 1-->Mild-to-moderate dysarthria, patient slurs at least some words and, at worst, can be understood with some difficulty   11. Extinction and Inattention  0-->No abnormality   Total 4 (04/18/25 2039)            Labs     CBC  Lab Results   Component Value Date    HGB 14.6 04/18/2025    HCT 45.0 04/18/2025    WBC 9.4 04/18/2025     04/18/2025       BMP  Lab Results   Component Value Date     04/18/2025    POTASSIUM 4.4 04/18/2025    CHLORIDE 101 04/18/2025    CO2 26 04/18/2025    BUN 23.5 (H) 04/18/2025    CR 1.24 (H) 04/18/2025     (H) 04/18/2025    JOSE MARIA 10.0 04/18/2025       INR  INR   Date Value Ref Range Status   04/18/2025 0.97 0.85 - 1.15 Final   01/09/2025 0.98 0.85 - 1.15 Final   12/27/2024 1.06 0.85 - 1.15 Final       Data   Stroke Code Data  (for stroke code with tele)  Stroke code activated 04/18/25 2022   First stroke provider response 04/18/25 2024   Video start time 04/18/25 2036   Video end time 04/18/25 2104   Last known normal 04/18/25 2000   Time of discovery (or onset of symptoms)  04/18/25 2000   Head CT read by Stroke Neuro Provider 04/18/25 2040   Was stroke code de-escalated? No           Telestroke Service Details  Type of service telemedicine diagnostic assessment of acute neurological changes  "  Reason telemedicine is appropriate patient requires assessment with a specialist for diagnosis and treatment of neurological symptoms   Mode of transmission secure interactive audio and video communication per Salma   Originating site (patient location) [facility]    Distant site (provider location) Provider remote site        Clinically Significant Risk Factors Present on Admission                 # Drug Induced Platelet Defect: home medication list includes an antiplatelet medication             # Obesity: Estimated body mass index is 31.96 kg/m  as calculated from the following:    Height as of 4/7/25: 1.676 m (5' 6\").    Weight as of this encounter: 89.8 kg (198 lb).         # Financial/Environmental Concerns:           "

## 2025-04-19 NOTE — PROGRESS NOTES
[facility]    Vascular Neurology Progress Note    Interval History     Afshan was seen and examined this AM, she continues to have aphasia with right-sided visual field deficits. We discussed assessment and plan along with imaging findings in depth with patient.     Hospital Course     Chief complaint: Stroke Symptoms    59-year-old female with past medical history significant for AML in remission, ischemic stroke, parotid/parotid cancer s/p radiation and diabetes mellitus presented on 4/18/2025 with acute onset right sided weakness and dysarthria. Last known well 4/18/2025 2000. On admission, /91, GCS 15, CBC unremarkable, CMP showing elevated creatinine, PT INR within normal limits.CT head was negative for any acute hemorrhage. CTA head and neck was negative for any actionable large vessel occlusion but showed 50% stenosis of left ICA. She was evaluated by teleneurology with an initial NIH stroke scale of 4, she was deemed to be candidate for TNK, TNK was administered at 2100. MRI brain revealed interval development of hyperacute hemorrhage in the left parietal occipital region with adjacent edema and several watershed/embolic appearing infarcts involving left frontotemporal region.TNK was reversed with TXA and cryo. She was reevaluated by Tele-Neurology with repeat NIH stroke scale of 5.  Repeat CT head did not reveal any worsening of intracranial hemorrhage    Assessment and Plan     1. Acute ischemic strokes of L hemisphere due to atheroembolic versus ESUS s/p TNK  2  Left parieto-occipital hemorrhage post TNK s/p reversal      59-year-old female with past medical history significant for AML in remission, ischemic stroke, parotid/parotid cancer s/p radiation and diabetes mellitus presented on 4/18/2025 with acute onset right sided weakness and dysarthria. Last known well 4/18/2025 2000. On admission, /91, GCS 15, CBC unremarkable, CMP showing elevated creatinine, PT  "INR within normal limits.CT head was negative for any acute hemorrhage. CTA head and neck was negative for any actionable large vessel occlusion but showed 50% stenosis of left ICA. She was evaluated by teleneurology with an initial NIH stroke scale of 4, she was deemed to be candidate for TNK, TNK was administered at 2100. MRI brain revealed interval development of hyperacute hemorrhage in the left parietal occipital region with adjacent edema and several watershed/embolic appearing infarcts involving left frontotemporal region.TNK was reversed with TXA and cryo. She was reevaluated by teleneurology with repeat NIH stroke scale of 5.  Repeat CT head did not reveal any worsening of intracranial hemorrhage.  Neurosurgery were on board who recommended repeat CT head tomorrow AM, On exam, she continues to have aphasia, right-sided mild sensorimotor deficits and new right VF deficit attributed to the hemorrhage.  Suspect etiology of ischemic strokes are likely atheroembolic from ipsilateral carotid versus ESUS.  She had an unfortunate complication from TNK which was warranted for disabling symptoms.  Will monitor her closely in the ICU and will follow-up on repeat CT head for any interval progression of hemorrhage and act accordingly.      -Plan:  - Use orderset: \"Ischemic Stroke Post-Thrombolytics/Thrombectomy ICU Admission\"  - Neurochecks and vital signs per post-thrombolytic orders and monitor closely for any evidence of CNS hemorrhage, bleeding, or orolingual angioedema  - Goal BP <140  - Neurosurgery on board, appreciate recs  - Hold all antithrombotic and anticoagulant medications for 24 hrs post-thrombolytic  - Hold pharmacologic VTE prophylaxis for 24 hrs post-thrombolytic  - Statin:  Continue PTA Lipitor 40 mg daily, goal of LDL between 40 and 70  - Repeat Head CT 24 hrs post-thrombolytic  - TTE (with Bubble Study if age 60 yrs or less)  - Telemetry, EKG  - Bedside Glucose Monitoring  - A1c, Lipid Panel, " "Troponin x 3  - PT/OT/SLP  - Stroke Education  - Euthermia, Euglycemia    DVT Prophylaxis: hold chemical DVT ppx     Patient Follow-up    - in 4-6 weeks with general neurology or stroke BEV ([phone])    We will continue to follow.       The Stroke Staff is Dr. Nicholson.    Delvis Concepcion MD  Vascular Neurology Fellow    To page me or covering stroke neurology team member, click here: [facility]  Choose \"On Call\" tab at top, then select \"NEUROLOGY/ALL SITES\" from middle drop-down box, press Enter, then look for \"stroke\" or \"telestroke\" for your site.    Physical Examination     Temp: 98.4  F (36.9  C) Temp src: Oral BP: 136/78 Pulse: 104   Resp: 18 SpO2: 96 % O2 Device: None (Room air)      Neurologic  Mental Status:  alert, oriented x 3, follows commands, aphasic  Cranial Nerves:  visual fields right visual field deficit, PERRL, EOMI with normal smooth pursuit, facial sensation intact and symmetric, facial movements symmetric, hearing not formally tested but intact to conversation  Motor:  normal muscle tone and bulk, no abnormal movements, able to move all limbs spontaneously, strength at least antigravity throughout upper and lower extremities  Reflexes:  toes down-going  Sensory: Decreased to light touch in left arm  Coordination:   Mild dysmetria on right  Station/Gait:  deferred    Stroke Scales       NIHSS  1a. Level of Consciousness 0-->Alert, keenly responsive   1b. LOC Questions 0-->Answers both questions correctly   1c. LOC Commands 0-->Performs both tasks correctly   2.   Best Gaze 0-->Normal   3.   Visual 2-->Complete hemianopia   4.   Facial Palsy 1-->Minor paralysis (flattened nasolabial fold, asymmetry on smiling)   5a. Motor Arm, Left 0-->No drift, limb holds 90 (or 45) degrees for full 10 secs   5b. Motor Arm, Right 0-->No drift, limb holds 90 (or 45) degrees for full 10 secs   6a. Motor Leg, Left 0-->No drift, leg holds 30 degree position for full 5 secs   6b. Motor Leg, right 0-->No " drift, leg holds 30 degree position for full 5 secs   7.   Limb Ataxia 1-->Present in one limb   8.   Sensory 0-->Normal, no sensory loss   9.   Best Language 1-->Mild-to-moderate aphasia, some obvious loss of fluency or facility of comprehension, without significant limitation on ideas expressed or form of expression. Reduction of speech and/or comprehension, however, makes conversation. . . (see row details)   10. Dysarthria 0-->Normal   11. Extinction and Inattention  0-->No abnormality   Total 5 (04/19/25 9620)       Imaging/Labs   (Bolded imaging and labs new and/or personally reviewed or re-reviewed by me today)    MRI/Head CT HEAD CT:  1.  No CT evidence for acute intracranial process.  2.  Brain atrophy and presumed chronic microvascular ischemic changes as above.  3.  Left mastoid effusion.    MRI Brain   1.  Interval development of a hyperacute vertebral hemorrhage in the left parieto-occipital region with mild adjacent edema and small adjacent subdural hemorrhage. Mild mass effect without midline shift     2.  Multiple additional small scattered areas of chronic hemosiderin are unchanged     3.  Several new watershed and embolic-type infarcts are seen in the left frontoparietal region. Interval evolutionary change of the previously seen infarcts     4.  Abnormal enhancement within the completely opacified left mastoid air cells. The enhancement is progressive or new since the prior study. Question mild adjacent intracranial enhancement. Temporal bone CT suggested for further evaluation.       Repeat CT head   1.  Similar parenchymal hematoma at the left parietal/occipital junction compared to same day brain MRI. Similar thin subdural collection over the left cerebral convexity, measuring 4 mm in maximum thickness. No significant progressive mass effect.  2.  No new sites of acute intracranial hemorrhage.  3.  Multiple small recent infarcts in the left cerebral hemisphere are better seen on MRI.      Intracranial Vasculature HEAD CTA:  Normal CTA [place] [facility] [place].   Cervical Vasculature NECK CTA:  1.  50% stenosis of the left ICA origin.  2.  Heterogeneous thyroid gland with multiple nodules.     Echocardiogram    EKG/Telemetry Sinus tachycardia   CUS:->      < 50% stenosis of bilateral ICAs  LDL  No lab value available in past 30 days   A1C  4/7/2025: 6.0 %   Troponin 4/18/2025: 8 ng/L     Other labs reviewed by me today:

## 2025-04-19 NOTE — ED NOTES
Patient has been having improvement in  strength in her right hand.  She is also having longer  periods of being able to keep her right arm up before it drifts down. Headache remains 2/10 which is unchanged, she had this prior to administration of TNK. Patient has intermittent brief aphasia but is eventually able to find her words.  MRI checklist filled out.     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

## 2025-04-19 NOTE — H&P
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 She     Right upper extremity ataxia and weakness, slurred speech: Suspect some of her symptoms may actually have been recrudescence of prior stroke as she describes lightheadedness with standing on Ozempic, poor oral intake, blood pressures in the 80s systolic range during recent care visits as an outpatient.  Watershed and embolic infarcts in left frontal parietal region: On MRI noted to have multiple watershed and embolic type infarcts in the left frontoparietal region as well as interval evolutionary change of prior infarcts.  History of left parietal and occipital strokes: December 2024, embolic strokes of undetermined source.  Negative AURA at that time  - Received tenecteplase in the emergency department  - Initial post tenecteplase and every 2 hour neurochecks  -Left temporal bone CT as below  - Aspirin and Plavix on hold 24 hours following tenecteplase  - Follow-up head CT  - MRI ordered and obtained; now found to have post tenecteplase intraparenchymal hemorrhage  - Note outpatient consideration for implantable loop recorder through neurology visit 2/28/2025.  This can be pursued as an outpatient as placement would not be available over the weekend regardless  - Cardiac telemetry  - Consider lumbar puncture. Patient with enhancing left mastoid fluid collection, though felt to represent prior surgery and radiation and eustachian tube dysfunction from radiation she does have chronic headaches, and some slowed speech as well as MRI concerns historically for  cerebritis.  Await neurocritical care evaluation.  Would not be able to perform in the setting of tenecteplase administration at this time, regardless.    Post tenecteplase intraparenchymal hemorrhage: Hyperacute hemorrhage involving left parieto-occipital region as well as adjacent subdural hematoma of 1 to 2 mm.  - Every hour neurochecks  - Systolic blood pressure goal less than 140  - Hydralazine, labetalol as needed for blood pressure greater than 140  - Receiving cryoprecipitate and transaminase acid per stroke neurology  - Stroke critical care following    Symptomatic hypotension: Describes lightheadedness, leaning on a wall when ambulating at home, blood pressures in the 80 systolic range as an outpatient with poor oral intake on Ozempic  - Discontinue Ozempic.  Discussed with patient on admission  - Received 2 L normal saline bolus in the emergency department  - Can trend orthostatic blood pressure and pulse when off of tenecteplase precautions  - A.m. cortisol    Left mastoid effusion and enhancement: Known on prior imaging and stable.  No mastoid tenderness on palpation.  Was seen by ENT during last hospitalization and this was felt to need no anti-infectives, no need for intervention.  Attributed to prior surgical treatment and radiation of the left parotid for prior parotid cancer.  Has been seen by [facility] as well and it is felt that radiation may have damaged her eustachian tube resulting in this fluid collection.  - On MRI, some enhancement within left mastoid air cells was felt to be new and radiology recommended temporal bone CT.  With current acute intracranial hemorrhage following tenecteplase, will await clinical stability prior to pursuing temporal bone CT.     History of AML status post bone marrow transplant: Donor bone marrow transplant from her sister in 1992  Parotid cancer status post radiation and surgical resection on the left    Pancreas cyst, likely IPMN:  - Surveillance MRI in 6  "months would be due in June  - Referred through GI by her primary care team.    Type 2 diabetes: On Ozempic, no longer utilizing insulin.  Well-controlled with hemoglobin A1c of 6 point 0/7/25.  - Discontinue Ozempic.  See above regarding symptomatic hypotension  - Every 4 blood glucose checks with medium dose sliding scale insulin available    Pulmonary nodules: Has followed with pulmonary nodule clinic.  Generally felt not to be concerning with plan for follow-up CT to ensure stability.    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  Okay for time-limited trial of intubation.  Confirmed with patient on admission    Clinically Significant Risk Factors Present on Admission                 # Drug Induced Platelet Defect: home medication list includes an antiplatelet medication             # Obesity: Estimated body mass index is 31.96 kg/m  as calculated from the following:    Height as of 4/7/25: 1.676 m (5' 6\").    Weight as of this encounter: 89.8 kg (198 lb).       # Financial/Environmental Concerns:           Disposition Plan     Medically Ready for Discharge: Anticipated in 2-4 Days           Mk Barnes MD  Hospitalist Service  [facility] [place]  Securely message with [employer] (more info)  Text page via [facility] Paging/Directory     ______________________________________________________________________    Chief Complaint   Right arm ataxia and weakness, dysarthria    History is obtained from the patient, chart review, discussion with Dr. Pichardo in the emergency department.  Discussed with neurosurgery as well as stroke neurology as well following MRI returning with post tenecteplase " hemorrhage.  Discussed with [place] radiology regarding acute hemorrhage findings.    History of Present Illness   Afshan Jimenez is a 59 year old female with a history of AML status post bone marrow transplant in 1992, palate and left parotid cancer status postsurgery and radiation, prior left parietal and occipital embolic stroke December 2024 who presents to the emergency department after developing right arm weakness and dysarthria, speech difficulty at approximately 9 PM while with a friend.  Brought to the emergency department and was found to have a and NIH stroke scale of 5 for some slight facial palsy, ataxia in her right upper extremity, dysarthria, and right arm weakness.  Per nursing staff, she had no  strength when she initially presented to triage.    Stroke neurology was contacted and following CT and CTA, decision was made to administer tenecteplase.    Patient has a history of left parietal and occipital strokes in December 2024.  Completed her Plavix, and remains on aspirin daily as well as a cholesterol pill.  During her hospitalization in December, there was concern for embolic source of her strokes, but despite telemetry, AURA, and brief 3-day Zio patch as an outpatient, no etiology was identified.    She has since followed with outpatient neurology, and there was some discussion regarding an implantable loop recorder to evaluate for atrial fibrillation given concern for embolic strokes.    Patient tells me that she had a history of right arm weakness in [place], diagnosed with a right radial nerve palsy at that time.  Question if she may have had a stroke or TIA at that time.    December 27, 2024 presented with right upper extremity weakness, right lower extremity weakness, and MRI demonstrated acute infarct involving left parietal lobe and nonspecific contrast-enhancement of left occipital cortex suspicious for ischemia.  Again, extensive workup as above for possible embolic sources  including AURA.    On January 9, she was having coffee with her friends when she developed slurred speech as well as right foot ataxia and right upper and lower extremity numbness.  Presented to the emergency department where MRI brain showed evolving subacute infarcts in the left hemisphere.  It was felt that her fluctuating symptoms were from recent stroke and she was to continue on her dual antiplatelet therapy with follow-up.    After church/18/25 she had sudden slurred speech and right arm paresis for which she was brought to the emergency department, tenecteplase administered as above.    Following tenecteplase, patient had some progressive improvement in her right upper extremity strength.  Wrist flexion extension against resistance as well as elbow flexion extension against resistance intact and 5 out of 5 at time of my assessment, as was  strength.  Nursing tells me that she had 0  strength initially.  She does have notable ataxia.  Unable to perform finger-nose-finger or scratch her face without flapping motions of her right hand.  Lower extremity strength intact.    Patient continued to feel improved, though generally stable from time of admission request.  MRI was performed and this unfortunately demonstrated intraparenchymal hemorrhage as well as subdural occipital hematoma in the setting of tenecteplase.  Received cryoprecipitate and tranexamic acid per stroke neurology recommendations.  Neurosurgery also consulted.  Blood pressure goals to less than 140.    Assessed patient after MRI results returned.  She reported no headache.  She has had an on and off headache in the past, this is actually more longstanding and potentially attributed to left mastoid fluid collection.  No mastoid tenderness on palpation.  Per nursing, she apparently developed some right field cut symptoms which have been on and off and reported at time of MRI.  Despite hemorrhage, patient actually feels that she has  improved.    Patient did not have any fevers or chills.    Noting patient is on Ozempic and seeing that she has had blood pressures in the 80 systolic range during recent clinic visits, discussed orthostasis and similar symptoms.  Patient tells me that she has been ambulating at home and feeling lightheaded when she stands.  She will often put her hand and hold onto the wall to keep from falling because of her lightheadedness.  She recognizes that she probably does not drink enough fluid or eat enough.  Has lost 50 pounds on Ozempic.  No nausea or vomiting.  She actually desires to get off of his Ozempic, and has mentioned this to her care team.  Her provider at [facility] recommended she remain on for 2 years to minimize weight gain after discontinuing.  Ozempic has helped her diabetes.  Is no longer requiring insulin.  Last hemoglobin A1c 6.0.    With patient's recurrent symptoms as well as intermittent hypotension documented in chart as well as history of orthostasis, suspect that perhaps patient is having recrudescence of her prior stroke symptoms.  Discussed this with patient on admission.  Administered 2 L normal saline in the emergency department.  She remains tachycardic in the 110 range.        Past Medical History    Past Medical History:   Diagnosis Date    Deafness     left ear    Diabetes mellitus (H)     Malignant neoplasm (H)     leukemia    Malignant neoplasm of parotid gland (H)        Past Surgical History   Past Surgical History:   Procedure Laterality Date    LAPAROSCOPIC CHOLECYSTECTOMY  10/24/2011    Procedure:LAPAROSCOPIC CHOLECYSTECTOMY; Laparoscopic Cholecystectomy; Surgeon:TIAGO MALDONADO; Location:[facility] OR    SALIVARY GLAND SURGERY      L parotid    SINUS SURGERY      tumor removed from roof of mouth         Prior to Admission Medications   Prior to Admission Medications   Prescriptions Last Dose Informant Patient Reported? Taking?   Semaglutide, 1 MG/DOSE, (OZEMPIC, 1 MG/DOSE,) 4  MG/3ML pen   No No   Sig: Inject 1 mg subcutaneously once a week. On Mondays   aspirin 81 MG EC tablet   No No   Sig: Take 1 tablet (81 mg) by mouth daily.   atorvastatin (LIPITOR) 40 MG tablet   No No   Sig: Take 1 tablet (40 mg) by mouth every evening.   indomethacin (INDOCIN) 50 MG capsule   No No   Sig: Take 1 capsule (50 mg) by mouth 3 times daily as needed for moderate pain (Gout).      Facility-Administered Medications: None           Physical Exam   Vital Signs: Temp: 98.4  F (36.9  C) Temp src: Oral BP: (!) 158/86 Pulse: 100   Resp: 14 SpO2: 96 %      Weight: 198 lbs 0 oz    General Appearance: Comfortable appearing 59-year-old female.  Eyes: Extraocular motions intact.  No scleral icterus or injection  HEENT: Normocephalic and atraumatic.  With forced smile no facial droop appreciated, but some subtle right facial droop with conversation.  Respiratory: Breath sounds clear bilaterally to auscultation without wheezes or crackles.  Cardiovascular: Heart rate of 105.  Regular rhythm.  Lymph/Hematologic: No significant lower extremity edema  Musculoskeletal: Muscular tone and bulk intact in all extremities and appropriate for age.  Neurologic: Alert and conversant.  No slurred speech, but rate of speech is slower than might be her baseline.  She recognizes this as well.  Does not appear to have any word finding difficulty.   strength intact and equal bilaterally.  5/5 strength in hip flexion, dorsi and plantarflexion, wrist flexion extension, elbow flexion extension bilaterally.  Sensation to light touch is intact bilaterally on the face.  Tongue midline.  Very mild right facial droop is noted in conversation, but not with forced smile.  Patient reports decreased sensation to light touch over her right shoulder.  Shoulder shrug intact and equal.  May have some receptive aphasia.  She struggles a bit with assessing pronator drift in terms of how she should position each of her hands.  Significant ataxia of  right upper extremity with finger-nose-finger and even attempting to itch her face.  No difficulty on the left.  Psychiatric: Pleasant, normal affect    Medical Decision Making       90 MINUTES SPENT BY ME on the date of service doing chart review, history, exam, documentation & further activities per the note.      Data     I have personally reviewed the following data over the past 24 hrs:    9.4  \   14.6   / 179     140 101 23.5 (H) /  121 (H)   4.4 26 1.24 (H) \     Trop: 8 BNP: N/A     INR:  0.97 PTT:  27   D-dimer:  N/A Fibrinogen:  N/A       Imaging results reviewed over the past 24 hrs:   Recent Results (from the past 24 hours)   CT Head w/o Contrast    Narrative    EXAM: CTA HEAD NECK W CONTRAST, CT HEAD W/O CONTRAST  LOCATION: [facility]  DATE: 4/18/2025    INDICATION: Code Stroke, evaluate for LVO. PLEASE READ IMMEDIATELY. Right arm weakness and facial droop.  COMPARISON: MRI brain 1/31/2025, CTA head and neck 12/27/2024  CONTRAST: 67mL Isovue 370  TECHNIQUE: Head and neck CT angiogram with IV contrast. Noncontrast head CT followed by axial helical CT images of the head and neck vessels obtained during the arterial phase of intravenous contrast administration. Axial 2D reconstructed images and   multiplanar 3D MIP reconstructed images of the head and neck vessels were performed by the technologist. Dose reduction techniques were used. All stenosis measurements made according to NASCET criteria unless otherwise specified.    FINDINGS:   NONCONTRAST HEAD CT:   INTRACRANIAL CONTENTS: No intracranial hemorrhage, extraaxial collection, or mass effect.  No CT evidence of acute infarct. Mild presumed chronic small vessel ischemic changes. Mild generalized volume loss. No hydrocephalus.     VISUALIZED ORBITS/SINUSES/MASTOIDS: No intraorbital abnormality. No paranasal sinus mucosal disease. Complete/near complete opacification of the left mastoid air cells. No apparent mass in the  posterior nasopharynx or skull base.    BONES/SOFT TISSUES: No acute abnormality.    HEAD CTA:  ANTERIOR CIRCULATION: No stenosis/occlusion, aneurysm, or high flow vascular malformation. Standard [place] anatomy.    POSTERIOR CIRCULATION: No stenosis/occlusion, aneurysm, or high flow vascular malformation. Dominant left and smaller right vertebral artery contribute to a normal basilar artery.     DURAL VENOUS SINUSES: Expected enhancement of the major dural venous sinuses.    NECK CTA:  RIGHT CAROTID: No measurable stenosis or dissection.    LEFT CAROTID: Atherosclerotic plaque results in 50% stenosis in the left ICA. No dissection.    VERTEBRAL ARTERIES: No focal stenosis or dissection. Dominant left and smaller right vertebral arteries.    AORTIC ARCH: Classic aortic arch anatomy with no significant stenosis at the origin of the great vessels.    NONVASCULAR STRUCTURES: Heterogeneous thyroid gland with multiple scattered nodules, largest 1.7 cm on the left      Impression    IMPRESSION:   HEAD CT:  1.  No CT evidence for acute intracranial process.  2.  Brain atrophy and presumed chronic microvascular ischemic changes as above.  3.  Left mastoid effusion.    HEAD CTA:  Normal CTA [place].    NECK CTA:  1.  50% stenosis of the left ICA origin.  2.  Heterogeneous thyroid gland with multiple nodules.    3.  Dr. Dow discussed the above findings by telephone with Dr. Pichardo at 8:53 PM central time 4/18/2025.     CTA Head Neck with Contrast    Narrative    EXAM: CTA HEAD NECK W CONTRAST, CT HEAD W/O CONTRAST  LOCATION: [facility]  DATE: 4/18/2025    INDICATION: Code Stroke, evaluate for LVO. PLEASE READ IMMEDIATELY. Right arm weakness and facial droop.  COMPARISON: MRI brain 1/31/2025, CTA head and neck 12/27/2024  CONTRAST: 67mL Isovue 370  TECHNIQUE: Head and neck CT angiogram with IV contrast. Noncontrast head CT followed by axial helical CT images of the head and neck  vessels obtained during the arterial phase of intravenous contrast administration. Axial 2D reconstructed images and   multiplanar 3D MIP reconstructed images of the head and neck vessels were performed by the technologist. Dose reduction techniques were used. All stenosis measurements made according to NASCET criteria unless otherwise specified.    FINDINGS:   NONCONTRAST HEAD CT:   INTRACRANIAL CONTENTS: No intracranial hemorrhage, extraaxial collection, or mass effect.  No CT evidence of acute infarct. Mild presumed chronic small vessel ischemic changes. Mild generalized volume loss. No hydrocephalus.     VISUALIZED ORBITS/SINUSES/MASTOIDS: No intraorbital abnormality. No paranasal sinus mucosal disease. Complete/near complete opacification of the left mastoid air cells. No apparent mass in the posterior nasopharynx or skull base.    BONES/SOFT TISSUES: No acute abnormality.    HEAD CTA:  ANTERIOR CIRCULATION: No stenosis/occlusion, aneurysm, or high flow vascular malformation. Standard [place] anatomy.    POSTERIOR CIRCULATION: No stenosis/occlusion, aneurysm, or high flow vascular malformation. Dominant left and smaller right vertebral artery contribute to a normal basilar artery.     DURAL VENOUS SINUSES: Expected enhancement of the major dural venous sinuses.    NECK CTA:  RIGHT CAROTID: No measurable stenosis or dissection.    LEFT CAROTID: Atherosclerotic plaque results in 50% stenosis in the left ICA. No dissection.    VERTEBRAL ARTERIES: No focal stenosis or dissection. Dominant left and smaller right vertebral arteries.    AORTIC ARCH: Classic aortic arch anatomy with no significant stenosis at the origin of the great vessels.    NONVASCULAR STRUCTURES: Heterogeneous thyroid gland with multiple scattered nodules, largest 1.7 cm on the left      Impression    IMPRESSION:   HEAD CT:  1.  No CT evidence for acute intracranial process.  2.  Brain atrophy and presumed chronic microvascular ischemic  changes as above.  3.  Left mastoid effusion.    HEAD CTA:  Normal CTA [place].    NECK CTA:  1.  50% stenosis of the left ICA origin.  2.  Heterogeneous thyroid gland with multiple nodules.    3.  Dr. Dow discussed the above findings by telephone with Dr. Pichardo at 8:53 PM central time 4/18/2025.

## 2025-04-19 NOTE — ED NOTES
Patient was a difficult IV stick, IV US was eventually able to be obtained, and brought to CT at 2030

## 2025-04-20 ENCOUNTER — APPOINTMENT (OUTPATIENT)
Dept: CT IMAGING | Facility: CLINIC | Age: 60
End: 2025-04-20
Attending: PSYCHIATRY & NEUROLOGY
Payer: COMMERCIAL

## 2025-04-20 ENCOUNTER — ORDERS ONLY (AUTO-RELEASED) (OUTPATIENT)
Dept: MEDSURG UNIT | Facility: CLINIC | Age: 60
End: 2025-04-20
Payer: COMMERCIAL

## 2025-04-20 ENCOUNTER — APPOINTMENT (OUTPATIENT)
Dept: OCCUPATIONAL THERAPY | Facility: CLINIC | Age: 60
End: 2025-04-20
Attending: INTERNAL MEDICINE
Payer: COMMERCIAL

## 2025-04-20 DIAGNOSIS — I63.9 ACUTE CVA (CEREBROVASCULAR ACCIDENT) (H): ICD-10-CM

## 2025-04-20 LAB
ANION GAP SERPL CALCULATED.3IONS-SCNC: 8 MMOL/L (ref 7–15)
BASOPHILS # BLD AUTO: 0 10E3/UL (ref 0–0.2)
BASOPHILS NFR BLD AUTO: 0 %
BUN SERPL-MCNC: 11.2 MG/DL (ref 8–23)
CALCIUM SERPL-MCNC: 9.3 MG/DL (ref 8.8–10.4)
CHLORIDE SERPL-SCNC: 104 MMOL/L (ref 98–107)
CREAT SERPL-MCNC: 0.82 MG/DL (ref 0.51–0.95)
EGFRCR SERPLBLD CKD-EPI 2021: 81 ML/MIN/1.73M2
EOSINOPHIL # BLD AUTO: 0.3 10E3/UL (ref 0–0.7)
EOSINOPHIL NFR BLD AUTO: 4 %
ERYTHROCYTE [DISTWIDTH] IN BLOOD BY AUTOMATED COUNT: 13.1 % (ref 10–15)
GLUCOSE BLDC GLUCOMTR-MCNC: 114 MG/DL (ref 70–99)
GLUCOSE BLDC GLUCOMTR-MCNC: 116 MG/DL (ref 70–99)
GLUCOSE BLDC GLUCOMTR-MCNC: 139 MG/DL (ref 70–99)
GLUCOSE BLDC GLUCOMTR-MCNC: 141 MG/DL (ref 70–99)
GLUCOSE SERPL-MCNC: 123 MG/DL (ref 70–99)
HCO3 SERPL-SCNC: 26 MMOL/L (ref 22–29)
HCT VFR BLD AUTO: 36.6 % (ref 35–47)
HGB BLD-MCNC: 11.9 G/DL (ref 11.7–15.7)
IMM GRANULOCYTES # BLD: 0 10E3/UL
IMM GRANULOCYTES NFR BLD: 0 %
LYMPHOCYTES # BLD AUTO: 2.2 10E3/UL (ref 0.8–5.3)
LYMPHOCYTES NFR BLD AUTO: 34 %
MCH RBC QN AUTO: 28.7 PG (ref 26.5–33)
MCHC RBC AUTO-ENTMCNC: 32.5 G/DL (ref 31.5–36.5)
MCV RBC AUTO: 88 FL (ref 78–100)
MONOCYTES # BLD AUTO: 0.6 10E3/UL (ref 0–1.3)
MONOCYTES NFR BLD AUTO: 8 %
NEUTROPHILS # BLD AUTO: 3.5 10E3/UL (ref 1.6–8.3)
NEUTROPHILS NFR BLD AUTO: 54 %
NRBC # BLD AUTO: 0 10E3/UL
NRBC BLD AUTO-RTO: 0 /100
PLATELET # BLD AUTO: 158 10E3/UL (ref 150–450)
POTASSIUM SERPL-SCNC: 4.1 MMOL/L (ref 3.4–5.3)
RBC # BLD AUTO: 4.15 10E6/UL (ref 3.8–5.2)
SODIUM SERPL-SCNC: 138 MMOL/L (ref 135–145)
WBC # BLD AUTO: 6.5 10E3/UL (ref 4–11)

## 2025-04-20 PROCEDURE — 97165 OT EVAL LOW COMPLEX 30 MIN: CPT | Mod: GO

## 2025-04-20 PROCEDURE — 250N000013 HC RX MED GY IP 250 OP 250 PS 637: Performed by: INTERNAL MEDICINE

## 2025-04-20 PROCEDURE — 99232 SBSQ HOSP IP/OBS MODERATE 35: CPT | Performed by: HOSPITALIST

## 2025-04-20 PROCEDURE — 97535 SELF CARE MNGMENT TRAINING: CPT | Mod: GO

## 2025-04-20 PROCEDURE — 85025 COMPLETE CBC W/AUTO DIFF WBC: CPT | Performed by: HOSPITALIST

## 2025-04-20 PROCEDURE — 80048 BASIC METABOLIC PNL TOTAL CA: CPT | Performed by: HOSPITALIST

## 2025-04-20 PROCEDURE — 120N000013 HC R&B IMCU

## 2025-04-20 PROCEDURE — 99233 SBSQ HOSP IP/OBS HIGH 50: CPT | Mod: GC | Performed by: STUDENT IN AN ORGANIZED HEALTH CARE EDUCATION/TRAINING PROGRAM

## 2025-04-20 PROCEDURE — 70450 CT HEAD/BRAIN W/O DYE: CPT

## 2025-04-20 PROCEDURE — 36415 COLL VENOUS BLD VENIPUNCTURE: CPT | Performed by: HOSPITALIST

## 2025-04-20 RX ADMIN — ATORVASTATIN CALCIUM 40 MG: 40 TABLET, FILM COATED ORAL at 19:59

## 2025-04-20 ASSESSMENT — ACTIVITIES OF DAILY LIVING (ADL)
ADLS_ACUITY_SCORE: 41
ADLS_ACUITY_SCORE: 42
ADLS_ACUITY_SCORE: 48
ADLS_ACUITY_SCORE: 41
ADLS_ACUITY_SCORE: 41
DEPENDENT_IADLS:: INDEPENDENT
ADLS_ACUITY_SCORE: 41
ADLS_ACUITY_SCORE: 42
PREVIOUS_RESPONSIBILITIES: MEAL PREP;HOUSEKEEPING;LAUNDRY;SHOPPING;MEDICATION MANAGEMENT;FINANCES;DRIVING;WORK
ADLS_ACUITY_SCORE: 41
ADLS_ACUITY_SCORE: 42
ADLS_ACUITY_SCORE: 46
ADLS_ACUITY_SCORE: 48
ADLS_ACUITY_SCORE: 41
ADLS_ACUITY_SCORE: 42
ADLS_ACUITY_SCORE: 41
ADLS_ACUITY_SCORE: 42
ADLS_ACUITY_SCORE: 41
ADLS_ACUITY_SCORE: 48
ADLS_ACUITY_SCORE: 42
ADLS_ACUITY_SCORE: 41
ADLS_ACUITY_SCORE: 48
ADLS_ACUITY_SCORE: 42

## 2025-04-20 NOTE — PROGRESS NOTES
[facility]    Neurosurgery  Daily Note    Assessment & Plan   59 year old female o n ASA 81mg past medical history significant for AML in remission, ischemic stroke, parotid/parotid cancer s/p radiation and diabetes mellitus presented on 4/18/2025 with acute onset right sided weakness and dysarthria. NSGY consulted for hemorrhagic conversion. Last well known 4/18/25 2000. CT head was negative for any acute hemorrhage. CTA head and neck was negative for any actionable large vessel occlusion but showed 50% stenosis of left ICA. She was evaluated by teleneurology with an initial NIH stroke scale of 4, she was deemed to be candidate for TNK, TNK was administered at 2100.  MRI brain revealed interval development of hyperacute hemorrhage in the left parietal occipital region with adjacent edema and several watershed/embolic appearing infarcts involving left frontotemporal region.TNK was reversed with TXA and cryo. Per ED provider, patient has had improvement in exam following TNK.     24 hour events: 24 hour post TNK head CT stable.    AM Rounds: Denies headache, nausea/vomiting, vision changes. Exam improving. Discussed repeat head CT stable, discussed observation for peak swelling 3-5 days, no surgery indicated at this time.     Imaging:  Imaging Interpretation provided by radiologist.   EXAM: CT HEAD W/O CONTRAST  LOCATION: [facility]  DATE: 4/19/2025     INDICATION: Acute Ischemic Stroke  COMPARISON: CT head 4/19/2025, MRI brain 4/19/2025  TECHNIQUE: Routine CT Head without IV contrast. Multiplanar reformats. Dose reduction techniques were used.     FINDINGS:  INTRACRANIAL CONTENTS:   Left occipital lobe acute hematoma currently measuring 4.1 cm AP by 2.7 cm TR dimension similar in size compared to CT head 4/19/2025. Mild surrounding edema.      Left cerebral convexity thin acute subdural hematoma measuring 2 mm in thickness similar in size compared to prior  examination.     No new or significantly progressive acute intracranial hemorrhages.      Mild right midline shift measuring 2 mm similar compared to prior examination.     MRI brain demonstrates multiple small acute infarcts within the left cerebral hemisphere not visualized on current examination.     No CT evidence for nonhemorrhagic acute infarct.     Mild presumed chronic small vessel ischemic changes. Normal ventricles and sulci.      VISUALIZED ORBITS/SINUSES/MASTOIDS: No intraorbital abnormality.  Left posterior sphenoid sinus mild mucosal thickening versus small air-fluid level. Remaining visualized paranasal sinuses essentially clear. Right mastoid air cells essentially clear.   Left mastoid air cells completely opacified.     BONES/SOFT TISSUES: No acute abnormality. Bilateral TMJ degenerative joint disease. Left parotidectomy versus fatty atrophy.                                                           IMPRESSION:  1.  No significant interval change compared to CT head 4/19/2025.    Labs 4/20/25:  Sodium 138  Hgb 11.9      Plan:  - No urgent/emergent neurosurgical intervention  - NSGY will continue to follow for Hemicraniectomy watch (peak swelling 3-5 days)  - Stroke Neurology involved  -HOB>30   -SBP<140  -Hold all anticoagulation  -Hold sedation medications to maintain acurate exam  -Okay for Q2 neuro checks from NSGY, defer to stroke/defer transfer recs to stroke    Plans discussed with Dr. Michaud, ICU nursing staff .      Please contact on-call neurosurgery BEV via [facility] for questions, concerns, changes in neuroexam.     Marjan Serra PA-C  [facility] Neurosurgery  [address] [place] [address]  [address] [place] [id number]      Physical Exam   Temp: 98.1  F (36.7  C) Temp src: Axillary BP: 131/82 Pulse: 95   Resp: 16 SpO2: 95 % O2 Device: None (Room air) Oxygen Delivery: 2 LPM  Vitals:    04/18/25 2028 04/19/25 0720 04/20/25 0500   Weight: 89.8 kg (198 lb) 92 kg (202 lb 13.2 oz) 92.7 kg  (204 lb 5.9 oz)     Vital Signs with Ranges  Temp:  [97.3  F (36.3  C)-98.1  F (36.7  C)] 98.1  F (36.7  C)  Pulse:  [] 95  Resp:  [12-32] 16  BP: ()/() 131/82  SpO2:  [86 %-98 %] 95 %  I/O last 3 completed shifts:  In: 500 [P.O.:200]  Out: -     Sitting up in ICU chair  Awake, alert, and appropriate. NAD  Oriented to self, location, time   Face symmetric  Speech clear   Cranial nerves II-XII intact   Pupils equal  Right vision cuts  Moves all extremities equally   Improving but decreased sensation in RUE  Right pronator drift    Medications   Current Facility-Administered Medications   Medication Dose Route Frequency Provider Last Rate Last Admin    Medication Instruction - Avoid dextrose in IV solutions   Intravenous Continuous PRN Barnes, Mk Bell MD        niCARdipine 40 mg in 200 mL NS (CARDENE) infusion  0.5-15 mg/hr Intravenous Continuous Barnes, Mk Bell MD   Held at 04/19/25 1431    sodium chloride 0.9 % infusion   Intravenous Continuous PRN Barnes, Mk Bell MD          Current Facility-Administered Medications   Medication Dose Route Frequency Provider Last Rate Last Admin    atorvastatin (LIPITOR) tablet 40 mg  40 mg Oral QPM Barnes, Mk Bell MD   40 mg at 04/19/25 2017    insulin aspart (NovoLOG) injection (RAPID ACTING)  1-7 Units Subcutaneous Q4H Cameron, Mk Bell MD        sodium chloride (PF) 0.9% PF flush 3 mL  3 mL Intracatheter Q8H [facility] Cameron, Mk Bell MD   3 mL at 04/19/25 1436

## 2025-04-20 NOTE — PLAN OF CARE
Goal Outcome Evaluation:      Plan of Care Reviewed With: patient      Outcome Evaluation: Care Management will continue to follow ARU coordination and discharge   planning.

## 2025-04-20 NOTE — PLAN OF CARE
Neuro: A&Ox4. PERRLA. Able to follow commands, CMS intact. DEFICITS: R visual cut/neglect, intermittent R pronator drift. NIHSS at 2100 (post TNK 24 hrs) was 6. At times, can have difficult time with finding words. Anxious at times   Pain/CPOT: denies  CV: ST. SBP remains <140  Resp: RA. Intermittent use of 2L NC as pt spO2 was in the 80's  GI: No BM on shift. +BS  : Up with SBA to bedside bathroom   Diet: Easy chew/small bites and thin liquids  Skin: intact. No skin issues. R bruise inner thigh    Lines/tubes/drains: x2 PIV  Central Lines: none  Perez: no  GTTS/Infusions: none    PRNs: labetalol   Labs: BG in range  Imaging: CT    Major Shift Events:    - Repeat CT at 2000 stable. Discontinued 0600 CT    Plan: Continue with plan of care    For vital signs and complete assessments, please see documentation under flowsheets.    Goal Outcome Evaluation:      Plan of Care Reviewed With: patient    Overall Patient Progress: no changeOverall Patient Progress: no change    Outcome Evaluation: Stable CT at 2000. See note
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
## 2025-04-20 NOTE — PROVIDER NOTIFICATION
MD Notification    Notified Person: MD    Notified Person Name: Carlene    Notification Date/Time: 4/20, 1300    Notification Interaction: text     Purpose of Notification: neuro changes -  slurred speech, slight facial droop, increased weakness on R side.    Orders Received: STAT CT    Comments:

## 2025-04-20 NOTE — PROGRESS NOTES
[facility]    Vascular Neurology Progress Note    Interval History     Afshan was seen and examined this AM, repeat CT head stable, neuroexam stable. , Hgb 11.9.     BP: 130/80 Systolic (24hrs), Av , Min:98 , Max:171  Diastolic (24hrs), Av, Min:71, Max:103      Hospital Course     Chief complaint: Stroke Symptoms    59-year-old female with past medical history significant for AML in remission, ischemic stroke, parotid/parotid cancer s/p radiation and diabetes mellitus presented on 2025 with acute onset right sided weakness and dysarthria. Last known well 2025. On admission, /91, GCS 15, CBC unremarkable, CMP showing elevated creatinine, PT INR within normal limits.CT head was negative for any acute hemorrhage. CTA head and neck was negative for any actionable large vessel occlusion but showed 50% stenosis of left ICA. She was evaluated by teleneurology with an initial NIH stroke scale of 4, she was deemed to be candidate for TNK, TNK was administered at 2100. MRI brain revealed interval development of hyperacute hemorrhage in the left parietal occipital region with adjacent edema and several watershed/embolic appearing infarcts involving left frontotemporal region.TNK was reversed with TXA and cryo. She was reevaluated by Tele-Neurology with repeat NIH stroke scale of 5.  Repeat CT head did not reveal any worsening of intracranial hemorrhage.     Assessment and Plan     1. Acute ischemic strokes of L hemisphere due to atheroembolic versus ESUS s/p TNK  2  Left parieto-occipital hemorrhage post TNK s/p reversal  3  Probable CAA      59-year-old female with past medical history significant for AML in remission, ischemic stroke, parotid/parotid cancer s/p radiation and diabetes mellitus presented on 2025 with acute onset right sided weakness and dysarthria. Last known well 2025. On admission, /91, GCS 15, CBC unremarkable, CMP  showing elevated creatinine, PT INR within normal limits.CT head was negative for any acute hemorrhage. CTA head and neck was negative for any actionable large vessel occlusion but showed 50% stenosis of left ICA. She was evaluated by teleneurology with an initial NIH stroke scale of 4, she was deemed to be candidate for TNK, TNK was administered at 2100. MRI brain revealed interval development of hyperacute hemorrhage in the left parietal occipital region with adjacent edema and several watershed/embolic appearing infarcts involving left frontotemporal region.TNK was reversed with TXA and cryo. She was reevaluated by teleneurology with repeat NIH stroke scale of 5.  Repeat CT head did not reveal any worsening of intracranial hemorrhage.  Neurosurgery were on board who recommended repeat CT head tomorrow AM, On exam, she continues to have aphasia, right-sided mild sensorimotor deficits and new right VF deficit attributed to the hemorrhage.  Suspect etiology of ischemic strokes are likely atheroembolic from ipsilateral carotid versus ESUS.  She had an unfortunate complication from TNK which was warranted for disabling symptoms.  She has remained clinically/radiographically stable, will get a repeat MRI brain in 3 months to look for any underlying mass.  On evaluation of prior MRIs, suspect she also has an underlying diagnosis of probable CAA which could have also contributed by increasing her hemorrhagic risk, would recommend holding off antithrombotics for atleast 3 to 4 weeks and reevaluate reinitiation of antiplatelet at that point, will also send her with a follow-up with vascular surgery to follow-up on her left carotid atherosclerotic disease which could have played a contributory role for her ischemic strokes.  We discussed with neuro IR to see if she needs any invasive imaging to look for any vascular pathology who felt it was not warranted at this juncture.  Neurosurgery are also on board, and are monitoring  "for any worsening malcolm-hematomal edema in anticipation of any surgical needs.       -Plan:  - SBP goal <140  - Neurosurgery on board, appreciate recs  - Okay to downgrade to floor with every 2 Neuro-checks  - Statin:  Continue PTA Lipitor 40 mg daily, goal of LDL between 40 and 70  - TTE (with Bubble Study if age 60 yrs or less)  - Telemetry, EKG  - Bedside Glucose Monitoring  - A1c, Lipid Panel, Troponin x 3  - PT/OT/SLP  - Stroke Education  - Euthermia, Euglycemia    DVT Prophylaxis: hold chemical DVT ppx     Patient Follow-up    - in 4-6 weeks with general neurology or stroke BEV ([phone])  - Repeat MRI brain in 3 months  - Follow-up with vascular surgery as outpatient  - Zio patch on discharge    We will continue to follow.       The Stroke Staff is Dr. Nicholson.    Delvis Concepcion MD  Vascular Neurology Fellow    To page me or covering stroke neurology team member, click here: AMCOM  Choose \"On Call\" tab at top, then select \"NEUROLOGY/ALL SITES\" from middle drop-down box, press Enter, then look for \"stroke\" or \"telestroke\" for your site.    Physical Examination     Temp: 98  F (36.7  C) Temp src: Axillary BP: 130/80 Pulse: 98   Resp: 15 SpO2: 96 % O2 Device: None (Room air) Oxygen Delivery: 2 LPM    Neurologic  Mental Status:  alert, oriented x 3, follows commands, aphasic  Cranial Nerves:  visual fields right visual field deficit, PERRL, EOMI with normal smooth pursuit, facial sensation intact and symmetric, facial movements symmetric, hearing not formally tested but intact to conversation  Motor:  normal muscle tone and bulk, no abnormal movements, able to move all limbs spontaneously, strength at least antigravity throughout upper and lower extremities  Reflexes:  toes down-going  Sensory: Decreased to light touch in right arm  Coordination:   Mild dysmetria on right  Station/Gait:  deferred    Stroke Scales       NIHSS  1a. Level of Consciousness 0-->Alert, keenly responsive   1b. LOC " Questions 0-->Answers both questions correctly   1c. LOC Commands 0-->Performs both tasks correctly   2.   Best Gaze 0-->Normal   3.   Visual 2-->Complete hemianopia   4.   Facial Palsy 1-->Minor paralysis (flattened nasolabial fold, asymmetry on smiling)   5a. Motor Arm, Left 0-->No drift, limb holds 90 (or 45) degrees for full 10 secs   5b. Motor Arm, Right 0-->No drift, limb holds 90 (or 45) degrees for full 10 secs   6a. Motor Leg, Left 0-->No drift, leg holds 30 degree position for full 5 secs   6b. Motor Leg, right 0-->No drift, leg holds 30 degree position for full 5 secs   7.   Limb Ataxia 1-->Present in one limb   8.   Sensory 1   9.   Best Language 1-->Mild-to-moderate aphasia, some obvious loss of fluency or facility of comprehension, without significant limitation on ideas expressed or form of expression. Reduction of speech and/or comprehension, however, makes conversation. . . (see row details)   10. Dysarthria 0-->Normal   11. Extinction and Inattention  0-->No abnormality   Total 6       Imaging/Labs   (Bolded imaging and labs new and/or personally reviewed or re-reviewed by me today)    MRI/Head CT HEAD CT:  1.  No CT evidence for acute intracranial process.  2.  Brain atrophy and presumed chronic microvascular ischemic changes as above.  3.  Left mastoid effusion.    MRI Brain   1.  Interval development of a hyperacute vertebral hemorrhage in the left parieto-occipital region with mild adjacent edema and small adjacent subdural hemorrhage. Mild mass effect without midline shift     2.  Multiple additional small scattered areas of chronic hemosiderin are unchanged     3.  Several new watershed and embolic-type infarcts are seen in the left frontoparietal region. Interval evolutionary change of the previously seen infarcts     4.  Abnormal enhancement within the completely opacified left mastoid air cells. The enhancement is progressive or new since the prior study. Question mild adjacent  intracranial enhancement. Temporal bone CT suggested for further evaluation.       Repeat CT head   1.  Similar parenchymal hematoma at the left parietal/occipital junction compared to same day brain MRI. Similar thin subdural collection over the left cerebral convexity, measuring 4 mm in maximum thickness. No significant progressive mass effect.  2.  No new sites of acute intracranial hemorrhage.  3.  Multiple small recent infarcts in the left cerebral hemisphere are better seen on MRI.    CT head 4/19  1.  No significant interval change compared to CT head 4/19/2025.       Intracranial Vasculature HEAD CTA:  Normal CTA [place].   Cervical Vasculature NECK CTA:  1.  50% stenosis of the left ICA origin.  2.  Heterogeneous thyroid gland with multiple nodules.     Echocardiogram NA   EKG/Telemetry Sinus tachycardia   CUS:->      < 50% stenosis of bilateral ICAs  LDL  4/19/2025: 47 mg/dL   A1C  4/7/2025: 6.0 %   Troponin 4/18/2025: 8 ng/L     Other labs reviewed by me today:
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
## 2025-04-20 NOTE — INTERIM SUMMARY
I was paged about transient episode of worsening R hemiparesis and dysarthria after working with therapies       Repeat CT head did not reveal any acute interval changes of hemorrhage, slight interval progression of malcolm-hematomal edema.      National Institutes of Health Stroke Scale  Exam Interval:    Score    Level of consciousness: (0)   Alert, keenly responsive    LOC questions: (0)   Answers both questions correctly    LOC commands: (0)   Performs both tasks correctly    Best gaze: (0)   Normal    Visual: (2)   Complete hemianopia    Facial palsy: (1)   Minor paralysis (flat nasolabial fold, smile asymmetry)    Motor arm (left): (0)   No drift    Motor arm (right): (0)   No drift    Motor leg (left): (0)   No drift    Motor leg (right): (0)   No drift    Limb ataxia: (1)   Present in one limb    Sensory: (1)   Mild to moderate sensory loss    Best language: (1)   Mild to moderate aphasia    Dysarthria: (0)   Normal    Extinction and inattention: (0)   No abnormality        Total Score:  6          Delvis Concepcion  Stroke Fellow    1.82

## 2025-04-20 NOTE — PROGRESS NOTES
04/20/25 1220   Appointment Info   Signing Clinician's Name / Credentials (OT) Veena Diaz, MS, OTR/L   Living Environment   People in Home alone   Current Living Arrangements condominium   Self-Care   Usual Activity Tolerance good   Current Activity Tolerance fair   Equipment Currently Used at Home grab bar, toilet;grab bar, tub/shower  (Step in shower. comfort height toilet)   Fall history within last six months no   Activity/Exercise/Self-Care Comment Pt reports at baseline is independent in selfcares   Instrumental Activities of Daily Living (IADL)   Previous Responsibilities meal prep;housekeeping;laundry;shopping;medication management;finances;driving;work   IADL Comments Works full-time as    General Information   Onset of Illness/Injury or Date of Surgery 04/19/25   Referring Physician Barnes, Mk Bell MD   Patient/Family Therapy Goal Statement (OT) Improve independence   Additional Occupational Profile Info/Pertinent History of Current Problem 59 year old female o n ASA 81mg past medical history significant for AML in remission, ischemic stroke, parotid/parotid cancer s/p radiation and diabetes mellitus presented on 4/18/2025 with acute onset right sided weakness and dysarthria. NSGY consulted for hemorrhagic conversion. Last well known 4/18/25 2000. CT head was negative for any acute hemorrhage. CTA head and neck was negative for any actionable large vessel occlusion but showed 50% stenosis of left ICA. She was evaluated by teleneurology with an initial NIH stroke scale of 4, she was deemed to be candidate for TNK, TNK was administered at 2100.  MRI brain revealed interval development of hyperacute hemorrhage in the left parietal occipital region with adjacent edema and several watershed/embolic appearing infarcts involving left frontotemporal region.TNK was reversed with TXA and cryo   Existing Precautions/Restrictions fall  (HOB >30. SBP <140)   Cognitive Status Examination    Orientation Status person;place  (Month. Year. Off by 2.5 hours on time. Day of week with increased time)   Affect/Mental Status (Cognitive) WFL  (Slightly anxiou)   Follows Commands follows one-step commands;75-90% accuracy;delayed response/completion;increased processing time needed   Cognitive Status Comments Aphasia. Word finding impairment noted at times   Visual Perception   Visual Impairment/Limitations corrective lenses full-time   Impact of Vision Impairment on Function (Vision) No peripheral vision past midline in upper, middle, and lower regions of visual field. Reduced R eye convergence   Sensory   Sensory Comments Baseline RUE radial nerve palsy. Intact and symmetrical BUE light touch   Pain Assessment   Patient Currently in Pain Yes, see Vital Sign flowsheet   Strength Comprehensive (MMT)   Comment, General Manual Muscle Testing (MMT) Assessment 4/5 MMT in dominant RUE. 5/5 MMT in LUE.   Coordination   Upper Extremity Coordination Right UE impaired   Coordination Comments Reduced gross and fine motor and proprioception. Pt reports coordination much improved compared to 2 days ago   Transfers   Transfers toilet transfer   Toilet Transfer   [place] Level (Toilet Transfer) contact guard   Activities of Daily Living   BADL Assessment/Intervention lower body dressing;toileting;bathing   Bathing Assessment/Intervention   [place] Level (Bathing) minimum assist (75% patient effort)   Lower Body Dressing Assessment/Training   [place] Level (Lower Body Dressing) minimum assist (75% patient effort)   Toileting   [place] Level (Toileting) minimum assist (75% patient effort)   Clinical Impression   Criteria for Skilled Therapeutic Interventions Met (OT) Yes, treatment indicated   OT Diagnosis Decline function   OT Problem List-Impairments impacting ADL activity tolerance impaired;balance;cognition;communication;coordination;mobility;motor control;vision;strength   Assessment of Occupational  Performance 5 or more Performance Deficits   Identified Performance Deficits LB dressing, UB dressing, toileting, grooming, bathing, functional mobility, IADLs   Planned Therapy Interventions (OT) ADL retraining;IADL retraining;transfer training;progressive activity/exercise;cognition;fine motor coordination training;balance training;motor coordination training;neuromuscular re-education;visual perception;strengthening   Clinical Decision Making Complexity (OT) problem focused assessment/low complexity   Risk & Benefits of therapy have been explained care plan/treatment goals reviewed;evaluation/treatment results reviewed;risks/benefits reviewed;current/potential barriers reviewed;participants voiced agreement with care plan;participants included;patient   OT Total Evaluation Time   OT Eval, Low Complexity Minutes (80659) 11   OT Goals   Therapy Frequency (OT) Daily   OT Predicted Duration/Target Date for Goal Attainment 04/30/25   OT Goals Hygiene/Grooming;Lower Body Dressing;Transfers;Toilet Transfer/Toileting;Upper Body Dressing   OT: Hygiene/Grooming supervision/stand-by assist;while standing   OT: Upper Body Dressing Supervision/stand-by assist   OT: Lower Body Dressing Supervision/stand-by assist;including set-up/clothing retrieval   OT: Transfer Supervision/stand-by assist  (Step in shower transfer)   OT: Toilet Transfer/Toileting Supervision/stand-by assist   Interventions   Interventions Quick Adds Self-Care/Home Management   Self-Care/Home Management   Self-Care/Home Mgmt/ADL, Compensatory, Meal Prep Minutes (55868) 26   Treatment Detail/Skilled Intervention Upon arrival pt seated in recliner in ICU and agreeable to therapy. BP within parameters at 108/83. Increased time needed during session for command following. Pt assisted with ordering food from hospital phone for first time. Pt needed cues to dial phone. Needed cues to recall food order x2. Pt threaded mesh panties with SBA. Sit to stand with CGA for  "balance safety. Pulled up undergarments with CGA. Doffed/donned slipper socks with SBA using figure four while sitting and required one cue for head turn R. Pt completed toilet transfer using grab bar with CGA for balance safety. Pt needed reassurance that window next to toilet is tinted. Pt needed max assist to locate sink in her right visual field. Pt navigated to sink with Alicia. Pt washed and dried glasses with CGA for balance safety. Pt setup in recliner with food tray. Writer about to leave. Pt reported feeling \"weird\". Pt noted to have R facial droop, worsened R  strength, more word finding difficulty. /77. RN immediately notified and arrived. Pt left with RN for stat head CT.   OT Discharge Planning   OT Plan Re-eval? LB dressing. Toileting. G/H standing. Visual perception. RUE NMR   OT Discharge Recommendation (DC Rec) Acute Rehab Center-Motivated patient will benefit from intensive, interdisciplinary therapy.  Anticipate will be able to tolerate 3 hours of therapy per day   OT Rationale for DC Rec At very conclusion of OT session pt had sudden onset of worsening right  strength, word finding, and right sided mouth droop. Pt may now need a OT re-eval. For beginning main portion of OT session, pt required assist of 1 for selfcares primarily due to impairments in vision, cognition, dynamic standing balance. Mild impairments in RUE strength and coordination noted. Pt motivated to participate. Recommend continued skilled OT in [facility] prior to returning home alone.   OT Brief overview of current status Ax1 grooming, toileting, LB dressing. \"Goals of therapy will be to address safe mobility and make recs for d/c to next level of care. Pt and RN will continue to follow all falls risk precautions as documented by RN staff while hospitalized\"   OT Total Distance Amb During Session (feet) 30   OT Equipment Needed at Discharge   (TBD)   Total Session Time   Timed Code Treatment Minutes 26   Total Session " Time (sum of timed and untimed services) 37

## 2025-04-20 NOTE — PLAN OF CARE
"Goal Outcome Evaluation:      Plan of Care Reviewed With: patient    Overall Patient Progress: improvingOverall Patient Progress: improving    Outcome Evaluation: Neuros unchanged, sensation and strength in R side overall improving. R field cut remains. Headache this morning but improved with change in head cap. VSS. Up with SBA to bathroom. Pt anxious/tearful, is less anxious when explaining cares, what to expect, next steps, etc.    Had brief change in neuros - see provider notification note. Went for stat CT.     Report given to Cordell BAEZ, belongings sent with pt, transferred to neuro floor.     Problem: Adult Inpatient Plan of Care  Goal: Plan of Care Review  Description: The Plan of Care Review/Shift note should be completed every shift.  The Outcome Evaluation is a brief statement about your assessment that the patient is improving, declining, or no change.  This information will be displayed automatically on your shiftnote.  Outcome: Progressing  Flowsheets (Taken 4/20/2025 1123)  Outcome Evaluation: Neuros unchanged, sensation and strength in R side improving. R field cut remains. Headache this morning but improved with change in head cap. VSS. Up with SBA to bathroom. Pt anxious/tearful, is less anxious when explaining cares, what to expect, next steps, etc.  Plan of Care Reviewed With: patient  Overall Patient Progress: improving  Goal: Patient-Specific Goal (Individualized)  Description: You can add care plan individualizations to a care plan. Examples of Individualization might be:  \"Parent requests to be called daily at 9am for status\", \"I have a hard time hearing out of my right ear\", or \"Do not touch me to wake me up as it startlesme\".  Outcome: Progressing  Goal: Absence of Hospital-Acquired Illness or Injury  Outcome: Progressing  Intervention: Identify and Manage Fall Risk  Recent Flowsheet Documentation  Taken 4/20/2025 0800 by Hernesto Carter RN  Safety Promotion/Fall Prevention:   room " door open   lighting adjusted   safety round/check completed   activity supervised   assistive device/personal items within reach   clutter free environment maintained   increased rounding and observation   increase visualization of patient   mobility aid in reach   nonskid shoes/slippers when out of bed   room organization consistent   supervised activity   treat reversible contributory factors   treat underlying cause  Intervention: Prevent Skin Injury  Recent Flowsheet Documentation  Taken 4/20/2025 1000 by Hernesto Carter RN  Body Position: weight shifting  Taken 4/20/2025 0900 by Hernesto Carter RN  Body Position: weight shifting  Intervention: Prevent and Manage VTE (Venous Thromboembolism) Risk  Recent Flowsheet Documentation  Taken 4/20/2025 0800 by Hernesto Carter RN  VTE Prevention/Management: SCDs off (sequential compression devices)  Goal: Optimal Comfort and Wellbeing  Outcome: Progressing  Intervention: Provide Person-Centered Care  Recent Flowsheet Documentation  Taken 4/20/2025 0800 by Hernesto Carter RN  Trust Relationship/Rapport:   care explained   choices provided   emotional support provided   empathic listening provided   questions answered   questions encouraged   reassurance provided   thoughts/feelings acknowledged  Goal: Readiness for Transition of Care  Outcome: Progressing     Problem: Stroke, Intracerebral Hemorrhage  Goal: Optimal Coping  Outcome: Progressing  Intervention: Support Psychosocial Response to Stroke  Recent Flowsheet Documentation  Taken 4/20/2025 0800 by Hernesto Carter RN  Supportive Measures:   active listening utilized   positive reinforcement provided   relaxation techniques promoted   self-care encouraged  Goal: Effective Bowel Elimination  Outcome: Progressing  Goal: Optimal Cerebral Tissue Perfusion  Outcome: Progressing  Intervention: Protect and Optimize Cerebral Perfusion  Recent Flowsheet Documentation  Taken  4/20/2025 0800 by Hernesto Carter RN  Sensory Stimulation Regulation: auditory stimulation provided  Cerebral Perfusion Promotion: blood pressure monitored  Goal: Optimal Cognitive Function  Outcome: Progressing  Intervention: Optimize Cognitive Function  Recent Flowsheet Documentation  Taken 4/20/2025 0800 by Hernesto Carter RN  Sensory Stimulation Regulation: auditory stimulation provided  Reorientation Measures:   calendar in view   clock in view   glasses use encouraged   reorientation provided  Goal: Effective Communication Skills  Outcome: Progressing  Intervention: Optimize Communication Skills  Recent Flowsheet Documentation  Taken 4/20/2025 0800 by Hernesto Carter RN  Communication Enhancement Strategies:   call light answered in person   extra time allowed for response  Goal: Optimal Functional Ability  Outcome: Progressing  Intervention: Optimize Functional Ability  Recent Flowsheet Documentation  Taken 4/20/2025 1000 by Hernesto Carter RN  Activity Management: ambulated in room  Taken 4/20/2025 0800 by Hernesto Carter RN  Activity Management: up in chair  Goal: Optimal Nutrition Intake  Outcome: Progressing  Goal: Optimal Pain Control and Function  Outcome: Progressing  Goal: Effective Oxygenation and Ventilation  Outcome: Progressing  Goal: Improved Sensorimotor Function  Outcome: Progressing  Goal: Safe and Effective Swallow  Outcome: Progressing  Intervention: Optimize Eating and Swallowing  Recent Flowsheet Documentation  Taken 4/20/2025 0800 by Hernesto Carter RN  Swallowing Interventions: Dysphagia: small bites/sips encouraged  Goal: Effective Urinary Elimination  Outcome: Progressing

## 2025-04-20 NOTE — PLAN OF CARE
Goal Outcome Evaluation:  Plan of Care Reviewed With: patient    Overall Patient Progress: no changeOverall Patient Progress: no change    Transfer from ICU. Pt admitted with RUE ataxia, weakness, WFD, slurred speech, found watershed embolic-like infarcts in the L frontoparietal region, received TNK, hemorrhagic conversion/IPH, TNK reversed. Hx stroke, left parotid cancer, AML, bone marrow transplant in 1992. A&O x4, anxious. Reassurance given. VSS, SBP<140, on RA. LS clear. Tele Denies pain. CMS and Neuro's intact except for WFD/aphasia, R field cut, and slight R pronator drift. Up SBA w/ GB. EC thin liquid diet. Cont B&B. +BS, BM this morning per patient. Neuro/Hosp and NSG currently no intervention standing by if needed, (peak swelling 3-5 days), see NSG note. Pt scoring green on Aggression Stop Light Tool. Zio patch at discharge. Recommending ARU, pending.

## 2025-04-21 ENCOUNTER — TELEPHONE (OUTPATIENT)
Dept: OTHER | Facility: CLINIC | Age: 60
End: 2025-04-21

## 2025-04-21 ENCOUNTER — APPOINTMENT (OUTPATIENT)
Dept: CT IMAGING | Facility: CLINIC | Age: 60
End: 2025-04-21
Attending: STUDENT IN AN ORGANIZED HEALTH CARE EDUCATION/TRAINING PROGRAM
Payer: COMMERCIAL

## 2025-04-21 ENCOUNTER — APPOINTMENT (OUTPATIENT)
Dept: SPEECH THERAPY | Facility: CLINIC | Age: 60
DRG: 061 | End: 2025-04-21
Payer: COMMERCIAL

## 2025-04-21 ENCOUNTER — APPOINTMENT (OUTPATIENT)
Dept: CARDIOLOGY | Facility: CLINIC | Age: 60
DRG: 061 | End: 2025-04-21
Attending: PSYCHIATRY & NEUROLOGY
Payer: COMMERCIAL

## 2025-04-21 ENCOUNTER — APPOINTMENT (OUTPATIENT)
Dept: PHYSICAL THERAPY | Facility: CLINIC | Age: 60
DRG: 061 | End: 2025-04-21
Payer: COMMERCIAL

## 2025-04-21 ENCOUNTER — APPOINTMENT (OUTPATIENT)
Dept: OCCUPATIONAL THERAPY | Facility: CLINIC | Age: 60
DRG: 061 | End: 2025-04-21
Payer: COMMERCIAL

## 2025-04-21 LAB
ANION GAP SERPL CALCULATED.3IONS-SCNC: 12 MMOL/L (ref 7–15)
BASOPHILS # BLD AUTO: 0 10E3/UL (ref 0–0.2)
BASOPHILS NFR BLD AUTO: 0 %
BUN SERPL-MCNC: 19.5 MG/DL (ref 8–23)
CALCIUM SERPL-MCNC: 9.9 MG/DL (ref 8.8–10.4)
CHLORIDE SERPL-SCNC: 102 MMOL/L (ref 98–107)
CREAT SERPL-MCNC: 0.95 MG/DL (ref 0.51–0.95)
EGFRCR SERPLBLD CKD-EPI 2021: 68 ML/MIN/1.73M2
EOSINOPHIL # BLD AUTO: 0.3 10E3/UL (ref 0–0.7)
EOSINOPHIL NFR BLD AUTO: 3 %
ERYTHROCYTE [DISTWIDTH] IN BLOOD BY AUTOMATED COUNT: 13.2 % (ref 10–15)
GLUCOSE BLDC GLUCOMTR-MCNC: 105 MG/DL (ref 70–99)
GLUCOSE BLDC GLUCOMTR-MCNC: 110 MG/DL (ref 70–99)
GLUCOSE BLDC GLUCOMTR-MCNC: 113 MG/DL (ref 70–99)
GLUCOSE BLDC GLUCOMTR-MCNC: 141 MG/DL (ref 70–99)
GLUCOSE BLDC GLUCOMTR-MCNC: 82 MG/DL (ref 70–99)
GLUCOSE SERPL-MCNC: 189 MG/DL (ref 70–99)
HCO3 SERPL-SCNC: 24 MMOL/L (ref 22–29)
HCT VFR BLD AUTO: 41.1 % (ref 35–47)
HGB BLD-MCNC: 13.4 G/DL (ref 11.7–15.7)
IMM GRANULOCYTES # BLD: 0 10E3/UL
IMM GRANULOCYTES NFR BLD: 0 %
LVEF ECHO: NORMAL
LYMPHOCYTES # BLD AUTO: 3 10E3/UL (ref 0.8–5.3)
LYMPHOCYTES NFR BLD AUTO: 32 %
MCH RBC QN AUTO: 28.3 PG (ref 26.5–33)
MCHC RBC AUTO-ENTMCNC: 32.6 G/DL (ref 31.5–36.5)
MCV RBC AUTO: 87 FL (ref 78–100)
MONOCYTES # BLD AUTO: 0.7 10E3/UL (ref 0–1.3)
MONOCYTES NFR BLD AUTO: 8 %
NEUTROPHILS # BLD AUTO: 5.3 10E3/UL (ref 1.6–8.3)
NEUTROPHILS NFR BLD AUTO: 57 %
NRBC # BLD AUTO: 0 10E3/UL
NRBC BLD AUTO-RTO: 0 /100
PLATELET # BLD AUTO: 181 10E3/UL (ref 150–450)
POTASSIUM SERPL-SCNC: 4.2 MMOL/L (ref 3.4–5.3)
RBC # BLD AUTO: 4.74 10E6/UL (ref 3.8–5.2)
SODIUM SERPL-SCNC: 138 MMOL/L (ref 135–145)
WBC # BLD AUTO: 9.4 10E3/UL (ref 4–11)

## 2025-04-21 PROCEDURE — 255N000002 HC RX 255 OP 636: Performed by: PSYCHIATRY & NEUROLOGY

## 2025-04-21 PROCEDURE — 999N000208 ECHOCARDIOGRAM COMPLETE

## 2025-04-21 PROCEDURE — 97112 NEUROMUSCULAR REEDUCATION: CPT | Mod: GO

## 2025-04-21 PROCEDURE — 92526 ORAL FUNCTION THERAPY: CPT | Mod: GN

## 2025-04-21 PROCEDURE — 97535 SELF CARE MNGMENT TRAINING: CPT | Mod: GO

## 2025-04-21 PROCEDURE — 80048 BASIC METABOLIC PNL TOTAL CA: CPT | Performed by: HOSPITALIST

## 2025-04-21 PROCEDURE — 250N000013 HC RX MED GY IP 250 OP 250 PS 637: Performed by: INTERNAL MEDICINE

## 2025-04-21 PROCEDURE — 93306 TTE W/DOPPLER COMPLETE: CPT | Mod: 26 | Performed by: INTERNAL MEDICINE

## 2025-04-21 PROCEDURE — 99232 SBSQ HOSP IP/OBS MODERATE 35: CPT | Performed by: INTERNAL MEDICINE

## 2025-04-21 PROCEDURE — 99221 1ST HOSP IP/OBS SF/LOW 40: CPT | Mod: GC | Performed by: SURGERY

## 2025-04-21 PROCEDURE — 97116 GAIT TRAINING THERAPY: CPT | Mod: GP

## 2025-04-21 PROCEDURE — 97530 THERAPEUTIC ACTIVITIES: CPT | Mod: GP

## 2025-04-21 PROCEDURE — 92507 TX SP LANG VOICE COMM INDIV: CPT | Mod: GN

## 2025-04-21 PROCEDURE — 250N000013 HC RX MED GY IP 250 OP 250 PS 637: Performed by: HOSPITALIST

## 2025-04-21 PROCEDURE — 92523 SPEECH SOUND LANG COMPREHEN: CPT | Mod: GN

## 2025-04-21 PROCEDURE — 85004 AUTOMATED DIFF WBC COUNT: CPT | Performed by: HOSPITALIST

## 2025-04-21 PROCEDURE — 36415 COLL VENOUS BLD VENIPUNCTURE: CPT | Performed by: HOSPITALIST

## 2025-04-21 PROCEDURE — 70450 CT HEAD/BRAIN W/O DYE: CPT

## 2025-04-21 PROCEDURE — 99232 SBSQ HOSP IP/OBS MODERATE 35: CPT | Mod: GC | Performed by: PSYCHIATRY & NEUROLOGY

## 2025-04-21 PROCEDURE — 120N000013 HC R&B IMCU

## 2025-04-21 RX ADMIN — HUMAN ALBUMIN MICROSPHERES AND PERFLUTREN 9 ML: 10; .22 INJECTION, SOLUTION INTRAVENOUS at 09:22

## 2025-04-21 RX ADMIN — ATORVASTATIN CALCIUM 40 MG: 40 TABLET, FILM COATED ORAL at 20:45

## 2025-04-21 RX ADMIN — ACETAMINOPHEN 650 MG: 325 TABLET, FILM COATED ORAL at 06:01

## 2025-04-21 ASSESSMENT — ACTIVITIES OF DAILY LIVING (ADL)
ADLS_ACUITY_SCORE: 42

## 2025-04-21 NOTE — PLAN OF CARE
Goal Outcome Evaluation:      Plan of Care Reviewed With: patient    Overall Patient Progress: improvingOverall Patient Progress: improving    Patient is here with Watershed infract in the Lt temporal area, s/p TNK and reversal.A&OX4.  Mild HA managed with tylenol .VSS . Neuro's with WFD , Rt field cut and slight Rt  pronator drift. On easy to chew thin liquid diet. Patient felt dizzy for moment while she was getting up to the bathroom this morning  Up with SBA voided adequately. Tele NSR , Plan for ARU at discharge.

## 2025-04-21 NOTE — PROGRESS NOTES
[facility]    Vascular Neurology Progress Note    Interval History     No overnight issues, clinically stable, the patient continues to experience aphasia.    BP: 122/79 Systolic (24hrs), Av , Min:98 , Max:142   Diastolic (24hrs), Av, Min:73, Max:99       Hospital Course     Chief complaint: Stroke Symptoms    59-year-old female with past medical history significant for AML in remission, ischemic stroke, parotid/parotid cancer s/p radiation and diabetes mellitus presented on 2025 with acute onset right sided weakness and dysarthria. Last known well 2025. On admission, /91, GCS 15, CBC unremarkable, CMP showing elevated creatinine, PT INR within normal limits.CT head was negative for any acute hemorrhage. CTA head and neck was negative for any actionable large vessel occlusion but showed 50% stenosis of left ICA. She was evaluated by teleneurology with an initial NIH stroke scale of 4, she was deemed to be candidate for TNK, TNK was administered at 2100. MRI brain revealed interval development of hyperacute hemorrhage in the left parietal occipital region with adjacent edema and several watershed/embolic appearing infarcts involving left frontotemporal region.TNK was reversed with TXA and cryo. She was reevaluated by Tele-Neurology with repeat NIH stroke scale of 5.  Repeat CT head did not reveal any worsening of intracranial hemorrhage. Right arm weakness and ataxia improving but she developed right hemianopsia and aphasia.    Assessment and Plan     1. 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  4 Left CCA stenosis    She had at least 2 embolic strokes in the left hemisphere, and could be from left CCA.    -Plan:  - SBP goal < 150  - Neuro check every 4 hours  - Statin:  Continue PTA Lipitor 40 mg daily, goal  "of LDL between 40 and 70  - TTE (with Bubble Study if age 60 yrs or less)  - Telemetry, EKG  - Bedside Glucose Monitoring  - PT/OT/SLP  - Stroke Education  - Euthermia, Euglycemia  - Vascular surgery consulted  - Repeat head CT now  - If CT stable, start baby aspirin  - In a few days, will start cilostazol     DVT Prophylaxis: hold chemical DVT ppx     Patient Follow-up    - in 4-6 weeks with general neurology or stroke BEV ([phone])  - Repeat MRI brain in 3 months  - Follow-up with vascular surgery as outpatient  - Zio patch on discharge    We will continue to follow.     The Stroke Staff is Dr. Valdivia.    Caridad Dalal MD  Vascular Neurology Fellow    To page me or covering stroke neurology team member, click here: [facility]  Choose \"On Call\" tab at top, then select \"NEUROLOGY/ALL SITES\" from middle drop-down box, press Enter, then look for \"stroke\" or \"telestroke\" for your site.    Physical Examination     Temp: 97.8  F (36.6  C) Temp src: Oral BP: 122/79 Pulse: 98   Resp: 16 SpO2: 93 % O2 Device: None (Room air)      Neurologic  Mental Status:  alert, oriented x 3, follows commands, aphasic  Cranial Nerves:  visual fields right visual field deficit, PERRL, EOMI with normal smooth pursuit, facial sensation intact and symmetric, facial movements symmetric, hearing not formally tested but intact to conversation  Motor:  normal muscle tone and bulk, no abnormal movements, able to move all limbs spontaneously, strength at least antigravity throughout upper and lower extremities  Reflexes:  toes down-going  Sensory: Decreased to light touch in right arm  Coordination:   Mild dysmetria on right  Station/Gait:  deferred    Stroke Scales       NIHSS  1a. Level of Consciousness 0-->Alert, keenly responsive   1b. LOC Questions 0-->Answers both questions correctly   1c. LOC Commands 0-->Performs both tasks correctly   2.   Best Gaze 0-->Normal   3.   Visual 2-->Complete hemianopia   4.   Facial Palsy 1-->Minor paralysis " (flattened nasolabial fold, asymmetry on smiling)   5a. Motor Arm, Left 0-->No drift, limb holds 90 (or 45) degrees for full 10 secs   5b. Motor Arm, Right 0-->No drift, limb holds 90 (or 45) degrees for full 10 secs   6a. Motor Leg, Left 0-->No drift, leg holds 30 degree position for full 5 secs   6b. Motor Leg, right 0-->No drift, leg holds 30 degree position for full 5 secs   7.   Limb Ataxia 1-->Present in one limb   8.   Sensory 1   9.   Best Language 1-->Mild-to-moderate aphasia, some obvious loss of fluency or facility of comprehension, without significant limitation on ideas expressed or form of expression. Reduction of speech and/or comprehension, however, makes conversation. . . (see row details)   10. Dysarthria 0-->Normal   11. Extinction and Inattention  0-->No abnormality   Total 6       Imaging/Labs   (Bolded imaging and labs new and/or personally reviewed or re-reviewed by me today)    MRI/Head CT HEAD CT:  1.  No CT evidence for acute intracranial process.  2.  Brain atrophy and presumed chronic microvascular ischemic changes as above.  3.  Left mastoid effusion.    MRI Brain   1.  Interval development of a hyperacute vertebral hemorrhage in the left parieto-occipital region with mild adjacent edema and small adjacent subdural hemorrhage. Mild mass effect without midline shift     2.  Multiple additional small scattered areas of chronic hemosiderin are unchanged     3.  Several new watershed and embolic-type infarcts are seen in the left frontoparietal region. Interval evolutionary change of the previously seen infarcts     4.  Abnormal enhancement within the completely opacified left mastoid air cells. The enhancement is progressive or new since the prior study. Question mild adjacent intracranial enhancement. Temporal bone CT suggested for further evaluation.       Repeat CT head   1.  Similar parenchymal hematoma at the left parietal/occipital junction compared to same day brain MRI. Similar thin  subdural collection over the left cerebral convexity, measuring 4 mm in maximum thickness. No significant progressive mass effect.  2.  No new sites of acute intracranial hemorrhage.  3.  Multiple small recent infarcts in the left cerebral hemisphere are better seen on MRI.    CT head 4/19  1.  No significant interval change compared to CT head 4/19/2025.   Intracranial Vasculature HEAD CTA:  Normal CTA [place].   Cervical Vasculature NECK CTA:  1.  50% stenosis of the left ICA origin.  2.  Heterogeneous thyroid gland with multiple nodules.     Echocardiogram EF 55-60 percent, normal biventricular size   EKG/Telemetry Sinus tachycardia   CUS:->      < 50% stenosis of bilateral ICAs  LDL  4/19/2025: 47 mg/dL   A1C  4/7/2025: 6.0 %   Troponin No lab value available in past 48 hrs

## 2025-04-21 NOTE — PROGRESS NOTES
"Communication Evaluation:        04/21/25 1016   Appointment Info   Signing Clinician's Name / Credentials (SLP) Rachael Gautam MS CCC-SLP   General Information   Onset of Illness/Injury or Date of Surgery 04/18/25   Referring Physician Barnes, Mk Bell MD   Patient/Family Therapy Goal Statement (SLP) To improve communication   Pertinent History of Current Problem   Per provider: \"Afshan Jimenez is a 59 year old female with a history of left parotid cancer status post radiation resection, distant history of AML status post bone marrow transplant with sister as donor in 1992, embolic strokes of uncertain source in December 2024 with parietal and occipital strokes admitted on 4/18/2025.\"     Pt very frustrated with communication, having notable aphasia and therefore communication evaluation completed.     General Observations Pt alert, upright in bed   Type of Evaluation   Type of Evaluation Speech, Language, Cognition   Western Aphasia Battery- Revised Bedside Record From   Spontaneous Speech Content Score (out of 10) 7   Spontaneous Speech Fluency Score (out of 10) 5   Auditory Verbal Comprehension Score (out of 10) 9   Sequential Commands Score (out of 10) 4     Repetition Score (out of 10) 9   Object Naming Score (out of 10) 6   Bedside Aphasia Sum 40   WAB-R Bedside Aphasia Score 66.67   Reading Score (out of 10) 0   Writing Score (out of 10) 1   Bedside Language Sum 41   Bedside Language Score 51.25   Bedside Aphasia Classification Anomic Aphasia   Aphasia Severity Level Moderate Aphasia   General Therapy Interventions   Planned Therapy Interventions Language   Language Auditory comprehension;Reading comprehension;Verbal expression;Written expression   Clinical Impression   Criteria for Skilled Therapeutic Interventions Met (SLP Eval) Yes, treatment indicated   SLP Diagnosis Moderate Anomic Aphasia   Risks & Benefits of therapy have been explained evaluation/treatment results reviewed;care plan/treatment goals " reviewed;risks/benefits reviewed;current/potential barriers reviewed;participants voiced agreement with care plan;participants included;patient   Clinical Impression Comments   Communication evaluation completed usng the Western Aphasia Battery- bedside edition. Pt achieved a total score of 66.67/100, converting to a moderate anomic aphasia. Deficits include: word finding difficulties, phonemic and semantic paraphasias, perseverations of words, reduced sentence generation/fluency, impaired direction following, reading abilities (?potential visual changes, field cut, with pt jomar reading on L side of paper), writing abilities. Pt aware of some deficits, specifically word finding difficulties, attempts to self-correct at times.     SLP Total Evaluation Time   Eval: Sound production with lang comprehension and expression Minutes (99398) 15   SLP Goals   Therapy Frequency (SLP Eval) daily   SLP Predicted Duration/Target Date for Goal Attainment 04/28/25   SLP Goals Communication;Language Comprehension   SLP: Improve language comprehension for interaction with caregivers/environment minimal assist;auditory and written   SLP: Communicate basic wants and needs minimal assist;verbal and written   Interventions   Interventions Quick Adds Speech, Language, Voice & Communication   Speech, Language, Voice Communication&/or Auditory Processing   Treatment of Speech, Language, Voice Communication&/or Auditory Processing Minutes (91083) 10   Symptoms Noted During/After Treatment None   Treatment Detail/Skilled Intervention Sentence completion cues and phonemic cues effective 100% of the time during instances of word finding difficulties. Trained pt in circumlocution strategy - needs ~mod cues to utilize.   SLP Discharge Planning   SLP Plan Swallow: trial regular. Communication: word finding/strategies, 2+ unit directions, reading/writing   SLP Discharge Recommendation [facility]-Motivated patient will benefit from  intensive, interdisciplinary therapy.  Anticipate will be able to tolerate 3 hours of therapy per day   SLP Rationale for DC Rec Patient's swallow and communication function are below her baseline.   SLP Brief overview of current status  Moderate anomic aphasia requiring intensive SLP/rehab to regain function. Give her extra time to respond, ask her to describe item/need if unable to come up with word, provide choices for patient, use 1 step directions.   SLP Time and Intention   Total Session Time (sum of timed and untimed services) 25

## 2025-04-21 NOTE — TELEPHONE ENCOUNTER
Referral received via [employer] on 4/20/25.    Referred by Delvis Concepcion MD  for ICA stenosis     Previous imaging completed (pertinent to referral):  CTA Head Neck with Contrast [[id number]] on 4/18/25  US Carotid Bilateral [[id number]] on 4/19/2025    Routing to scheduling to coordinate the following:  NEW VASCULAR PATIENT consult with Vascular Medicine  Please schedule this at next available    Referring provider requesting vascular surgery, however per clinic scheduling guidelines, patient is to be seen by vascular medicine for consultation first.     Appt note:  Referred by Delvis Concepcion MD  for ICA stenosis
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
## 2025-04-21 NOTE — PROGRESS NOTES
"[facility]  Neurosurgery Daily Progress Note  Date of Admission:  4/18/2025    Assessment  59F on ASA 81mg with history of AML in remission, ischemic stroke, parotid/parotid cancer s/p radiation and diabetes mellitus. Patient presented on 4/18/2025 with acute onset right sided weakness and dysarthria. NSGY consulted for hemorrhagic conversion. Head CT was negative for acute hemorrhage. CTA head and neck showed 50% stenosis of left ICA. She was evaluated by Neurology with an initial NIH stroke scale of 4, she was deemed to be candidate for TNK, TNK was administered at 2100.  MRI brain revealed interval development of hyperacute hemorrhage in the left parietal occipital region with adjacent edema and infarcts involving left frontotemporal region.TNK was reversed with TXA and cryo.     Interval History   Patient reports \"feeling better\" today, 3/10 headache. Slight RUE weakness/numbness and mild word finding difficulty, improving. Continued right visual field cut. Patient denies any new or worsening symptoms at this time.    Sodium 138 on 4/20, labs pending today.  Repeat head CT yesterday:     CT HEAD W/O CONTRAST  LOCATION: [facility]  DATE: 4/20/2025                                                    IMPRESSION:    1.  Evolving acute intraparenchymal hemorrhage within the left occipital lobe measuring 2.7 x 3.7 x 3.3 cm (AP x TV x CC).   2.  No other evidence of acute intracranial hemorrhage or mass effect.    Plan   -No surgical intervention planned at this time   -NSGY will continue to follow for possible hemicraniectomy watch (peak swelling 3-5 days)  -Continue management per Stroke Neurology   -Monitor neuro exam   -Continue PT OT SLP   -Appreciate assistance from specialties  -Discharge plan: ARU    Discussed with Dr. Medley, CNP  [facility] Neurosurgery  Clinic phone number: [phone]  Securely message or page via Epic Secure Chat, " Vocera, or Amcom     Physical Exam   Temp: 98.1  F (36.7  C) Temp src: Oral BP: 122/79 Pulse: 98   Resp: 16 SpO2: 93 % O2 Device: None (Room air)    Vitals:    04/18/25 2028 04/19/25 0720 04/20/25 0500   Weight: 89.8 kg (198 lb) 92 kg (202 lb 13.2 oz) 92.7 kg (204 lb 5.9 oz)       Mental status:  Alert and oriented x 3, mild word finding difficulty, following commands  Cranial nerves:  II-XII intact except right visual field cut   Motor:    Moves all extremities  RUE slight weakness, improving   LUE 5/5  BLE 5/5   Sensation:  Slight decreased sensation in RUE   Coordination:  Smooth finger to nose testing. RUE pronator drift.

## 2025-04-21 NOTE — PLAN OF CARE
Reason for Admission: Acute L Parieto-occipital Hemorrhage S/p TNK reversal     Cognitive/Mentation: A/Ox 4  Neuros/CMS: Intact ex wfd worsens with therapy, RUE weaker than L, R field cut, slight R pronator drift, forgetfulness   VS: stable .   Tele: nsr .  /GI: Continent. .   Pulmonary: LS clear.  Pain: denies .     Drains/Lines: piv sl   Skin: intact  Activity: Assist x sba .  Diet: Easy to chew  with thin liquids. Takes pills whole .     Therapies recs: aru  Discharge: Pending     Aggression Stoplight Tool: Green

## 2025-04-21 NOTE — CONSULTS
VASCULAR SURGERY CONSULT NOTE    Afshan Jimenez  [id number]   1965      Reason for consult: Symptomatic left carotid stenosis  Requesting physician: Dr. Caridad Dalal, stroke neurology fellow    Assessment & Plan:  60 yo F with symptomatic mild left ICA stenosis admitted with right sided weakness, numbness, ataxia, dysarthria. S/p TNK, complicated by parietal-occipital hemorrhage requiring TNK reversal. Symptoms are largely improved and area of intra cranial hemorrhage is grossly stable on repeat CT imaging today    We discussed the patient our recommendation to proceed with left carotid endarterectomy once deemed safe for intraoperative heparin to minimize the risk of further bleeding.  Will discuss timing of left CEA with neurology and neurosurgery teams tomorrow.    In the meantime, antiplatelet and anticoagulation medications are held.  Continue high-dose statin therapy as ordered by neurology.    20 min spent with patient    Seen and discussed with staff, Dr. Deleon.    Azael Zendejas MD      HPI:  Ms Jimenez is a 59-year-old female admitted with right arm weakness, ataxia, right facial droop, and dysarthria after reports of a previous stroke involving right upper extremity weakness in late 2024.  At that time, her stroke workup was unrevealing for cardioembolic source.  Since then, she was seen briefly in the ER in 2025 with transient dysarthria and right-sided deficits, however MRI was negative for acute strokes.  She is now admitted with multiple left hemispheric strokes approximately 3 days ago and received intravenous tenecteplase therapy which has been complicated by intracranial hemorrhage which is now stable.  She has been evaluated by the neurology service who feels her left internal carotid artery if symptomatic and is resulted in the left hemispheric strokes.    Medical history: Type 2 diabetes, leukemia, parotid gland tumor    Surgical history: Laparoscopic cholecystectomy,  "left parotidectomy    Medications: Ozempic, aspirin, Lipitor 40 mg    NKDA    Social history: Never smoker, no tobacco use, minimal EtOH    Family history: Breast cancer in her mother, testicular cancer in her brother.    Exam:  /79 (BP Location: Left arm, Patient Position: Supine)   Pulse 98   Temp 98  F (36.7  C) (Oral)   Resp 16   Ht 1.689 m (5' 6.5\")   Wt 92.7 kg (204 lb 5.9 oz)   SpO2 93%   BMI 32.49 kg/m      Sitting comfortably in chair  Nonlabored on room air  Mild dysarthria  slight right upper extremity weakness and numbness.  Reports continued right visual field deficit  Palpable bilateral dorsalis pedis pulses    Labs:  LDL 47  Hemoglobin A1c 6.0%    Imaging:  Mild left ICA stenosis with soft, hypoechoic plaque on carotid duplex ultrasound.  Hypodense plaque is also seen on CT angiogram of the head neck.  Widely patent right carotid and left vertebral artery, atretic right vertebral artery.    Brain MRI 4/19/2025 shows hyperacute vertebral hemorrhage in the left parietal-occipital region.  Multiple small embolic infarcts in the left frontoparietal regions.    Most recent Noncon CT head imaging performed today shows largely stable 2.6 x 3.7 x 3.4 cm area of hemorrhage in the left parietal occipital lobe.    Echocardiogram 4/21/2025 shows normal LVEF 55-60%, no wall motion abnormalities, and no significant valvular disease, no intracardiac thrombus or shunt.    STAFF:  Patient examined with Dr Zendejas.  Reviewed past history including event of right arm weakness in November 2025 of uncertain etiology with 95% improvement per patient.  New event also reviewed with hemorrhage following lytic therapy that has been stable.  Patient still expressing some numbness and uncoordination to her right hand and expressive aphasia that is improving.    Reviewed all images including MRI-CT scans of head including earlier today-CTA of vasculature-carotid duplex.  There is a mild stenosis of the left carotid " bifurcation with essentially no disease in the right and no intracerebral disease.  This stenosis is less than 50% and not obviously ulcerated.  With these events my biggest concern is that there is an ulceration within this plaque which is caused these 2 events.    With the patient's young age and symptoms I would recommend a left CEA.  However, this needs to be coordinated appropriately with the neurostroke team and due to the hemorrhage and this was discussed with patient.      Will continue to follow her.  Over 45 minutes with her this afternoon.    Ivan Deleon MD

## 2025-04-21 NOTE — PROGRESS NOTES
Care Management Follow Up    Length of Stay (days): 3    Expected Discharge Date: 04/23/2025     Concerns to be Addressed: discharge planning     Patient plan of care discussed at interdisciplinary rounds: Yes    Anticipated Discharge Disposition: Acute Rehab  Anticipated Discharge Services: therapies     Referrals Placed by CM/SW:  ARU  Private pay costs discussed: Not applicable    Additional Information:  Referral sent to [facility] yesterday and called to provide updated contact info for this writer. Admissions there will need to see updated notes/sessions with therapies before making a decision on appropriateness for the program. They additionally need to know what her support plan would be for after a potential ARU stay.    KYLE Moore, LGSW   Social Work   [facility]

## 2025-04-22 ENCOUNTER — APPOINTMENT (OUTPATIENT)
Dept: SPEECH THERAPY | Facility: CLINIC | Age: 60
DRG: 061 | End: 2025-04-22
Payer: COMMERCIAL

## 2025-04-22 LAB
GLUCOSE BLDC GLUCOMTR-MCNC: 111 MG/DL (ref 70–99)
GLUCOSE BLDC GLUCOMTR-MCNC: 118 MG/DL (ref 70–99)
GLUCOSE BLDC GLUCOMTR-MCNC: 88 MG/DL (ref 70–99)
GLUCOSE BLDC GLUCOMTR-MCNC: 96 MG/DL (ref 70–99)
HOLD SPECIMEN: NORMAL
MAGNESIUM SERPL-MCNC: 1.9 MG/DL (ref 1.7–2.3)
POTASSIUM SERPL-SCNC: 4.2 MMOL/L (ref 3.4–5.3)

## 2025-04-22 PROCEDURE — 99232 SBSQ HOSP IP/OBS MODERATE 35: CPT | Mod: GC | Performed by: PSYCHIATRY & NEUROLOGY

## 2025-04-22 PROCEDURE — 92526 ORAL FUNCTION THERAPY: CPT | Mod: GN

## 2025-04-22 PROCEDURE — 99232 SBSQ HOSP IP/OBS MODERATE 35: CPT | Performed by: INTERNAL MEDICINE

## 2025-04-22 PROCEDURE — 99232 SBSQ HOSP IP/OBS MODERATE 35: CPT | Performed by: SURGERY

## 2025-04-22 PROCEDURE — 83735 ASSAY OF MAGNESIUM: CPT | Performed by: INTERNAL MEDICINE

## 2025-04-22 PROCEDURE — 84132 ASSAY OF SERUM POTASSIUM: CPT | Performed by: INTERNAL MEDICINE

## 2025-04-22 PROCEDURE — 92507 TX SP LANG VOICE COMM INDIV: CPT | Mod: GN

## 2025-04-22 PROCEDURE — 250N000013 HC RX MED GY IP 250 OP 250 PS 637: Performed by: INTERNAL MEDICINE

## 2025-04-22 PROCEDURE — 99231 SBSQ HOSP IP/OBS SF/LOW 25: CPT | Performed by: NURSE PRACTITIONER

## 2025-04-22 PROCEDURE — 120N000001 HC R&B MED SURG/OB

## 2025-04-22 PROCEDURE — 36415 COLL VENOUS BLD VENIPUNCTURE: CPT | Performed by: INTERNAL MEDICINE

## 2025-04-22 PROCEDURE — 250N000013 HC RX MED GY IP 250 OP 250 PS 637: Performed by: STUDENT IN AN ORGANIZED HEALTH CARE EDUCATION/TRAINING PROGRAM

## 2025-04-22 RX ORDER — NICOTINE POLACRILEX 4 MG
15-30 LOZENGE BUCCAL
Status: DISCONTINUED | OUTPATIENT
Start: 2025-04-22 | End: 2025-04-24 | Stop reason: HOSPADM

## 2025-04-22 RX ORDER — ASPIRIN 81 MG/1
81 TABLET, CHEWABLE ORAL DAILY
Status: DISCONTINUED | OUTPATIENT
Start: 2025-04-22 | End: 2025-04-24 | Stop reason: HOSPADM

## 2025-04-22 RX ORDER — DEXTROSE MONOHYDRATE 25 G/50ML
25-50 INJECTION, SOLUTION INTRAVENOUS
Status: DISCONTINUED | OUTPATIENT
Start: 2025-04-22 | End: 2025-04-24 | Stop reason: HOSPADM

## 2025-04-22 RX ADMIN — ASPIRIN 81 MG CHEWABLE TABLET 81 MG: 81 TABLET CHEWABLE at 11:37

## 2025-04-22 RX ADMIN — ATORVASTATIN CALCIUM 40 MG: 40 TABLET, FILM COATED ORAL at 21:17

## 2025-04-22 ASSESSMENT — ACTIVITIES OF DAILY LIVING (ADL)
ADLS_ACUITY_SCORE: 42

## 2025-04-22 NOTE — PROGRESS NOTES
VASCULAR SURGERY    Patient remained stable.  Discussed her situation with Dr. Valdivia neurostroke staff.    Discussed situation of prior Meuchel epidural I Pilate carcinoma and left parotid cancer treated with surgery radiation from [id number].  This certainly could explain the long stenosis of the distal left common carotid artery with a  normal ICA being related to the radiation changes of scar tissue.      Often times these are much more benign than atherosclerotic disease which is more chance of ulceration and emboli.  Certainly possible that the carotid had nothing to do with her present situation.      May be more appropriate for her to be on appropriate antiplatelet treatment with clinical follow-up in light of this issue and other issues.    If intervention is necessary I do feel an open endarterectomy with patch angioplasty would be more efficacious in stents in this situation particular the length of the stents and necessary to cover the entire distal common carotid artery.  We would wait a minimum 2 weeks due to the hemorrhage and other issues.      Tend to favor more contact conservative treatment after consulting with the Stroke  team.         Ivan Deleon MD

## 2025-04-22 NOTE — PROGRESS NOTES
Neurosurgery Progress Note:     59F on ASA 81mg with history of AML in remission, ischemic stroke, parotid/parotid cancer s/p radiation and diabetes mellitus. Patient presented on 4/18/2025 with acute onset right sided weakness and dysarthria. NSGY consulted for hemorrhagic conversion. Head CT was negative for acute hemorrhage. CTA head and neck showed 50% stenosis of left ICA. She was evaluated by Neurology with an initial NIH stroke scale of 4, she was deemed to be candidate for TNK, TNK was administered at 2100.  MRI brain revealed interval development of hyperacute hemorrhage in the left parietal occipital region with adjacent edema and infarcts involving left frontotemporal region.TNK was reversed with TXA and cryo.     24 hours events: Patient reports fluctuating numbness in her right upper extremity overnight.  Now resolved     On exam: Patient is alert and oriented.  Pupils equal and reactive.  Face is symmetric.  Cranial nerves intact.  Mild expressive aphasia.  Continues to have right visual field cut.  Pronator drift in right upper extremity.  Seemingly 5 out of 5 strength in bilateral upper and lower extremities.  Sensation intact.  No extinction        PLAN:  -No plan for surgical intervention  - Will follow for additional day and likely sign off tomorrow if exam stable  - Management per stroke neurology team  - PT OT SLP       Sahil Vu DNP  [facility] Neurosurgery  [facility]  Vocera messaging strongly preferred  Please always look up on-call provider before messaging/paging    I have spent 25 minutes providing care for this patient  Do        Dragon Disclosure   This was created at least in part with a voice recognition software. Mistakes/typos may be present.

## 2025-04-22 NOTE — PROGRESS NOTES
Care Management Follow Up    Length of Stay (days): 4    Expected Discharge Date: 04/26/2025     Concerns to be Addressed: discharge planning     Patient plan of care discussed at interdisciplinary rounds: Yes    Anticipated Discharge Disposition: Acute Rehab  Anticipated Discharge Services: therapies, transport    Patient/family educated on Medicare website which has current facility and service quality ratings: yes  Education Provided on the Discharge Plan: yes  Patient/Family in Agreement with the Plan: yes    Referrals Placed by CM/SW:  ARU  Private pay costs discussed: Not applicable    Additional Information:  SW met with patient to discuss discharge plans. Recommendation is AR[facility] and [facility] She does not recall why she wanted to go there and is [facility] Referral sent and Mellisa in admissions notified. Patient will need support for after potential ARU stay, she said she thinks she can find people to come stay but she does not know if it would be 24.7 staying with her. She will ask friends. Explained ARU vs TCU, length of stay, and discharge planning process. Patient wants to leave the hospital to go take care of things at home, SW explained that she cannot leave the hospital and come back while here or while at ARU, she was understanding. GRIS called and updated Carmina in admissions at [facility].     Emlira Valles, KYLE, LGSW   Social Work   [facility]

## 2025-04-22 NOTE — PROVIDER NOTIFICATION
MD Notification    Notified Person: MD    Notified Person Name:  Red    Notification Date/Time: 04/22/2025 0033    Notification Interaction: Vocera    Purpose of Notification: FYI pt had brief episode (~5 min) of increased numbness RUE as well as decreased strength and reduced fine motor skills; now resolved. BP was 87/59 and pt had recently ambulated, BP now back up with SBP >100    Orders Received: Read, no response    Comments: Pt appears to have had a similar episode during therapies recently

## 2025-04-22 NOTE — PLAN OF CARE
Goal Outcome Evaluation:      Plan of Care Reviewed With: patient    Overall Patient Progress: improvingOverall Patient Progress: improving         Pt here with L sided stroke, s/p TNK followed by hemmorhage and TNK reversal. A&O x4. Neuros: R pronator drift, R droop, R field cut. Pt is slow to respond, but this has improved throughout the night. Overnight had RUE weakness & difficulty with fine motor skills - resolved after about 5 min, MD notified - appears this also happened with therapies recently. Pt wears a removable orthodontic appliance, speech sounds slurred if this is not in place. VSS on RA ex soft BP; parameters SBP <150. Tele NSR. Easy to chew diet, thin liquids. No pills taken this shift. Up with SBA w/ GB. Pt reports intermittent h/a. Pt scoring green on the Aggression Stop Light Tool. Plan ARU. Discharge pending. Vascular surgery recommending L CEA when pt safe to have intraoperative heparin.

## 2025-04-22 NOTE — PROGRESS NOTES
[facility]    Vascular Neurology Progress Note    Interval History     No overnight issues, the patient is clinically stable, she is getting atorvastatin 40 mg daily.  Blood pressure stable, last blood pressure 107/74.  BP: 107/74 Systolic (24hrs), Av , Min:87 , Max:133   Diastolic (24hrs), Av, Min:59, Max:93       Hospital Course     Chief complaint: Stroke Symptoms    59-year-old female with past medical history significant for AML in remission, ischemic stroke, parotid/parotid cancer s/p radiation and diabetes mellitus presented on 2025 with acute onset right sided weakness and dysarthria. Last known well 2025. On admission, /91, GCS 15, CBC unremarkable, CMP showing elevated creatinine, PT INR within normal limits.CT head was negative for any acute hemorrhage. CTA head and neck was negative for any actionable large vessel occlusion but showed 50% stenosis of left ICA. She was evaluated by teleneurology with an initial NIH stroke scale of 4, she was deemed to be candidate for TNK, TNK was administered at 2100. MRI brain revealed interval development of hyperacute hemorrhage in the left parietal occipital region with adjacent edema and several watershed/embolic appearing infarcts involving left frontotemporal region.TNK was reversed with TXA and cryo. She was reevaluated by Tele-Neurology with repeat NIH stroke scale of 5.  Repeat CT head did not reveal any worsening of intracranial hemorrhage. Right arm weakness and ataxia improving but she developed right hemianopsia and aphasia.    Assessment and Plan     1. 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  4 Left CCA stenosis    She had at least 2 embolic strokes in the left hemisphere, and could be from left CCA.    -Plan:  - SBP goal < 150  - Aspirin 81 mg  "daily started  - In a few days will add cilostazol   - If stable, okay to go to ARU on Thursday (4/23/24)  - Appreciated vascular surgery input, out patient clinic follow up  - Neuro check every 4 hours  - Statin:  Continue PTA Lipitor 40 mg daily, goal of LDL between 40 and 70  - PT/OT/SLP    DVT Prophylaxis: hold chemical DVT ppx     Patient Follow-up    - in 4-6 weeks with general neurology or stroke BEV ([phone]), requested  - Repeat MRI brain in 3 months, requested  - Zio patch on discharge, requested  - Outpatient vascular surgery follow up  - TCD at [place] in 1 month    We will continue to follow.     The Stroke Staff is Dr. Valdivia.    Caridad Dalal MD  Vascular Neurology Fellow    To page me or covering stroke neurology team member, click here: [facility]  Choose \"On Call\" tab at top, then select \"NEUROLOGY/ALL SITES\" from middle drop-down box, press Enter, then look for \"stroke\" or \"telestroke\" for your site.    Physical Examination     Temp: 97.9  F (36.6  C) Temp src: Oral BP: 107/74 Pulse: 99   Resp: 12 SpO2: 96 % O2 Device: None (Room air)      Neurologic  Mental Status:  alert, oriented x 3, follows commands, aphasic  Cranial Nerves:  visual fields right visual field deficit, PERRL, EOMI with normal smooth pursuit, facial sensation intact and symmetric, facial movements symmetric, hearing not formally tested but intact to conversation  Motor:  normal muscle tone and bulk, no abnormal movements, able to move all limbs spontaneously, strength at least antigravity throughout upper and lower extremities  Reflexes:  toes down-going  Sensory: Decreased to light touch in right arm  Coordination:   Mild dysmetria on right  Station/Gait:  deferred    Stroke Scales       NIHSS  1a. Level of Consciousness 0-->Alert, keenly responsive   1b. LOC Questions 0-->Answers both questions correctly   1c. LOC Commands 0-->Performs both tasks correctly   2.   Best Gaze 0-->Normal   3.   Visual 2-->Complete hemianopia   4.  "  Facial Palsy 1-->Minor paralysis (flattened nasolabial fold, asymmetry on smiling)   5a. Motor Arm, Left 0-->No drift, limb holds 90 (or 45) degrees for full 10 secs   5b. Motor Arm, Right 0-->No drift, limb holds 90 (or 45) degrees for full 10 secs   6a. Motor Leg, Left 0-->No drift, leg holds 30 degree position for full 5 secs   6b. Motor Leg, right 0-->No drift, leg holds 30 degree position for full 5 secs   7.   Limb Ataxia 1-->Present in one limb   8.   Sensory 1   9.   Best Language 1-->Mild-to-moderate aphasia, some obvious loss of fluency or facility of comprehension, without significant limitation on ideas expressed or form of expression. Reduction of speech and/or comprehension, however, makes conversation. . . (see row details)   10. Dysarthria 0-->Normal   11. Extinction and Inattention  0-->No abnormality   Total 6       Imaging/Labs   (Bolded imaging and labs new and/or personally reviewed or re-reviewed by me today)    MRI/Head CT HEAD CT:  1.  No CT evidence for acute intracranial process.  2.  Brain atrophy and presumed chronic microvascular ischemic changes as above.  3.  Left mastoid effusion.    MRI Brain   1.  Interval development of a hyperacute vertebral hemorrhage in the left parieto-occipital region with mild adjacent edema and small adjacent subdural hemorrhage. Mild mass effect without midline shift     2.  Multiple additional small scattered areas of chronic hemosiderin are unchanged     3.  Several new watershed and embolic-type infarcts are seen in the left frontoparietal region. Interval evolutionary change of the previously seen infarcts     4.  Abnormal enhancement within the completely opacified left mastoid air cells. The enhancement is progressive or new since the prior study. Question mild adjacent intracranial enhancement. Temporal bone CT suggested for further evaluation.       Repeat CT head   1.  Similar parenchymal hematoma at the left parietal/occipital junction compared  to same day brain MRI. Similar thin subdural collection over the left cerebral convexity, measuring 4 mm in maximum thickness. No significant progressive mass effect.  2.  No new sites of acute intracranial hemorrhage.  3.  Multiple small recent infarcts in the left cerebral hemisphere are better seen on MRI.    CT head 4/19  1.  No significant interval change compared to CT head 4/19/2025.   Intracranial Vasculature HEAD CTA:  Normal CTA [place].   Cervical Vasculature NECK CTA:  1.  50% stenosis of the left ICA origin.  2.  Heterogeneous thyroid gland with multiple nodules.     Echocardiogram EF 55-60 percent, normal biventricular size   EKG/Telemetry Sinus tachycardia   CUS:->      < 50% stenosis of bilateral ICAs  LDL  4/19/2025: 47 mg/dL   A1C  4/7/2025: 6.0 %   Troponin No lab value available in past 48 hrs

## 2025-04-22 NOTE — PROGRESS NOTES
Vascular Surgery Progress Note  04/22/2025       Subjective:  - Patient sitting comfortably in chair this morning. Episode of increased RUE weakness and numbness overnight, associated BP of 87/59. Reports feeling back to baseline for RUE weakness.     Objective:  Temp:  [98  F (36.7  C)] 98  F (36.7  C)  Pulse:  [] 99  Resp:  [16] 16  BP: ()/(59-93) 107/74  SpO2:  [92 %-97 %] 96 %       Physical Exam:  Gen: Awake, sitting comfortably in chair, NAD  Resp: Breathing room air, nonlabored   Mild dysarthria   Slight RUE weakness       Labs:  Recent Labs   Lab 04/21/25  1854 04/20/25  0520 04/19/25  0821   WBC 9.4 6.5 8.1   HGB 13.4 11.9 12.9    158 148*     Imaging:  Imaging reviewed.     Assessment/Plan:   59 year old female symptomatic mild left ICA stenosis admitted with right sided weakness, numbness, ataxia, dysarthria. S/p TNK, complicated by parietal-occipital hemorrhage requiring TNK reversal. Symptoms are largely improved and area of intra cranial hemorrhage is grossly stable on repeat CT imaging.    - recommending left CEA once deemed same for intra-op heparin   - coordinating timing of left CEA with neurology and neurosurgery teams  - antiplatelet and anticoagulation meds currently held  - continue high-dose statin  - continue neuro checks and monitor for worsening symptoms      Discussed with vascular surgery staff, who is in agreement with the above.  - - - - - - - - - - - - - - - - - -  Nicki Cox PA-C  Vascular Surgery
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
## 2025-04-22 NOTE — PLAN OF CARE
Goal Outcome Evaluation:      Plan of Care Reviewed With: patient    Overall Patient Progress: improvingOverall Patient Progress: improving       Here with L sided stroke, s/p TNK followed by hemmorhage and TNK reversal. Neuros stable. Word finding difficulty. Right field cut both eyes. Denies pain. Tele NSR. BP < 150 maintained. No sliding scale insulin needed. Regular diet, meds whole. Up with assist 1, gait belt, walker. Continent. Discharge to ARU when ready. Aggression tool green

## 2025-04-23 ENCOUNTER — APPOINTMENT (OUTPATIENT)
Dept: CT IMAGING | Facility: CLINIC | Age: 60
DRG: 061 | End: 2025-04-23
Attending: INTERNAL MEDICINE
Payer: COMMERCIAL

## 2025-04-23 ENCOUNTER — APPOINTMENT (OUTPATIENT)
Dept: OCCUPATIONAL THERAPY | Facility: CLINIC | Age: 60
DRG: 061 | End: 2025-04-23
Payer: COMMERCIAL

## 2025-04-23 ENCOUNTER — APPOINTMENT (OUTPATIENT)
Dept: PHYSICAL THERAPY | Facility: CLINIC | Age: 60
DRG: 061 | End: 2025-04-23
Payer: COMMERCIAL

## 2025-04-23 LAB
GLUCOSE BLDC GLUCOMTR-MCNC: 115 MG/DL (ref 70–99)
GLUCOSE BLDC GLUCOMTR-MCNC: 121 MG/DL (ref 70–99)
GLUCOSE BLDC GLUCOMTR-MCNC: 129 MG/DL (ref 70–99)
GLUCOSE BLDC GLUCOMTR-MCNC: 134 MG/DL (ref 70–99)
HOLD SPECIMEN: NORMAL
MAGNESIUM SERPL-MCNC: 2.1 MG/DL (ref 1.7–2.3)
POTASSIUM SERPL-SCNC: 4.5 MMOL/L (ref 3.4–5.3)

## 2025-04-23 PROCEDURE — 97535 SELF CARE MNGMENT TRAINING: CPT | Mod: GO

## 2025-04-23 PROCEDURE — 70480 CT ORBIT/EAR/FOSSA W/O DYE: CPT

## 2025-04-23 PROCEDURE — 250N000013 HC RX MED GY IP 250 OP 250 PS 637: Performed by: STUDENT IN AN ORGANIZED HEALTH CARE EDUCATION/TRAINING PROGRAM

## 2025-04-23 PROCEDURE — 36415 COLL VENOUS BLD VENIPUNCTURE: CPT | Performed by: INTERNAL MEDICINE

## 2025-04-23 PROCEDURE — 97530 THERAPEUTIC ACTIVITIES: CPT | Mod: GP

## 2025-04-23 PROCEDURE — 99232 SBSQ HOSP IP/OBS MODERATE 35: CPT | Performed by: INTERNAL MEDICINE

## 2025-04-23 PROCEDURE — 120N000001 HC R&B MED SURG/OB

## 2025-04-23 PROCEDURE — 97116 GAIT TRAINING THERAPY: CPT | Mod: GP

## 2025-04-23 PROCEDURE — 84132 ASSAY OF SERUM POTASSIUM: CPT | Performed by: INTERNAL MEDICINE

## 2025-04-23 PROCEDURE — 83735 ASSAY OF MAGNESIUM: CPT | Performed by: INTERNAL MEDICINE

## 2025-04-23 PROCEDURE — 99231 SBSQ HOSP IP/OBS SF/LOW 25: CPT | Mod: GC | Performed by: PSYCHIATRY & NEUROLOGY

## 2025-04-23 PROCEDURE — 250N000013 HC RX MED GY IP 250 OP 250 PS 637: Performed by: INTERNAL MEDICINE

## 2025-04-23 RX ADMIN — ASPIRIN 81 MG CHEWABLE TABLET 81 MG: 81 TABLET CHEWABLE at 08:08

## 2025-04-23 RX ADMIN — ATORVASTATIN CALCIUM 40 MG: 40 TABLET, FILM COATED ORAL at 20:50

## 2025-04-23 ASSESSMENT — ACTIVITIES OF DAILY LIVING (ADL)
ADLS_ACUITY_SCORE: 42

## 2025-04-23 NOTE — PLAN OF CARE
Goal Outcome Evaluation:         Pt here with L CVA, post TNK, then developed a brain bleed,  TNK reversal administered to stop bleed. A&O x4. Neuros R field cut on both eyes, slight R pronator drift, BLE neuropathy, slight RUE numbness (improving). No PIV, on call MD notified, pt will think about getting another IV and let nursing know, as right now she is refusing IV placement. VSS, SBP parameters for < 180. Tele NSR. reg diet, thin liquids. Takes pills whole with water. Up with A1 GB W. Denies pain. Pt scoring green on the Aggression Stop Light Tool. Plan for ARU. Discharge pending.

## 2025-04-23 NOTE — PROGRESS NOTES
Care Management Follow Up    Length of Stay (days): 5    Expected Discharge Date: 04/24/2025     Concerns to be Addressed: discharge planning     Patient plan of care discussed at interdisciplinary rounds: Yes    Anticipated Discharge Disposition: Acute Rehab  Anticipated Discharge Services:  therapies    Referrals Placed by CM/SW:  ARU locations  Private pay costs discussed: Not applicable    Additional Information:  Updated by hospitalist that patient may be medically ready to discharge tomorrow. GRIS notified Mellisa in admissions at  ARU.     Elmira Valles, KYLE, LGSW   Social Work   [facility]

## 2025-04-23 NOTE — PROGRESS NOTES
Care Management Follow Up    Length of Stay (days): 5    Expected Discharge Date: 04/24/2025     Concerns to be Addressed: discharge planning     Patient plan of care discussed at interdisciplinary rounds: Yes    Anticipated Discharge Disposition: Acute Rehab              Anticipated Discharge Services:    Anticipated Discharge DME:      Patient/family educated on Medicare website which has current facility and service quality ratings: yes  Education Provided on the Discharge Plan:    Patient/Family in Agreement with the Plan: yes    Referrals Placed by CM/SW:    Private pay costs discussed: Not applicable    Discussed  Partnership in Safe Discharge Planning  document with patient/family: No     Handoff Completed: No, handoff not indicated or clinically appropriate    Additional Information:  Writer met with patient at bedside to discuss that insurance auth has been submitted. Writer asked patient about transportation, patient stated she would like it arranged with  [employer] and is in agreement with costs associated.     [employer] wheelchair ride arrange for [phone]    Next Steps: Insurance auth    JAMES MARTIN  [facility]  INPATIENT CARE COORDINATION

## 2025-04-23 NOTE — PROGRESS NOTES
[facility]  Neurosurgery Daily Progress Note  Date of Admission:  4/18/2025    Assessment  59F on ASA 81mg with history of AML in remission, ischemic stroke, parotid/parotid cancer s/p radiation and diabetes mellitus. Patient presented on 4/18/2025 with acute onset right sided weakness and dysarthria. NSGY was consulted for hemorrhagic conversion and possible hemicraniectomy watch. Head CT was negative for acute hemorrhage. CTA head and neck showed 50% stenosis of left ICA. She was evaluated by Neurology with an initial NIH stroke scale of 4, she was deemed to be candidate for TNK, TNK was administered at 2100.  MRI brain revealed interval development of hyperacute hemorrhage in the left parietal occipital region with adjacent edema and infarcts involving left frontotemporal region.TNK was reversed with TXA and cryo.     Interval History   Patient was seen sitting in the chair. Reports RUE numbness/tingling/weakness continues to improve. Continued right visual field cut and mild expressive aphasia. Denies headaches or new  symptoms today.      Plan   -No surgical intervention planned at this time   -Continue management and follow-up per Stroke Neurology   -Discharge plan: ARU  -NSGY will sign off, please call with questions     Discussed with Dr. Michaud and patient    Shirley Velasquez CNP  [facility] Neurosurgery  Clinic phone number: [phone]  Securely message or page via [employer] Secure Chat, [employer], or [place]     Physical Exam   Temp: 98.1  F (36.7  C) Temp src: Oral BP: 119/77 Pulse: 103   Resp: 16 SpO2: 98 % O2 Device: None (Room air)    Vitals:    04/18/25 2028 04/19/25 0720 04/20/25 0500   Weight: 89.8 kg (198 lb) 92 kg (202 lb 13.2 oz) 92.7 kg (204 lb 5.9 oz)       Mental status:  Alert and oriented x 3, mild expressive aphasia, following commands  Cranial nerves:  II-XII intact except right visual field cut   Motor:    Moves all extremities  RUE slight weakness, improving   LUE  5/5  BLE 5/5   Sensation:  Slight decreased sensation in RUE   Coordination:  Smooth finger to nose testing. RUE pronator drift.

## 2025-04-23 NOTE — PROGRESS NOTES
Care Management Follow Up    Length of Stay (days): 5    Expected Discharge Date: 04/24/2025     Concerns to be Addressed: discharge planning     Patient plan of care discussed at interdisciplinary rounds: Yes    Anticipated Discharge Disposition: Acute Rehab              Anticipated Discharge Services:    Anticipated Discharge DME:      Patient/family educated on Medicare website which has current facility and service quality ratings: yes  Education Provided on the Discharge Plan:    Patient/Family in Agreement with the Plan: yes    Referrals Placed by CM/SW:    Private pay costs discussed: Not applicable    Discussed  Partnership in Safe Discharge Planning  document with patient/family: No     Handoff Completed: No, handoff not indicated or clinically appropriate    Additional Information:  Patient requests an upcoming appointment at [facility] be cancelled.  Contacted the clinic and appointment has been cancelled.  Clinic will reach out to patient at a later date to reschedule.    Bridgett Eng RN, BSN, PHN  Inpatient Care Coordination  [facility]  Phone: [phone]

## 2025-04-23 NOTE — PLAN OF CARE
Reason for Admission: L CVA, s/p TNK followed by hemmorhage and TNK reversal.     Cognitive/Mentation: A/O x4.   Neuros/CMS: WFD, gen weakness, numbness in BLE at baseline, numbness in RUE improving, R field cut in both eyes, slight R drift, slurred speech without removable orthodontic appliance.   VS: Stable on RA.   Tele: NSR.   GI/: Continent.   Pulmonary: LS clear.   Pain: denies.     Drains/Lines: Refused PIV overnight.   Skin: Scattered bruising to BUE.   Activity: SBA with gait belt.   Diet: Regular with thin liquids.     Therapies recs: ARU.  Discharge: pending.     Aggression Spotlight Tool: green.     End of shift summary: No acute changes overnight.
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
## 2025-04-23 NOTE — PROGRESS NOTES
[facility]    Vascular Neurology Progress Note    Interval History     No overnight issue. Clinically stable.   BP: 119/77 Systolic (24hrs), Av , Min:109 , Max:126   Diastolic (24hrs), Av, Min:72, Max:82     Hospital Course     Chief complaint: Stroke Symptoms    59-year-old female with past medical history significant for AML in remission, ischemic stroke, parotid/parotid cancer s/p radiation and diabetes mellitus presented on 2025 with acute onset right sided weakness and dysarthria. Last known well 2025. On admission, /91, GCS 15, CBC unremarkable, CMP showing elevated creatinine, PT INR within normal limits.CT head was negative for any acute hemorrhage. CTA head and neck was negative for any actionable large vessel occlusion but showed 50% stenosis of left ICA. She was evaluated by teleneurology with an initial NIH stroke scale of 4, she was deemed to be candidate for TNK, TNK was administered at 2100. MRI brain revealed interval development of hyperacute hemorrhage in the left parietal occipital region with adjacent edema and several watershed/embolic appearing infarcts involving left frontotemporal region.TNK was reversed with TXA and cryo. She was reevaluated by Tele-Neurology with repeat NIH stroke scale of 5.  Repeat CT head did not reveal any worsening of intracranial hemorrhage. Right arm weakness and ataxia improving but she developed right hemianopsia and aphasia.    Assessment and Plan     1. 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  4 Left CCA stenosis    -Plan:  - Will repeat head CT tomorrow morning  - If repeat head CT stable, will start Cilostazol  - SBP goal < 150  - Aspirin 81 mg daily started  - ARU tomorrow  - Appreciated vascular surgery input, out patient clinic follow up  - Neuro  "check every 4 hours  - Statin:  Continue PTA Lipitor 40 mg daily, goal of LDL between 40 and 70  - PT/OT/SLP    DVT Prophylaxis: hold chemical DVT ppx     Patient Follow-up    - in 4-6 weeks with general neurology or stroke BEV ([phone]), requested  - Repeat MRI brain in 3 months, requested  - Zio patch on discharge, requested  - Outpatient vascular surgery follow up  - TCD at [place] in 1 month, requested    We will continue to follow.     The Stroke Staff is Dr. Valdivia.    Caridad Dalal MD  Vascular Neurology Fellow    To page me or covering stroke neurology team member, click here: [facility]  Choose \"On Call\" tab at top, then select \"NEUROLOGY/ALL SITES\" from middle drop-down box, press Enter, then look for \"stroke\" or \"telestroke\" for your site.    Physical Examination     Temp: 98.1  F (36.7  C) Temp src: Oral BP: 119/77 Pulse: 103   Resp: 16 SpO2: 98 % O2 Device: None (Room air)      Neurologic  Mental Status:  alert, oriented x 3, follows commands, aphasic  Cranial Nerves:  visual fields right visual field deficit, PERRL, EOMI with normal smooth pursuit, facial sensation intact and symmetric, facial movements symmetric, hearing not formally tested but intact to conversation  Motor:  normal muscle tone and bulk, no abnormal movements, able to move all limbs spontaneously, strength at least antigravity throughout upper and lower extremities  Reflexes:  toes down-going  Sensory: Decreased to light touch in right arm  Coordination:   Mild dysmetria on right  Station/Gait:  deferred    Stroke Scales       NIHSS  1a. Level of Consciousness 0-->Alert, keenly responsive   1b. LOC Questions 0-->Answers both questions correctly   1c. LOC Commands 0-->Performs both tasks correctly   2.   Best Gaze 0-->Normal   3.   Visual 2-->Complete hemianopia   4.   Facial Palsy 1-->Minor paralysis (flattened nasolabial fold, asymmetry on smiling)   5a. Motor Arm, Left 0-->No drift, limb holds 90 (or 45) degrees for full 10 secs "   5b. Motor Arm, Right 0-->No drift, limb holds 90 (or 45) degrees for full 10 secs   6a. Motor Leg, Left 0-->No drift, leg holds 30 degree position for full 5 secs   6b. Motor Leg, right 0-->No drift, leg holds 30 degree position for full 5 secs   7.   Limb Ataxia 1-->Present in one limb   8.   Sensory 1   9.   Best Language 1-->Mild-to-moderate aphasia, some obvious loss of fluency or facility of comprehension, without significant limitation on ideas expressed or form of expression. Reduction of speech and/or comprehension, however, makes conversation. . . (see row details)   10. Dysarthria 0-->Normal   11. Extinction and Inattention  0-->No abnormality   Total 6       Imaging/Labs   (Bolded imaging and labs new and/or personally reviewed or re-reviewed by me today)    MRI/Head CT HEAD CT:  1.  No CT evidence for acute intracranial process.  2.  Brain atrophy and presumed chronic microvascular ischemic changes as above.  3.  Left mastoid effusion.    MRI Brain   1.  Interval development of a hyperacute vertebral hemorrhage in the left parieto-occipital region with mild adjacent edema and small adjacent subdural hemorrhage. Mild mass effect without midline shift     2.  Multiple additional small scattered areas of chronic hemosiderin are unchanged     3.  Several new watershed and embolic-type infarcts are seen in the left frontoparietal region. Interval evolutionary change of the previously seen infarcts     4.  Abnormal enhancement within the completely opacified left mastoid air cells. The enhancement is progressive or new since the prior study. Question mild adjacent intracranial enhancement. Temporal bone CT suggested for further evaluation.       Repeat CT head   1.  Similar parenchymal hematoma at the left parietal/occipital junction compared to same day brain MRI. Similar thin subdural collection over the left cerebral convexity, measuring 4 mm in maximum thickness. No significant progressive mass  effect.  2.  No new sites of acute intracranial hemorrhage.  3.  Multiple small recent infarcts in the left cerebral hemisphere are better seen on MRI.    CT head 4/19  1.  No significant interval change compared to CT head 4/19/2025.   Intracranial Vasculature HEAD CTA:  Normal CTA [place].   Cervical Vasculature NECK CTA:  1.  50% stenosis of the left ICA origin.  2.  Heterogeneous thyroid gland with multiple nodules.     Echocardiogram EF 55-60 percent, normal biventricular size   EKG/Telemetry Sinus tachycardia   CUS:->      < 50% stenosis of bilateral ICAs  LDL  4/19/2025: 47 mg/dL   A1C  4/7/2025: 6.0 %   Troponin No lab value available in past 48 hrs

## 2025-04-23 NOTE — PLAN OF CARE
Goal Outcome Evaluation:      Plan of Care Reviewed With: patient    Overall Patient Progress: improvingOverall Patient Progress: improving     Reason for Admission: L CVA, hemorrhagic converstion     Cognitive/Mentation: A/Ox 4  Neuros/CMS: WFD,slurred speech (dental appliance removed) weakness numbness RUE right  field cut, right drift  VS: stable.   Tele: ST.  /GI: Continent.   Pulmonary: LS clear.  Pain: denies.     Drains/Lines: no IV   Skin: scattered bruising  Activity: SBA  Diet: reg with thin liquids. Takes pills whole.     Therapies recs: ARU  Discharge: pending    Aggression Stoplight Tool: green    End of shift summary : IV placement attempted by resource nurse. Pt hard stick. Pt then stated I dont want an IV right now. MD pena replied with ok to hold off for now.

## 2025-04-23 NOTE — INTERIM SUMMARY
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 Last known well 4/18/2025 2000. On admission, /91, GCS 15, CBC unremarkable, CMP showing elevated creatinine, PT INR within normal limits.CT head was negative for any acute hemorrhage. CTA head and neck was negative for any actionable large vessel occlusion but showed 50% stenosis of left ICA. She was evaluated by teleneurology with an initial NIH stroke scale of 4, she was deemed to be candidate for TNK, TNK was administered at 2100. MRI brain revealed interval development of hyperacute hemorrhage in the left parietal occipital region with adjacent edema and several watershed/embolic appearing infarcts involving left frontotemporal region.TNK was reversed with TXA and cryo. Neurosurgery was consulted and no surgical intervention is planned. Hospital course has been complicated by fluctuations in neuro symptoms attributed to hypotension, and determining if pt should start AC as well as if pt is a surgical candidate for L CEA. Pt is now ready for transfer to inpatient rehab with continued medical management at least 3 times per week to manage neuro status, BP, diabetes.      At baseline, pt lives alone in an accessible condo  with elevator access. She typically takes the stairs and is very independent with all ADL, IADL and mobility and works full-time as a . She is currently far below baseline, with deficits in ADL, IADL, mobility, speak, swallowing after an acute stroke. She is in need of intensive inpatient neuro rehab to address for functional deficits that are caused by acute R hemiplegia and impaired sensation, ataxia, aphasia, dysarthria, R hemianopsia/field cut, R neglect, impaired balance and coordination. She is at high risk for falls and is not yet safe to discharge home. She is motivated and progressing well.     Current Active Medical Management Needs/Risks for Clinical Complications  The patient requires the high level of rehabilitation physician supervision that accompanies the provision of intensive rehabilitation therapy.  The patient needs the services of the rehabilitation physician to assess the patient medically and functionally and to modify the course of treatment as needed to maximize the patient's capacity to benefit from the rehabilitation process.    Neuro: Pt is at risk for neurologic decline, falls with injury, future strokes, post stroke depression and pain, long term gait and functional impairment. Continue neuro checks and assess for neurologic recovery. Assess for needs related to positioning, tone, ROM. Provide stroke education, including proper medication management. On Aspirin. Start Plavix on 4/24. Continue high dose statin. BID Cilostazol. Zio patch on discharge. PT is OK to lay flat. Repeat MRI brain in 3 months. Follow up in OP vascular surgery clinic to address possible L CEA. Per Neuro and Vascular: Hold off on surgery and manage conservatively given that the stenosis is not severe and that secondary stroke prevention plan was not maximized. There is also concern about the history of radiation therapy increasing the surgical risk and the long segment of stenosis that would  require multiple stents if we go the endovascular route. 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   BP: Pt is at risk for both hypertension and hypotension/poor perfusion, and has had changes to neuro exam in the setting of soft BP. Continue to assess vitals and symptoms closely and manage anti hypertensives as indicated. SBP goal < 140. Most recently, BP has been as low as 90/62 in the last 48 hours.   DM II: Pt is at risk for uncontrolled BG as well as feeling lightheaded/dizzy. She has a recent history of taking Ozempic and feeling faint, having to lean against a wall to prevent a fall at home; it was discontinued this hospitalization due to this. TIDAC glucose checks with medium dose sliding scale insulin available. Further discussion with PCP after discharge regarding resumption of Ozempic versus different hypoglycemic agent.  BG has been between  in the last 24 hours.     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 and 7224-2370 with surgery and radiation; pulmonary nodules    Level of Functioning Prior to Admission:    LIVING ENVIRONMENT  People in Home: alone  Current Living Arrangements: condominium  Home Accessibility: no concerns  Transportation Anticipated: family or friend will provide  Living Environment Comments: Pt lives alone in 3rd floor condo, has elevator but does do stairs as well    SELF-CARE  Usual Activity Tolerance: good  Regular Exercise: No  Equipment Currently Used at Home: grab bar, toilet, grab bar, tub/shower  Activity/Exercise/Self-Care Comment: Pt reports at baseline is independent in self cares    Additional Comments: Works full time in financial planning    Level of Function: YANELY  Scale (Section GG Functional Ability and Goals; CMS's ABDALLA Version 3.0 Manual effective 10.1.2019):  PT Current Function Goals for Rehab   Bed Rolling 6 Independent 6 Independent   Supine to Sit 4 Supervision or touching assistance 6 Independent   Sit to Stand 3 Partial/moderate assistance 6 Independent   Transfer 3 Partial/moderate assistance 6 Independent   Ambulation 3 Partial/moderate assistance 6 Independent   Stairs Not completed 6 Independent     OT Current Function Goals for Rehab   Feeding 5 Setup or clean-up assistance 6 Independent   Grooming 4 Supervision or touching assistance (seated, CGA after set up) 6 Independent   Bathing Not completed 6 Independent   Upper Body Dressing Not completed 6 Independent   Lower Body Dressing 4 Supervision or touching assistance (CGA) 6 Independent   Toileting 3 Partial/moderate assistance 6 Independent   Toilet Transfer 4 Supervision or touching assistance (CGA with grab bar) 6 Independent   Tub/Shower Transfer Not completed 6 Independent   Cognition Impaired Independent     SLP Current Function Goals for Rehab   Swallow Impaired Tolerate least restrictive diet without signs & symptoms of aspiration and adhere to safe swallow strategies   Communication Impaired Communicate basic needs effectively       Current Diet:  0-Thin and 7-Regular    Summary Statement:  Pt is a 59 year previously very independent and high functioning female who now presents with significant neuro deficits due to acute strokes. Pt needs varying assist from CGA-Mod A for sit<>stand transfers, with hand over hand guidance for R hand at times due to impaired grasp, vision, sensation. Ambulated approx 40 ft with Mod A, with shuffling slow gait, using handrail in hallway with assist to guide R hand. Pt needs to sit to complete ADLs and needs cues to scan environment to locate items as well as needs cues to incorporate R hand with ADL tasks such as grooming or dressing. Pt was recently upgraded to a  regular diet and will need to ensure continued tolerance. SLP continues to work on cognitive-linguistic tasks such as naming of objects, sentence completion, word definition and speech production. 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 She needs 24/7 rehab focused nursing cares and at least 3x per week medical management of her neuro status, BP and DM II. She is progressing well, highly motivated and is an excellent candidate for inpatient acute rehab.     Expected Therapies and Services Required During Inpatient Rehab Admission  Intensity of Therapy: Patient requires intensive therapies not available in a lesser level of care. Patient is motivated, making gains, and can tolerate 3 hours of therapy a day.  Physical Therapy: 60 minutes per day, 6 days a week for 8 days  Occupational Therapy: 60 minutes per day, 6 days a week for 8 days  Speech and Language Therapy: 60 minutes per day, 6 days a week for 8 days  Rehabilitation Nursing Needs: Patient requires 24 hour Rehab Nursing to manage vitals, medication education, positioning, carryover of new rehab techniques, care coordination, blood sugar management, diabetes education, assess neurologic status, pain management, stroke education, provide safe environment for patient at falls risk, and monitor nutritional intake.    Precautions/restrictions/special needs:   Precautions: fall precautions; SBP goal >140   Restrictions: NA   Special Needs:  NA    Expected Level of Improvement: Mod (I) with ADL, IADL and mobility in her home environment, able to communicate all of her needs  Expected Length of time to achieve: 8 days    Anticipated Discharge Needs:  Anticipated Discharge Destination: Home  Anticipated Discharge Support: Family member and Friends  24/7 support available : No  Identified caregiver(s):  , friends  Anticipated Discharge Needs:  Home with homecare and Home with outpatient therapy    Identified challenges/barriers:  Lives alone    Funding source: Medical insurance-[facility]    Liaison signature/date/time: Sofi Maldonado OTR/L CM 4/24/2025 1:12 PM    Physician statement of review and agreement:  I have reviewed and am in agreement of the need for IRF stay to address above functional and medical needs. In addition to above statements address, Patient requires intensive active and ongoing therapeutic intervention and multiple therapies; Patient requires medical supervision; Expected to actively participate in the intensive rehab program; Sufficiently stable to actively participate; Expectation for measurable improvement in functional capacity or adaption to impairments.    MD signature/date/time:

## 2025-04-24 ENCOUNTER — APPOINTMENT (OUTPATIENT)
Dept: CT IMAGING | Facility: CLINIC | Age: 60
DRG: 061 | End: 2025-04-24
Attending: STUDENT IN AN ORGANIZED HEALTH CARE EDUCATION/TRAINING PROGRAM
Payer: COMMERCIAL

## 2025-04-24 ENCOUNTER — APPOINTMENT (OUTPATIENT)
Dept: OCCUPATIONAL THERAPY | Facility: CLINIC | Age: 60
DRG: 061 | End: 2025-04-24
Payer: COMMERCIAL

## 2025-04-24 ENCOUNTER — HOSPITAL ENCOUNTER (INPATIENT)
Facility: CLINIC | Age: 60
DRG: 057 | End: 2025-04-24
Attending: PHYSICAL MEDICINE & REHABILITATION | Admitting: PHYSICAL MEDICINE & REHABILITATION
Payer: COMMERCIAL

## 2025-04-24 ENCOUNTER — APPOINTMENT (OUTPATIENT)
Dept: SPEECH THERAPY | Facility: CLINIC | Age: 60
DRG: 061 | End: 2025-04-24
Payer: COMMERCIAL

## 2025-04-24 VITALS
BODY MASS INDEX: 32.08 KG/M2 | RESPIRATION RATE: 16 BRPM | HEART RATE: 96 BPM | OXYGEN SATURATION: 95 % | TEMPERATURE: 97.6 F | SYSTOLIC BLOOD PRESSURE: 109 MMHG | DIASTOLIC BLOOD PRESSURE: 68 MMHG | HEIGHT: 67 IN | WEIGHT: 204.37 LBS

## 2025-04-24 DIAGNOSIS — I63.9 CEREBROVASCULAR ACCIDENT (CVA), UNSPECIFIED MECHANISM (H): ICD-10-CM

## 2025-04-24 DIAGNOSIS — I63.9 ACUTE CVA (CEREBROVASCULAR ACCIDENT) (H): Primary | ICD-10-CM

## 2025-04-24 DIAGNOSIS — H74.90: ICD-10-CM

## 2025-04-24 LAB
ANION GAP SERPL CALCULATED.3IONS-SCNC: 12 MMOL/L (ref 7–15)
BUN SERPL-MCNC: 20.6 MG/DL (ref 8–23)
CALCIUM SERPL-MCNC: 9.4 MG/DL (ref 8.8–10.4)
CHLORIDE SERPL-SCNC: 101 MMOL/L (ref 98–107)
CREAT SERPL-MCNC: 1 MG/DL (ref 0.51–0.95)
EGFRCR SERPLBLD CKD-EPI 2021: 64 ML/MIN/1.73M2
ERYTHROCYTE [DISTWIDTH] IN BLOOD BY AUTOMATED COUNT: 13 % (ref 10–15)
GLUCOSE BLDC GLUCOMTR-MCNC: 114 MG/DL (ref 70–99)
GLUCOSE BLDC GLUCOMTR-MCNC: 130 MG/DL (ref 70–99)
GLUCOSE SERPL-MCNC: 138 MG/DL (ref 70–99)
HCO3 SERPL-SCNC: 25 MMOL/L (ref 22–29)
HCT VFR BLD AUTO: 38.5 % (ref 35–47)
HGB BLD-MCNC: 12.6 G/DL (ref 11.7–15.7)
MAGNESIUM SERPL-MCNC: 1.9 MG/DL (ref 1.7–2.3)
MCH RBC QN AUTO: 28.6 PG (ref 26.5–33)
MCHC RBC AUTO-ENTMCNC: 32.7 G/DL (ref 31.5–36.5)
MCV RBC AUTO: 88 FL (ref 78–100)
PLATELET # BLD AUTO: 152 10E3/UL (ref 150–450)
POTASSIUM SERPL-SCNC: 4 MMOL/L (ref 3.4–5.3)
RBC # BLD AUTO: 4.4 10E6/UL (ref 3.8–5.2)
SODIUM SERPL-SCNC: 138 MMOL/L (ref 135–145)
WBC # BLD AUTO: 7.3 10E3/UL (ref 4–11)

## 2025-04-24 PROCEDURE — 97112 NEUROMUSCULAR REEDUCATION: CPT | Mod: GO

## 2025-04-24 PROCEDURE — 85027 COMPLETE CBC AUTOMATED: CPT | Performed by: INTERNAL MEDICINE

## 2025-04-24 PROCEDURE — 97535 SELF CARE MNGMENT TRAINING: CPT | Mod: GO

## 2025-04-24 PROCEDURE — 128N000003 HC R&B REHAB

## 2025-04-24 PROCEDURE — 99239 HOSP IP/OBS DSCHRG MGMT >30: CPT | Performed by: INTERNAL MEDICINE

## 2025-04-24 PROCEDURE — 92526 ORAL FUNCTION THERAPY: CPT | Mod: GN

## 2025-04-24 PROCEDURE — 92507 TX SP LANG VOICE COMM INDIV: CPT | Mod: GN

## 2025-04-24 PROCEDURE — 99233 SBSQ HOSP IP/OBS HIGH 50: CPT

## 2025-04-24 PROCEDURE — 250N000013 HC RX MED GY IP 250 OP 250 PS 637

## 2025-04-24 PROCEDURE — 80048 BASIC METABOLIC PNL TOTAL CA: CPT | Performed by: INTERNAL MEDICINE

## 2025-04-24 PROCEDURE — 250N000013 HC RX MED GY IP 250 OP 250 PS 637: Performed by: PHYSICIAN ASSISTANT

## 2025-04-24 PROCEDURE — 70450 CT HEAD/BRAIN W/O DYE: CPT

## 2025-04-24 PROCEDURE — 36415 COLL VENOUS BLD VENIPUNCTURE: CPT | Performed by: INTERNAL MEDICINE

## 2025-04-24 PROCEDURE — 83735 ASSAY OF MAGNESIUM: CPT | Performed by: INTERNAL MEDICINE

## 2025-04-24 PROCEDURE — 99222 1ST HOSP IP/OBS MODERATE 55: CPT | Mod: AI | Performed by: PHYSICAL MEDICINE & REHABILITATION

## 2025-04-24 PROCEDURE — 250N000013 HC RX MED GY IP 250 OP 250 PS 637: Performed by: STUDENT IN AN ORGANIZED HEALTH CARE EDUCATION/TRAINING PROGRAM

## 2025-04-24 PROCEDURE — 250N000013 HC RX MED GY IP 250 OP 250 PS 637: Performed by: HOSPITALIST

## 2025-04-24 RX ORDER — CILOSTAZOL 100 MG/1
100 TABLET ORAL 2 TIMES DAILY
Status: DISCONTINUED | OUTPATIENT
Start: 2025-04-24 | End: 2025-04-27

## 2025-04-24 RX ORDER — DEXTROSE MONOHYDRATE 25 G/50ML
25-50 INJECTION, SOLUTION INTRAVENOUS
Status: DISCONTINUED | OUTPATIENT
Start: 2025-04-24 | End: 2025-05-03 | Stop reason: HOSPADM

## 2025-04-24 RX ORDER — ACETAMINOPHEN 325 MG/1
650 TABLET ORAL EVERY 4 HOURS PRN
Status: ON HOLD | DISCHARGE
Start: 2025-04-24

## 2025-04-24 RX ORDER — ACETAMINOPHEN 325 MG/1
650 TABLET ORAL EVERY 4 HOURS PRN
Status: DISCONTINUED | OUTPATIENT
Start: 2025-04-24 | End: 2025-05-03 | Stop reason: HOSPADM

## 2025-04-24 RX ORDER — NICOTINE POLACRILEX 4 MG
15-30 LOZENGE BUCCAL
Status: DISCONTINUED | OUTPATIENT
Start: 2025-04-24 | End: 2025-05-03 | Stop reason: HOSPADM

## 2025-04-24 RX ORDER — CILOSTAZOL 100 MG/1
100 TABLET ORAL 2 TIMES DAILY
Status: DISCONTINUED | OUTPATIENT
Start: 2025-04-24 | End: 2025-04-24 | Stop reason: HOSPADM

## 2025-04-24 RX ORDER — BISACODYL 10 MG
10 SUPPOSITORY, RECTAL RECTAL DAILY PRN
Status: DISCONTINUED | OUTPATIENT
Start: 2025-04-24 | End: 2025-05-03 | Stop reason: HOSPADM

## 2025-04-24 RX ORDER — CILOSTAZOL 100 MG/1
100 TABLET ORAL 2 TIMES DAILY
Status: ON HOLD | DISCHARGE
Start: 2025-04-24 | End: 2025-05-01

## 2025-04-24 RX ORDER — ATORVASTATIN CALCIUM 40 MG/1
40 TABLET, FILM COATED ORAL EVERY EVENING
Status: DISCONTINUED | OUTPATIENT
Start: 2025-04-24 | End: 2025-05-03 | Stop reason: HOSPADM

## 2025-04-24 RX ORDER — AMOXICILLIN 250 MG
1 CAPSULE ORAL 2 TIMES DAILY PRN
Status: DISCONTINUED | OUTPATIENT
Start: 2025-04-24 | End: 2025-05-03 | Stop reason: HOSPADM

## 2025-04-24 RX ORDER — ASPIRIN 81 MG/1
81 TABLET ORAL DAILY
Status: DISCONTINUED | OUTPATIENT
Start: 2025-04-25 | End: 2025-05-03 | Stop reason: HOSPADM

## 2025-04-24 RX ADMIN — CILOSTAZOL 100 MG: 100 TABLET ORAL at 19:45

## 2025-04-24 RX ADMIN — OXYCODONE HYDROCHLORIDE 2.5 MG: 5 TABLET ORAL at 05:46

## 2025-04-24 RX ADMIN — CILOSTAZOL 100 MG: 100 TABLET ORAL at 11:38

## 2025-04-24 RX ADMIN — ATORVASTATIN CALCIUM 40 MG: 40 TABLET, FILM COATED ORAL at 19:45

## 2025-04-24 RX ADMIN — ASPIRIN 81 MG CHEWABLE TABLET 81 MG: 81 TABLET CHEWABLE at 08:18

## 2025-04-24 ASSESSMENT — ACTIVITIES OF DAILY LIVING (ADL)
ADLS_ACUITY_SCORE: 36
ADLS_ACUITY_SCORE: 36
ADLS_ACUITY_SCORE: 42
ADLS_ACUITY_SCORE: 36
ADLS_ACUITY_SCORE: 42
ADLS_ACUITY_SCORE: 42
ADLS_ACUITY_SCORE: 36
ADLS_ACUITY_SCORE: 42
ADLS_ACUITY_SCORE: 42
ADLS_ACUITY_SCORE: 36
ADLS_ACUITY_SCORE: 42
ADLS_ACUITY_SCORE: 59
ADLS_ACUITY_SCORE: 42
ADLS_ACUITY_SCORE: 36
ADLS_ACUITY_SCORE: 42

## 2025-04-24 NOTE — PROGRESS NOTES
Rehab Admissions:  Called pt to provide her with information about [facility], including location, visitor information, room accommodations, meals, medications, what to bring, intensive rehab schedule, ELOS, and tentative plans for after rehab. Pt reports she has several friends for support and may look into hiring a . Her speech and thinking have improved; pt reports she is still very forgetful. Discussed the likelihood of pt continuing with either home or OP therapy services after rehab stay.     Thank you for the referral, we will continue to follow this patient for post acute placement.     Determination of admission is based upon the patient's need for an intensive, interdisciplinary approach to rehabilitation, their ability to progress, their ability to tolerate intensive therapies, their need for daily physician supervision, their need for twenty four hour nursing assistance, and their ability and willingness to participate in such a program.    Sofi Maldonado CM  Rehab Liaison/  [facility]  4/24/2025    1:48 PM

## 2025-04-24 NOTE — PLAN OF CARE
Reason for Admission: L watershed CVA's, L parietal-occipital  IPH s/p TNK  Cognitive/Mentation: A/Ox 4  Neuros/CMS: Stable w/ blurry vision, R field cut. RUE numbness, ataxia, drift. BLE numbness/tingling. Slight aphasia, WFD.   VS: VSS on RA   Tele: ST  GI/: BS audible, + flatus. Voiding adequately.  Continent.  Pulmonary: LS clear  Pain: Headache, BLE neuropathy pain- prn Oxycodone given.    Drains/Lines: No PIV, MD aware  Skin: BUE bruising  Activity: Up w/ SBA and GB  Diet: Regular diet with thin liquids. Takes pills whole.   Therapies recs: ARU  Discharge: [facility] ARU today [id number]  Aggression Stoplight Tool: Green  End of shift summary: Pt refusing HOB >30 overnight, education provided. Repeat CT this AM.

## 2025-04-24 NOTE — PLAN OF CARE
Goal Outcome Evaluation:           Overall Patient Progress: improvingOverall Patient Progress: improving         Here with left watershed CVA's, left parietal-occipital IPH s/p TNK. Neuros stable. Right field cut, word finding improving. Slight right hand drift. Tele NSR. C/O feet pain from neuropathy improving. Regular diet. No sliding scale insulin needed. Continent. Up with assist 1, gait belt. Discharge instructions reviewed and questions answered. Patient set to discharge to ARU today.

## 2025-04-24 NOTE — CONSULTS
[facility] Consultation by ENT    Afshan Jimenez MRN# [id number]   Age: 59 year old YOB: 1965     Date of Admission:  4/18/2025    Reason for consult: Abnormal temporal bone CT       Requesting physician: Madeleine       Level of consult: One-time consult to assist in determining a diagnosis and to recommend an appropriate treatment plan           Assessment and Plan:   Assessment:   We were consulted on this patient and December 2024 for the same exact reason.Chronic appearing changes on imaging of the left temporal bone due to the patient's previous surgery and radiation therapy.  Essentially no change noted today from prior imaging in 2024 or 2022.  The patient has no ear complaints and no findings to suggest active mastoid or middle ear infection.  Please  note that these changes are permanent, and any time the patient has future imaging, soft tissue/fluid will be noted in the mastoid.  No need to consult ENT in the future unless the patient has any ear related complaints or findings of clinical mastoiditis consisting of erythema and tenderness in the mastoid area and blunting of postauricular sulcus.  The patient will be well served by following up with ENT as an outpatient for ear debridement and hearing rehabilitation. Current imaging as well as previous CAT scan images reviewed, admission records reviewed.      Plan:     Please see Assessment             Chief Complaint:    abnormal mastoid imaging     History is obtained from the patient    Patient is here for suspicion of CVA.  Imaging noted opacification of the left mastoid with no  soft tissue inflammation or coalescence.  Patient denies any  ear pain or drainage.  She has no useful hearing on the left side due to previous surgery and radiation therapy in the 90s for malignant lesion of the left parotid.  Abnormal imaging results with mastoid opacifications previously noted last year as well as in 2022, and ENT was consulted  then for those findings as well.           Past Medical History:   I have reviewed this patient's past medical history          Past Surgical History:   I have reviewed this patient's past surgical history          Social History:   I have reviewed this patient's social history          Family History:   I have reviewed this patient's family history          Immunizations:   Immunizations are current          Allergies:   All allergies reviewed and addressed          Medications:     Current Facility-Administered Medications   Medication Dose Route Frequency Provider Last Rate Last Admin    acetaminophen (TYLENOL) tablet 650 mg  650 mg Oral Q4H PRN Ly Hutchins MD   650 mg at 04/21/25 0601    Or    acetaminophen (TYLENOL) Suppository 650 mg  650 mg Rectal Q4H PRN Ly Hutchins MD        aspirin (ASA) chewable tablet 81 mg  81 mg Oral Daily Caridad Dalal MD   81 mg at 04/24/25 0818    atorvastatin (LIPITOR) tablet 40 mg  40 mg Oral QPM Mk Barnes MD   40 mg at 04/23/25 2050    cilostazol (PLETAL) tablet 100 mg  100 mg Oral BID Chanel Thomas PA-C   100 mg at 04/24/25 1138    glucose gel 15-30 g  15-30 g Oral Q15 Min PRN Amy Salvador MD        Or    dextrose 50 % injection 25-50 mL  25-50 mL Intravenous Q15 Min PRN Amy Salvador MD        Or    glucagon injection 1 mg  1 mg Subcutaneous Q15 Min PRN Amy Salvador MD        hydrALAZINE (APRESOLINE) injection 10 mg  10 mg Intravenous Q30 Min PRN Mk Barnes MD        labetalol (NORMODYNE/TRANDATE) injection 10 mg  10 mg Intravenous Q10 Min PRN Ly Hutchins MD   10 mg at 04/19/25 2103    Or    hydrALAZINE (APRESOLINE) injection 10 mg  10 mg Intravenous Q10 Min PRN Ly Hutchins MD        insulin aspart (NovoLOG) injection (RAPID ACTING)  1-7 Units Subcutaneous TID AC Amy Salvador MD        insulin aspart (NovoLOG) injection (RAPID ACTING)  1-5 Units Subcutaneous At Bedtime Amy Salvador MD        labetalol  (NORMODYNE/TRANDATE) injection 10 mg  10 mg Intravenous Q10 Min PRN Barnes, Mk Bell MD   10 mg at 04/19/25 0829    lidocaine (LMX4) cream   Topical Q1H PRN Barnes, Mk Bell MD        lidocaine 1 % 0.1-1 mL  0.1-1 mL Other Q1H PRN Barnes, Mk Bell MD        Medication Instruction - Avoid dextrose in IV solutions   Intravenous Continuous PRN Barnes, Mk Bell MD        naloxone (NARCAN) injection 0.2 mg  0.2 mg Intravenous Q2 Min PRN Barnes, Mk Bell MD        Or    naloxone (NARCAN) injection 0.4 mg  0.4 mg Intravenous Q2 Min PRN Barnes, Mk Bell MD        Or    naloxone (NARCAN) injection 0.2 mg  0.2 mg Intramuscular Q2 Min PRN Barnes, Mk Bell MD        Or    naloxone (NARCAN) injection 0.4 mg  0.4 mg Intramuscular Q2 Min PRN Barnes, Mk Bell MD        ondansetron (ZOFRAN ODT) ODT tab 4 mg  4 mg Oral Q6H PRN Cameron, Mk Bell MD        Or    ondansetron (ZOFRAN) injection 4 mg  4 mg Intravenous Q6H PRN Cameron, Mk Bell MD        oxyCODONE IR (ROXICODONE) half-tab 2.5 mg  2.5 mg Oral Q4H PRN Ly Hutchins MD   2.5 mg at 04/24/25 0546    prochlorperazine (COMPAZINE) injection 10 mg  10 mg Intravenous Q6H PRN Cameron, Mk Bell MD        Or    prochlorperazine (COMPAZINE) tablet 10 mg  10 mg Oral Q6H PRN Cameron, Mk Bell MD        Or    prochlorperazine (COMPAZINE) suppository 25 mg  25 mg Rectal Q12H PRN Cameron, Mk Bell MD        sodium chloride (PF) 0.9% PF flush 3 mL  3 mL Intracatheter Q8H [facility] Cameron, Mk Bell MD   3 mL at 04/22/25 2118    sodium chloride (PF) 0.9% PF flush 3 mL  3 mL Intracatheter q1 min prn Cameron, Mk Bell MD        sodium chloride 0.9 % infusion   Intravenous Continuous PRN Barnes, Mk Bell MD                 Review of Systems:   The Review of Systems is negative other than noted in the HPI     Physical exam reveals a middle-aged female with no acute distress, communicates appropriately.  Both ears demonstrate normal auricles and external auditory canals  with no mastoid tenderness, erythema or swelling.  Tympanic membrane is normal on the right with dry cerumen against the eardrum on the left making it difficult to visualize the eardrum.  Neck demonstrated well-healed left-sided parotid incision and remote postradiation change.          Data:     All imaging studies reviewed by me. both the brain MRI and the CAT scan demonstrated partial opacification of the mastoid on the left with no significant change noted from previous brain imaging from December 2024.  No significant fluid in the middle ear cavity.    Attestation:    J Luis Torrez MD

## 2025-04-24 NOTE — DISCHARGE SUMMARY
"[facility]  Hospitalist Discharge Summary      Date of Admission:  4/18/2025  Date of Discharge:  4/24/2025  Discharging Provider: Amy Salvador MD  Discharge Service: Hospitalist Service    Discharge Diagnoses     Acute ischemic stroke of the left hemisphere due to atheroembolic versus ESUS s/p TNK .  Right upper extremity ataxia and weakness with slurred speech, secondary to above.  Left parieto-occipital hemorrhage post TNK, s/p reversal.  Possible left common carotid artery symptomatic disease.  Left mastoid effusion and enhancement,chronic.  History of AML s/p bone marrow transplant.  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  Pancreatic cyst, likely IPMN.  Type 2 diabetes mellitus with hemoglobin A1c of 6.  Pulmonary nodules.  History of mucoepidermoid palate carcinoma    Clinically Significant Risk Factors     # Obesity: Estimated body mass index is 32.49 kg/m  as calculated from the following:    Height as of this encounter: 1.689 m (5' 6.5\").    Weight as of this encounter: 92.7 kg (204 lb 5.9 oz).       Follow-ups Needed After Discharge   Follow-up Appointments       Follow Up and recommended labs and tests      Follow up with prison physician.  The following labs/tests are recommended: BMP and CBC in 1 week.  Follow-up with PCP and her specialist in next 6 to 8 weeks.                Unresulted Labs Ordered in the Past 30 Days of this Admission       No orders found from 3/19/2025 to 4/19/2025.            Discharge Disposition   Discharged to rehabilitation facility  Condition at discharge: Stable    Hospital Course     Afshan Jimenez is a 59 year old female with a history of left parotid cancer status post radiation resection, distant history of AML status post bone marrow transplant with sister as donor in 1992, embolic strokes of uncertain source in December 2024 with parietal and occipital strokes admitted on 4/18/2025.      Here are " further details regarding her current hospitalization.     Right upper extremity ataxia and weakness, slurred speech:   Acute ischemic strokes of L hemisphere due to atheroembolic versus ESUS s/p TNK  Left parieto-occipital hemorrhage post TNK s/p reversal  Possible left common carotid artery symptomatic disease: Imaging concerning for soft plaque with ulceration     On MRI noted to have multiple watershed and embolic type infarcts in the left frontoparietal region as well as interval evolutionary change of prior infarcts.  History of left parietal and occipital strokes: December 2024, embolic strokes of undetermined source.  Negative AURA at that time     - Received tenecteplase in the emergency department  - Aspirin and Plavix held following tenecteplase  - MRI ordered and obtained; found to have post tenecteplase intraparenchymal hemorrhage              - Received cryoprecipitate and transaminase acid per stroke critical care.    - Patient was monitored on cardiac telemetry  - TTE with bubble study was completed              --Normal biventricular size and function, EF of 55 to 60%\              --No segmental wall motion abnormalities    - Repeat head CT without contrast 04/21, results reviewed              -- Evolving intraparenchymal hemorrhage within left parietal occipital lobe, mildly increase in size and measure 2.6 X3.7X 3.4 cm with slightly decreased surrounding vasogenic edema              -- No other evidence of acute intracranial hemorrhage or mass effect     - Neurosurgery followed, no surgical intervention recommended, now signed off  - Due to the concern for possible left common carotid artery disease as etiology, vascular surgery consultation requested    - Highly appreciate  from Vascular surgery input              -- Vascular surgery recommending left carotid endarterectomy once deemed safe for intraoperative heparin to minimize risk of further bleeding              -- Plan to monitor  patient on appropriate dual antiplatelet agent              -- Patient has been started on baby aspirin,   -- Head CT repeated on 04/24, showing a stable exam   -- Neurology starting patient Pletal 100 mg po BID along with baby ASA.                - If vascular intervention is necessary in future, patient might need open endarterectomy with patch angioplasty which would be more efficacious in the stents in this situation particularly in the length of the stents and necessary to cover the entire distal common carotid artery. Vascular surgery would wait a minimum of 2 weeks due to current intracranial hemorrhage.     - Stroke neurology recommending repeat MRI brain in 3 months,  ordered  - TCD at [facility] in 1 month, requested  - Ziopatch on discharge, ordered     Post tenecteplase intraparenchymal hemorrhage: as above ,Hyperacute hemorrhage involving left parieto-occipital region as well as adjacent subdural hematoma of 1 to 2 mm.     - Received cryoprecipitate and transaminase acid per stroke neurology  - Stroke neuro and neurosurgery followed  - PT/OT/SLP, recommending ARU  - HOB>30   - Stroke neurology followed closely, neurosurgery signed off no surgical intervention is needed.  - As above stroke neurology has started patient on baby aspirin, repeated head CT , stable this morning      History of symptomatic hypotension: Describes lightheadedness, leaning on a wall when ambulating at home, blood pressures in the 80 systolic range as an outpatient with poor oral intake on Ozempic.  Hypoglycemia might also be contributing to symptoms     - Discontinued Ozempic.  Discussed with patient on admission  - Vitals have been stable     Left mastoid effusion and enhancement: Known on prior imaging and stable.  No mastoid tenderness on palpation.  Was seen by ENT during last hospitalization and this was felt to need no anti-infectives, no need for intervention.  Attributed to prior surgical treatment and radiation of the  left parotid for prior parotid cancer.  Has been seen by [facility] as well and it is felt that radiation may have damaged her eustachian tube resulting in this fluid collection.     - On MRI, some enhancement within left mastoid air cells was felt to be new and radiology recommended temporal bone CT.    - Ordered temporal bone CT to be completed before this hospitalization   -- Right temporal bone showed unremarkable exam   -- Left temporal bone showing multifocal areas of dehiscence on the tegmen tympani and tegmen mastoideum, complete opacification of the mastoid air cells and thickened tympanic membrane   -- Does not have any mastoid tenderness, asked ENT to evaluate   -- Highly appreciated ENT input, per ENT these changes are chronic and will always be visible on future imaging.  Patient does not need any further evaluation or ENT consultation unless patient has ear related complaints or findings of clinical mastoiditis consisting of erythema and tenderness in the mastoid area and blunting of postauricular sulcus.   -- Outpatient ENT follow-up for ear debridement and hearing rehabilitation.   -- Highly appreciated their input and timely manner to facilitate patient discharge  - Continue outpatient follow-up with [facility]     History of AML status post bone marrow transplant: Donor bone marrow transplant from her sister in 1992  Parotid cancer status post radiation and surgical resection on the left     Pancreas cyst, likely IPMN:  - Surveillance MRI in 6 months would be due in June  - Referred through GI by her primary care team.     Type 2 diabetes: On Ozempic, no longer utilizing insulin.  Well-controlled with hemoglobin A1c of 6 point 0/7/25.  - Discontinued Ozempic.  See above regarding symptomatic hypotension  - Further discussion with PCP after discharge from rehab regarding resumption of Ozempic versus different hypoglycemic agent  -If needed low-dose sliding scale insulin can be considered while  patient is in acute rehab     Pulmonary nodules: Has followed with pulmonary nodule clinic.  Generally felt not to be concerning with plan for follow-up CT to ensure stability.     Mucoepidermoid palate carcinoma: Treated around the same time as her left parotid cancer and [id number] with surgery and radiation.    Patient was seen and examined on the day of discharge ,she is feeling well, does not have any complaints , I did review the discharge medications and instructions with the patient and plan for her to follow up with the PCP after the hospitalization .patient was in agreement , she is discharged in stable condition to ARU.     Consultations This Hospital Stay   NEUROLOGY IP STROKE CONSULT  SPEECH LANGUAGE PATH ADULT IP CONSULT  PHARMACY IP CONSULT  PHARMACY IP CONSULT  PHYSICAL THERAPY ADULT IP CONSULT  OCCUPATIONAL THERAPY ADULT IP CONSULT  REHAB ADMISSIONS LIAISON IP CONSULT  CARE MANAGEMENT / SOCIAL WORK IP CONSULT  NEUROSURGERY IP CONSULT  VASCULAR SURGERY IP CONSULT  ENT IP CONSULT  PHYSICAL THERAPY ADULT IP CONSULT  OCCUPATIONAL THERAPY ADULT IP CONSULT    Code Status   Full Code    Time Spent on this Encounter   IAmy MD, personally saw the patient today and spent greater than 30 minutes discharging this patient.     Amy Salvador MD  [facility] NEUROSCIENCE UNIT  [facility] [place] [address]  Phone: [phone]  ______________________________________________________________________    Physical Exam   Vital Signs: Temp: 97.6  F (36.4  C) Temp src: Oral BP: 109/68 Pulse: 96   Resp: 16 SpO2: 95 % O2 Device: None (Room air)    Weight: 204 lbs 5.86 oz    Physical Exam  Vitals and nursing note reviewed.   Constitutional:       Appearance: She is well-developed.   HENT:      Head: Normocephalic and atraumatic.   Eyes:      Conjunctiva/sclera: Conjunctivae normal.      Pupils: Pupils are equal, round, and reactive to light.   Neck:      Thyroid: No thyromegaly.    Cardiovascular:      Rate and Rhythm: Normal rate and regular rhythm.      Heart sounds: Normal heart sounds. No murmur heard.  Pulmonary:      Effort: Pulmonary effort is normal. No respiratory distress.      Breath sounds: Normal breath sounds. No wheezing.   Abdominal:      General: Bowel sounds are normal.      Palpations: Abdomen is soft.      Tenderness: There is no abdominal tenderness. There is no guarding or rebound.   Musculoskeletal:         General: No deformity. Normal range of motion.      Cervical back: Normal range of motion and neck supple.   Skin:     General: Skin is warm and dry.   Neurological:      Mental Status: She is alert and oriented to person, place, and time.      Comments: Continues to have some dysarthria although speech improving as compared to admission, continues to have right visual field cut.   Psychiatric:         Behavior: Behavior normal.          Primary Care Physician   [facility]    Discharge Orders      MR Brain w/o & w Contrast     Vascular Surgery Referral      Primary Care - Care Coordination Referral      General info for SNF    Length of Stay Estimate: Short Term Care: Estimated # of Days <30  Condition at Discharge: Improving  Level of care:skilled   Rehabilitation Potential: Good  Admission H&P remains valid and up-to-date: Yes  Recent Chemotherapy: N/A  Use Nursing Home Standing Orders: Yes     Mantoux instructions    Give two-step Mantoux (PPD) Per Facility Policy Yes     Follow Up and recommended labs and tests    Follow up with [facility] physician.  The following labs/tests are recommended: BMP and CBC in 1 week.  Follow-up with PCP and her specialist in next 6 to 8 weeks.     Reason for your hospital stay    You were admitted to the hospital secondary to acute ischemic stroke and you were also found to have hemorrhagic transformation and is status post TNK administration.  During the hospitalization you were evaluated by stroke neurology and  neurosurgery.  You were also evaluated by vascular surgery due to the concern for possible left-sided common carotid artery disease.     Glucose monitor nursing POCT    Before meals and at bedtime     Additional Discharge Instructions    You were admitted to the hospital secondary to acute ischemic stroke and you were also found to have hemorrhagic transformation and is status post TNK administration.  During the hospitalization you were evaluated by stroke neurology and neurosurgery.  You were also evaluated by vascular surgery due to the concern for possible left-sided common carotid artery disease.  You have been started on baby aspirin and Pletal.  You are recommended to undergo brain MRI in 3 months.  You will have outpatient follow-up with the stroke neurology.     Activity - Up with nursing assistance     Full Code     Physical Therapy Adult Consult    Evaluate and treat as clinically indicated.    Reason: Acute illness debilitation     Occupational Therapy Adult Consult    Evaluate and treat as clinically indicated.    Reason: Acute illness debilitation     Fall precautions     Diet    Follow this diet upon discharge: Orders Placed This Encounter      Room Service      Combination Diet Regular Diet       Stroke Hospital Follow Up (for neurologist use only)    [employer] will call you to coordinate care as prescribed by your provider. If you don t hear from a representative within 2 business days, please call [phone].    [employer] will call you to coordinate care as prescribed by your provider. If you don t hear from a representative within 2 business days, please call [phone].       [employer] PATCH MAIL OUT     [employer] PATCH MAIL OUT       Significant Results and Procedures   Results for orders placed or performed during the hospital encounter of 04/18/25   CT Head w/o Contrast    Narrative    EXAM: CTA HEAD NECK W CONTRAST, CT HEAD W/O CONTRAST  LOCATION: [facility]OSPITAL  DATE: 4/18/2025    INDICATION: Code Stroke, evaluate for LVO. PLEASE READ IMMEDIATELY. Right arm weakness and facial droop.  COMPARISON: MRI brain 1/31/2025, CTA head and neck 12/27/2024  CONTRAST: 67mL Isovue 370  TECHNIQUE: Head and neck CT angiogram with IV contrast. Noncontrast head CT followed by axial helical CT images of the head and neck vessels obtained during the arterial phase of intravenous contrast administration. Axial 2D reconstructed images and   multiplanar 3D MIP reconstructed images of the head and neck vessels were performed by the technologist. Dose reduction techniques were used. All stenosis measurements made according to NASCET criteria unless otherwise specified.    FINDINGS:   NONCONTRAST HEAD CT:   INTRACRANIAL CONTENTS: No intracranial hemorrhage, extraaxial collection, or mass effect.  No CT evidence of acute infarct. Mild presumed chronic small vessel ischemic changes. Mild generalized volume loss. No hydrocephalus.     VISUALIZED ORBITS/SINUSES/MASTOIDS: No intraorbital abnormality. No paranasal sinus mucosal disease. Complete/near complete opacification of the left mastoid air cells. No apparent mass in the posterior nasopharynx or skull base.    BONES/SOFT TISSUES: No acute abnormality.    HEAD CTA:  ANTERIOR CIRCULATION: No stenosis/occlusion, aneurysm, or high flow vascular malformation. Standard [place] anatomy.    POSTERIOR CIRCULATION: No stenosis/occlusion, aneurysm, or high flow vascular malformation. Dominant left and smaller right vertebral artery contribute to a normal basilar artery.     DURAL VENOUS SINUSES: Expected enhancement of the major dural venous sinuses.    NECK CTA:  RIGHT CAROTID: No measurable stenosis or dissection.    LEFT CAROTID: Atherosclerotic plaque results in 50% stenosis in the left ICA. No dissection.    VERTEBRAL ARTERIES: No focal stenosis or dissection. Dominant left and smaller right vertebral arteries.    AORTIC ARCH: Classic  aortic arch anatomy with no significant stenosis at the origin of the great vessels.    NONVASCULAR STRUCTURES: Heterogeneous thyroid gland with multiple scattered nodules, largest 1.7 cm on the left      Impression    IMPRESSION:   HEAD CT:  1.  No CT evidence for acute intracranial process.  2.  Brain atrophy and presumed chronic microvascular ischemic changes as above.  3.  Left mastoid effusion.    HEAD CTA:  Normal CTA [place].    NECK CTA:  1.  50% stenosis of the left ICA origin.  2.  Heterogeneous thyroid gland with multiple nodules.    3.  Dr. Dow discussed the above findings by telephone with Dr. Pichardo at 8:53 PM central time 4/18/2025.     CTA Head Neck with Contrast    Narrative    EXAM: CTA HEAD NECK W CONTRAST, CT HEAD W/O CONTRAST  LOCATION: [facility]  DATE: 4/18/2025    INDICATION: Code Stroke, evaluate for LVO. PLEASE READ IMMEDIATELY. Right arm weakness and facial droop.  COMPARISON: MRI brain 1/31/2025, CTA head and neck 12/27/2024  CONTRAST: 67mL Isovue 370  TECHNIQUE: Head and neck CT angiogram with IV contrast. Noncontrast head CT followed by axial helical CT images of the head and neck vessels obtained during the arterial phase of intravenous contrast administration. Axial 2D reconstructed images and   multiplanar 3D MIP reconstructed images of the head and neck vessels were performed by the technologist. Dose reduction techniques were used. All stenosis measurements made according to NASCET criteria unless otherwise specified.    FINDINGS:   NONCONTRAST HEAD CT:   INTRACRANIAL CONTENTS: No intracranial hemorrhage, extraaxial collection, or mass effect.  No CT evidence of acute infarct. Mild presumed chronic small vessel ischemic changes. Mild generalized volume loss. No hydrocephalus.     VISUALIZED ORBITS/SINUSES/MASTOIDS: No intraorbital abnormality. No paranasal sinus mucosal disease. Complete/near complete opacification of the left mastoid air  cells. No apparent mass in the posterior nasopharynx or skull base.    BONES/SOFT TISSUES: No acute abnormality.    HEAD CTA:  ANTERIOR CIRCULATION: No stenosis/occlusion, aneurysm, or high flow vascular malformation. Standard [place] anatomy.    POSTERIOR CIRCULATION: No stenosis/occlusion, aneurysm, or high flow vascular malformation. Dominant left and smaller right vertebral artery contribute to a normal basilar artery.     DURAL VENOUS SINUSES: Expected enhancement of the major dural venous sinuses.    NECK CTA:  RIGHT CAROTID: No measurable stenosis or dissection.    LEFT CAROTID: Atherosclerotic plaque results in 50% stenosis in the left ICA. No dissection.    VERTEBRAL ARTERIES: No focal stenosis or dissection. Dominant left and smaller right vertebral arteries.    AORTIC ARCH: Classic aortic arch anatomy with no significant stenosis at the origin of the great vessels.    NONVASCULAR STRUCTURES: Heterogeneous thyroid gland with multiple scattered nodules, largest 1.7 cm on the left      Impression    IMPRESSION:   HEAD CT:  1.  No CT evidence for acute intracranial process.  2.  Brain atrophy and presumed chronic microvascular ischemic changes as above.  3.  Left mastoid effusion.    HEAD CTA:  Normal CTA [place].    NECK CTA:  1.  50% stenosis of the left ICA origin.  2.  Heterogeneous thyroid gland with multiple nodules.    3.  Dr. Dow discussed the above findings by telephone with Dr. Pichardo at 8:53 PM central time 4/18/2025.     US Carotid Bilateral    Narrative    EXAM: US CAROTID BILATERAL  LOCATION: [facility]  DATE: 4/19/2025    INDICATION: Left ICA stenosis  COMPARISON: CTA head and neck 04/18/2025  TECHNIQUE: Duplex exam performed utilizing 2D gray-scale imaging, Doppler interrogation with color-flow and spectral waveform analysis. The percent diameter stenosis is determined using Updated Recommendations for Carotid Stenosis Interpretation Criteria    from [employer] Vascular Testing.    FINDINGS:    RIGHT: No significant plaque is seen at the right carotid bifurcation, or within the internal carotid artery . Normal velocities in the ECA. Antegrade flow within the vertebral artery.     LEFT: Mild plaque at the distal left common carotid artery, approaching the bifurcation. The peak systolic velocity in the ICA is less than 180 cm/sec, consistent with less than 50% stenosis. Normal velocities in the ECA. Antegrade flow within the   vertebral artery.    VELOCITY CHART:  CCA   Right: 93 cm/s   Left: 103 cm/s  ICA   Right: 69 cm/s   Left: 82 cm/s  ECA   Right: 60 cm/s   Left: 70 cm/s  ICA/CCA PSV Ratio   Right: 0.74   Left: 0.80      Impression    IMPRESSION:  1.  No significant plaque is seen in the right carotid arterial system.  2.  Mild plaque at the distal left common carotid artery, approaching the bifurcation. Velocities consistent with less than 50% stenosis in the right internal carotid artery.  3.  Flow within the vertebral arteries is antegrade.   MR Brain w/o & w Contrast     Value    Radiologist flags Acute hemorrhage (AA)    Narrative    EXAM: MR BRAIN WITHOUT AND WITH CONTRAST  LOCATION: [facility]  DATE: 04/19/2025    INDICATION: Stroke.  COMPARISON: CTA head and neck 04/18/2025, brain MRI and MRA head and neck 01/09/2025 and 01/31/2025.  CONTRAST: 9 mL Gadavist.  TECHNIQUE: Routine multiplanar multisequence head MRI without and with intravenous contrast.    FINDINGS:  INTRACRANIAL CONTENTS: Interval development of a hyperacute hemorrhage in the left parieto-occipital region measuring 4.3 x 2.8 cm AP by ML with mild associated edema and mild mass effect. No midline shift. Thin adjacent subdural hematoma measuring 1-2   mm. Interval development of multiple new watershed and embolic-type infarcts in the left frontoparietal region. Interval evolutionary change of the previously seen infarcts. No mass, acute hemorrhage, or extra-axial  fluid collections. Age-related and   chronic ischemic changes. Normal position of the cerebellar tonsils.    SELLA: No abnormality accounting for technique.    OSSEOUS STRUCTURES/SOFT TISSUES: Normal marrow signal. The major intracranial vascular flow-voids are maintained.     ORBITS: No abnormality accounting for technique.     SINUSES/MASTOIDS: No paranasal sinus mucosal disease. Enhancement is seen within the left mastoid air cells which are noted to be completely opacified on the CT head. This enhancement appears progressive. Question mild adjacent intracranial enhancement   in the extra-axial space.       Impression    IMPRESSION:  1.  Interval development of a hyperacute vertebral hemorrhage in the left parieto-occipital region with mild adjacent edema and small adjacent subdural hemorrhage. Mild mass effect without midline shift    2.  Multiple additional small scattered areas of chronic hemosiderin are unchanged    3.  Several new watershed and embolic-type infarcts are seen in the left frontoparietal region. Interval evolutionary change of the previously seen infarcts    4.  Abnormal enhancement within the completely opacified left mastoid air cells. The enhancement is progressive or new since the prior study. Question mild adjacent intracranial enhancement. Temporal bone CT suggested for further evaluation.      [Critical Result: Acute hemorrhage]    Finding was identified on 04/19/2025, 4:31 AM CDT.     Dr. Barnes was contacted by me on 04/19/2025, 4:54 AM CDT and verbalized understanding of the critical result.        CT Head w/o Contrast - 24 hrs after Stroke    Narrative    EXAM: CT HEAD W/O CONTRAST  LOCATION: [facility]  DATE: 4/19/2025    INDICATION: Acute Ischemic Stroke  COMPARISON: CT head 4/19/2025, MRI brain 4/19/2025  TECHNIQUE: Routine CT Head without IV contrast. Multiplanar reformats. Dose reduction techniques were used.    FINDINGS:  INTRACRANIAL CONTENTS:   Left  occipital lobe acute hematoma currently measuring 4.1 cm AP by 2.7 cm TR dimension similar in size compared to CT head 4/19/2025. Mild surrounding edema.     Left cerebral convexity thin acute subdural hematoma measuring 2 mm in thickness similar in size compared to prior examination.    No new or significantly progressive acute intracranial hemorrhages.     Mild right midline shift measuring 2 mm similar compared to prior examination.    MRI brain demonstrates multiple small acute infarcts within the left cerebral hemisphere not visualized on current examination.    No CT evidence for nonhemorrhagic acute infarct.    Mild presumed chronic small vessel ischemic changes. Normal ventricles and sulci.     VISUALIZED ORBITS/SINUSES/MASTOIDS: No intraorbital abnormality.  Left posterior sphenoid sinus mild mucosal thickening versus small air-fluid level. Remaining visualized paranasal sinuses essentially clear. Right mastoid air cells essentially clear.   Left mastoid air cells completely opacified.    BONES/SOFT TISSUES: No acute abnormality. Bilateral TMJ degenerative joint disease. Left parotidectomy versus fatty atrophy.      Impression    IMPRESSION:  1.  No significant interval change compared to CT head 4/19/2025.   CT Head w/o Contrast - to be done in 4 hours    Narrative    EXAM: CT HEAD W/O CONTRAST  LOCATION: [facility]  DATE: 4/19/2025    INDICATION: 4 hour follow up CT for hemorrhagic transformation post thrombolysis (tenecteplase).  COMPARISON: Brain MRI: 4/19/2025. Head CT: 4/18/2025.  TECHNIQUE: Routine CT Head without IV contrast. Multiplanar reformats. Dose reduction techniques were used.    FINDINGS:  INTRACRANIAL CONTENTS: Redemonstration of parenchymal hemorrhage at the left parietal/occipital junction as seen on same-day brain MRI. No significant increase in size of dominant hematoma, which measures approximately 37 x 39 x 35 mm (AP by TV by cc).   Similar thin subdural  collection over the left cerebral convexity, measuring 4 mm in maximum thickness. No significant progressive mass effect. Asymmetric narrowing of the left atrium is similar to prior. No midline shift. No new distinct sites of acute   intracranial hemorrhage. Multiple small recent infarcts in the left cerebral hemisphere are better seen on recent MRI. Mild presumed chronic small vessel ischemic changes. Mild generalized volume loss. No hydrocephalus.     VISUALIZED ORBITS/SINUSES/MASTOIDS: No intraorbital abnormality. No paranasal sinus mucosal disease. Similar large left mastoid effusion.    BONES/SOFT TISSUES: No acute abnormality.      Impression    IMPRESSION:    1.  Similar parenchymal hematoma at the left parietal/occipital junction compared to same day brain MRI. Similar thin subdural collection over the left cerebral convexity, measuring 4 mm in maximum thickness. No significant progressive mass effect.  2.  No new sites of acute intracranial hemorrhage.  3.  Multiple small recent infarcts in the left cerebral hemisphere are better seen on MRI.   CT Head w/o Contrast    Narrative    EXAM: CT HEAD W/O CONTRAST  LOCATION: [facility]  DATE: 4/20/2025    INDICATION: Worsening R sided weakness dysarthria  COMPARISON: April 19, 2025  TECHNIQUE: Routine CT Head without IV contrast. Multiplanar reformats. Dose reduction techniques were used.    FINDINGS:  INTRACRANIAL CONTENTS: Evolving acute intraparenchymal hemorrhage within the left occipital lobe measuring 2.7 x 3.7 x 3.3 cm (AP x TV x CC). No other evidence of acute intracranial hemorrhage or mass effect. Brain attenuation morphology are normal. The   ventricles and sulci are normal for age. Normal gray-white matter differentiation. The basilar cisterns are patent.    VISUALIZED ORBITS/SINUSES/MASTOIDS: The globes are unremarkable. The partially imaged paranasal sinuses, mastoid air cells and middle ear cavities are unremarkable.      BONES/SOFT TISSUES: The visualized skull base and calvarium are unremarkable.      Impression    IMPRESSION:    1.  Evolving acute intraparenchymal hemorrhage within the left occipital lobe measuring 2.7 x 3.7 x 3.3 cm (AP x TV x CC).   2.  No other evidence of acute intracranial hemorrhage or mass effect.   CT Head w/o Contrast    Narrative    EXAM: CT HEAD W/O CONTRAST  LOCATION: [facility]  DATE: 4/21/2025    INDICATION: Hemorrhagic stroke  COMPARISON: April 20, 2025  TECHNIQUE: Routine CT Head without IV contrast. Multiplanar reformats. Dose reduction techniques were used.    FINDINGS:  INTRACRANIAL CONTENTS: Evolving intraparenchymal hemorrhage within the left parietal occipital lobe, mildly increased in size and measures 2.6 x 3.7 x 3.4 cm with slightly decreased surrounding vasogenic edema. No other evidence of acute intracranial   hemorrhage or mass effect. The ventricles and sulci are normal for age. Normal gray-white matter differentiation. The basilar cisterns are patent.    VISUALIZED ORBITS/SINUSES/MASTOIDS: The globes are unremarkable. The partially imaged are nasal sinuses, mastoid air cells and middle ear cavities are unremarkable.     BONES/SOFT TISSUES: The visualized skull base and calvarium are unremarkable.      Impression    IMPRESSION:    1.  Evolving intraparenchymal hemorrhage within the left parietal occipital lobe, mildly increased in size and measures 2.6 x 3.7 x 3.4 cm with slightly decreased surrounding vasogenic edema.  2.  No other evidence of acute intracranial hemorrhage or mass effect.    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  TECHNIQUE:  Routine without contrast  including dedicated thin section multiplanar reformats of each temporal bone. Dose reduction techniques were used.    FINDINGS:  Right IAC:  External auditory canal is unremarkable without evidence of atresia, stenosis or soft tissue. Visualized tympanic membrane is intact and unremarkable. Scutum is sharp.  Epitympanum, mesotympanum and hypotympanum are free of soft tissue or fluid. Ossicles are intact and in normal alignment. Tegmen tympani is pneumatized and intact. Sinus tympani and facial recess are free of soft tissue or fluid. Oval and round windows   are patent.  2-1/2 turns to the cochlea. Vestibule and semicircular canals are unremarkable without evidence of dehiscence. Vestibular and cochlear aqueducts are of normal caliber. No evidence of labyrinthitis ossificans or otospongiosis. Internal auditory canal is   unremarkable. Labyrinthine, tympanic and mastoid segments of the facial nerve are unremarkable and follow a normal anatomic course. No dehiscence of the tympanic segment.  Carotid and jugular vascular channels are unremarkable and follow a normal anatomic course.  Perieustachian, perilabyrinthine, and mastoid air cells are pneumatized and entirely aerated.  Preauricular and postauricular spaces are unremarkable.    Left IAC:  External auditory canal is unremarkable without evidence of atresia, stenosis or soft tissue. The tympanic membrane is thickened. Scutum is sharp.  Epitympanum, mesotympanum and hypotympanum are free of soft tissue or fluid. Ossicles are intact and in normal alignment. Tegmen tympani and tegmen mastoideum of multifocal areas of thinning and  dehiscence. Sinus tympani and facial recess are free of   soft tissue or fluid. Oval and round windows are patent.  2-1/2 turns to the cochlea. Vestibule and semicircular canals are unremarkable without evidence of dehiscence. Vestibular and cochlear aqueducts are of normal caliber. No evidence of labyrinthitis ossificans or otospongiosis.  Internal auditory canal is   unremarkable. Labyrinthine, tympanic and mastoid segments of the facial nerve are unremarkable and follow a normal anatomic course. No dehiscence of the tympanic segment.  Carotid and jugular vascular channels are unremarkable and follow a normal anatomic course.  Complete opacification of the mastoid air cells.  Preauricular and postauricular spaces are unremarkable.    Visualized intracranial contents, orbits, paranasal sinuses, nasopharynx is unremarkable.      Impression    IMPRESSION:    Right temporal bone:    1.  Unremarkable examination.    Left temporal bone:    1.  Multifocal areas of dehiscence of the tegmen tympani and tegmen mastoideum.  2.  Complete opacification of the mastoid air cells.  3.  Thickened tympanic membrane.   CT Head w/o Contrast    Narrative    EXAM: CT HEAD W/O CONTRAST  LOCATION: [facility]  DATE: 2025    INDICATION: Intracranial hemorrhage, stroke  COMPARISON:  CT head 2025.  TECHNIQUE: Routine CT Head without IV contrast. Multiplanar reformats. Dose reduction techniques were used.    FINDINGS:  INTRACRANIAL CONTENTS: Stable size of the evolving left parietal intraparenchymal hematoma with surrounding vasogenic edema. Stable mass effect with effacement of the regional sulci. No new or enlarging hemorrhage.  No CT evidence of acute infarct.   Stable ventricle caliber.     VISUALIZED ORBITS/SINUSES/MASTOIDS: Stable orbits. No paranasal sinus mucosal disease. No middle ear or mastoid effusion.    BONES/SOFT TISSUES: Stable.      Impression    IMPRESSION:  1.  Stable examination.    Echocardiogram Complete     Value    LVEF  55-60%    Narrative    [id number]  [id number]  [id number]  [id number]^KINSEY^QUINN^TIFFANIE MARVIN     [facility]  Echocardiography Laboratory  [address] [place] [id number]     Name: ROSELINE BARCLAY  MRN: [id number]  : 1965  Study Date: 2025 09:02 AM  Age: 59  yrs  Gender: Female  Patient Location: [facility]  Reason For Study: CVA  Ordering Physician: QUINN EUCEDA  Performed By: Renetta Guillen RDCS     BSA: 2.0 m2  Height: 66 in  Weight: 204 lb  HR: 94  BP: 122/79 mmHg  ______________________________________________________________________________  Procedure  Echocardiogram with two-dimensional, color and spectral Doppler. Optison (NDC  #[id number]) given intravenously.  ______________________________________________________________________________  Interpretation Summary     1. Normal biventricular size and function. Left ventricular ejection fraction  of 55-60%.  2. No segmental wall motion abnormalities noted.  3. No hemodynamically significant valvular disease.  Compared to the previous echocardiogram, there are no significant changes.  Technically very difficult study.  ______________________________________________________________________________  Left Ventricle  The left ventricle is normal in size. There is concentric remodeling present.  Grade I or early diastolic dysfunction. Left ventricular systolic function is  normal. The visual ejection fraction is 55-60%. No regional wall motion  abnormalities noted.     Right Ventricle  The right ventricle is normal in size and function.     Atria  Normal left atrial size. Right atrial size is normal.     Mitral Valve  The mitral valve leaflets appear normal. There is no evidence of stenosis,  fluttering, or prolapse. There is mild mitral annular calcification. There is  trace mitral regurgitation.     Tricuspid Valve  Normal tricuspid valve. There is trace tricuspid regurgitation.     Aortic Valve  There is mild trileaflet aortic sclerosis. No aortic stenosis is present.     Pulmonic Valve  The pulmonic valve is not well visualized.     Vessels  Normal size aorta. Normal size ascending aorta. IVC diameter <2.1 cm  collapsing >50% with sniff suggests a normal RA pressure of 3 mmHg.     Pericardium  There is no  pericardial effusion.     Rhythm  Sinus rhythm was noted.  ______________________________________________________________________________  MMode/2D Measurements & Calculations     IVSd: 0.95 cm  LVIDd: 3.9 cm  LVIDs: 2.3 cm  LVPWd: 1.1 cm  FS: 42.6 %  LV mass(C)d: 130.9 grams  LV mass(C)dI: 64.9 grams/m2  Ao root diam: 3.0 cm  asc Aorta Diam: 3.5 cm  LVOT diam: 1.9 cm  LVOT area: 3.0 cm2  Ao root diam index Ht(cm/m): 1.8  Ao root diam index BSA (cm/m2): 1.5  Asc Ao diam index BSA (cm/m2): 1.7  Asc Ao diam index Ht(cm/m): 2.1  LA Volume (BP): 34.0 ml     LA Volume Index (BP): 16.8 ml/m2  RV Base: 2.9 cm  RWT: 0.58  TAPSE: 2.1 cm     Doppler Measurements & Calculations  MV E max rocco: 63.0 cm/sec  MV A max rocco: 99.2 cm/sec  MV E/A: 0.64  MV dec time: 0.17 sec  E/E' av.4  Lateral E/e': 9.2  Medial E/e': 9.7  RV S Rocco: 13.7 cm/sec     ______________________________________________________________________________  Report approved by: Husam Dubose MD on 2025 01:38 PM             Discharge Medications   Current Discharge Medication List        START taking these medications    Details   acetaminophen (TYLENOL) 325 MG tablet Take 2 tablets (650 mg) by mouth every 4 hours as needed for mild pain.    Associated Diagnoses: Acute CVA (cerebrovascular accident) (H)      cilostazol (PLETAL) 100 MG tablet Take 1 tablet (100 mg) by mouth 2 times daily.    Associated Diagnoses: Acute CVA (cerebrovascular accident) (H)           CONTINUE these medications which have NOT CHANGED    Details   aspirin 81 MG EC tablet Take 1 tablet (81 mg) by mouth daily.  Qty: 90 tablet, Refills: 0    Comments: Future refills by PCP  Associated Diagnoses: Parietal lobe infarction (H)      atorvastatin (LIPITOR) 40 MG tablet Take 1 tablet (40 mg) by mouth every evening.  Qty: 90 tablet, Refills: 1    Associated Diagnoses: Parietal lobe infarction (H); Dyslipidemia           STOP taking these medications       indomethacin (INDOCIN) 50 MG  capsule Comments:   Reason for Stopping:         Semaglutide, 1 MG/DOSE, (OZEMPIC, 1 MG/DOSE,) 4 MG/3ML pen Comments:   Reason for Stopping:             Allergies   Allergies   Allergen Reactions    Codeine Sulfate Rash

## 2025-04-24 NOTE — PLAN OF CARE
Physical Therapy Discharge Summary     Reason for therapy discharge:    Discharged to acute rehab unit.     Progress towards therapy goal(s). See goals on Care Plan in [facility] electronic health record for goal details.  Goals not met.  Barriers to achieving goals:   discharge from facility.     Therapy recommendation(s):    Continued therapy is recommended.  Rationale/Recommendations:  Patient would benefit from PT eval at ARU in order to increase strength, activity tolerance, balance and independence with mobility.    **Pt not seen by discharging therapist on this date, note written based on previous treating therapist's notes and recommendations

## 2025-04-24 NOTE — DISCHARGE INSTRUCTIONS
Your risk factors for stroke or TIA (transient ischemic attack):     Your Risk Factors Your Results Goals   [] High blood pressure BP: 109/68 (04/24/25 1115) Less than 120/80   [] Cholesterol          Total 4/19/2025: 120 mg/dL   Less than 150    Triglycerides   4/19/2025: 96 mg/dL Less than 150    LDL 4/19/2025: 47 mg/dL    Less than 70    HDL 4/19/2025: 54 mg/dL         Greater than 40 (men)  Greater than 50 (women)   [x] Diabetes                A1C 4/7/2025: 6.0 % Less than 5.7   [] Atrial fibrillation No atrial fibrillation noted on cardiac monitor Manage per physician orders   [] Smoking/tobacco use   Tobacco Use      Smoking status: Never      Smokeless tobacco: Never   Quit smoking and tobacco   [] Overweight Body mass index is 32.49 kg/m .  Less than 25     Other risk factors include: carotid (neck) artery disease, other heart diseases, prior stroke or TIA, poor diet, lack of exercise, and excessive alcohol consumption.     [x] Written stroke educational materials given to patient including:   - Learning about BE FAST: Stroke Warning Signs and Learning about Risk Factors for Stroke (Healthwise)   - Understanding Stroke: Key Resources After a Stroke (FOD #340784)       Know the warning signs and symptoms of stroke: BE FAST     B = Balance loss   E = Eyesight changes   F = Facial droop or numbness   A = Arm or leg weakness   S = Speech difficulty, slurred speech   T = Time to call 911 for help

## 2025-04-24 NOTE — PLAN OF CARE
Goal Outcome Evaluation:      Plan of Care Reviewed With: patient    Admission Note: Arrived to     Reason for admission: Stroke  Primary team notified of pt arrival.  Admitted from: [facility]  Via: EMS  Accompanied by: Paramedic  Belongings: Placed in closet; valuables sent home with family  Admission Required Doc Completed: Yes  Mobility Devices Utilized by Patient Provided (i.e. walker, wheelchair, etc.): Yes, gait belt  Teaching: Orientation to unit and call light- call light within reach, use of console, meal times, when to call for the RN, and enforced importance of safety.  IV Access: No  Telemetry: No  Ht./Wt.: Completed  Code Status verified on armband: Yes  2 RN Skin Assessment Completed with: Not done   Suction/Ambu bag/Flowmeter at bedside: Yes    Pt status:     Patient is A&O x4. Patient denied chest pain/SOB and dizziness/nausea. Patient c/o foot pain due to neuropathy. Patient is continent of both bowel and bladder; LBM 4/24. Patient had no concerns during this time, is able to make her needs known sometimes, and uses call light appropriately.    Activity: Assist x1 w/ GB  Diet: Regular/thin/whole   Skin: Bruising to the upper extremities but was not able to do a skin check this time  Goals for next shift: Promote rest     Temp:  [98.1  F (36.7  C)] 98.1  F (36.7  C)  Pulse:  [107] 107  Resp:  [18] 18  BP: (115)/(65) 115/65  SpO2:  [99 %] 99 %

## 2025-04-24 NOTE — PHARMACY-ADMISSION MEDICATION HISTORY
Admission medication history completed at [facility]. Please see Pharmacist Admission Medication History note from 4/18/2025 for more information.    Keagan Brower, BlancaD, BCIDP

## 2025-04-24 NOTE — PROGRESS NOTES
[facility]    Vascular Neurology Progress Note    Interval History     Hypotensive episode of 92/63 this morning. SBP ranges to 126. BG ranges 120-130s. Nursing notes Afshan is refusing HOB>30 at night.     CT head today is stable, so started Cilostazol today, and continued aspirin 81 and lipitor 40 mg.     Afshan reports that her speech is better, but word finding difficulty is the same. She has gotten up to walk, but feels that she has generalized weakness. She reports prior headache and bilateral lower extremity pain and received oxycodone. [facility]   Hospital Course     Chief complaint: Stroke Symptoms    59-year-old female with past medical history significant for AML in remission, ischemic stroke, parotid/parotid cancer s/p radiation and diabetes mellitus presented on 4/18/2025 with acute onset right sided weakness and dysarthria. CTA showed no LVO but showed 50% stenosis of left ICA origin. Telestroke team had initial NIHSS of 4 and given TNK. MRI showed hyperacute hemorrhage in left parietal region with adjacent edema. TNK was reversed with TXA and cryo. She presented with right arm weakness and ataxia, and developed right hemianopsia and aphasia. PTA meds include aspirin 81, Lipitor 40 mg.     Assessment and Plan     1. Acute ischemic strokes of L hemisphere due to atheroembolic from symptomatic left common carotid stenosis vs ESUS. Due to multi embolic strokes in left hemisphere, transcranial doppler ordered to access for microembolic source.     2 Left parieto-occipital hemorrhage post TNK s/p reversal. Patient is on aspirin and cliostazol instead of Plavix to reduce risk of bleeding.     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     -Plan:  - Neuro check every 4 hours  - Ordered Cilostazol 100 mg BID  - Continue aspirin 81 mg  - Statin:  Continue PTA Lipitor  "40 mg daily, goal of LDL between 40 and 70  - BP goal < 130/80  - ARU today  - PT/OT/SLP  -Research: Interested in Discovery     DVT Prophylaxis: SCDs, hold chemical DVT ppx      Patient Follow-up    - in 4-6 weeks with general neurology or stroke BEV ([phone]) (ordered)  -Discuss outpatient Cardiac CTA at stroke clinic visit (not ordered)   - Repeat MRI brain in 3 months (ordered)  - Zio patch on discharge (ordered)  - Outpatient vascular surgery (ordered)  - TCD at [place] in 1 month, requested    No further stroke evaluation is recommended, so we will sign off. Please contact us with any additional questions.      Chanel Thomas PA-C  Vascular Neurology    To page me or covering stroke neurology team member, click here: AMCOM  Choose \"On Call\" tab at top, then select \"NEUROLOGY/ALL SITES\" from middle drop-down box, press Enter, then look for \"stroke\" or \"telestroke\" for your site.    Physical Examination     Temp: 98  F (36.7  C) Temp src: Oral BP: 122/69 Pulse: 96   Resp: 16 SpO2: 94 % O2 Device: None (Room air)      Neurologic  Mental Status:  alert, oriented to self, age, date, day of the week, follows commands, speech clear with frequent pauses, naming and repetition normal  Cranial Nerves: right complete hemianopia,  PERRL, EOMI with normal smooth pursuit, facial sensation intact and symmetric, facial movements symmetric, hearing not formally tested but intact to conversation  Motor:  no abnormal movements, able to move all limbs spontaneously, strength 4/5 in right upper and lower extremities, strength 5/5 in left upper and lower extremities, slight pronation RUE without drift  Reflexes:  not tested  Sensory:  light touch sensation intact and symmetric throughout upper and lower extremities, no extinction on double simultaneous stimulation   Coordination:  left finger-to-nose is faster than right and heel-to-shin bilaterally without dysmetria  Station/Gait:  deferred    Imaging/Labs   (Bolded " imaging and labs new and/or personally reviewed or re-reviewed by me today)    MRI/Head CT MRI brain 4/19: Several new watershed and embolic-type infarcts are seen in the left frontoparietal region. Hyperacute vertebral hemorrhage in left parieto-occipital region with adjacent edema and small subdural hemorrhage. Mild mass effect w/out midline shift.     CT head 4/18: No acute hemorrhage or infarct, chronic microvascular ischemic changes, left mastoid effusion    CT head 4/19: Similar parenchymal hematoma to MRI. No new acute hemorrhages. Multiple small infarcts in left cerebral hemisphere.     CT head 4/19 evening: No significant changes to same day CT.     CT head 4/20: Evolving acute intraparenchymal hemorrhage in left occipital lobe 2.7 x 3.7 x 3.3 cm . No other hemorrhage or mass effect.     CT head 4/21: Evolving IP hemorrhage in left parietal occipital lobe measures 2.6 x 3.7 x 3.4 cm  with decreased vasogenic edema.     CT head 4/24: Stable sive of left IP hematoma with vasogenic edema. Stable mass effect. No new hemorrhage or acute infarct.      Intracranial Vasculature CTA head: No LVO   Cervical Vasculature CTA neck: 50 % stenosis of left ICA origin     Echocardiogram Technically difficult study, EF 55-60%, concentric remodeling of left ventricle, normal bilateral atria and right ventricle size and function   EKG/Telemetry Sinus tachycardia, minimal voltage criteria for LVH,     LDL  4/19/2025: 47 mg/dL   A1C  4/7/2025: 6.0 %   Troponin No lab value available in past 48 hrs     Other labs reviewed by me today:  -130s.     Time Spent on this Encounter   Billing: I have personally spent a total of 60 minutes providing care today, time spent in reviewing medical records and devising the plan as recorded above.

## 2025-04-24 NOTE — PROGRESS NOTES
Care Management Discharge Note    Discharge Date: 04/24/2025       Discharge Disposition: [facility]  Discharge Services:  therapies, transport  Discharge Transportation: [employer] wheelchair     Private pay costs discussed: transportation costs discussed with patient and  yesterday     Does the patient's insurance plan have a 3 day qualifying hospital stay waiver?  No    Education Provided on the Discharge Plan: yes  Persons Notified of Discharge Plans: facility, bedside RN  Patient/Family in Agreement with the Plan: yes    Handoff Referral Completed: No, handoff not indicated or clinically appropriate    Additional Information:  SW called transport to see if any availability opened up for today's transport to be later and we were able to reschedule. [employer] wheelchair will pick patient up between [id number] to go to [facility] pending insurance authorization.     ADD: [id number]  Patient has received insurance authorization and met with ENT today. ARU confirmed that patient will discharge to them as planned. SW met with patient to go over the discharge plan for today and answer her questions. She is very excited and ready to leave.    Elmira Valles, KYLE, LGSW   Social Work   [facility]

## 2025-04-24 NOTE — PROGRESS NOTES
Reason for Admission: L.sided stroke s/p TNK followed by hemorrhagic.    Cognitive/Mentation: A/Ox 4  Neuros/CMS: Intact ex WFD, RUE weakness, RUE and BLE numbness, blurry vision and R.field-cut on both eyes, RUE ataxic  VS: Stable RA.   Tele: ST/SR.  /GI: Continent.   Pulmonary: LS clear.  Pain: Denies.   Drains/Lines: No PIV MD aware  Skin: scattered bruising BUE  Activity: Assist x 1 with GBW.  Diet: Reg with thin  liquids. Takes pills whole with water.   Discharge: ARU    Aggression Stoplight Tool: Green    End of shift summary: Repeat CT tomorrow.

## 2025-04-24 NOTE — PROGRESS NOTES
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  She received tenecteplase with acute post tenecteplase intraparenchymal hemorrhage, involving left parieto-occipital region as well as adjacent subdural hematoma.  She received cryoprecipitate and tranexamic acid per stroke neurology.     Seen in consult by neurosurgery in setting of hemorrhagic conversion, recommended medical management with routine imaging.  CTA head and neck revealed, 50% stenosis of the left ICA.  Seen in consult by vascular surgery due to symptomatic left carotid stenosis,  recommendation to proceed with left carotid endarterectomy once deemed safe for intraoperative heparin to minimize risk of further bleeding. In setting of stable head CT last completed 4/24 patient was started on cilostazol 4/24 per neurology.      Ms. Jimenez was seen in consult by ENT for left mastoid effusion with recommendations for outpatient ENT follow up.      During acute hospitalization, patient was seen and evaluated by PT, SLP and OT,  who collectively recommended that patient would benefit from ongoing therapies in the acute inpatient rehabilitation setting.     Functionally noted to have impaired sensation, ataxia, aphasia, dysarthria, right hemianopsia/field cut, right neglect, impaired balance, and impaired coordination.  Currently CGA-mod  assist for sit to stand transfers with hand over hand guidance for right hand due to impaired grasp, vision, and sensation.  She is ambulating up to 40 feet with mod assist with shuffling slow gait using hand rail in hallway.  Completed adls seated with cues to scan environment for items as well as cues to incorporate right hand for adls.  Upgraded to regular diet. Working with speech on object naming, sentence completion, word definition, and speech production.  Goals for MOD I with basic mobility, adls, and improved ability to communicate needs effectively.     On arrival to rehab, she was doing well. Denied any pain but her feet can get painful due to neuropathy. His RUE and RLE weakness has improved significantly. Has ongoing numbness in RUE, mostly right hand. She also reported some difficulty with hand function which seemed to be apraxia and motor planning issue. Swallowing is normal per her report. Reported right sided visual loss. No issues with her memory, bowel or bladder function. Talking is difficult and slow; word finding has improved and she can more clearly.  She had h/o cancer in her mouth which impacted her jaw movement and speech; jaw opening is limited and she has been using a mouth prosthesis to be able to talk more clearly for years.       PAST MEDICAL HISTORY   Reviewed and updated in Epic.  Past Medical History:   Diagnosis Date    Deafness     left ear    Diabetes mellitus (H)     Malignant neoplasm (H)     leukemia    Malignant neoplasm of parotid gland (H)        SURGICAL HISTORY  Reviewed and updated in Epic.  Past Surgical History:   Procedure Laterality Date    LAPAROSCOPIC CHOLECYSTECTOMY  10/24/2011    Procedure:LAPAROSCOPIC CHOLECYSTECTOMY; Laparoscopic Cholecystectomy; Surgeon:TIAGO MALDONADO; Location:[facility] OR    SALIVARY GLAND SURGERY      L parotid    SINUS SURGERY      tumor removed from roof of mouth         SOCIAL HISTORY  Reviewed and updated in Epic.  Living situation:  lives  alone in [facility] with elevator access, though typically uses stairs.    Family support: supportive friends and co-workers; parents are alive but elderly and can't help    Vocational History: works full time as   Tobacco use: none  Alcohol use: none   Illicit drug use: none     Social History     Socioeconomic History    Marital status: Single     Spouse name: Not on file    Number of children: Not on file    Years of education: Not on file    Highest education level: Not on file   Occupational History    Not on file   Tobacco Use    Smoking status: Never    Smokeless tobacco: Never   Vaping Use    Vaping status: Never Used   Substance and Sexual Activity    Alcohol use: Yes    Drug use: Not on file    Sexual activity: Not on file   Other Topics Concern    Not on file   Social History Narrative    Mostly office/administrative and financial work, 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      Social Drivers of Health     Financial Resource Strain: Low Risk  (4/24/2025)    Financial Resource Strain     Within the past 12 months, have you or your family members you live with been unable to get utilities (heat, electricity) when it was really needed?: No   Food Insecurity: Low Risk  (4/24/2025)    Food Insecurity     Within the past 12 months, did you worry that your food would run out before you got money to buy more?: No     Within the past 12 months, did the food you bought just not last and you didn t have money to get more?: No   Transportation Needs: Low Risk  (4/24/2025)    Transportation Needs     Within the past 12 months, has lack of transportation kept you from medical appointments, getting your medicines, non-medical meetings or appointments, work, or from getting things that you need?: No   Physical Activity: Insufficiently Active (7/26/2024)    [facility] from [facility]    Exercise Vital Sign     Days of Exercise per Week: 2 days     Minutes of Exercise  per Session: 20 min   Stress: No Stress Concern Present (11/27/2022)    [facility]    [employer] [place] [facility] [employer] - Occupational Stress Questionnaire     Feeling of Stress : Only a little   Social Connections: Unknown (11/27/2022)    [facility]    Social Connection and Isolation Panel [NHANES]     Frequency of Communication with Friends and Family: More than three times a week     Frequency of Social Gatherings with Friends and Family: More than three times a week     Attends Yazidism Services: More than 4 times per year     Active Member of Clubs or Organizations: Yes     Attends Club or Organization Meetings: More than 4 times per year     Marital Status: Not on file   Interpersonal Safety: Low Risk  (4/24/2025)    Interpersonal Safety     Do you feel physically and emotionally safe where you currently live?: Yes     Within the past 12 months, have you been hit, slapped, kicked or otherwise physically hurt by someone?: No     Within the past 12 months, have you been humiliated or emotionally abused in other ways by your partner or ex-partner?: No   Recent Concern: Interpersonal Safety - High Risk (4/19/2025)    Interpersonal Safety     Do you feel physically and emotionally safe where you currently live?: Yes     Within the past 12 months, have you been hit, slapped, kicked or otherwise physically hurt by someone?: Yes     Within the past 12 months, have you been humiliated or emotionally abused in other ways by your partner or ex-partner?: No   Housing Stability: Low Risk  (4/24/2025)    Housing Stability     Do you have housing? : Yes     Are you worried about losing your housing?: No       FAMILY HISTORY  Reviewed and updated in Epic.  Family History   Problem Relation Age of Onset    Breast Cancer Mother     Testicular cancer Brother     Pancreatic Cancer Maternal Uncle     Chronic Obstructive Pulmonary Disease Maternal Uncle     LUNG  "DISEASE No family hx of        PRIOR FUNCTIONAL HISTORY   Pt was independent with all ADLs/IADLs, transfers, mobility and gait.    Right hand-dominant     MEDICATIONS  Scheduled meds  Medications Prior to Admission   Medication Sig Dispense Refill Last Dose/Taking    acetaminophen (TYLENOL) 325 MG tablet Take 2 tablets (650 mg) by mouth every 4 hours as needed for mild pain.       aspirin 81 MG EC tablet Take 1 tablet (81 mg) by mouth daily. 90 tablet 0     atorvastatin (LIPITOR) 40 MG tablet Take 1 tablet (40 mg) by mouth every evening. 90 tablet 1     cilostazol (PLETAL) 100 MG tablet Take 1 tablet (100 mg) by mouth 2 times daily.          ALLERGIES     Allergies   Allergen Reactions    Codeine Sulfate Rash       REVIEW OF SYSTEMS  A 10 point ROS was performed and negative unless otherwise noted in HPI.     PHYSICAL EXAM  VITAL SIGNS:  /65 (BP Location: Left arm, Patient Position: Semi-Wolf's, Cuff Size: Adult Regular)   Pulse 107   Temp 98.1  F (36.7  C) (Oral)   Resp 18   Ht 1.676 m (5' 6\")   Wt 88.5 kg (195 lb 1.6 oz)   SpO2 99%   BMI 31.49 kg/m    BMI:  Estimated body mass index is 31.49 kg/m  as calculated from the following:    Height as of this encounter: 1.676 m (5' 6\").    Weight as of this encounter: 88.5 kg (195 lb 1.6 oz).     General: NAD, pleasant   HEENT: MMM, showed me her removable mouth prosthesis for the upper jaw/roof of her mouth   Pulmonary: non-labored in room air   Cardiovascular: regular pulse   Abdominal: soft and non-tender   Extremities: warm, well perfused, no edema in bilateral lower extremities, no tenderness in calves but touching feet was extremely tender   MSK/neuro:   Mental Status:  alert and oriented x3    Cranial Nerves: right homonymous hemianopsia, otherwise normal; [place] on left ear - chronic     Sensory: diminished to LT in right hand, also almost allodynic in feet but apparently that's intermittent    Strength: mild weakness at RUE and RLE   Abnormal " movements: None    Coordination: dysmetria on right FNF    Speech: slow with prolonged pauses but clear and intelligible    Cognition:was able to remember the details of her medical history         LABS  CBC RESULTS:   Recent Labs   Lab Test 04/24/25  0709 04/21/25  1854 04/20/25  0520   WBC 7.3 9.4 6.5   RBC 4.40 4.74 4.15   HGB 12.6 13.4 11.9   HCT 38.5 41.1 36.6   MCV 88 87 88   MCH 28.6 28.3 28.7   MCHC 32.7 32.6 32.5   RDW 13.0 13.2 13.1    181 158     Last Basic Metabolic Panel:  Recent Labs   Lab Test 04/24/25  1214 04/24/25  0751 04/24/25  0709 04/23/25  0738 04/23/25  0709 04/22/25  1238 04/22/25  0828 04/21/25  2107 04/21/25  1854 04/20/25  0600 04/20/25  0520   NA  --   --  138  --   --   --   --   --  138  --  138   POTASSIUM  --   --  4.0  --  4.5  --  4.2  --  4.2  --  4.1   CHLORIDE  --   --  101  --   --   --   --   --  102  --  104   CO2  --   --  25  --   --   --   --   --  24  --  26   ANIONGAP  --   --  12  --   --   --   --   --  12  --  8   * 130* 138*   < >  --    < >  --    < > 189*   < > 123*   BUN  --   --  20.6  --   --   --   --   --  19.5  --  11.2   CR  --   --  1.00*  --   --   --   --   --  0.95  --  0.82   GFRESTIMATED  --   --  64  --   --   --   --   --  68  --  81   JOSE MARIA  --   --  9.4  --   --   --   --   --  9.9  --  9.3    < > = values in this interval not displayed.       4/24 head CT  FINDINGS:  INTRACRANIAL CONTENTS: Stable size of the evolving left parietal intraparenchymal hematoma with surrounding vasogenic edema. Stable mass effect with effacement of the regional sulci. No new or enlarging hemorrhage.  No CT evidence of acute infarct.   Stable ventricle caliber.      VISUALIZED ORBITS/SINUSES/MASTOIDS: Stable orbits. No paranasal sinus mucosal disease. No middle ear or mastoid effusion.     BONES/SOFT TISSUES: Stable.    MRI brain 4/19  FINDINGS:  INTRACRANIAL CONTENTS: Stable size of the evolving left parietal intraparenchymal hematoma with surrounding  vasogenic edema. Stable mass effect with effacement of the regional sulci. No new or enlarging hemorrhage.  No CT evidence of acute infarct.   Stable ventricle caliber.      VISUALIZED ORBITS/SINUSES/MASTOIDS: Stable orbits. No paranasal sinus mucosal disease. No middle ear or mastoid effusion.     BONES/SOFT TISSUES: Stable.    4/19  Carotid US  RIGHT: No significant plaque is seen at the right carotid bifurcation, or within the internal carotid artery . Normal velocities in the ECA. Antegrade flow within the vertebral artery.      LEFT: Mild plaque at the distal left common carotid artery, approaching the bifurcation. The peak systolic velocity in the ICA is less than 180 cm/sec, consistent with less than 50% stenosis. Normal velocities in the ECA. Antegrade flow within the   vertebral artery.      IMPRESSION/PLAN:  Afshan Jimenez is a 59 year old right hand dominant woman with past medical history of AML s/p bone marrow transplant 1992, DM type 2, HLD, left parietal stroke (12/2024), who presented with acute right sided incoordination and slurred speech imaging revealed multiple watershed and embolic type infarcts in left frontoparietal region, received TNK with left parieto-occipital hemorrhage s/p TNK reversal.  Workup also revealed left common carotid artery stenosis, with recommendation for follow up vascular surgery for CEA planning. Admitted to rehab 04/24/2025, functionally noted to have impaired strength, impaired coordination, impaired balance, impaired vision, and impaired speech.       Admission to acute inpatient rehab: 04/24/2025  Impairment group code: Stroke Ischemic 01.2 (R) Body Involvement (L) Brain; Acute ischemic strokes of L hemisphere due to atheroembolic from symptomatic left common carotid stenosis vs ESUS complicated by L parieto-occipital hemorrhage post TNK .        PT, OT and SLP 60 minutes of each on a daily basis up to 6 days per week, in addition to rehab nursing and close  management of physiatrist x 7.      Impairment of ADL's: Noted to have impaired strength, impaired activity tolerance, impaired vision,  and impaired coordination leading to decreased ability to independently complete ADL's.  Will benefit from ongoing OT with goal for MOD I with basic ADLs.     Impairment of mobility:  Noted to have impaired strength, impaired activity tolerance, impaired balance,  and impaired visioin,  leading to decreased mobility.  Will benefit from ongoing PT with goal for JENNIFER with basic mobility.       Impairment of cognition/language/swallow:  Noted to have impaired speech will benefit from ongoing SLP to improve ability to communicate needs effectively.     Medical Conditions    Acute ishemic strokes left hemisphere due to atheroembolic from  Symptomatic left carotid stenosis  vs esus  S/p TNK complicated by left parieto-occipital hemorrhage s/p TNK reversal.   Probable CAA   Left Parietal lobe stroke (12/2024) Subacute left occipital strokes- felt ESUS  Prior to admission on asa & stain.  Presented 4/18/25 with right sided weakness, slurred speech:  MRI noted to have multiple watershed and embolic type infarcts in the left frontoparietal region as well as interval evolutionary change of prior infarcts. She received TNK with resulting hemorrhage on follow up imaging, TNK was reversed, received serial brain imaging and medical management.    - Per vascular surgery; recommendation to proceed with left carotid endarterectomy once deemed safe for intraoperative heparin to minimize the risk of further bleeding.   -started on cilostazol 100 mg bid  -continue ASA 81 mg daily  - LDL goal 40-70- continue statin  -DM- goal A1c <7  -ziopatch on discharge  -follow up MRI 3 months  - follow up stroke neurology  -follow up vascular surgery- for CEA  -Transcranial doppler at [place] ~ 1 month per neurology       DM type 2  Lab Results   Component Value Date    A1C 6.0 04/07/2025    A1C 6.0 12/27/2024  "  Previously took ozempic, discontinued with some symptomatic dizziness/ concerns for hypotension.  Per pcp note 4/7/2025,  would like to try lifestyle modification  -monitor with labs  -hypoglycemic protocol  -add diabetes agent if needed- glucose at goal without medications      HLD  LDL 47 4/19  -continue atorvastatin 40 mg daily LDL goal 40-70      Peripheral neuropathy   No clear etiology, likely multifactorial due to DM and prior cancer treatments   Gets episodes of severe burning pain in feet which respond very well to OTC deep blue cream       Gout  Takes indomethacin prn prior to admission.       Pancreas cyst  -follow up GI recommended surveillance MRI      Incidental lung nodules  - follow up Chest CT per pulm      Myeloblastic Leukemia s/p bone marrow transplant 1992 s/p full body radiation       History of palatal mucoepidermoid carcinoma s/p surgery for removal of upper palate  Impacted her speech and jaw ROM; wears mouth prosthesis since 2010      History of Left parotid cancer  Mastoid abnormalities on imaging  -chronic appearing changes on left temporal bone due to prior surgery/radiation, stable 4/2025 from 12/2024.  Patient without ear complaints, no findings to suggest mastoid or middle ear infection, per ENT \"changes are permanent\",  \"soft tissue/fluid will be noted in the mastoid\",  no need to consult ent unless patient is symptomatic/ clinical mastoiditis: erythema and tenderness in mastoid aresa.   -follow up ENT as outpatient for ear debridement and hearing rehab        Adjustment to disability:  Clinical psychology to eval and treat  FEN: reg  Bowel: monitor  Bladder: monitor  DVT Prophylaxis: mechanical  GI Prophylaxis: none  Code: full - she noted that she picked DNR for her will but wasn't sure about her prior decision about intubation - eventually decided to keep full code for now and talk to her friend   Disposition: home  ELOS:  8 days.  Rehab prognosis:  fair  Follow up Appointments " on Discharge: pcp, neurology (stroke dorys), vascular surgery (follow up carotid stenosis), TCD at [facility] (per neurology note 4/23/25), PM&R     Note prepared by Helen Logan MD  Physical Medicine & Rehabilitation

## 2025-04-25 ENCOUNTER — APPOINTMENT (OUTPATIENT)
Dept: PHYSICAL THERAPY | Facility: CLINIC | Age: 60
DRG: 057 | End: 2025-04-25
Attending: PHYSICAL MEDICINE & REHABILITATION
Payer: COMMERCIAL

## 2025-04-25 ENCOUNTER — APPOINTMENT (OUTPATIENT)
Dept: OCCUPATIONAL THERAPY | Facility: CLINIC | Age: 60
DRG: 057 | End: 2025-04-25
Attending: PHYSICAL MEDICINE & REHABILITATION
Payer: COMMERCIAL

## 2025-04-25 ENCOUNTER — APPOINTMENT (OUTPATIENT)
Dept: SPEECH THERAPY | Facility: CLINIC | Age: 60
DRG: 057 | End: 2025-04-25
Attending: PHYSICAL MEDICINE & REHABILITATION
Payer: COMMERCIAL

## 2025-04-25 VITALS
BODY MASS INDEX: 31.36 KG/M2 | TEMPERATURE: 98.1 F | HEART RATE: 110 BPM | SYSTOLIC BLOOD PRESSURE: 102 MMHG | OXYGEN SATURATION: 98 % | WEIGHT: 195.1 LBS | HEIGHT: 66 IN | DIASTOLIC BLOOD PRESSURE: 68 MMHG | RESPIRATION RATE: 18 BRPM

## 2025-04-25 LAB — GLUCOSE BLDC GLUCOMTR-MCNC: 140 MG/DL (ref 70–99)

## 2025-04-25 PROCEDURE — 250N000013 HC RX MED GY IP 250 OP 250 PS 637: Performed by: PHYSICIAN ASSISTANT

## 2025-04-25 PROCEDURE — 92523 SPEECH SOUND LANG COMPREHEN: CPT | Mod: GN | Performed by: SPEECH-LANGUAGE PATHOLOGIST

## 2025-04-25 PROCEDURE — 97530 THERAPEUTIC ACTIVITIES: CPT | Mod: GP | Performed by: PHYSICAL THERAPIST

## 2025-04-25 PROCEDURE — 99232 SBSQ HOSP IP/OBS MODERATE 35: CPT | Performed by: PHYSICIAN ASSISTANT

## 2025-04-25 PROCEDURE — 128N000003 HC R&B REHAB

## 2025-04-25 PROCEDURE — 92610 EVALUATE SWALLOWING FUNCTION: CPT | Mod: GN | Performed by: SPEECH-LANGUAGE PATHOLOGIST

## 2025-04-25 PROCEDURE — 97165 OT EVAL LOW COMPLEX 30 MIN: CPT | Mod: GO

## 2025-04-25 PROCEDURE — 97162 PT EVAL MOD COMPLEX 30 MIN: CPT | Mod: GP | Performed by: PHYSICAL THERAPIST

## 2025-04-25 RX ORDER — GABAPENTIN 100 MG/1
100 CAPSULE ORAL 3 TIMES DAILY PRN
Status: DISCONTINUED | OUTPATIENT
Start: 2025-04-25 | End: 2025-05-03 | Stop reason: HOSPADM

## 2025-04-25 RX ADMIN — CILOSTAZOL 100 MG: 100 TABLET ORAL at 20:47

## 2025-04-25 RX ADMIN — CILOSTAZOL 100 MG: 100 TABLET ORAL at 09:37

## 2025-04-25 RX ADMIN — ACETAMINOPHEN 650 MG: 325 TABLET ORAL at 00:43

## 2025-04-25 RX ADMIN — ATORVASTATIN CALCIUM 40 MG: 40 TABLET, FILM COATED ORAL at 20:47

## 2025-04-25 RX ADMIN — ASPIRIN 81 MG CHEWABLE TABLET 81 MG: 81 TABLET CHEWABLE at 09:37

## 2025-04-25 RX ADMIN — GABAPENTIN 100 MG: 100 CAPSULE ORAL at 13:07

## 2025-04-25 RX ADMIN — ACETAMINOPHEN 650 MG: 325 TABLET ORAL at 14:54

## 2025-04-25 ASSESSMENT — ACTIVITIES OF DAILY LIVING (ADL)
ADLS_ACUITY_SCORE: 38
ADLS_ACUITY_SCORE: 36
ADLS_ACUITY_SCORE: 38
PREVIOUS_RESPONSIBILITIES: MEAL PREP;HOUSEKEEPING;SHOPPING;LAUNDRY;MEDICATION MANAGEMENT;FINANCES;DRIVING;WORK
ADLS_ACUITY_SCORE: 38
BADLS,_PREVIOUS_FUNCTIONAL_LEVEL: INDEPENDENT
ADLS_ACUITY_SCORE: 38
IADLS,_PREVIOUS_FUNCTIONAL_LEVEL: INDEPENDENT
ADLS_ACUITY_SCORE: 38

## 2025-04-25 NOTE — PLAN OF CARE
Discharge Planner Post-Acute Rehab PT:     Discharge Plan: Home w/ support     Precautions: falls, cognition, insensate feet    Current Status:  Bed Mobility: ind  Transfer: walker, CGA  Gait: not tested due to orthostatic  Stairs: NT  Balance: stands w/o UE support, assist for dynamic standing    Outcome Measures:   AguilarxSTS:  TUG:     Assessment:  orthostasis today prevented full mobility evaluation, she did have compression socks from the hospital, OT established in pm that these are not adequate for BP management.     Other Barriers to Discharge (DME, Family Training, etc):   No identified caregiver  Cognition

## 2025-04-25 NOTE — PROGRESS NOTES
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 She received tenecteplase with acute post tenecteplase intraparenchymal hemorrhage, involving left parieto-occipital region as well as adjacent subdural hematoma. She received cryoprecipitate and tranexamic acid per stroke neurology. Seen in consult by neurosurgery in setting of hemorrhagic conversion, recommended medical management with routine imaging. CTA head and neck revealed, 50% stenosis of the left ICA. Seen in consult by vascular surgery due to symptomatic left carotid stenosis, recommendation to proceed with left carotid endarterectomy once deemed safe for intraoperative heparin to minimize risk of further bleeding. In setting of stable head CT last completed 4/24 patient was started on cilostazol 4/24 per neurology. Ms. Jimenez was seen in consult by ENT for left mastoid effusion with recommendations for outpatient ENT follow up.\" SLP consult received for evaluation and treatment as indicated.   General Observations Pt arrived to ARU with orders for regular solids and thin liquids. Pt followed by acute SLP for dysphagia during hospitalization, please see dysphagia history below for more details.   Type of Evaluation   Type of Evaluation Swallow " Evaluation   Oral Motor   Oral Musculature generally intact   Dentition (Oral Motor)   Dentition (Oral Motor) natural dentition;adequate dentition   Facial Symmetry (Oral Motor)   Facial Symmetry (Oral Motor) WNL   Lip Function (Oral Motor)   Lip Range of Motion (Oral Motor) WNL   Tongue Function (Oral Motor)   Tongue Coordination/Speed (Oral Motor) WNL   Tongue ROM (Oral Motor) WNL   Jaw Function (Oral Motor)   Jaw Function (Oral Motor) WNL   Vocal Quality/Secretion Management (Oral Motor)   Vocal Quality (Oral Motor) WFL   General Swallowing Observations   Past History of Dysphagia Pt progressed from soft and bite sized solids (6) and thin liquids (0) to regualr solids with thin liquids with acute SLP. Acute SLP recommending additional regular meal follow up at time of discharge to ARU. No imaging of swallow during hospitalization.   Respiratory Support room air   Current Diet/Method of Nutritional Intake (General Swallowing Observations, NIS) thin liquids (level 0);regular diet   Swallowing Evaluation Clinical swallow evaluation   Clinical Swallow Evaluation   Additional evaluation(s) completed today Yes   Rationale for completing additional evaluation Communication evaluation indicated   Clinical Swallow Evaluation Textures Trialed thin liquids;solid foods   Clinical Swallow Eval: Thin Liquid Texture Trial   Mode of Presentation, Thin Liquids straw;self-fed   Volume of Liquid or Food Presented multiple single and sequential sips   Oral Phase of Swallow WFL   Pharyngeal Phase of Swallow intact   Clinical Swallow Evaluation: Solid Food Texture Trial   Mode of Presentation self-fed   Volume Presented small single bite   Oral Phase WFL   Pharyngeal Phase intact   Diagnostic Statement No oral residue, pocketing.   Esophageal Phase of Swallow   Patient reports or presents with symptoms of esophageal dysphagia No   Swallowing Recommendations   Diet Consistency Recommendations thin liquids (level 0);regular diet   Mode  of Delivery Recommendations bolus size, small;slow rate of intake   Swallowing Maneuver Recommendations alternate food and liquid intake   Medication Administration Recommendations, Swallowing (SLP) Per pt preference.   Instrumental Assessment Recommendations instrumental evaluation not recommended at this time   Clinical Impression   Criteria for Skilled Therapeutic Interventions Met (SLP Eval) Yes, treatment indicated   SLP Diagnosis Oral, pharyngeal swallow appears functional   Risks & Benefits of therapy have been explained evaluation/treatment results reviewed;care plan/treatment goals reviewed;participants voiced agreement with care plan;participants included;patient   Clinical Impression Comments SLP: Clinical swallow evaluation completed. Oral mechanism exam unremarkable. Evaluated pt with regular snack with arian cracker, thin via straw. Pt with adequate mastication and oral clearance of solid, no residue or pocketing. Pt with no coughing or throat clearing post-swallow single or sequential straw sips of thin. Recommend continuing regular solids and thin liquids (0), pt to use general safe swallow strategies sit upright, small bite/sip size, slow rate, alternate liquids/solids. Administer medications per pt's preference. Anticipate short course related to dysphagia. Skilled SLP services indicated to ensure safety with highest level of oral intake.   SLP Total Evaluation Time   Eval: oral/pharyngeal swallow function, clinical swallow Minutes (15564) 30   SLP Goals   Therapy Frequency (SLP Eval) 6 times/week   SLP Predicted Duration/Target Date for Goal Attainment 05/08/25   SLP Goals Swallow   SLP Discharge Planning   SLP Plan SLP: f/u reg/0 meal, likely sign off on swallow. Cognitive eval with [facility], enter details in progress note and add goals.   SLP Time and Intention   Total Session Time (sum of timed and untimed services) 30   SLP - Acute Rehab Center Time   Individual Time (minutes) - SLP 30   ARC  Total Session Time (minutes) - SLP 30   ARC Daily Total Session Time   SLP ARC Daily Total Session Time 30   ARC Daily Rehab Total Minutes 30

## 2025-04-25 NOTE — PROGRESS NOTES
04/25/25 0910   Appointment Info   Signing Clinician's Name / Credentials (PT) Celeste Schreiber, PT   Rehab Comments (PT) -20 orthostatic   Living Environment   People in Home alone   Current Living Arrangements condominium   Transportation Anticipated family or friend will provide   Living Environment Comments Elevator access but typically uses the stairs   Self-Care   Usual Activity Tolerance good   Current Activity Tolerance poor   Equipment Currently Used at Home grab bar, toilet;grab bar, tub/shower   Fall history within last six months no   Activity/Exercise/Self-Care Comment walks w/o device even though neuropathy is severe   Post-Acute Assessment Only   Post-Acute Functional Assessment See below   Previous Level of Function/Home Environm   Bathing, Previous Functional Level independent   Grooming, Previous Functional Level independent   Dressing, Previous Functional Level independent   Eating/Feeding, Previous Functional Level independent   Toileting, Previous Functional Level independent   BADLs, Previous Functional Level independent   IADLs, Previous Functional Level independent   Bed Mobility, Previous Functional Level independent   Transfers, Previous Functional Level independent   Household Ambulation, Previous Functional Level independent   Stairs, Previous Functional Level independent   Community Ambulation, Previous Functional Level independent   General Information   Onset of Illness/Injury or Date of Surgery 04/18/25   Referring Physician Desmond West   Patient/Family Therapy Goals Statement (PT) return home, but I know it isn't safe yet   Pertinent History of Current Problem (include personal factors and/or comorbidities that impact the POC) old stroke 12/24, new stroke 4/25   Existing Precautions/Restrictions fall   General Observations insensate feet.   Cognition   Affect/Mental Status (Cognition) anxious   Orientation Status (Cognition) person;situation   Follows Commands (Cognition)  follows one-step commands   Behavioral Issues overwhelmed easily   Safety Deficit (Cognition) problem-solving   Memory Deficit (Cognition) minimal deficit   Pain Assessment   Patient Currently in Pain Yes, see Vital Sign flowsheet  (LE neuropathy)   Integumentary/Edema   Integumentary/Edema no deficits were identifed   Posture    Posture Not impaired   Range of Motion (ROM)   Range of Motion ROM is WNL   Strength (Manual Muscle Testing)   Strength (Manual Muscle Testing) strength is WNL   Strength Comments LE's grossly 4+/5   Balance   Balance Comments stands EOB w/o UE support, needs assist w/ dynamic   Sensory Examination   Sensory Perception Comments absent vibration, absent monofilament, absent great toe (B)   Coordination   Coordination Comments slowed Donny   Muscle Tone   Muscle Tone Comments reflexes slowed   Clinical Impression   Criteria for Skilled Therapeutic Intervention Yes, treatment indicated   PT Diagnosis (PT) coordination deficit  (noted ataxia of limbs and trunk w/ complex movements)   Influenced by the following impairments balance, strength, sensation   Functional limitations due to impairments assist w/ standing dynamic mobility, stairs/gait/transfers   Clinical Presentation (PT Evaluation Complexity) evolving   Clinical Decision Making (Complexity) moderate complexity   Planned Therapy Interventions (PT) balance training;gait training;neuromuscular re-education;motor coordination training;stair training;strengthening;transfer training   Risk & Benefits of therapy have been explained evaluation/treatment results reviewed;care plan/treatment goals reviewed;risks/benefits reviewed;current/potential barriers reviewed;participants voiced agreement with care plan;participants included;patient   PT Total Evaluation Time   PT Eval, Moderate Complexity Minutes (40697) 15   Physical Therapy Goals   PT: Transfers Modified independent   PT: Gait Modified independent;Rolling walker   PT: Stairs Modified  independent   PT: Goal 1 car transfer SBA   PT: Goal 2 floor transfer w/ furniture assist   PT: Goal 3 will cite 3 falls prevention approaches after attending falls education   Therapeutic Activity   Treatment Detail/Skilled Intervention PT: focus on safe OOB: time spent w/ measuring and interpreting orthostatic blood pressure, collaborating w/ PA and OT   PT Discharge Planning   PT Plan PT: standing BP's and progression of activity to include gait, stairs, car, OM's   Physical Therapy Time and Intention   Total Session Time (sum of timed and untimed services) 15   PT - Acute Rehab Center Time   Individual Time (minutes) - PT 40  (eval 15, ther act 25)   Group Time (minutes) - PT 0   Concurrent Time (minutes) - PT 0   Co-Treatment Time (minutes) - PT 0   ARC Total Session Time (minutes) - PT 40   ARC Daily Total Session Time   PT ARC Daily Total Session Time 40   ARC Daily Rehab Total Minutes 40   Roll Left and Right: flat no rail   Assistance Needed Independent   Roll Left to Right CARE Score 6   Sit to Lying: flat no rail   Assistance Needed Independent   Sit to Lying CARE Score 6   Lying to Sitting on Side of Bed: flat no rail   Assistance Needed Independent   Lying to Sitting CARE Score 6   Sit to Stand: standard height (18 inches)   Assistance Needed Supervision   Physical Assistance Level Contact guard assist   Sitting to Standing CARE Score 4

## 2025-04-25 NOTE — PLAN OF CARE
"Goal Outcome Evaluation:      Plan of Care Reviewed With: patient    Overall Patient Progress: no changeOverall Patient Progress: no change    VS: /63 (BP Location: Right arm, Patient Position: Semi-Wolf's, Cuff Size: Adult Regular)   Pulse 108   Temp 97.5  F (36.4  C) (Oral)   Resp 18   Ht 1.676 m (5' 6\")   Wt 88.5 kg (195 lb 1.6 oz)   SpO2 98%   BMI 31.49 kg/m     O2: SpO2 > 98 and stable on RA. LS clear and equal bilaterally. Denies chest pain and SOB.    Output: Voids spontaneously without difficulty to bathroom.   Last BM: 4/24, denies abdominal discomfort. BS active / passing flatus.    Activity: Up with A x 1 with a walker. Pt was dizzy with lightheaded during therapy when ambulating.     Skin: WDL except, bruising to LUE, pt declined skin check x3, shift charged and unit supervisor informed about it.    Pain: Pain managed with PRN tylenol for headache and gabapentin for neuro-pian to BL-toes.    CMS: Alert and oriented to self and confused. C/o numbness and tingling.   Dressing: None.   Diet: Regular diet. Denies nausea/vomiting. Pt had good appetite for meals.    LDA: None.   Equipment: Personal belongings,    Plan: Fall precautions maintained / Continue with plan of care. Call light within reach, pt able to make needs known. Alarms on and safety checks completed.    Additional Info: Pt positive for orthostatic BP, pt needs abdominal binder and CHRISTINE stocking worn when OOB.  Pt declined PVR checks and the order was discontinued.   Pt declined admission skin check, offered x 3   Pt verbalized she wanted to go home, but was spoken out of it by PA.            "

## 2025-04-25 NOTE — PLAN OF CARE
Discharge Planner Post-Acute Rehab OT:     Discharge Plan: home with assist from friends    Precautions: fall, OH+ BP    Current Status:  ADLs:  Mobility: CGA with FWW  Grooming: TBD  Dressing: UB: TBD LB: TBD footwear: set up   Bathing: TBD  Toileting: TBD  IADLs: Patient highly independent at baseline, anticipate will require assist for heavy IADL tasks  Vision/Cognition: Patient reports visual field cut on right side, demonstrates decreased insight into deficits and need for assist with ADL/IADL tasks, would benefit from further assessment of higher-level cognitive skills    Assessment: Pt is a 58 y/o female post acute ischemic stroke of L hemisphere c/b L parieto-occipital hemmorrahge with deficits in balance, strength, sensation, vision impairing safety/engagement with ADLs/IADLs. Goals for Mod I with ADLs/IADLs. ELOS 8 days.    Other Barriers to Discharge (DME, Family Training, etc):   Lives alone

## 2025-04-25 NOTE — PHARMACY-MEDICATION REGIMEN REVIEW
Pharmacy Medication Regimen Review  Afshan Jimenez is a 59 year old female who is currently in the Acute Rehab Unit.    Assessment: All medications have an appropriate indications, durations and no unnecessary use was found    Plan:   Continue current medications as ordered.     Attending provider will be sent this note for review.  If there are any emergent issues noted above, pharmacist will contact provider directly by phone.      Pharmacy will periodically review the resident's medication regimen for any PRN medications not administered in > 72 hours and discontinue them. The pharmacist will discuss gradual dose reductions of psychopharmacologic medications with interdisciplinary team on a regular basis.    Please contact pharmacy if the above does not answer specific medication questions/concerns.    Background:  A pharmacist has reviewed all medications and pertinent medical history today.  Medications were reviewed for appropriate use and any irregularities found are listed with recommendations.      Current Facility-Administered Medications:     acetaminophen (TYLENOL) tablet 650 mg, 650 mg, Oral, Q4H PRN, Helen Keating PA, 650 mg at 04/25/25 0043    aspirin EC tablet 81 mg, 81 mg, Oral, Daily, Helen Keating PA, 81 mg at 04/25/25 0937    atorvastatin (LIPITOR) tablet 40 mg, 40 mg, Oral, QPM, Helen Keating PA, 40 mg at 04/24/25 1945    bisacodyl (DULCOLAX) suppository 10 mg, 10 mg, Rectal, Daily PRN, Helen Keating PA    cilostazol (PLETAL) tablet 100 mg, 100 mg, Oral, BID, Helen Keating PA, 100 mg at 04/25/25 0937    glucose gel 15-30 g, 15-30 g, Oral, Q15 Min PRN **OR** dextrose 50 % injection 25-50 mL, 25-50 mL, Intravenous, Q15 Min PRN **OR** glucagon injection 1 mg, 1 mg, Subcutaneous, Q15 Min PRN, Helen Keating PA    senna-docusate (SENOKOT-S/PERICOLACE) 8.6-50 MG per tablet 1 tablet, 1 tablet, Oral, BID PRN, Helen Keating PA  No current outpatient  prescriptions on file.   PMH: Stroke s/p TPA c/b hemorrhagic transformation, T2DM, gout, Parotid gland Ca s/p radiation, AML s/p BMT 1992, CVA 12/2024

## 2025-04-25 NOTE — PROGRESS NOTES
Pt approached and asked to have bladder scan, pt refused. Explained the reason and importance of checking the bladder after stroke, pt still refused and said she voided by herself to the toilet at 9:50pm. Bed alrams observed to be on and no trigger heard. Unable to do bladder scan but pt willing to have it done the next time she voids. Able to point what to press on the call button the next time she needs to go to the bathroom. RN assigned updated.

## 2025-04-25 NOTE — PROGRESS NOTES
04/25/25 1326   Appointment Info   Signing Clinician's Name / Credentials (OT) Mally Lockettterry, OTR/L   Living Environment   People in Home alone   Current Living Arrangements condominium   Home Accessibility no concerns   Transportation Anticipated family or friend will provide   Living Environment Comments Pt reports living in condo alone. Reports will not have 24/7 A upon return home. Reports has no equipment.   Self-Care   Usual Activity Tolerance good   Current Activity Tolerance fair   Regular Exercise No   Equipment Currently Used at Home none   Fall history within last six months no   Activity/Exercise/Self-Care Comment Pt reports at baseline very IND, does not use AE.   Instrumental Activities of Daily Living (IADL)   Previous Responsibilities meal prep;housekeeping;shopping;laundry;medication management;finances;driving;work   IADL Comments Pt reports very IND with IADLs at baseline.  Works as a .  Reports  intends to provide occasional check-in's or assist occasionally with IADLs.   General Information   Onset of Illness/Injury or Date of Surgery 04/18/25   Referring Physician Helen Keating, PA   Patient/Family Therapy Goal Statement (OT) to go home ASAP   Additional Occupational Profile Info/Pertinent History of Current Problem Per chart: 59 year old right hand dominant woman with past medical history of AML s/p bone marrow transplant 1992, DM type 2, HLD, left parietal stroke (12/2024), who presented with acute right sided incoordination and slurred speech imaging revealed multiple watershed and embolic type infarcts in left frontoparietal region, received TNK with left parieto-occipital hemorrhage s/p TNK reversal.  Workup also revealed left common carotid artery stenosis, with recommendation for follow up vascular surgery for CEA planning. Admitted to rehab 04/24/2025, functionally noted to have impaired strength, impaired coordination, impaired balance,  impaired vision, and impaired speech.   Performance Patterns (Routines, Roles, Habits) Works as a    Existing Precautions/Restrictions fall   Limitations/Impairments safety/cognitive   Left Upper Extremity (Weight-bearing Status) full weight-bearing (FWB)   Right Upper Extremity (Weight-bearing Status) full weight-bearing (FWB)   Left Lower Extremity (Weight-bearing Status) full weight-bearing (FWB)   Right Lower Extremity (Weight-bearing Status) full weight-bearing (FWB)   General Observations and Info Activity Up with assist   Cognitive Status Examination   Orientation Status orientation to person, place and time   Behavioral Issues other (see comments)   Affect/Mental Status (Cognitive) other (see comments)   Follows Commands WFL   Safety Deficit moderate deficit;awareness of need for assistance;insight into deficits/self-awareness   Executive Function Deficit moderate deficit;insight/awareness of deficits;information processing;abstract thinking   Cognitive Status Comments Patient demonstrates decreased insight into deficits.  Well pleasant and cooperative highly perseverative on returning to home.  Full evaluation limited due to ongoing requests to discuss discharge planning.  Would benefit from further assessment of higher-level cognitive skills which may impact engagement in IADLs safety.   Visual Perception   Visual Impairment/Limitations corrective lenses full-time   Impact of Vision Impairment on Function (Vision) Patient reports right visual field cut   Sensory   Sensory Quick Adds sensation intact   Pain Assessment   Patient Currently in Pain No   Posture   Posture forward head position;protracted shoulders   Range of Motion Comprehensive   General Range of Motion no range of motion deficits identified   Strength Comprehensive (MMT)   Comment, General Manual Muscle Testing (MMT) Assessment Patient reports weakness in right hand, functionally 4+/5 on right side, 5/5 on left side.   Muscle  Tone Assessment   Muscle Tone Quick Adds No deficits were identified   Coordination   Coordination Comments I would benefit from further assessment of FMC.  Subjectively patient reports impairments in right hand.   Bed Mobility   Bed Mobility No deficits identified   Transfers   Transfer Comments See GG for details.   Activities of Daily Living   BADL Assessment/Intervention   (See GG for details)   Clinical Impression   Criteria for Skilled Therapeutic Interventions Met (OT) Yes, treatment indicated   OT Diagnosis Decrease safety/engagement with ADLs/IADLs   Influenced by the following impairments Impaired balance, impaired cognition, impaired coordination, impaired strength, impaired vision   OT Problem List-Impairments impacting ADL problems related to;activity tolerance impaired;balance;cognition;mobility;strength;coordination;vision   Assessment of Occupational Performance 5 or more Performance Deficits   Identified Performance Deficits Dressing, bathing, toileting, work, home management, med management   Planned Therapy Interventions (OT) IADL retraining;ADL retraining;balance training;cognition;strengthening;transfer training;visual perception;home program guidelines;progressive activity/exercise;risk factor education;neuromuscular re-education   Clinical Decision Making Complexity (OT) problem focused assessment/low complexity   Risk & Benefits of therapy have been explained evaluation/treatment results reviewed;care plan/treatment goals reviewed;risks/benefits reviewed;current/potential barriers reviewed;participants voiced agreement with care plan;participants included;patient   Clinical Impression Comments Patient may benefit from continued skilled IP OT services to progress independence/safety with ADLs/IADLs.   OT Total Evaluation Time   OT Eval, Low Complexity Minutes (98898) 15   OT Goals   Therapy Frequency (OT) 6 times/week   OT Predicted Duration/Target Date for Goal Attainment 05/03/25   OT Goals  Hygiene/Grooming;Upper Body Dressing;Lower Body Dressing;Upper Body Bathing;Lower Body Bathing;Toilet Transfer/Toileting;Cognition;Meal Preparation;Home Management   OT: Hygiene/Grooming modified independent   OT: Upper Body Dressing Modified independent   OT: Lower Body Dressing Modified independent   OT: Upper Body Bathing Modified independent   OT: Lower Body Bathing Modified independent   OT: Toilet Transfer/Toileting Modified independent   OT: Meal Preparation Modified independent;with moderately complex multi-step meal preparation   OT: Home Management Modified independent;with light demand household tasks   OT: Cognitive Patient/caregiver will verbalize understanding of cognitive assessment results/recommendations as needed for safe discharge planning   Interventions   Interventions Quick Adds Therapeutic Activity   Therapeutic Activities   Therapeutic Activity Minutes (81163) 5   Symptoms noted during/after treatment other (see comments)   Treatment Detail/Skilled Intervention Patient initially greeted EOB, PA present and engaging in discussion regarding DC to home today, patient agreeable to OT eval, limited by patient requesting to discuss discharge recommendations with friend on phone.  Did attempt short distance mobility with FWW for further assessment however significant drop in BP with patient symptomatic.  Ultimately not safe to continue further activity due to systolic BPs in the 60s with standing despite compression socks donned.  See vital signs flowsheet for details.   OT Discharge Planning   OT Plan Sponge bath or wheelchair stand pivot to extended tub bench pending BP, continue assessment of GG, [place] cognitive screen vs CPT, monitor BP   Total Session Time   Timed Code Treatment Minutes 5   Total Session Time (sum of timed and untimed services) 20   Post Acute Settings Only   What unit is patient on? Acute Rehab   OT - Acute Rehab Center Time   Individual Time (minutes) - OT 20   ARC Total  Session Time (minutes) - OT 20   ARC Daily Total Session Time   OT ARC Daily Total Session Time 20   ARC Daily Rehab Total Minutes 50   Lower Body Dressing putting on/taking off footwear   Complete independence with Lower Body Dressing Footwear no   Describe performance set up seated EOB   Toilet Transfer: standard height (18 inches)   Complete independence with getting on and off a toilet or commode no   Reason if not attempted Medical concerns  (BP 62/32 with standing)   Walk 10 Feet   Comment CGA with FWW

## 2025-04-25 NOTE — PLAN OF CARE
Goal Outcome Evaluation:      Plan of Care Reviewed With: patient    Overall Patient Progress: no changeOverall Patient Progress: no change     Patient sleeping when checked at the start of the shift  Bed alarm triggered after midnight, pt sitting on the edge of bed  Offered bathroom but refused, pt complained burning sensation to her feet, PRN Tylenol given and warm packed provided  No respiratory distress, V/S taken and recorded, afebrile  On Bladder protocol, needing PVR's below 100, no BM this shift  Safety checks done, fall prevention maintained, bed alarm ON, call light in reach  No acute event overnight  Plan of care ongoing

## 2025-04-25 NOTE — PROGRESS NOTES
Discharge Planner Post-Acute Rehab SLP:     Discharge Plan: TBD. Ongoing SLP    Precautions: fall    Current Status:  Hearing: WFL  Vision: Glasses- R hemianopsia/field cut, right neglect per chart  Communication: Motor speech intact at conversation level. Verbal expression and auditory comprehension functional at conversation level. Reading deficits likely r/t vision deficits.   Cognition: Memory, attention deficits suspected; to be evaluated 04/26  Swallow: Regular solids/Thin liquids (0). General safe swallow strategies.    Assessment:   Communication: Motor speech intact at conversation level. WAB-R Bedside administered and interpreted, pt with significant improvement in scores since 04/21 administration in hospital: 91.67 this day for verbal expression and auditory comprehension, 87.5 with reading and writing added (66.67 and 51.25 before, respectively). Language functional to participate in cognitive testing, suspect cognitive deficits impacting higher level language. Skilled SLP services indicated to improve cognitive-linguistic skills.    Swallow: Clinical swallow evaluation completed. Oral mechanism exam unremarkable. Evaluated pt with regular snack with arian cracker, thin via straw. Pt with adequate mastication and oral clearance of solid, no residue or pocketing. Pt with no coughing or throat clearing post-swallow single or sequential straw sips of thin. Recommend continuing regular solids and thin liquids (0), pt to use general safe swallow strategies sit upright, small bite/sip size, slow rate, alternate liquids/solids. Administer medications per pt's preference. Anticipate short course related to dysphagia. Skilled SLP services indicated to ensure safety with highest level of oral intake.     Other Barriers to Discharge (Family Training, etc): Level of assist with iADLS?

## 2025-04-25 NOTE — PROGRESS NOTES
"  [facility]   Acute Rehabilitation Unit  Daily progress note    INTERVAL HISTORY  Afshan Jimenez was seen and examined at bedside this morning and than again later in the day.  No acute events reported overnight.  She notes dizziness and lightheadedness with out of bed activity, but admits this is not new.  She states that she has not been keeping up with hydration but trying to drink more today.  She notes that her foot pain was increased severely overnight.  She has this pain in her bilateral forefeet chronically, but typically relieved easily with a topical OTC cream (which she does not currently have).  She uses that sometimes once a day, sometimes once a week, depending on her pain.  Her feet are more painful now than is typical and pain is increased with any touch or weightbearing.  She expresses preference to avoid oral medications and has a friend bringing the cream today.  She notes frequent headaches over the past weekend, has used PRN Tylenol with some relief.  She did not have a headache this morning but did this afternoon.  She declined scheduled Tylenol for now, but should revisit if frequent use.  She denies any chest pain, shortness of breath, nausea, abdominal pain.  She had BM yesterday and has been having almost daily.  She is concerned about right hand coordination and speech difficulties.    Functionally, therapy evals underway today.      Returned in afternoon as patient had expressed to nursing a desire to go home today.  She noted that she has really appreciated the therapy part but \"not the other stuff\".  Tried to get some clarification.  She did have a difficult time recalling those things that bothered her but indicated bladder scans and being \"treated like a child\".  We reviewed some of the reasoning for these.  We also discussed medical and functional concerns if she were to discharge now.  She did agree these would pose challenges and she is " "worried about how she will be safe at home with limited supports.  She was agreeable to stay as she feels therapy intensity here is beneficial, especially after we reviewed the other types of therapy (HC and OP) and those frequencies.  She denied other questions or concerns at this time.    MEDICATIONS  Current Facility-Administered Medications   Medication Dose Route Frequency Provider Last Rate Last Admin    aspirin EC tablet 81 mg  81 mg Oral Daily Helen Keating PA        atorvastatin (LIPITOR) tablet 40 mg  40 mg Oral QPM Helen Keating PA   40 mg at 04/24/25 1945    cilostazol (PLETAL) tablet 100 mg  100 mg Oral BID Helen Keating PA   100 mg at 04/24/25 1945          Current Facility-Administered Medications   Medication Dose Route Frequency Provider Last Rate Last Admin    acetaminophen (TYLENOL) tablet 650 mg  650 mg Oral Q4H PRN Helen Keating PA   650 mg at 04/25/25 0043    bisacodyl (DULCOLAX) suppository 10 mg  10 mg Rectal Daily PRN Helen Keating PA        glucose gel 15-30 g  15-30 g Oral Q15 Min PRN Helen Keating PA        Or    dextrose 50 % injection 25-50 mL  25-50 mL Intravenous Q15 Min PRN Helen Keating PA        Or    glucagon injection 1 mg  1 mg Subcutaneous Q15 Min PRN Helen Keating PA        senna-docusate (SENOKOT-S/PERICOLACE) 8.6-50 MG per tablet 1 tablet  1 tablet Oral BID PRN Helen Keating PA            PHYSICAL EXAM  /68 (BP Location: Right arm, Patient Position: Semi-Wolf's, Cuff Size: Adult Regular)   Pulse 110   Temp 98.1  F (36.7  C) (Oral)   Resp 18   Ht 1.676 m (5' 6\")   Wt 88.5 kg (195 lb 1.6 oz)   SpO2 98%   BMI 31.49 kg/m    Gen: NAD, resting in bed  HEENT: NC/AT, slightly dry mucous membranes, mouth prosthesis  Cardio: tachycardic but regular, no murmurs  Pulm: non-labored on room air, lungs CTA bilaterally  Abd: soft, non-tender, bowel sounds present  Ext: no edema in BLE  Neuro/MSK: awake, alert, confused, " follows commands and able to move all extremities against resistance, incoordination RUE    LABS  CBC RESULTS:   Recent Labs   Lab Test 04/24/25  0709 04/21/25  1854 04/20/25  0520   WBC 7.3 9.4 6.5   RBC 4.40 4.74 4.15   HGB 12.6 13.4 11.9   HCT 38.5 41.1 36.6   MCV 88 87 88   MCH 28.6 28.3 28.7   MCHC 32.7 32.6 32.5   RDW 13.0 13.2 13.1    181 158       Last Basic Metabolic Panel:  Recent Labs   Lab Test 04/25/25  0230 04/24/25  1214 04/24/25  0751 04/24/25  0709 04/23/25  0738 04/23/25  0709 04/22/25  1238 04/22/25  0828 04/21/25 2107 04/21/25 1854 04/20/25  0600 04/20/25  0520   NA  --   --   --  138  --   --   --   --   --  138  --  138   POTASSIUM  --   --   --  4.0  --  4.5  --  4.2  --  4.2  --  4.1   CHLORIDE  --   --   --  101  --   --   --   --   --  102  --  104   CO2  --   --   --  25  --   --   --   --   --  24  --  26   ANIONGAP  --   --   --  12  --   --   --   --   --  12  --  8   * 114* 130* 138*   < >  --    < >  --    < > 189*   < > 123*   BUN  --   --   --  20.6  --   --   --   --   --  19.5  --  11.2   CR  --   --   --  1.00*  --   --   --   --   --  0.95  --  0.82   GFRESTIMATED  --   --   --  64  --   --   --   --   --  68  --  81   JOSE MARIA  --   --   --  9.4  --   --   --   --   --  9.9  --  9.3    < > = values in this interval not displayed.         ASSESSMENT AND PLAN  Afshan Jimenez is a 59 year old right hand dominant female with past medical history of AML s/p bone marrow transplant 1992, DM type 2, HLD, left parietal stroke (12/2024), left parotid carcinoma s/p parotidectomy and radiation, pancreatic cyst, and lung nodules who presented on 4/18/25 with acute right-sided incoordination and slurred speech with imaging revealing multiple watershed and embolic-type infarcts in left frontoparietal region s/p TNK c/b left parieto-occipital hemorrhage requiring TNK reversal with course further complicated by additional findings of left common carotid artery stenosis,  orthostatic hypotension, and left mastoid effusion/effacement.  She is admitted to ARU on 4/24/25 for multidisciplinary rehabilitation and ongoing medical management.    Rehabilitation   Admission to acute inpatient rehab: 04/24/2025  Impairment group code: Stroke Ischemic 01.2 (R) Body Involvement (L) Brain; Acute ischemic strokes of L hemisphere due to atheroembolic from symptomatic left common carotid stenosis vs ESUS complicated by L parieto-occipital hemorrhage post TNK .          PT, OT and SLP 60 minutes of each on a daily basis up to 6 days per week, in addition to rehab nursing and close management of physiatrist x 7.       Impairment of ADL's: Noted to have impaired strength, impaired activity tolerance, impaired vision,  and impaired coordination leading to decreased ability to independently complete ADL's.  Will benefit from ongoing OT with goal for MOD I with basic ADLs.      Impairment of mobility:  Noted to have impaired strength, impaired activity tolerance, impaired balance,  and impaired visioin,  leading to decreased mobility.  Will benefit from ongoing PT with goal for JENNIFER with basic mobility.      Impairment of cognition/language/swallow:  Noted to have impaired speech will benefit from ongoing SLP to improve ability to communicate needs effectively.     Continue comprehensive acute inpatient rehabilitation program with multidisciplinary approach including therapies, rehab nursing, and physiatry following. See interval history for updates.        Medical Conditions  New actions/orders/updates for today are in blue.     Acute ishemic strokes of left hemisphere due to atheroembolic from symptomatic left carotid stenosis vs ESUS s/p TNK complicated by left parieto-occipital hemorrhage s/p TNK reversal.   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  Presented 4/18/25 with right sided weakness, slurred speech:   MRI noted to have multiple watershed and embolic type infarcts in the left frontoparietal region as well as interval evolutionary change of prior infarcts. She received TNK with resulting hemorrhage on follow up imaging, TNK was reversed, received serial brain imaging and medical management.    - Started on cilostazol 100 mg bid  - Continue ASA 81 mg daily  - BP goal <130/80, currently at goal without medications  - LDL goal 40-70- continue statin  - Goal A1c <7, management as below  - Continue PT/OT/SLP  - Ziopatch on discharge  - Plan for Transcranial doppler at [facility] ~ 1 month per neurology   - Follow up MRI 3 months  - Follow up stroke neurology.  Consider cardiac CTA at clinic visit.  - Follow up vascular surgery regarding L CCA stenosis.  Consideration of CEA (deferred at this time due to risks of recurrent bleeding with intra-operative heparin, as well as felt more likely stenosis was due to prior radiation rather than atherosclerotic disease which would pose higher chance of embolization/ulceration)     Type II diabetes mellitus  A1c 6.0% on 4/7/25  Previously took ozempic, discontinued with some symptomatic dizziness/ concerns for hypotension.  Per pcp note 4/7/2025, would like to try lifestyle modification  - Monitor with labs  - Add diabetes agent if needed- glucose at goal without medications  - Hypoglycemic protocol     HLD  LDL 47 4/19  - Continue atorvastatin 40 mg daily  - LDL goal 40-70    Tachycardia  Throughout hospital course and also appears some chronic component.  EKG on 4/18/25 with sinus tachycardia.  - Given concurrent orthostatic hypotension as below, question if some hypovolemia though also could be autonomic  - If persistent, consider IV fluids  - If associated symptoms, consider further work-up    Orthostatic hypotension  Per review of OP chart, she was noted to have mild tachycardia and symptomatic hypotension in clinic in 1/2025, felt to be orthostatic hypotension, largely  attributed to poor PO intake while on Ozempic.  Considered IV fluid bolus but patient declined.  Per IP chart, patient reported some lightheadedness at home, leaning on wall while ambulating at home.  Noted to have recurrent symptomatic hypotension in hospital as well.  Ozempic was discontinued and she was given some IV hydration.  Has had significant symptomatic orthostatic hypotension initially at ARU, documented positive orthostatic vitals by therapies and nursing team.  No medications that would expect to contribute.  No localizing signs/symptoms of infection and supine BP normotensive.  - Add LE compression stockings  - Add abdominal binder  - Encourage PO fluids  - If no significant improvement tomorrow, would consider trial of IV fluids, as that did seem to help in hospital.  If remains limiting, may need to consider trial of pharmacologic intervention given significant safety concerns for discharge and apparent chronicity.     Peripheral neuropathy   No clear etiology, likely multifactorial due to DM and prior cancer treatments   Gets episodes of severe burning pain in feet which respond very well to OTC deep blue cream   4/25: notes that pain in feet was more severe overnight, increased with any touch or weightbearing  - Continue PTA Deep Blue cream when arrives from home (anticipated today)  - Patient denies prior trial of gabapentin or Lyrica.  Chart confirms these have previously been discussed but declined.  Added gabapentin 100 mg TID PRN for now until cream arrives and if patient finds helpful and tolerates, could consider scheduled though caution given dizziness/orthostasis     Gout  Takes indomethacin PRN prior to admission.   - Monitor for any symptoms     Pancreatic cysts, suspected IPMN  Found incidentally on CT CAP with contrast during admission in 12/2024 for stroke.  Seen by GI in 2/2025 following MRI/MRCP, which showed multiple pancreatic cysts, felt most likely to be IPMNs.  - Per GI,  "recommend repeat MRI/MRCP in 6 months (8/2025) and follow up with GI after.  Should return earlier if unexplained weight loss of > 10 lb, jaundice, diagnosis of acute pancreatitis or chronic diarrhea lasting more than 2 weeks.      Incidental lung nodules  Noted on 12/2024 work-up for CVA.  Presumed related to radiation.  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  Patient without ear complaints, no findings to suggest mastoid or middle ear infection, per ENT \"changes are permanent\",  \"soft tissue/fluid will be noted in the mastoid\",  no need to consult ent unless patient is symptomatic/ clinical mastoiditis: erythema and tenderness in mastoid aresa.   - Follow up ENT as outpatient for ear debridement and hearing rehab      Adjustment to disability: monitor mood, consult psychology if indicated  FEN: regular diet, thin liquids  Bowel: continent.  Senokot-S 1 tab BID PRN, bisacodyl suppository PRN.  Monitor and adjust regimen as indicated.  Bladder: continent, PVRs at admission without retention and patient declines ongoing  DVT Prophylaxis: mechanical  GI Prophylaxis: none  Code: per discussion with admitting provider: full - she noted that she picked DNR for her will but wasn't sure about her prior decision about intubation - eventually decided to keep full code for now and talk to her friend   Disposition: goal for home.  Concerns regarding cognition (to be assessed more fully by SLP on 4/26) and limited support at home.  She has intermittent been indicating her intent " to go home as soon as today, expresses being upset about some of the things here, though appreciative of therapies and wants to continue this intensity.  Unable to specify areas of discontent other than bladder scans, which have been stopped.  Discussed that orthostatic hypotension would currently be significant medical concern for safety at home.  She ultimately did agree to stay for medical optimization and therapy intensity.   ELOS: target discharge date TBD pending evals   Follow up Appointments on Discharge: PCP in 1-2 weeks, neurology (stroke dorys), vascular surgery (follow up carotid stenosis), TCD at [place] (per neurology note 4/23/25), ENT, PM&R in 8-12 weeks      45 minutes spent on the date of service doing chart review, history and exam, documentation, and further activities as noted above.       Patient was discussed with Dr. Joshua West, PM&R Staff Physician    Veena Burk PA-C  Physical Medicine & Rehabilitation

## 2025-04-25 NOTE — PLAN OF CARE
"Goal Outcome Evaluation:      Plan of Care Reviewed With: patient    Overall Patient Progress: no changeOverall Patient Progress: no change     Overall pt had no acute issue this shift. Pt is alert and oriented x 4. Able to make needs known. Denied having chest pain, SOB, N/V, fever and chills. Pt appears to be continent of  both bowel and bladder. Refused bladder scan and stated she just wanted to sleep and have some rest for 2 days. Pt educated on protocol and Pt  stated HUH this is not working for me. Pt very particular with ways things are done and placed in room. Pt asked if she had been urinating and Pt stated yes. Per charge nurse, Pt stated she urinated 10 minutes ago. No bed alarm went off this shift. Pt also refused groin check. Stated \"I do not know why you are doing this and I hope it will never be done again . Pt added no one ever checked her skin. Medication given whole with thin liquid. Stated did not want to be disturbed overnight. Will update oncoming nurse.       "

## 2025-04-25 NOTE — PLAN OF CARE
"Speech Language Therapy Discharge Summary    Reason for therapy discharge:    Discharged to acute rehabilitation facility.    Progress towards therapy goal(s). See goals on Care Plan in Psychiatric electronic health record for goal details.  Goals not met.  Barriers to achieving goals:   discharge from facility.    Therapy recommendation(s):    Continued therapy is recommended.  Rationale/Recommendations:  \"Patient's swallow and communication function are below her baseline. 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          "

## 2025-04-25 NOTE — CONSULTS
Social Work: Initial Assessment with Discharge Plan    Patient Name: Afshan Jimenez  : 1965  Age: 59 year old  MRN: [id number]  Completed assessment with: Chart review and interview with patient.   Admitted to ARU: 2025    Presenting Information   Date of SW assessment: 2025  Health Care Directive: provided education and copy  Primary Health Care Agent: Patient/self.   Secondary Health Care Agent: none appointed   Living Situation: lives alone in [facility] with elevator access, though typically uses stairs.   Previous Functional Status: Pt was independent with all ADLs/IADLs, transfers, mobility and gait.  Right hand-dominant   DME available:  raised toilet seat. See therapy evaluation for more information   Patient and family understanding of hospitalization: Appropriate and Pleasant.  Cultural/Language/Spiritual Considerations: 59 year old female, English Speaking and Female     Physical Health  Reason for admission: Afshan Jimenez is a 59 year old right hand dominant woman with past medical history of type 2 Diabetes,  gout, mucoepidermomid palate carcinoma (-), pulmonary nodules, pancreatic cyst, AML s/p bone marrow transplant , left mastoid effusion, left parietal stroke 2024, who presented with right upper extremity ataxia, weakness, and slurred speech, MRI with multiple watershed and embolic type infarcts in left frontoparietal region as well as inerval evolutionary changes of prior strokes.  She received tenecteplase with acute post tenecteplase intraparenchymal hemorrhage, involving left parieto-occipital region as well as adjacent subdural hematoma.  She received cryoprecipitate and tranexamic acid per stroke neurology.      Seen in consult by neurosurgery in setting of hemorrhagic conversion, recommended medical management with routine imaging.  CTA head and neck revealed, 50% stenosis of the left ICA.  Seen in consult by vascular surgery due to symptomatic left  "carotid stenosis,  recommendation to proceed with left carotid endarterectomy once deemed safe for intraoperative heparin to minimize risk of further bleeding. In setting of stable head CT last completed 4/24 patient was started on cilostazol 4/24 per neurology.       Ms. Jimenez was seen in consult by ENT for left mastoid effusion with recommendations for outpatient ENT follow up.       During acute hospitalization, patient was seen and evaluated by PT, SLP and OT,  who collectively recommended that patient would benefit from ongoing therapies in the acute inpatient rehabilitation setting.     Provider Information   [place] Care Physician[place] at discharge.   : none reported    Mental Health/Chemical Dependency:   Diagnosis: Pt denied   Alcohol/Tobacco/Narcotis: none reported  Support/Services in Place: none reported  Services Needed/Recommended: Casi and Health Psychology support while on ARU available.   Sexuality/Intimacy: Not discussed     Support System  Marital Status: Single   Family support: none reported  Other support available: \"shared that she has some support\" but none identified.    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  Income Source: wages from jobs  Education: Not discussed   Financial Concerns:  none reported  Insurance: [employer]     ------------------------------------------------------------------------------------------------------------  ADIS Pain Assessment    Pain Effect on Sleep  Over the past 5 days, how much of the time has pain made it hard for you to sleep at night?\"    0. Does not apply - I have not had any pain or hurting in the past 5 days    Pain Interference with " "Therapy Activities  \"Over the past 5 days, how often have you limited your participation in rehabilitation therapy sessions due to pain?\"  0. Does not apply - I have not had any pain or hurting in the past 5 days    -------------------------------------------------------------------------------------------------------------    TRANSPORTATION:    Has lack of transportation kept you from medical appointments, meetings, work, or from getting things needed for daily living?  A. Yes, it has kept me from medical appointments or from getting my medications  B. Yes, it has kept me from non-medical meetings, appointments, work or from getting things that I need  C. No  X. Patient Unable to respond  Y. Patient declines to respond  -------------------------------------------------------------------------------------------------------------  Health Literacy:   How Often do you need to have someone help you when you read instructions, pamphlets, or other written material from your doctor or pharmacy?  0.       Never  1.       Rarely  2.       Sometimes  3.       Often  4.       Always  5.       Patient declines to respond  6.       Patient unable to respond  ------------------------------------------------------------------------------------------------------------    SOCIAL ISOLATION  How often do you feel lonely or isolated from those around you?  0.       Never  1.       Rarely  2.       Sometimes  3.       Often  4.       Always  5.       Patient declines to respond  6.       Patient unable to respond  -------------------------------------------------------------------------------------------------------------    Discharge Plan     Patient and family discharge goal: To be determined, pending progress  Provided Education on discharge plan: Evaluations and discharge recommendations pending.   Patient agreeable to discharge plan:  Pending further discussion. Evaluations and discharge recommendations pending.   Provided " education and attained signature for Medicare IM and IRF Patient Rights and Privacy Information provided to patient : Yes  Provided patient with [place] [facility] [place] Resources: Yes  Barriers to discharge: to be determined    Discharge Recommendations   Disposition: TBD, pending therapy updates  Transportation Needs: Patient, family/friends, paid transport, insurance transport (if applicable)   Name of Transportation Company and Phone: TBD    Additional comments   Discharge TBD, ELOS 8 days. Evals and discharge needs pending.     SW met with pt at bedside and introduced self and role. Stroke book was given. The patient is significantly cognitively impaired and confused. The writer had to repeatedly redirect the patient back to the questions during the assessment, as the patient would lose track of their thoughts and forget the questions that were asked.    SW will continue to remain available for patient and family support, discharge planning, and access to resources.      Sussy Anderson, [facility], Acute Inpatient Rehab Unit   [address] [place] [address], [address] [place] [address]  Phone: [phone]  Fax: [phone]

## 2025-04-25 NOTE — PLAN OF CARE
Occupational Therapy Discharge Summary    Reason for therapy discharge:    Discharged to acute rehabilitation facility.    Progress towards therapy goal(s). See goals on Care Plan in [facility] electronic health record for goal details.  Goals partially met.  Barriers to achieving goals:   discharge from facility.    Therapy recommendation(s):    Continued therapy is recommended.  Rationale/Recommendations:  Pt below indep function at baseline. pt required assist of 1 for selfcares primarily due to impairments in vision, dynamic standing balance, and aphasia. Mild impairments in RUE strength and coordination noted, seen to be improving, R field cut. Pt motivated to participate. Recommend continued skilled OT in [facility] prior to returning home alone.

## 2025-04-26 ENCOUNTER — APPOINTMENT (OUTPATIENT)
Dept: PHYSICAL THERAPY | Facility: CLINIC | Age: 60
DRG: 057 | End: 2025-04-26
Attending: PHYSICAL MEDICINE & REHABILITATION
Payer: COMMERCIAL

## 2025-04-26 ENCOUNTER — APPOINTMENT (OUTPATIENT)
Dept: SPEECH THERAPY | Facility: CLINIC | Age: 60
DRG: 057 | End: 2025-04-26
Attending: PHYSICAL MEDICINE & REHABILITATION
Payer: COMMERCIAL

## 2025-04-26 ENCOUNTER — APPOINTMENT (OUTPATIENT)
Dept: OCCUPATIONAL THERAPY | Facility: CLINIC | Age: 60
DRG: 057 | End: 2025-04-26
Attending: PHYSICAL MEDICINE & REHABILITATION
Payer: COMMERCIAL

## 2025-04-26 LAB — GLUCOSE BLDC GLUCOMTR-MCNC: 107 MG/DL (ref 70–99)

## 2025-04-26 PROCEDURE — 97110 THERAPEUTIC EXERCISES: CPT | Mod: GP

## 2025-04-26 PROCEDURE — 97530 THERAPEUTIC ACTIVITIES: CPT | Mod: GP

## 2025-04-26 PROCEDURE — 250N000013 HC RX MED GY IP 250 OP 250 PS 637: Performed by: PHYSICIAN ASSISTANT

## 2025-04-26 PROCEDURE — 97535 SELF CARE MNGMENT TRAINING: CPT | Mod: GO | Performed by: OCCUPATIONAL THERAPIST

## 2025-04-26 PROCEDURE — 92526 ORAL FUNCTION THERAPY: CPT | Mod: GN | Performed by: SPEECH-LANGUAGE PATHOLOGIST

## 2025-04-26 PROCEDURE — 128N000003 HC R&B REHAB

## 2025-04-26 PROCEDURE — 97116 GAIT TRAINING THERAPY: CPT | Mod: GP

## 2025-04-26 PROCEDURE — 96125 COGNITIVE TEST BY HC PRO: CPT | Mod: GN | Performed by: SPEECH-LANGUAGE PATHOLOGIST

## 2025-04-26 RX ADMIN — ATORVASTATIN CALCIUM 40 MG: 40 TABLET, FILM COATED ORAL at 20:11

## 2025-04-26 RX ADMIN — ASPIRIN 81 MG CHEWABLE TABLET 81 MG: 81 TABLET CHEWABLE at 08:02

## 2025-04-26 RX ADMIN — CILOSTAZOL 100 MG: 100 TABLET ORAL at 08:07

## 2025-04-26 RX ADMIN — CILOSTAZOL 100 MG: 100 TABLET ORAL at 20:11

## 2025-04-26 ASSESSMENT — ACTIVITIES OF DAILY LIVING (ADL)
ADLS_ACUITY_SCORE: 38

## 2025-04-26 NOTE — PLAN OF CARE
Discharge Planner Post-Acute Rehab PT:     Discharge Plan: Home w/ support     Precautions: falls, cognition, insensate feet    Current Status:  Bed Mobility: ind  Transfer: walker, CGA  Gait: not tested due to orthostatic  Stairs: NT  Balance: stands w/o UE support, assist for dynamic standing    Outcome Measures:   AguilarxSTS:  TUG:     Assessment: Overall, session tolerated fair. Pt continues to have significant orthostatic hypotension in standing, slight improvement with ankle pumps and LAQ prior to standing but unable to standing/ambulate longer than 25' without symptoms. Pt has impaired ability to detect symptoms and requires cues to sit and rest. No major improvement on NuStep with BP.     Other Barriers to Discharge (DME, Family Training, etc):   No identified caregiver  Cognition

## 2025-04-26 NOTE — PLAN OF CARE
Discharge Planner Post-Acute Rehab PT:     Discharge Plan: Home w/ support     Precautions: falls, cognition, insensate feet    Current Status:  Bed Mobility: ind  Transfer: walker, CGA  Gait: CGA to patient with FWW for 25' plus wheelchair follow due to hypotension  Stairs: NT  Balance: stands w/o UE support, assist for dynamic standing    Outcome Measures:   AguilarxSTS:  TUG:     Assessment: Overall, session tolerated fair. Pt continues to have significant orthostatic hypotension in standing, slight improvement with ankle pumps and LAQ prior to standing but unable to standing/ambulate longer than 25' without symptoms. Pt has impaired ability to detect symptoms and requires cues to sit and rest. No major improvement on NuStep with BP.     Other Barriers to Discharge (DME, Family Training, etc):   No identified caregiver  Cognition

## 2025-04-26 NOTE — PLAN OF CARE
"Goal Outcome Evaluation:      Plan of Care Reviewed With: patient    Overall Patient Progress: improvingOverall Patient Progress: improving      VS: BP (!) 64/44 (BP Location: Left arm, Patient Position: Standing)   Pulse 112   Temp 97.8  F (36.6  C) (Oral)   Resp 16   Ht 1.676 m (5' 6\")   Wt 88.5 kg (195 lb 1.6 oz)   SpO2 98%   BMI 31.49 kg/m     O2: SpO2 > 98 and stable on RA. LS clear and equal bilaterally. Denies chest pain and SOB.    Output: Voids spontaneously without difficulty to bathroom.   Last BM: 4/25, denies abdominal discomfort. BS active / passing flatus.    Activity: Up with A x 1 with a walker.     Skin: WDL except, bruising to LUE.    Pain: Verbalized doing okay, toes not painful today.    CMS: Alert and oriented to self and confused. Reorientation and redirection used during the shift. C/o numbness and tingling.   Dressing: None.   Diet: Regular diet. Denies nausea/vomiting. Pt had good appetite for meals.    LDA: None.   Equipment: Personal belongings,    Plan: Fall precautions maintained / Continue with plan of care. Call light within reach, pt able to make needs known. Alarms on and safety checks completed.    Additional Info: Pt BP soft but asymptomatic. Wearing CHRISTINE stocking and wearing an abdominal binder. Pt to start on MAP, Pharmacy informed to send the labels bottles.  Pt educated on MAP and instructed to call for her medication tonight.               "

## 2025-04-26 NOTE — PLAN OF CARE
Discharge Planner Post-Acute Rehab SLP:      Discharge Plan: TBD. Ongoing SLP     Precautions: fall     Current Status:  Hearing: WFL  Vision: Glasses- R hemianopsia/field cut, right neglect per chart  Communication: Motor speech intact at conversation level. Verbal expression and auditory comprehension functional at conversation level. Reading deficits likely r/t vision deficits.   Cognition: Severe cognitive impairment: severe deficits in immediate memory, visuospatial skills, higher level word finding, and attention. Moderate delayed memory deficits.  Swallow: Regular solids/Thin liquids (0). General safe swallow strategies. Swallow goals met 04/26     Assessment:   Swallow: Analyzed pt with regular/thin noon meal, veggiestiry high over rice and thin water via straw. Pt demo'd adequate oral and pharyngeal phase during PO intake while maintaining light conversation with SLP. Continued regular solids and thin liquids, all swallow goals met this day.     Cognitive Evaluation: RBANS form A administered and interpreted. Pt with severe overall cognitive impairment with severe deficits in immediate memory, visuospatial skills, higher level word finding, and attention. Pt's delayed memory score in moderate range- pt recalled 0/10 words but recognized 20/20, 4 of 12 story concepts recalled. Skilled SLP services indicated to train in cognitive strategies to promote indenpendence.     Other Barriers to Discharge (Family Training, etc): Level of assist with iADLS?

## 2025-04-26 NOTE — PROGRESS NOTES
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  She received tenecteplase with acute post tenecteplase intraparenchymal hemorrhage, involving left parieto-occipital region as well as adjacent subdural hematoma.  She received cryoprecipitate and tranexamic acid per stroke neurology.      Seen in consult by neurosurgery in setting of hemorrhagic conversion, recommended medical management with routine imaging.  CTA head and neck revealed, 50% stenosis of the left ICA.  Seen in consult by vascular surgery due to symptomatic left carotid stenosis,  recommendation to proceed with left carotid endarterectomy once deemed safe for intraoperative heparin to minimize risk of further bleeding. In setting of stable head CT last completed 4/24 patient was started on cilostazol 4/24 per neurology.       Ms. Jimenez was seen in consult by ENT for left mastoid effusion with recommendations for outpatient ENT follow up.\" SLP consult received for evaluation and treatment as indicated.   General Observations Pt followed by acute SLP for communication, WAB-R Bedside scores 04/21 66.67/100, 51.25/100 when reading/writing tasks added.   Type of Evaluation   Type of Evaluation Speech, Language, Cognition   Oral Motor   Oral " Musculature generally intact   Dentition (Oral Motor)   Dentition (Oral Motor) natural dentition;adequate dentition   Facial Symmetry (Oral Motor)   Facial Symmetry (Oral Motor) WNL   Lip Function (Oral Motor)   Lip Range of Motion (Oral Motor) WNL   Tongue Function (Oral Motor)   Tongue Coordination/Speed (Oral Motor) WNL   Tongue ROM (Oral Motor) WNL   Jaw Function (Oral Motor)   Jaw Function (Oral Motor) WNL   Motor Speech   Speech Intelligibility (Motor Speech) conversational level;WFL   Comment, Motor Speech Assessment Pt reported speech less clear when she is tired, pt edu this is not uncommon s/p CVA, will improve with time as brain heals.   Western Aphasia Battery- Revised Bedside Record From   Spontaneous Speech Content Score (out of 10) 9   Spontaneous Speech Fluency Score (out of 10) 9   Auditory Verbal Comprehension Score (out of 10) 10   Sequential Commands Score (out of 10) 8   Repetition Score (out of 10) 10   Object Naming Score (out of 10) 9   Bedside Aphasia Sum 55   WAB-R Bedside Aphasia Score 91.67   Reading Score (out of 10) 7   Writing Score (out of 10) 8   Bedside Language Sum 70   Bedside Language Score 87.5   Comments Verbal expression, auditory comprehension functional to participate in cognitive-linguistic assessment. Pt with deficits in reading likely to due visual deficits, reading comprehension at basic level intact. Functional writing intact, spontaneous/novel productions limited in length but grammatical.   Auditory Comprehension   Follows Commands (Auditory Comprehension) 1-step command;2-step commands;multi-step commands   Yes/No Questions (Auditory Comprehension) WFL   Multi-Step, Follows Commands (Auditory Comprehension) intact  (with basic wording)   1 Step, Follows Commands (Auditory Comprehension) intact   2 Step, Follows Commands (Auditory Comprehension) other (see comments)  (sequential intact; complex/out of order impaired.)   Verbal Expression   Comment, Assessment (Verbal  "Expression) Occasional anomic event, use of filler word \"thing\"   Confrontational Naming (Verbal Expression) body parts;objects;WFL   Conversational Speech (Verbal Expression) connected speech   Reading Comprehension   Oral Reading Ability (Reading Comprehension) paragraph level;word level;sentence level   Comprehension Level (Reading Comprehension) paragraph level tasks;WFL  (basic paragraph)   Word Level, Oral Reading Ability (Reading Comprehension) minimal impairment   Paragraph Level, Oral Reading Ability (Reading Comprehension) severe impairment   Sentence Level, Oral Reading Ability (Reading Comprehension) moderate impairment   Written Language   Written Expression (Written Language) sentence level   Functional Tasks (Written Language) name/signature;address   Sentence Level, Written Expression (Written Language) minimal impairment   Name/Signature, Functional Tasks (Written Language) intact   Address, Functional Tasks (Written Language) other (see comments)  (forgot zip code, verbalized correctly earlier in test)   Cognition   Cognitive Function attention deficit;memory deficit  (suspected)   Cognitive Status Alert, pleasant, cooperative   Additional cognitive-linguistic evaluation indicated  Recommended   Cognitive Status Exam Comments Language functional to participate in cognitive-linguistic evaluation, planned for 04/26.   Orientation Status (Cognition) oriented to;person;situation   Affect/Mental Status (Cognition) [facility]   General Therapy Interventions   Planned Therapy Interventions Language   Language Verbal expression;Reading comprehension;Written expression   Clinical Impression   Criteria for Skilled Therapeutic Interventions Met (SLP Eval) Yes, treatment indicated   SLP Diagnosis Moderate cognitive-linguistic deficits, suspected. Reading and writing deficits related to visual, cognitive deficits.   Risks & Benefits of therapy have been explained evaluation/treatment results reviewed;care " plan/treatment goals reviewed;participants voiced agreement with care plan;participants included;patient   Clinical Impression Comments SLP: Motor speech intact at conversation level. WAB-R Bedside administered and interpreted, pt with significant improvement in scores since 04/21 administration in hospital: 91.67 this day for verbal expression and auditory comprehension, 87.5 with reading and writing added (66.67 and 51.25 before, respectively). Language functional to participate in cognitive testing, suspect cognitive deficits impacting higher level language. Skilled SLP services indicated to improve cognitive-linguistic skills.   SLP Total Evaluation Time   Eval: Sound production with lang comprehension and expression Minutes (33225) 30   SLP Goals   Therapy Frequency (SLP Eval) 6 times/week   SLP Predicted Duration/Target Date for Goal Attainment 05/08/25   SLP Goals Communication;Language Comprehension   SLP: Improve language comprehension for interaction with caregivers/environment written;minimal assist   SLP: Communicate basic wants and needs written;minimal assist   SLP Discharge Planning   SLP Plan SLP: swallow eval. Cognitive eval with RBANS, enter details in progress note and add goals.   SLP Time and Intention   Total Session Time (sum of timed and untimed services) 30   Post Acute Settings Only   What unit is patient on? Acute Rehab   SLP - Acute Rehab Center Time   Individual Time (minutes) - SLP 30   [facility] Total Session Time (minutes) - SLP 30   [facility] Daily Total Session Time   SLP [facility] Daily Total Session Time 30   [facility] Daily Rehab Total Minutes 30

## 2025-04-26 NOTE — PLAN OF CARE
FOCUS/GOAL  Medication management, Safety management, and Prevention of secondary complications    ASSESSMENT, INTERVENTIONS AND CONTINUING PLAN FOR GOAL:  Pt alert and oriented x4.   Has word-finding difficulties.  Transfers assist of one with gait belt and walker.  R side vision neglect.  Reg thin diet, takes pills whole.  Diabetic.  Slept throughout the night  Goal Outcome Evaluation:

## 2025-04-26 NOTE — PROGRESS NOTES
Repeatable Battery for the Assessment of Neuropsychological Status (RBANS) FORM A   Immediate Memory Visuospatial/  Constructional Language Attention Delayed Memory Total Scale   Index Score 53 66 64 60 81 55   Percentile Rank 0.1 1 1 0.4 1 0.1     SLP:  Pt seen for administration of RBANS. Results are based on a mean of 100 and a standard deviation of +/- 15.   Interpretation: RBANS form A administered and interpreted. Pt with severe overall cognitive impairment with severe deficits in immediate memory, visuospatial skills, higher level word finding, and attention. Pt's delayed memory score in moderate range- pt recalled 0/10 words but recognized 20/20, 4 of 12 story concepts recalled. Skilled SLP services indicated to train in cognitive strategies to promote indenpendence.   Face to Face Administration: 35  Scoring/Interpretation: 10  Total Time: 45

## 2025-04-26 NOTE — PLAN OF CARE
Discharge Planner Post-Acute Rehab OT:      Discharge Plan: home alone (has friends that may be able to assist); HC     Precautions: fall, monitor low BP, cognition/word finding difficulty       Current Status:  ADLs:  Mobility: CGA with FWW  Grooming: CGA standing at sink  Dressing: SBA at eob for all   Bathing: CGA on/off tub bench; CGA to wash/dry   Toileting: CGA on/off toilet with fww and standing pericare/clothing   IADLs: Patient highly independent at baseline, anticipate will require assist for IADL tasks  Vision/Cognition: R field cut; wears corrective lenses; Cognition: deficits in memory, attention, EF, insight     Assessment: Pt less symptomatic/orthostatic today with basic ADLs in the bathroom. Will benefit from a formal cog assessment next visit as pt does appear to have lapse in judgement in her abilities/skills at discharge so will benefit from reiteration of deficits. Pt does appear to have word finding difficulties/trouble understand the words she is reading on menu. Overall, CGA with all mobility and tasks in room.     Other Barriers to Discharge (DME, Family Training, etc):   Lives alone in a condo with elevator access (enjoys taking stairs usually); walk in shower with grab bar, no shower chair  Owns no DME  DME: shower chair-will provide from here

## 2025-04-27 ENCOUNTER — APPOINTMENT (OUTPATIENT)
Dept: OCCUPATIONAL THERAPY | Facility: CLINIC | Age: 60
DRG: 057 | End: 2025-04-27
Attending: PHYSICAL MEDICINE & REHABILITATION
Payer: COMMERCIAL

## 2025-04-27 ENCOUNTER — APPOINTMENT (OUTPATIENT)
Dept: SPEECH THERAPY | Facility: CLINIC | Age: 60
DRG: 057 | End: 2025-04-27
Attending: PHYSICAL MEDICINE & REHABILITATION
Payer: COMMERCIAL

## 2025-04-27 ENCOUNTER — APPOINTMENT (OUTPATIENT)
Dept: PHYSICAL THERAPY | Facility: CLINIC | Age: 60
DRG: 057 | End: 2025-04-27
Attending: PHYSICAL MEDICINE & REHABILITATION
Payer: COMMERCIAL

## 2025-04-27 LAB
GLUCOSE BLDC GLUCOMTR-MCNC: 141 MG/DL (ref 70–99)
GLUCOSE BLDC GLUCOMTR-MCNC: 143 MG/DL (ref 70–99)

## 2025-04-27 PROCEDURE — 92507 TX SP LANG VOICE COMM INDIV: CPT | Mod: GN | Performed by: SPEECH-LANGUAGE PATHOLOGIST

## 2025-04-27 PROCEDURE — 97530 THERAPEUTIC ACTIVITIES: CPT | Mod: GP

## 2025-04-27 PROCEDURE — 97112 NEUROMUSCULAR REEDUCATION: CPT | Mod: GP

## 2025-04-27 PROCEDURE — 250N000013 HC RX MED GY IP 250 OP 250 PS 637: Performed by: PHYSICIAN ASSISTANT

## 2025-04-27 PROCEDURE — 99232 SBSQ HOSP IP/OBS MODERATE 35: CPT | Performed by: PHYSICAL MEDICINE & REHABILITATION

## 2025-04-27 PROCEDURE — 128N000003 HC R&B REHAB

## 2025-04-27 PROCEDURE — 97530 THERAPEUTIC ACTIVITIES: CPT | Mod: GO | Performed by: OCCUPATIONAL THERAPIST

## 2025-04-27 PROCEDURE — 97116 GAIT TRAINING THERAPY: CPT | Mod: GP

## 2025-04-27 RX ORDER — CLOPIDOGREL BISULFATE 75 MG/1
75 TABLET ORAL DAILY
Status: DISCONTINUED | OUTPATIENT
Start: 2025-04-28 | End: 2025-05-03 | Stop reason: HOSPADM

## 2025-04-27 RX ADMIN — CILOSTAZOL 100 MG: 100 TABLET ORAL at 08:46

## 2025-04-27 RX ADMIN — ASPIRIN 81 MG CHEWABLE TABLET 81 MG: 81 TABLET CHEWABLE at 08:46

## 2025-04-27 RX ADMIN — ATORVASTATIN CALCIUM 40 MG: 40 TABLET, FILM COATED ORAL at 21:06

## 2025-04-27 ASSESSMENT — ACTIVITIES OF DAILY LIVING (ADL)
ADLS_ACUITY_SCORE: 38

## 2025-04-27 NOTE — PLAN OF CARE
FOCUS/GOAL  Medical management, Psychosocial needs, and Prevention of secondary complications    ASSESSMENT, INTERVENTIONS AND CONTINUING PLAN FOR GOAL:  Pt is alert and oriented x4, appears anxious.   Emotionally labile.  Calls appropriately.  Continent of bowel/bladder. LBM 4/26.  Has R vision neglect.  Diabetic.  Denies pain.  Has word finding difficulty.  Transfers assist of one with gait belt and walker.  Regular thin diet takes pills whole with water.  Goal Outcome Evaluation:

## 2025-04-27 NOTE — PLAN OF CARE
Discharge Planner Post-Acute Rehab PT:     Discharge Plan: Home w/ support     Precautions: falls, cognition, insensate feet, right visual field cut    Current Status:  Bed Mobility: ind  Transfer: walker, CGA  Gait: CGA to patient with FWW for 25' plus wheelchair follow outside of room due to hypotension  Stairs: NT  Balance: stands w/o UE support, assist for dynamic standing    Outcome Measures:   AguilarxSTS:  TUG:     Assessment: Overall, session tolerated well but pt continues to have orthostatic hypotension 70-80s SBP with inconsistent symptoms reported. Pt has compression socks and abdominal binder on. Nursing aware. Pt able to ambulate without device but still recommend use as BP is low.     Other Barriers to Discharge (DME, Family Training, etc):   No identified caregiver  Cognition

## 2025-04-27 NOTE — PROGRESS NOTES
I was paged by Dr Logan, regarding pt facing side effects of Cilostazol with orthostatic hypotension and tachycardia.       Ok to switch Cilostazol to Plavix   Continue DAPT atleast until she follows up in the Stroke clinic      Delvis Concepcion  Stroke Fellow

## 2025-04-27 NOTE — PLAN OF CARE
Discharge Planner Post-Acute Rehab OT:      Discharge Plan: home alone (has friends that may be able to assist); HC     Precautions: fall, monitor low BP, cognition/word finding difficulty       Current Status:  ADLs:  Mobility: CGA with FWW  Grooming: CGA standing at sink  Dressing: SBA at eob for all   Bathing: CGA on/off tub bench; CGA to wash/dry   Toileting: CGA on/off toilet with fww and standing pericare/clothing   IADLs: Patient highly independent at baseline, anticipate will require assist for IADL tasks  Vision/Cognition: R field cut; wears corrective lenses; Cognition: ACE III: 79/100 indicating deficits in memory, attention, language, EF, visual-spatial (most impacted by R field cut deficit).     Assessment: ACE III completed today: 79/100 indicating deficits in language, memory, attention, EF, visual-spatial (most impacted by R field cut deficit). Pt having more recognition/acceptance into deficits this morning stating that some tasks were hard, and noting major deficits in language/reading/memory areas. Pt now using notepad to write down things to compensate for memory deficits.     Other Barriers to Discharge (DME, Family Training, etc):   Lives alone in a condo with elevator access (enjoys taking stairs usually); walk in shower with grab bar, no shower chair  Owns no DME  DME: shower chair-will provide from here

## 2025-04-27 NOTE — PLAN OF CARE
Individualized Overall Plan Of Care (IOPOC)      Rehab diagnosis/Impairment Group Code: Stroke ischemic 01.2 (r) body involvement (l) brain; acute ischemic strokes of l hemisphere due to atheroembolic from symptomatic left common carotid stenosis vs esus complicated by l parieto-occipital hemorrhage post tnk  Stroke (h)     Expected functional outcome: anticipate improvement to mod I level with basic mobility and ADLs; will need A for iADLs at least initially after discharge     Clinical Impression Comments:     Mobility:    Precautions: falls, cognition, insensate feet     Current Status:  Bed Mobility: ind  Transfer: walker, CGA  Gait: not tested due to orthostatic  Stairs: NT  Balance: stands w/o UE support, assist for dynamic standing     Outcome Measures:   AguilarxSTS:  TUG:      Assessment:  orthostasis today prevented full mobility evaluation, she did have compression socks from the hospital, OT established in pm that these are not adequate for BP management.      Other Barriers to Discharge (DME, Family Training, etc):   No identified caregiver  Cognition     ADL: Patient may benefit from continued skilled IP OT services to progress independence/safety with ADLs/IADLs.    Communication/Cognition/Swallow: SLP: Clinical swallow evaluation completed. Oral mechanism exam unremarkable. Evaluated pt with regular snack with arian cracker, thin via straw. Pt with adequate mastication and oral clearance of solid, no residue or pocketing. Pt with no coughing or throat clearing post-swallow single or sequential straw sips of thin. Recommend continuing regular solids and thin liquids (0), pt to use general safe swallow strategies sit upright, small bite/sip size, slow rate, alternate liquids/solids. Administer medications per pt's preference. Anticipate short course related to dysphagia. Skilled SLP services indicated to ensure safety with highest level of oral intake.     Intensity of therapy:   PT 60 minutes,  6  times/week, for 10 days  OT 60 minutes, 6 times/week, for 10 days  SLP 60 minutes, 6 times/week, for 10 days    Orthotics  TBD  Education stroke  Neuropsychology Testing: No      Medical Prognosis: good      Physician summary statement: she has a complicated PMH and some baseline functional deficits; will need good care coordination and close follow up after dischrge     Discharge destination: prior home  Discharge rehabilitation needs: outpatient, PT, OT, and SLP      Estimated length of stay: 10 days       Rehabilitation Physician Cristela Logan MD

## 2025-04-27 NOTE — PLAN OF CARE
"Goal Outcome Evaluation:      Plan of Care Reviewed With: patient    Overall Patient Progress: improvingOverall Patient Progress: improving      VS: /60 (BP Location: Left arm, Patient Position: Chair, Cuff Size: Adult Regular)   Pulse 86   Temp 97.5  F (36.4  C) (Oral)   Resp 16   Ht 1.676 m (5' 6\")   Wt 90.2 kg (198 lb 13.7 oz)   SpO2 98%   BMI 32.10 kg/m     O2: SpO2 > 98 and stable on RA. LS clear and equal bilaterally. Denies chest pain and SOB.    Output: Voids spontaneously without difficulty to bathroom.   Last BM: 4/26, denies abdominal discomfort. BS active / passing flatus.    Activity: Up with A x 1 with a walker.     Skin: WDL except, bruising to LUE.    Pain: Verbalized doing okay, toes not painful today.    CMS: Alert and oriented to self and confused. Reorientation and redirection used during the shift. C/o numbness and tingling.   Dressing: None.   Diet: Regular diet. Denies nausea/vomiting. Pt had good appetite for meals.    LDA: None.   Equipment: Personal belongings,    Plan: Fall precautions maintained / Continue with plan of care. Call light within reach, pt able to make needs known. Alarms on and safety checks completed.    Additional Info: Pt BP soft but asymptomatic. Wearing CHRISTINE stocking and wearing an abdominal binder. Physician informed and stopped Petal. Encouraged pt to push fluid.  Pt passed MAP this morning.       "

## 2025-04-27 NOTE — PLAN OF CARE
Discharge Planner Post-Acute Rehab SLP:      Discharge Plan: TBD. Ongoing SLP     Precautions: fall     Current Status:  Hearing: WFL  Vision: Glasses- R hemianopsia/field cut, right neglect per chart  Communication: Motor speech intact at conversation level. Verbal expression and auditory comprehension functional at conversation level. Reading deficits likely r/t vision deficits.   Cognition: Severe cognitive impairment: severe deficits in immediate memory, visuospatial skills, higher level word finding, and attention. Moderate delayed memory deficits.  Swallow: Regular solids/Thin liquids (0). General safe swallow strategies. Swallow goals met 04/26     Assessment:   Pt edu in RBANS findings, SLP POC. Pt edu in neuroplasticity, healing post-CVA.     Other Barriers to Discharge (Family Training, etc): Level of assist with iADLS?

## 2025-04-28 ENCOUNTER — APPOINTMENT (OUTPATIENT)
Dept: PHYSICAL THERAPY | Facility: CLINIC | Age: 60
DRG: 057 | End: 2025-04-28
Attending: PHYSICAL MEDICINE & REHABILITATION
Payer: COMMERCIAL

## 2025-04-28 ENCOUNTER — PATIENT OUTREACH (OUTPATIENT)
Dept: NEUROLOGY | Facility: CLINIC | Age: 60
End: 2025-04-28
Payer: COMMERCIAL

## 2025-04-28 ENCOUNTER — APPOINTMENT (OUTPATIENT)
Dept: OCCUPATIONAL THERAPY | Facility: CLINIC | Age: 60
DRG: 057 | End: 2025-04-28
Attending: PHYSICAL MEDICINE & REHABILITATION
Payer: COMMERCIAL

## 2025-04-28 ENCOUNTER — APPOINTMENT (OUTPATIENT)
Dept: SPEECH THERAPY | Facility: CLINIC | Age: 60
DRG: 057 | End: 2025-04-28
Attending: PHYSICAL MEDICINE & REHABILITATION
Payer: COMMERCIAL

## 2025-04-28 PROCEDURE — 250N000013 HC RX MED GY IP 250 OP 250 PS 637: Performed by: PHYSICIAN ASSISTANT

## 2025-04-28 PROCEDURE — 97750 PHYSICAL PERFORMANCE TEST: CPT | Mod: GP | Performed by: PHYSICAL THERAPIST

## 2025-04-28 PROCEDURE — 128N000003 HC R&B REHAB

## 2025-04-28 PROCEDURE — 97130 THER IVNTJ EA ADDL 15 MIN: CPT | Mod: GN

## 2025-04-28 PROCEDURE — 97535 SELF CARE MNGMENT TRAINING: CPT | Mod: GO | Performed by: OCCUPATIONAL THERAPIST

## 2025-04-28 PROCEDURE — 97129 THER IVNTJ 1ST 15 MIN: CPT | Mod: GN

## 2025-04-28 PROCEDURE — 97112 NEUROMUSCULAR REEDUCATION: CPT | Mod: GP | Performed by: PHYSICAL THERAPIST

## 2025-04-28 PROCEDURE — 99233 SBSQ HOSP IP/OBS HIGH 50: CPT | Performed by: PHYSICAL MEDICINE & REHABILITATION

## 2025-04-28 PROCEDURE — 97530 THERAPEUTIC ACTIVITIES: CPT | Mod: GP | Performed by: PHYSICAL THERAPIST

## 2025-04-28 PROCEDURE — 250N000013 HC RX MED GY IP 250 OP 250 PS 637: Performed by: PHYSICAL MEDICINE & REHABILITATION

## 2025-04-28 RX ADMIN — CLOPIDOGREL BISULFATE 75 MG: 75 TABLET, FILM COATED ORAL at 08:21

## 2025-04-28 RX ADMIN — ATORVASTATIN CALCIUM 40 MG: 40 TABLET, FILM COATED ORAL at 21:52

## 2025-04-28 RX ADMIN — ASPIRIN 81 MG CHEWABLE TABLET 81 MG: 81 TABLET CHEWABLE at 08:20

## 2025-04-28 ASSESSMENT — ACTIVITIES OF DAILY LIVING (ADL)
ADLS_ACUITY_SCORE: 38
ADLS_ACUITY_SCORE: 39
ADLS_ACUITY_SCORE: 38
ADLS_ACUITY_SCORE: 39
ADLS_ACUITY_SCORE: 38
ADLS_ACUITY_SCORE: 39
ADLS_ACUITY_SCORE: 38
ADLS_ACUITY_SCORE: 39
ADLS_ACUITY_SCORE: 38
ADLS_ACUITY_SCORE: 39

## 2025-04-28 NOTE — PROGRESS NOTES
"   04/28/25 1600   Signing Clinician's Name / Credentials   Signing clinician's name / credentials Raman Lazar DPT   Aguilar Balance Scale (LILIBETH SCANLON, CECELIA S, TAVON CLEVELAND, KOFFI RASHEED: MEASURING BALANCE IN THE ELDERLY: VALIDATION OF AN INSTRUMENT. CAN. J. PUB. HEALTH, JULY/AUGUST SUPPLEMENT 2:S7-11, 1992.)   Sit To Stand 4   Standing Unsupported 4   Sitting Unsupported 4   Stand to Sit 4   Transfers 4   Standing with Eyes Closed 4   Standing Unsupported, Feet Together 3   Reach Forward With Outstretched Arm 3   Retrieve Object From Floor 3   Turning to Look Behind 4   Turn 360 Degrees 2   Placing Alternate Foot on Stool (4-6 inches) 2   Unsupported Tandem Stand (Demonstrate to Subject) 3   One Leg Stand 1   Total Score (A score of 45 or less has been correlated with an increased risk of falls)   Total Score (out of 56) 45     Aguilar Balance Scale (BBS) Cutoff Scores for CVA Population:    The BBS is a measure of static and dynamic standing balance that has been validated in community dwelling elderly individuals and individuals who have Parkinson's Disease, MS, and those who are s/p CVA and TBI. The test is administered without an assistive device. Scores from the Aguilar are used to determine the probability of falling based on the patient's previous history of falls and their test performance.     0-20 High risk for falling- Corresponded with w/c bound status  21-40 Medium risk for falling- Able to walk with assistance  41-56 Low risk for falling- Able to walk independently  According to [facility].  Available at http://www.strokecenter.org/.  Accessibility verified April 10, 2013.  Minimal Detectable Change = 6.5 according to Michael & Abdoulaye 2008      10 Meter Walk Test:  Setup - using open 10 meter walkway. Can be setup individually or using marks along base board of East hallway between PT and OT gyms.  Instructions - comfortable/self chosen pace: \"Walk at your own comfortable walking pace and " "stop when you reach the end,\"   and fast pace: \"Walk as fast as you can safely walk and stop when you reach the end.\"   Time was started as the lead foot crossed the 2 meter sabiha and finished as the patient's lead foot crossed the 8 meter sabiha.    Assistive Device: SBA  Assist Provided: none    Average Speed for Comfortable Pace - 0.36 m/sec  Average Speed for Fast Pace - 0.81 m/sec    Normative Data:  Gait Speed for Community Dwellers - Joanna et al, 2005  Low Gait Speed - <0.7 m/sec  Mean Gait Speed - 0.7-1.0 m/sec  High Functioning Gait Speed - >1.1 m/sec        "

## 2025-04-28 NOTE — PLAN OF CARE
Discharge Planner Post-Acute Rehab OT:      Discharge Plan: home alone (has friends that may be able to assist); HC     Precautions: fall, monitor low BP, cognition/word finding difficulty       Current Status:  ADLs:  Mobility: CGA with FWW  Grooming: CGA standing at sink  Dressing: SBA at eob for all   Bathing: CGA on/off tub bench; CGA to wash/dry   Toileting: CGA on/off toilet with fww and standing pericare/clothing   IADLs: Patient highly independent at baseline, anticipate will require assist for IADL tasks  Vision/Cognition: R field cut; wears corrective lenses; Cognition: ACE III: 79/100 indicating deficits in memory, attention, language, EF, visual-spatial (most impacted by R field cut deficit); med task: 4/5     Assessment: Pt completed med task this date with 4/5 correct with 1 vc neeed to check work. Pt only has 2 AM meds, and 1 PM med and she is currently on MAP starting today. Will reach out to friend Ericka soon to see how much assist she can provide at home. Pt did have lower BP this AM with SBP being 100, however has not had any morning meds yet.     Other Barriers to Discharge (DME, Family Training, etc):   Lives alone in a condo with elevator access (enjoys taking stairs usually); walk in shower with grab bar, no shower chair  Owns no DME  DME: shower chair-will provide from here

## 2025-04-28 NOTE — PROGRESS NOTES
[facility]   Acute Rehabilitation Unit  Daily progress note    INTERVAL HISTORY  Afshan Jimenez was seen and examined at bedside. She was doing ok. Denied any acute symptoms but seemed stressed out and confused. Review ed the plan about switching Cilostazol to plavix as possible cause for her tachycardia and orthostasis. She couldn't follow the discussion and became more anxious. Talked to nursing team to bring new med list.     MEDICATIONS  Current Facility-Administered Medications   Medication Dose Route Frequency Provider Last Rate Last Admin    - Medication Assessment Program - Rehab Services   Does not apply See Admin Instructions Siddhartha West MD        aspirin EC tablet 81 mg  81 mg Oral Daily Helen Keating PA   81 mg at 04/27/25 0846    atorvastatin (LIPITOR) tablet 40 mg  40 mg Oral QPM Helen Keating PA   40 mg at 04/27/25 2106    clopidogrel (PLAVIX) tablet 75 mg  75 mg Oral Daily Cristela Logan MD              Current Facility-Administered Medications   Medication Dose Route Frequency Provider Last Rate Last Admin    acetaminophen (TYLENOL) tablet 650 mg  650 mg Oral Q4H PRN Helen Keating PA   650 mg at 04/25/25 1454    bisacodyl (DULCOLAX) suppository 10 mg  10 mg Rectal Daily PRN Helen Keating PA        glucose gel 15-30 g  15-30 g Oral Q15 Min PRN Helen Keating PA        Or    dextrose 50 % injection 25-50 mL  25-50 mL Intravenous Q15 Min PRN Helen Keating PA        Or    glucagon injection 1 mg  1 mg Subcutaneous Q15 Min PRN Helen Keating PA        gabapentin (NEURONTIN) capsule 100 mg  100 mg Oral TID PRN Veena Burk PA-C   100 mg at 04/25/25 1307    NONFORMULARY   Topical Q1H PRN Veena Burk PA-C        senna-docusate (SENOKOT-S/PERICOLACE) 8.6-50 MG per tablet 1 tablet  1 tablet Oral BID PRN Helen Keating PA            PHYSICAL EXAM  /67 (BP Location: Left arm)   Pulse 109    "Temp 98  F (36.7  C) (Oral)   Resp 18   Ht 1.676 m (5' 6\")   Wt 90.2 kg (198 lb 13.7 oz)   SpO2 97%   BMI 32.10 kg/m    Gen: NAD, resting in bed  HEENT: NC/AT, dry MM  Cardio: tachycardic but regular  Pulm: non-labored on room air  Abd: soft, non-tender  Ext: no edema in BLE  Neuro/MSK: was confused and couldn't follow discussion     LABS  CBC RESULTS:   Recent Labs   Lab Test 04/24/25  0709 04/21/25  1854 04/20/25  0520   WBC 7.3 9.4 6.5   RBC 4.40 4.74 4.15   HGB 12.6 13.4 11.9   HCT 38.5 41.1 36.6   MCV 88 87 88   MCH 28.6 28.3 28.7   MCHC 32.7 32.6 32.5   RDW 13.0 13.2 13.1    181 158       Last Basic Metabolic Panel:  Recent Labs   Lab Test 04/27/25  1212 04/27/25  0810 04/26/25  2148 04/24/25  0751 04/24/25  0709 04/23/25  0738 04/23/25  0709 04/22/25  1238 04/22/25  0828 04/21/25  2107 04/21/25  1854 04/20/25  0600 04/20/25  0520   NA  --   --   --   --  138  --   --   --   --   --  138  --  138   POTASSIUM  --   --   --   --  4.0  --  4.5  --  4.2  --  4.2  --  4.1   CHLORIDE  --   --   --   --  101  --   --   --   --   --  102  --  104   CO2  --   --   --   --  25  --   --   --   --   --  24  --  26   ANIONGAP  --   --   --   --  12  --   --   --   --   --  12  --  8   * 141* 107*   < > 138*   < >  --    < >  --    < > 189*   < > 123*   BUN  --   --   --   --  20.6  --   --   --   --   --  19.5  --  11.2   CR  --   --   --   --  1.00*  --   --   --   --   --  0.95  --  0.82   GFRESTIMATED  --   --   --   --  64  --   --   --   --   --  68  --  81   JOSE MARIA  --   --   --   --  9.4  --   --   --   --   --  9.9  --  9.3    < > = values in this interval not displayed.         ASSESSMENT AND PLAN  Afshan Jimenez is a 59 year old right hand dominant female with past medical history of AML s/p bone marrow transplant 1992, DM type 2, HLD, left parietal stroke (12/2024), left parotid carcinoma s/p parotidectomy and radiation, pancreatic cyst, and lung nodules who presented on 4/18/25 with acute " right-sided incoordination and slurred speech with imaging revealing multiple watershed and embolic-type infarcts in left frontoparietal region s/p TNK c/b left parieto-occipital hemorrhage requiring TNK reversal with course further complicated by additional findings of left common carotid artery stenosis, orthostatic hypotension, and left mastoid effusion/effacement.  She is admitted to ARU on 4/24/25 for multidisciplinary rehabilitation and ongoing medical management.      Admission to acute inpatient rehab: 04/24/2025  Impairment group code: Stroke Ischemic 01.2 (R) Body Involvement (L) Brain; Acute ischemic strokes of L hemisphere due to atheroembolic from symptomatic left common carotid stenosis vs ESUS complicated by L parieto-occipital hemorrhage post TNK .          --Vitals stable.   --Labs: none today.    --Continue ongoing medical management.   -tachycardia and orthostasis continued on Saturday and was limiting her therapies. Talked to stroke team about possible side effect of Cilostazol and they recommended to switch to plavix. Should continue DAPT atleast until she follows up in the Stroke clinic     --Continue therapies and plan of care. SLP eval showed severe cognitive deficits. Motor speech is intact and verbal expression is at functional conversation level. Reading impacted by visual deficits. Requires CGA for most ADLs and mobility.         Cristela Logan MD  Physical Medicine & Rehabilitation

## 2025-04-28 NOTE — PLAN OF CARE
FOCUS/GOAL  Medication management, Mobility, and Safety management    ASSESSMENT, INTERVENTIONS AND CONTINUING PLAN FOR GOAL:  Pt is continent of bowel and bladder.  Progressing well.  Sometimes is hypotensive but BP's have been WDL this shift.  Regular thin diet takes pills whole.  Slept throughout the night  Goal Outcome Evaluation:

## 2025-04-28 NOTE — PLAN OF CARE
Goal Outcome Evaluation:      Plan of Care Reviewed With: patient    Overall Patient Progress: improvingOverall Patient Progress: improving    Patient seem alert, has word finding difficulty and therefore unable assess orientation. Had a shower today, declines assist from nursing assistant and id not want anybody in the shower room Stated that she needs her privacy. Finished her shower with no incident.Outer L toe is slightly red, denies any discomfort right now. Instructed on using the call light, for staff to be present when going to BR. On MAP but did not have meds due on my shift. Ate well at supper, will continue poc

## 2025-04-28 NOTE — PROGRESS NOTES
Stroke RN Care Coordination - Chart Review Note    SITUATION     Afshan Jimenez is a 59 year old female who is receiving support for:  Chart Review Please (Stroke RN Review)    BACKGROUND     Patient admitted to [facility] 4/18-4/24 for acute stroke s/p TNK, complicated by hemorrhage s/p reversal of TNK.     Discharged to [facility] ARU.    ASSESSMENT     Per review, primary care SW has active care management program open and is following for discharge from ARU.     Stroke hospital follow up appt already scheduled for June.     PLAN     Will close stroke management program as not to duplicate care management efforts.      Lashawn MAHMOOD, RN, SCRN  RN Stroke Neurology Care Coordinator  [facility] Neuroscience Service Line

## 2025-04-28 NOTE — PROGRESS NOTES
ARU Social Work Progress Notes     Home Care Referral sent and waiting on accepting home care agency.      VIGNESH Harper  [facility], Acute Inpatient Rehab Unit   [facility] [address] [place] [address]  Phone: [phone]  Fax: [phone]

## 2025-04-28 NOTE — PLAN OF CARE
"Goal Outcome Evaluation:      Plan of Care Reviewed With: patient    Overall Patient Progress: improvingOverall Patient Progress: improving       VS: /62 (BP Location: Left arm)   Pulse 109   Temp 97.6  F (36.4  C) (Oral)   Resp 16   Ht 1.676 m (5' 6\")   Wt 90.2 kg (198 lb 13.7 oz)   SpO2 97%   BMI 32.10 kg/m      O2: RA, denies CP, SOB or cough   Neuro: A&Ox2-4, confused intermittently, baseline BLE N/T, RUE numbness   Bowel/Bladder: Continent   LBM: 4/28   Diet: Regular/thin liquids   Skin: Intact, edema in BLE, more pronounced in right foot than left, wearing her own compression stockings   Pain: Denies   Activity: Ax1 w/W&GB   Dressings: NA   LDA: NA   Equipment: Personal belongings, walker   Plan: POC, MAP   Additional Info: Pt very focused on her finances and trying to use her computer sign into her financial accounts       Karin Ramos RN on 4/28/2025 at 9:30 AM         "

## 2025-04-28 NOTE — PLAN OF CARE
"Discharge Planner Post-Acute Rehab PT:     Discharge Plan: Home (condo w/ elevator access). Home care PT.     Precautions: falls, cognition, insensate feet (recommend shoes OOB), right visual field cut, orthostatic: ab binder OOB    Current Status:  Bed Mobility: IND  Transfer: SBA with FWW  Gait: SBA with FWW, 200+ ft  Stairs: 12x6\" step ups with bilat rails and CGA/SBA  Balance: Fair static, dynamic standing balance w/out UE support    Outcome Measures:   Aguilar   4/28: 45/56  10mWT  4/28: 0.36 m/sec comfortable, 0.81 m/sec fast w/out device and SBA     Assessment: Pt with considerably improved tolerance for today's session. No reports of dizziness or lightheadedness, and BP stable with activity in 120s-140s/70s-80s. Significant R field cut present, so education provided on role of PT to encourage active head and eye mvmts to compensate for loss. Pain in bilat feet related to neuropathy limiting ambulation to a degree.    Other Barriers to Discharge (DME, Family Training, etc):   Limited by impaired cognition w/ no Id'd support.  Equipment: Reports to have FWW available, but will need to confirm    "

## 2025-04-28 NOTE — PROGRESS NOTES
"  [facility]   Acute Rehabilitation Unit  Daily progress note    INTERVAL HISTORY  Notes reviewed.  Over the weekend, continues to have episodes of orthostasis and tachycardia for which Pletal was changed to Plavix.  This morning Afshan has no new concerns or complaints, and reports feeling \"better\".  When asked specifically what is better, she says her speech although still having some word finding difficulty which she finds frustrating.  She asks many appropriate questions regarding her stroke including etiology and how to prevent another 1 in the future.  We discussed risk factors including her known left carotid stenosis and will need to be evaluated for a CEA in the future.  She is also asking about her ability to work and concern for her clients.  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  Finally, she is asking about traveling either driving or flying to potentially stay closer to family in [place].  I discussed there are no strict contraindications to this, although would encourage frequent moving to prevent DVTs, and would ideally wait approximately 1 month before flying if able.  I also discussed that she is not cleared to drive, and she reports she has no intention to drive at this time.    Current functional status:  PT:  Bed Mobility: ind  Transfer: walker, CGA  Gait: CGA to patient with FWW for 25' plus wheelchair follow outside of room due to hypotension  Stairs: NT  Balance: stands w/o UE support, assist for dynamic standing     Outcome Measures:   AguilarxSTS:  TUG:      Assessment: Overall, session tolerated well but pt continues to have orthostatic hypotension 70-80s SBP with inconsistent symptoms reported. Pt has compression socks and abdominal binder on. Nursing aware. Pt able to ambulate without device but still recommend use as BP is low. "     OT:  ADLs:  Mobility: CGA with FWW  Grooming: CGA standing at sink  Dressing: SBA at eob for all   Bathing: CGA on/off tub bench; CGA to wash/dry   Toileting: CGA on/off toilet with fww and standing pericare/clothing   IADLs: Patient highly independent at baseline, anticipate will require assist for IADL tasks  Vision/Cognition: R field cut; wears corrective lenses; Cognition: ACE III: 79/100 indicating deficits in memory, attention, language, EF, visual-spatial (most impacted by R field cut deficit); med task: 4/5     Assessment: Pt completed med task this date with 4/5 correct with 1 vc neeed to check work. Pt only has 2 AM meds, and 1 PM med and she is currently on MAP starting today. Will reach out to friend Ericka moreno to see how much assist she can provide at home. Pt did have lower BP this AM with SBP being 100, however has not had any morning meds yet.    SLP:  Hearing: WFL  Vision: Glasses- R hemianopsia/field cut, right neglect per chart  Communication: Motor speech intact at conversation level. Verbal expression and auditory comprehension functional at conversation level. Reading deficits likely r/t vision deficits.   Cognition: Severe cognitive impairment: severe deficits in immediate memory, visuospatial skills, higher level word finding, and attention. Moderate delayed memory deficits.  Swallow: Regular solids/Thin liquids (0). General safe swallow strategies. Swallow goals met 04/26    MEDICATIONS  Current Facility-Administered Medications   Medication Dose Route Frequency Provider Last Rate Last Admin    - Medication Assessment Program - Rehab Services   Does not apply See Admin Instructions Siddhartha West MD        aspirin EC tablet 81 mg  81 mg Oral Daily Helen Keating PA   81 mg at 04/28/25 0820    atorvastatin (LIPITOR) tablet 40 mg  40 mg Oral QPM Helen Keating PA   40 mg at 04/27/25 2106    clopidogrel (PLAVIX) tablet 75 mg  75 mg Oral Daily Cristela Logan MD   75 mg at  "04/28/25 0821          Current Facility-Administered Medications   Medication Dose Route Frequency Provider Last Rate Last Admin    acetaminophen (TYLENOL) tablet 650 mg  650 mg Oral Q4H PRN Helen Keating PA   650 mg at 04/25/25 1454    bisacodyl (DULCOLAX) suppository 10 mg  10 mg Rectal Daily PRN Helen Keating PA        glucose gel 15-30 g  15-30 g Oral Q15 Min PRN Helen Keating PA        Or    dextrose 50 % injection 25-50 mL  25-50 mL Intravenous Q15 Min PRN Helen Keating PA        Or    glucagon injection 1 mg  1 mg Subcutaneous Q15 Min PRN eHlen Keating PA        gabapentin (NEURONTIN) capsule 100 mg  100 mg Oral TID PRN Veena Burk PA-C   100 mg at 04/25/25 1307    NONFORMULARY   Topical Q1H PRN Veena Burk PA-C        senna-docusate (SENOKOT-S/PERICOLACE) 8.6-50 MG per tablet 1 tablet  1 tablet Oral BID PRN Helen Keating PA            PHYSICAL EXAM  /62 (BP Location: Left arm)   Pulse 109   Temp 97.6  F (36.4  C) (Oral)   Resp 16   Ht 1.676 m (5' 6\")   Wt 90.2 kg (198 lb 13.7 oz)   SpO2 97%   BMI 32.10 kg/m      Gen: NAD, sitting up in chair  HEENT: NC/AT  Cardio: Mildly tachycardic but regular, no murmurs  Pulm: non-labored on room air, lungs CTA bilaterally  Abd: soft, non-tender, bowel sounds present  Ext: no edema in BLE  Neuro/MSK: awake, alert, mild word finding difficulty present however she was able to answer appropriately, follow commands, and ask appropriate questions    LABS  CBC RESULTS:   Recent Labs   Lab Test 04/24/25  0709 04/21/25  1854 04/20/25  0520   WBC 7.3 9.4 6.5   RBC 4.40 4.74 4.15   HGB 12.6 13.4 11.9   HCT 38.5 41.1 36.6   MCV 88 87 88   MCH 28.6 28.3 28.7   MCHC 32.7 32.6 32.5   RDW 13.0 13.2 13.1    181 158       Last Basic Metabolic Panel:  Recent Labs   Lab Test 04/27/25  1212 04/27/25  0810 04/26/25  2148 04/24/25  0751 04/24/25  0709 04/23/25  0738 04/23/25  0709 04/22/25  1238 04/22/25  0828 " 04/21/25  2107 04/21/25  1854 04/20/25  0600 04/20/25  0520   NA  --   --   --   --  138  --   --   --   --   --  138  --  138   POTASSIUM  --   --   --   --  4.0  --  4.5  --  4.2  --  4.2  --  4.1   CHLORIDE  --   --   --   --  101  --   --   --   --   --  102  --  104   CO2  --   --   --   --  25  --   --   --   --   --  24  --  26   ANIONGAP  --   --   --   --  12  --   --   --   --   --  12  --  8   * 141* 107*   < > 138*   < >  --    < >  --    < > 189*   < > 123*   BUN  --   --   --   --  20.6  --   --   --   --   --  19.5  --  11.2   CR  --   --   --   --  1.00*  --   --   --   --   --  0.95  --  0.82   GFRESTIMATED  --   --   --   --  64  --   --   --   --   --  68  --  81   JOSE MARIA  --   --   --   --  9.4  --   --   --   --   --  9.9  --  9.3    < > = values in this interval not displayed.         ASSESSMENT AND PLAN  Afshan Jimenez is a 59 year old right hand dominant female with past medical history of AML s/p bone marrow transplant 1992, DM type 2, HLD, left parietal stroke (12/2024), left parotid carcinoma s/p parotidectomy and radiation, pancreatic cyst, and lung nodules who presented on 4/18/25 with acute right-sided incoordination and slurred speech with imaging revealing multiple watershed and embolic-type infarcts in left frontoparietal region s/p TNK c/b left parieto-occipital hemorrhage requiring TNK reversal with course further complicated by additional findings of left common carotid artery stenosis, orthostatic hypotension, and left mastoid effusion/effacement.  She is admitted to ARU on 4/24/25 for multidisciplinary rehabilitation and ongoing medical management.    Rehabilitation   Admission to acute inpatient rehab: 04/24/2025  Impairment group code: Stroke Ischemic 01.2 (R) Body Involvement (L) Brain; Acute ischemic strokes of L hemisphere due to atheroembolic from symptomatic left common carotid stenosis vs ESUS complicated by L parieto-occipital hemorrhage post TNK .          PT,  OT and SLP 60 minutes of each on a daily basis up to 6 days per week, in addition to rehab nursing and close management of physiatrist.       Impairment of ADL's: Noted to have impaired strength, impaired activity tolerance, impaired vision,  and impaired coordination leading to decreased ability to independently complete ADL's.  Will benefit from ongoing OT with goal for MOD I with basic ADLs.      Impairment of mobility:  Noted to have impaired strength, impaired activity tolerance, impaired balance,  and impaired visioin,  leading to decreased mobility.  Will benefit from ongoing PT with goal for JENNIFER with basic mobility.      Impairment of cognition/language/swallow:  Noted to have impaired speech will benefit from ongoing SLP to improve ability to communicate needs effectively.     Continue comprehensive acute inpatient rehabilitation program with multidisciplinary approach including therapies, rehab nursing, and physiatry following. See interval history for updates.        Medical Conditions  New actions/orders/updates for today are in blue.     Acute ishemic strokes of left hemisphere due to atheroembolic from symptomatic left carotid stenosis vs ESUS s/p TNK complicated by left parieto-occipital hemorrhage s/p TNK reversal.   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  Presented 4/18/25 with right sided weakness, slurred speech:  MRI noted to have multiple watershed and embolic type infarcts in the left frontoparietal region as well as interval evolutionary change of prior infarcts. She received TNK with resulting hemorrhage on follow up imaging, TNK was reversed, received serial brain imaging and medical management.    - Started on cilostazol 100 mg bid, but changed to Plavix 75 mg daily starting 4/28 after discussion with neurology due to orthostatic hypotension and tachycardia  - Continue ASA 81 mg daily  - BP goal  <130/80, currently at goal without medications  - LDL goal 40-70- continue statin  - Goal A1c <7, management as below  - Continue PT/OT/SLP  - Ziopatch on discharge  - Plan for Transcranial doppler at [place] ~ 1 month per neurology   - Follow up MRI 3 months  - Follow up stroke neurology.  Consider cardiac CTA at clinic visit.  - Follow up vascular surgery regarding L CCA stenosis.  Consideration of CEA (deferred at this time due to risks of recurrent bleeding with intra-operative heparin, as well as felt more likely stenosis was due to prior radiation rather than atherosclerotic disease which would pose higher chance of embolization/ulceration)     Type II diabetes mellitus  A1c 6.0% on 4/7/25  Previously took ozempic, discontinued with some symptomatic dizziness/ concerns for hypotension.  Per pcp note 4/7/2025, would like to try lifestyle modification  - Monitor with labs  - Add diabetes agent if needed- glucose at goal without medications  - Hypoglycemic protocol     HLD  LDL 47 4/19  - Continue atorvastatin 40 mg daily  - LDL goal 40-70    Tachycardia  Throughout hospital course and also appears some chronic component.  EKG on 4/18/25 with sinus tachycardia.  - Given concurrent orthostatic hypotension as below, question if some hypovolemia though also could be autonomic  - If persistent, consider IV fluids  - If associated symptoms, consider further work-up    Orthostatic hypotension  Per review of OP chart, she was noted to have mild tachycardia and symptomatic hypotension in clinic in 1/2025, felt to be orthostatic hypotension, largely attributed to poor PO intake while on Ozempic.  Considered IV fluid bolus but patient declined.  Per IP chart, patient reported some lightheadedness at home, leaning on wall while ambulating at home.  Noted to have recurrent symptomatic hypotension in hospital as well.  Ozempic was discontinued and she was given some IV hydration.  Has had significant symptomatic orthostatic  hypotension initially at ARU, documented positive orthostatic vitals by therapies and nursing team.  No medications that would expect to contribute.  No localizing signs/symptoms of infection and supine BP normotensive.  - Added LE compression stockings  - Added abdominal binder  - Pletal changed to Plavix as above  - Encourage PO fluids  - If no significant improvement, would consider trial of IV fluids, as that did seem to help in hospital.  If remains limiting, may need to consider trial of pharmacologic intervention such as Midodrine given significant safety concerns for discharge and apparent chronicity.     Peripheral neuropathy   No clear etiology, likely multifactorial due to DM and prior cancer treatments   Gets episodes of severe burning pain in feet which respond very well to OTC deep blue cream   4/25: notes that pain in feet was more severe overnight, increased with any touch or weightbearing  - Continue PTA Deep Blue cream when arrives from home (anticipated today)  - Patient denies prior trial of gabapentin or Lyrica.  Chart confirms these have previously been discussed but declined.  Added gabapentin 100 mg TID PRN for now until cream arrives and if patient finds helpful and tolerates, could consider scheduled though caution given dizziness/orthostasis     Gout  Takes indomethacin PRN prior to admission.   - Monitor for any symptoms  - Avoid NSAIDs at this time given CVA and DAPT     Pancreatic cysts, suspected IPMN  Found incidentally on CT CAP with contrast during admission in 12/2024 for stroke.  Seen by GI in 2/2025 following MRI/MRCP, which showed multiple pancreatic cysts, felt most likely to be IPMNs.  - Per GI, recommend repeat MRI/MRCP in 6 months (8/2025) and follow up with GI after.  Should return earlier if unexplained weight loss of > 10 lb, jaundice, diagnosis of acute pancreatitis or chronic diarrhea lasting more than 2 weeks.      Incidental lung nodules  Noted on 12/2024 work-up for  "CVA.  Presumed related to radiation.  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  Patient without ear complaints, no findings to suggest mastoid or middle ear infection, per ENT \"changes are permanent\",  \"soft tissue/fluid will be noted in the mastoid\",  no need to consult ent unless patient is symptomatic/ clinical mastoiditis: erythema and tenderness in mastoid aresa.   - Follow up ENT as outpatient for ear debridement and hearing rehab      Adjustment to disability: monitor mood, consult psychology if indicated  FEN: regular diet, thin liquids  Bowel: continent.  Senokot-S 1 tab BID PRN, bisacodyl suppository PRN.  Monitor and adjust regimen as indicated.  Bladder: continent, PVRs at admission without retention and patient declines ongoing checks  DVT Prophylaxis: mechanical  GI Prophylaxis: None  Code: per discussion with admitting provider: full - she noted that she picked DNR for her will but wasn't sure about her prior decision about intubation - eventually decided to keep full code for now and talk to her friend   Disposition: goal for home.    ELOS: target discharge date set at 5/3/25  Follow up Appointments on Discharge: PCP in 1-2 weeks, neurology (stroke dorys), vascular surgery (follow up carotid stenosis), TCD at [place] (per neurology note 4/23/25), ENT, PM&R in 8-12 weeks      50 minutes spent on the date of service doing chart review, history and exam, documentation, and further activities as noted above.     Joshua West MD  Department of Rehabilitation " Medicine

## 2025-04-28 NOTE — PLAN OF CARE
Discharge Planner Post-Acute Rehab SLP:      Discharge Plan: TBD. Ongoing SLP     Precautions: fall     Current Status:  Hearing: WFL  Vision: Glasses- R hemianopsia/field cut, right neglect per chart  Communication: Motor speech intact at conversation level. Verbal expression and auditory comprehension functional at conversation level. Reading deficits likely r/t vision deficits.   Cognition: Severe cognitive impairment: severe deficits in immediate memory, visuospatial skills, higher level word finding, and attention. Moderate delayed memory deficits.  Swallow: Regular solids/Thin liquids (0). General safe swallow strategies. Swallow goals met 04/26     Assessment:  Pt completed visual scanning task with 100% accuracy and independently, she did not appear to use/need the line down the right side. Pt then completed fill in the blank tasks with mod cues needed x 1 otherwise completed 5/5 independently.     Other Barriers to Discharge (Family Training, etc): Level of assist with iADLS?

## 2025-04-28 NOTE — PROGRESS NOTES
ARU Social Work Progress Notes     Team huddle today; targeting a discharge on 5/03 (Saturday) with home care services ( RN/PT/SLP/OT).      VIGNESH Harper  [facility], Acute Inpatient Rehab Unit   [address] [place] [address], [address] [place] [address]  Phone: [phone]  Fax: [phone]

## 2025-04-29 ENCOUNTER — APPOINTMENT (OUTPATIENT)
Dept: SPEECH THERAPY | Facility: CLINIC | Age: 60
DRG: 057 | End: 2025-04-29
Attending: PHYSICAL MEDICINE & REHABILITATION
Payer: COMMERCIAL

## 2025-04-29 ENCOUNTER — APPOINTMENT (OUTPATIENT)
Dept: OCCUPATIONAL THERAPY | Facility: CLINIC | Age: 60
DRG: 057 | End: 2025-04-29
Attending: PHYSICAL MEDICINE & REHABILITATION
Payer: COMMERCIAL

## 2025-04-29 ENCOUNTER — APPOINTMENT (OUTPATIENT)
Dept: PHYSICAL THERAPY | Facility: CLINIC | Age: 60
DRG: 057 | End: 2025-04-29
Attending: PHYSICAL MEDICINE & REHABILITATION
Payer: COMMERCIAL

## 2025-04-29 ENCOUNTER — ORDERS ONLY (AUTO-RELEASED) (OUTPATIENT)
Dept: MEDSURG UNIT | Facility: CLINIC | Age: 60
End: 2025-04-29

## 2025-04-29 DIAGNOSIS — I63.9 ACUTE CVA (CEREBROVASCULAR ACCIDENT) (H): ICD-10-CM

## 2025-04-29 PROCEDURE — 128N000003 HC R&B REHAB

## 2025-04-29 PROCEDURE — 97112 NEUROMUSCULAR REEDUCATION: CPT | Mod: GP | Performed by: PHYSICAL THERAPIST

## 2025-04-29 PROCEDURE — 250N000013 HC RX MED GY IP 250 OP 250 PS 637: Performed by: PHYSICAL MEDICINE & REHABILITATION

## 2025-04-29 PROCEDURE — 99232 SBSQ HOSP IP/OBS MODERATE 35: CPT | Performed by: PHYSICIAN ASSISTANT

## 2025-04-29 PROCEDURE — 97129 THER IVNTJ 1ST 15 MIN: CPT | Mod: GN

## 2025-04-29 PROCEDURE — 250N000013 HC RX MED GY IP 250 OP 250 PS 637: Performed by: PHYSICIAN ASSISTANT

## 2025-04-29 PROCEDURE — 97535 SELF CARE MNGMENT TRAINING: CPT | Mod: GO | Performed by: OCCUPATIONAL THERAPIST

## 2025-04-29 PROCEDURE — 92507 TX SP LANG VOICE COMM INDIV: CPT | Mod: GN

## 2025-04-29 PROCEDURE — 97130 THER IVNTJ EA ADDL 15 MIN: CPT | Mod: GN

## 2025-04-29 RX ADMIN — ATORVASTATIN CALCIUM 40 MG: 40 TABLET, FILM COATED ORAL at 20:25

## 2025-04-29 RX ADMIN — ASPIRIN 81 MG CHEWABLE TABLET 81 MG: 81 TABLET CHEWABLE at 08:55

## 2025-04-29 RX ADMIN — CLOPIDOGREL BISULFATE 75 MG: 75 TABLET, FILM COATED ORAL at 08:54

## 2025-04-29 ASSESSMENT — ACTIVITIES OF DAILY LIVING (ADL)
ADLS_ACUITY_SCORE: 39
ADLS_ACUITY_SCORE: 39
ADLS_ACUITY_SCORE: 38
ADLS_ACUITY_SCORE: 38
ADLS_ACUITY_SCORE: 39
ADLS_ACUITY_SCORE: 39
ADLS_ACUITY_SCORE: 38
ADLS_ACUITY_SCORE: 39
ADLS_ACUITY_SCORE: 39
ADLS_ACUITY_SCORE: 38
ADLS_ACUITY_SCORE: 39
ADLS_ACUITY_SCORE: 38
ADLS_ACUITY_SCORE: 39
ADLS_ACUITY_SCORE: 39
ADLS_ACUITY_SCORE: 38
ADLS_ACUITY_SCORE: 39

## 2025-04-29 NOTE — PROGRESS NOTES
[facility]   Acute Rehabilitation Unit  Daily progress note    INTERVAL HISTORY  Afshan Jimenez was seen up in her room this morning.  No acute events reported overnight.  She reports that she is feeling better overall.  She notes she is enjoying being up more and doing more therapy.  She notes still a little dizzy first thing in the morning, but not other than that.  She feels the burning pain in her feet is better with cream, compression stockings.  Nursing indicated patient had mentioned some concern for possible gout symptoms.  Patient could not recall this.  She admits she has pain at great toes on both sides, though says this is the best it has been during my visit.  She declines recent worsening and declines assessment or changes to her current regimen.  She notes minimal headache today.  She feels her endurance is improving though she had to stop to rest a few times in her PT session.  She feels she is remembering things better but still feeling a little confused.  She continues to have some word-finding difficulty.  She acknowledges that she should wait for some time until returning to work, but is also requesting some guidance on things she can do safely and independently when she first gets home.  She does not have a lot of regular assistance.  She asks if she is still on track for her anticipated discharge date.  She also asks about home care and other supports for home.  We had some initial discussions on all of these topics and will continue at team rounds tomorrow.    With therapies, PT session focused on standing balance, visual scanning, and R UE dexterity with use of Dynavision, card wall matching, and cone scavenger hunt. Overall, reports pain to be much better in both feet, with stable BP/no dizziness, and demonstrating improving scanning abilities to avoid obstacles and attend to R environment.  With SLP this morning, RN completing MAP upon arrival. RN  "reported did well identifying medications required to take and the times but did not call for meds this AM. Pt introduced to red line on R to aid visual attention and orientation. Pt engaged in reading comprhension task utilizing red line as visual anchor. noting intermittent word omissions and subsitutions, difficulties with word identification. Pt intermittently aware of difficulties and occasionally will attempt to break it down other times pt will move on and likely \"guess\" word. SLP provided education and feedback on this and introduced additional strategies and tools to aid word retrieval and phonological orientation. Pt able to complete reading comprehension task with 90% IND, 100% max cues. required significant amount of time but pt endorsed this is improving. Pt given assigned task to complete IND by next session     MEDICATIONS  Current Facility-Administered Medications   Medication Dose Route Frequency Provider Last Rate Last Admin    - Medication Assessment Program - Rehab Services   Does not apply See Admin Instructions Siddhartha West MD        aspirin EC tablet 81 mg  81 mg Oral Daily Helen Keating PA   81 mg at 04/29/25 0855    atorvastatin (LIPITOR) tablet 40 mg  40 mg Oral QPM Helen Keating PA   40 mg at 04/28/25 2152    clopidogrel (PLAVIX) tablet 75 mg  75 mg Oral Daily Cristela Logan MD   75 mg at 04/29/25 0854          Current Facility-Administered Medications   Medication Dose Route Frequency Provider Last Rate Last Admin    acetaminophen (TYLENOL) tablet 650 mg  650 mg Oral Q4H PRN Helen Keating PA   650 mg at 04/25/25 1454    bisacodyl (DULCOLAX) suppository 10 mg  10 mg Rectal Daily PRN Helen Keating PA        glucose gel 15-30 g  15-30 g Oral Q15 Min PRN Helen Keating PA        Or    dextrose 50 % injection 25-50 mL  25-50 mL Intravenous Q15 Min PRN Helen Keating PA        Or    glucagon injection 1 mg  1 mg Subcutaneous Q15 Min PRN Rishabh, " "ROBERT Gomes        gabapentin (NEURONTIN) capsule 100 mg  100 mg Oral TID PRN Veena Burk PA-C   100 mg at 04/25/25 1307    NONFORMULARY   Topical Q1H PRN Veena Burk PA-C        senna-docusate (SENOKOT-S/PERICOLACE) 8.6-50 MG per tablet 1 tablet  1 tablet Oral BID PRN Helen Keating PA            PHYSICAL EXAM  /73 (BP Location: Left arm)   Pulse 103   Temp 97.3  F (36.3  C) (Oral)   Resp 16   Ht 1.676 m (5' 6\")   Wt 90.2 kg (198 lb 13.7 oz)   SpO2 100%   BMI 32.10 kg/m    Gen: NAD, sitting up in chair  HEENT: NC/AT  Cardio: Mildly tachycardic but regular, no murmurs  Pulm: non-labored on room air, lungs CTA bilaterally  Abd: soft, non-tender, bowel sounds present  Ext: no edema in BLE, just slight tenderness with palpation of medial forefoot bilaterally through shoes (declined to remove compression stockings and shoes to assess further at this time)  Neuro/MSK: awake, alert, mild word finding difficulty, overall asking appropriate questions    LABS  CBC RESULTS:   Recent Labs   Lab Test 04/24/25  0709 04/21/25  1854 04/20/25  0520   WBC 7.3 9.4 6.5   RBC 4.40 4.74 4.15   HGB 12.6 13.4 11.9   HCT 38.5 41.1 36.6   MCV 88 87 88   MCH 28.6 28.3 28.7   MCHC 32.7 32.6 32.5   RDW 13.0 13.2 13.1    181 158       Last Basic Metabolic Panel:  Recent Labs   Lab Test 04/27/25  1212 04/27/25  0810 04/26/25  2148 04/24/25  0751 04/24/25  0709 04/23/25  0738 04/23/25  0709 04/22/25  1238 04/22/25  0828 04/21/25  2107 04/21/25  1854 04/20/25  0600 04/20/25  0520   NA  --   --   --   --  138  --   --   --   --   --  138  --  138   POTASSIUM  --   --   --   --  4.0  --  4.5  --  4.2  --  4.2  --  4.1   CHLORIDE  --   --   --   --  101  --   --   --   --   --  102  --  104   CO2  --   --   --   --  25  --   --   --   --   --  24  --  26   ANIONGAP  --   --   --   --  12  --   --   --   --   --  12  --  8   * 141* 107*   < > 138*   < >  --    < >  --    < > 189*   < > 123*   BUN  -- "   --   --   --  20.6  --   --   --   --   --  19.5  --  11.2   CR  --   --   --   --  1.00*  --   --   --   --   --  0.95  --  0.82   GFRESTIMATED  --   --   --   --  64  --   --   --   --   --  68  --  81   JOSE MARIA  --   --   --   --  9.4  --   --   --   --   --  9.9  --  9.3    < > = values in this interval not displayed.         ASSESSMENT AND PLAN  Afshan Jimenez is a 59 year old right hand dominant female with past medical history of AML s/p bone marrow transplant 1992, DM type 2, HLD, left parietal stroke (12/2024), left parotid carcinoma s/p parotidectomy and radiation, pancreatic cyst, and lung nodules who presented on 4/18/25 with acute right-sided incoordination and slurred speech with imaging revealing multiple watershed and embolic-type infarcts in left frontoparietal region s/p TNK c/b left parieto-occipital hemorrhage requiring TNK reversal with course further complicated by additional findings of left common carotid artery stenosis, orthostatic hypotension, and left mastoid effusion/effacement.  She is admitted to ARU on 4/24/25 for multidisciplinary rehabilitation and ongoing medical management.    Rehabilitation   Admission to acute inpatient rehab: 04/24/2025  Impairment group code: Stroke Ischemic 01.2 (R) Body Involvement (L) Brain; Acute ischemic strokes of L hemisphere due to atheroembolic from symptomatic left common carotid stenosis vs ESUS complicated by L parieto-occipital hemorrhage post TNK .          PT, OT and SLP 60 minutes of each on a daily basis up to 6 days per week, in addition to rehab nursing and close management of physiatrist.       Impairment of ADL's: Noted to have impaired strength, impaired activity tolerance, impaired vision,  and impaired coordination leading to decreased ability to independently complete ADL's.  Will benefit from ongoing OT with goal for MOD I with basic ADLs.      Impairment of mobility:  Noted to have impaired strength, impaired activity tolerance,  impaired balance,  and impaired visioin,  leading to decreased mobility.  Will benefit from ongoing PT with goal for JENNIFER with basic mobility.      Impairment of cognition/language/swallow:  Noted to have impaired speech will benefit from ongoing SLP to improve ability to communicate needs effectively.     Continue comprehensive acute inpatient rehabilitation program with multidisciplinary approach including therapies, rehab nursing, and physiatry following. See interval history for updates.        Medical Conditions  New actions/orders/updates for today are in blue.     Acute ishemic strokes of left hemisphere due to atheroembolic from symptomatic left carotid stenosis vs ESUS s/p TNK complicated by left parieto-occipital hemorrhage s/p TNK reversal.   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  Presented 4/18/25 with right sided weakness, slurred speech:  MRI noted to have multiple watershed and embolic type infarcts in the left frontoparietal region as well as interval evolutionary change of prior infarcts. She received TNK with resulting hemorrhage on follow up imaging, TNK was reversed, received serial brain imaging and medical management.    - Started on cilostazol 100 mg bid, but changed to Plavix 75 mg daily starting 4/28 after discussion with neurology due to orthostatic hypotension and tachycardia  - Continue ASA 81 mg daily  - BP goal <130/80, currently at goal without medications  - LDL goal 40-70- continue statin  - Goal A1c <7, management as below  - Continue PT/OT/SLP  - Ziopatch on discharge  - Plan for Transcranial doppler at [place] ~ 1 month per neurology   - Follow up MRI 3 months  - Follow up stroke neurology.  Consider cardiac CTA at clinic visit.  - Follow up vascular surgery regarding L CCA stenosis.  Consideration of CEA (deferred at this time due to risks of recurrent bleeding with intra-operative  heparin, as well as felt more likely stenosis was due to prior radiation rather than atherosclerotic disease which would pose higher chance of embolization/ulceration)     Type II diabetes mellitus  A1c 6.0% on 4/7/25  Previously took ozempic, discontinued with some symptomatic dizziness/ concerns for hypotension.  Per pcp note 4/7/2025, would like to try lifestyle modification  - Monitor with labs  - Blood glucose has been at goal without medications  - Hypoglycemic protocol     HLD  LDL 47 4/19  - Continue atorvastatin 40 mg daily  - LDL goal 40-70    Tachycardia  Throughout hospital course and also appears some chronic component.  EKG on 4/18/25 with sinus tachycardia.  - Given concurrent orthostatic hypotension as below, question if some hypovolemia though also could be autonomic  - No associated symptoms, remains stable and mild  - Monitor    Orthostatic hypotension  Per review of OP chart, she was noted to have mild tachycardia and symptomatic hypotension in clinic in 1/2025, felt to be orthostatic hypotension, largely attributed to poor PO intake while on Ozempic.  Considered IV fluid bolus but patient declined.  Per IP chart, patient reported some lightheadedness at home, leaning on wall while ambulating at home.  Noted to have recurrent symptomatic hypotension in hospital as well.  Ozempic was discontinued and she was given some IV hydration.  Has had significant symptomatic orthostatic hypotension initially at ARU, documented positive orthostatic vitals by therapies and nursing team.  No medications that would expect to contribute.  No localizing signs/symptoms of infection and supine BP normotensive.  - Added LE compression stockings  - Added abdominal binder  - Pletal changed to Plavix 4/28 as above  - Encourage PO fluids  - Ongoing improvements per patient and therapies     Peripheral neuropathy   No clear etiology, likely multifactorial due to DM and prior cancer treatments   Gets episodes of severe  burning pain in feet which respond very well to OTC deep blue cream.  As of 4/25: patient noted that pain in feet was more severe overnight, increased with any touch or weightbearing  - Continue PTA Deep Blue cream when arrives from home (anticipated today)  - Patient denies prior trial of gabapentin or Lyrica.  Chart confirms these have previously been discussed but declined.  Added gabapentin 100 mg TID PRN at ARU admission as did not have home topica.  If patient finds helpful and tolerates, could consider scheduled though caution given dizziness/orthostasis     Gout  Takes indomethacin PRN prior to admission.   - Patient reportedly expressed concern for gout symptoms to nursing with pain at bilateral great toes.  However, she denies this concern today.  She indicates constant pain in bilateral feet/toes, especially on medial side.  She is unclear if is related to her gout history or neuropathy.  She feels her pain is much better today than usual.  She declines provider to remove socks and shoes to evaluate at this time and declines additional intervention.  She is aware to notify nursing and provider can assess if pain recurrent or worsening.  - Monitor for any symptoms  - Avoid NSAIDs at this time given CVA and DAPT     Pancreatic cysts, suspected IPMN  Found incidentally on CT CAP with contrast during admission in 12/2024 for stroke.  Seen by GI in 2/2025 following MRI/MRCP, which showed multiple pancreatic cysts, felt most likely to be IPMNs.  - Per GI, recommend repeat MRI/MRCP in 6 months (8/2025) and follow up with GI after.  Should return earlier if unexplained weight loss of > 10 lb, jaundice, diagnosis of acute pancreatitis or chronic diarrhea lasting more than 2 weeks.      Incidental lung nodules  Noted on 12/2024 work-up for CVA.  Presumed related to radiation.  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  Patient without ear complaints, no findings to suggest mastoid or middle ear infection, per ENT \"changes are permanent\",  \"soft tissue/fluid will be noted in the mastoid\",  no need to consult ent unless patient is symptomatic/ clinical mastoiditis: erythema and tenderness in mastoid aresa.   - Follow up ENT as outpatient for ear debridement and hearing rehab      Adjustment to disability: monitor mood, consult psychology if indicated  FEN: regular diet, thin liquids  Bowel: continent.  Senokot-S 1 tab BID PRN, bisacodyl suppository PRN.  Monitor and adjust regimen as indicated.  Bladder: continent, PVRs at admission without retention and patient declines ongoing checks  DVT Prophylaxis: mechanical  GI Prophylaxis: None  Code: per discussion with admitting provider: full - she noted that she picked DNR for her will but wasn't sure about her prior decision about intubation - eventually decided to keep full code for now and talk to her friend   Disposition: goal for home.    ELOS: target discharge date 5/3/25  Follow up Appointments on Discharge: PCP in 1-2 weeks, neurology (stroke dorys), vascular surgery (follow up carotid stenosis), TCD at [facility] (per neurology note 4/23/25), ENT, PM&R in 8-12 weeks      35 minutes spent on the date of service doing chart review, history and exam, documentation, and further activities as noted above.     Plan of care was discussed with Dr. Joshua West, PM&R staff physician     Veena Burk PA-C  Physical Medicine & Rehabilitation        "

## 2025-04-29 NOTE — PROGRESS NOTES
ARU Social Work Progress Notes     Patient's Physical Address.  [address] [place] [address] [address]  [facility] [address]    VIGNESH Harper  [facility], Acute Inpatient Rehab Unit   [address] [place] [address], [address] [place] [address]  Phone: [phone]  Fax: [phone]

## 2025-04-29 NOTE — PLAN OF CARE
Goal Outcome Evaluation:      Plan of Care Reviewed With: patient    Overall Patient Progress: improvingOverall Patient Progress: improving    Patient seem alert, however assessment is limited by her word finding difficulty.On MAP ,did not call but picked out meds appropriately. Has 2 meds in AM shift 1 in the evening. Reminded to call next time when her meds are due. Participating in therapy, will continue poc

## 2025-04-29 NOTE — PLAN OF CARE
"Discharge Planner Post-Acute Rehab SLP:      Discharge Plan: TBD. Ongoing SLP     Precautions: fall     Current Status:  Hearing: WFL  Vision: Glasses- R hemianopsia/field cut, right neglect per chart  Communication: Motor speech intact at conversation level. Verbal expression and auditory comprehension functional at conversation level. Reading deficits likely r/t vision deficits.   Cognition: Severe cognitive impairment: severe deficits in immediate memory, visuospatial skills, higher level word finding, and attention. Moderate delayed memory deficits.  Swallow: Regular solids/Thin liquids (0). General safe swallow strategies. Swallow goals met 04/26     Assessment:  RN completing MAP upon arrival. RN reported did well identifying medications required to take and the times but did not call for meds this AM. Pt introduced to red line on R to aid visual attention and orientation. Pt engaged in reading comprhension task utilizing red line as visual anchor. noting intermittent word omissions and subsitutions, difficulties with word identification. Pt intermittently aware of difficulties and occasionally will attempt to break it down other times pt will move on and likely \"guess\" word. SLP provided education and feedback on this and introduced additional strategies and tools to aid word retrieval and phonological orientation. Pt able to complete reading comprehension task with 90% IND, 100% max cues. required significant amount of time but pt endorsed this is improving. Pt given assigned task to complete IND by next session     Other Barriers to Discharge (Family Training, etc): Level of assist with iADLS?   "

## 2025-04-29 NOTE — PLAN OF CARE
FOCUS/GOAL  Medication management, Mobility, and Prevention of secondary complications    ASSESSMENT, INTERVENTIONS AND CONTINUING PLAN FOR GOAL:  Pt did not call for evening medication.  Sleeping upon entering room.  Calls appropriately to go to the bathroom and for other needs.  Sleeping throughout the shift.  Denies pain.  Transfers assist of one with walker and gait belt.  Regular thin diet, takes pills whole with water. Continent x2; LBM 4/28  Goal Outcome Evaluation:

## 2025-04-29 NOTE — PROGRESS NOTES
ARU Social Work Progress Notes     Home Health Care:   [facility]   PH: [phone]  F: [phone] or F: [phone]  Intake--[email or web address]      Nurse, physical therapy, occupational therapy and speech therapy  -You will get a call after you have discharged from [facility] to schedule the first home care visit. The home health nurse should come out within the first 24-48 hours. If you don't get a call from them within the first 48 hours, please call to follow up.       VIGNESH Harper  [facility], Acute Inpatient Rehab Unit   [facility] [address], [address] [place] [address]  Phone: [phone]  Fax: [phone]

## 2025-04-29 NOTE — PLAN OF CARE
"Discharge Planner Post-Acute Rehab PT:     Discharge Plan: Home (condo w/ elevator access). Home care PT.     Precautions: falls, cognition, insensate feet (recommend shoes OOB), right visual field cut, orthostatic: ab binder OOB    Current Status:  Bed Mobility: IND  Transfer: SBA with FWW  Gait: SBA with FWW, 200+ ft (SBA w/out device in therapies)  Stairs: 12x6\" step ups with bilat rails and CGA/SBA  Balance: Fair static, dynamic standing balance w/out UE support    Outcome Measures:   Aguilar   4/28: 45/56  10mWT  4/28: 0.36 m/sec comfortable, 0.81 m/sec fast w/out device and SBA     Assessment: Session focused on standing balance, visual scanning, and R UE dexterity with use of Dynavision, card wall matching, and cone scavenger hunt. Overall, reports pain to be much better in both feet, with stable BP/no dizziness, and demonstrating improving scanning abilities to avoid obstacles and attend to R environment.    Other Barriers to Discharge (DME, Family Training, etc):   Limited by impaired cognition.  FT with friend Ericka 5/1 PM.  Equipment: Reports to have FWW available, but will need to confirm    "

## 2025-04-30 ENCOUNTER — APPOINTMENT (OUTPATIENT)
Dept: OCCUPATIONAL THERAPY | Facility: CLINIC | Age: 60
DRG: 057 | End: 2025-04-30
Attending: PHYSICAL MEDICINE & REHABILITATION
Payer: COMMERCIAL

## 2025-04-30 ENCOUNTER — APPOINTMENT (OUTPATIENT)
Dept: SPEECH THERAPY | Facility: CLINIC | Age: 60
DRG: 057 | End: 2025-04-30
Attending: PHYSICAL MEDICINE & REHABILITATION
Payer: COMMERCIAL

## 2025-04-30 ENCOUNTER — APPOINTMENT (OUTPATIENT)
Dept: PHYSICAL THERAPY | Facility: CLINIC | Age: 60
DRG: 057 | End: 2025-04-30
Attending: PHYSICAL MEDICINE & REHABILITATION
Payer: COMMERCIAL

## 2025-04-30 PROCEDURE — 97130 THER IVNTJ EA ADDL 15 MIN: CPT | Mod: GN

## 2025-04-30 PROCEDURE — 97112 NEUROMUSCULAR REEDUCATION: CPT | Mod: GP

## 2025-04-30 PROCEDURE — 250N000013 HC RX MED GY IP 250 OP 250 PS 637: Performed by: PHYSICAL MEDICINE & REHABILITATION

## 2025-04-30 PROCEDURE — 97530 THERAPEUTIC ACTIVITIES: CPT | Mod: GP

## 2025-04-30 PROCEDURE — 92507 TX SP LANG VOICE COMM INDIV: CPT | Mod: GN

## 2025-04-30 PROCEDURE — 97530 THERAPEUTIC ACTIVITIES: CPT | Mod: GO | Performed by: OCCUPATIONAL THERAPIST

## 2025-04-30 PROCEDURE — 250N000013 HC RX MED GY IP 250 OP 250 PS 637: Performed by: PHYSICIAN ASSISTANT

## 2025-04-30 PROCEDURE — 99233 SBSQ HOSP IP/OBS HIGH 50: CPT | Performed by: PHYSICAL MEDICINE & REHABILITATION

## 2025-04-30 PROCEDURE — 97535 SELF CARE MNGMENT TRAINING: CPT | Mod: GO | Performed by: OCCUPATIONAL THERAPIST

## 2025-04-30 PROCEDURE — 97129 THER IVNTJ 1ST 15 MIN: CPT | Mod: GN

## 2025-04-30 PROCEDURE — 128N000003 HC R&B REHAB

## 2025-04-30 RX ADMIN — ACETAMINOPHEN 650 MG: 325 TABLET ORAL at 20:24

## 2025-04-30 RX ADMIN — ATORVASTATIN CALCIUM 40 MG: 40 TABLET, FILM COATED ORAL at 20:14

## 2025-04-30 RX ADMIN — ASPIRIN 81 MG CHEWABLE TABLET 81 MG: 81 TABLET CHEWABLE at 08:26

## 2025-04-30 RX ADMIN — CLOPIDOGREL BISULFATE 75 MG: 75 TABLET, FILM COATED ORAL at 08:26

## 2025-04-30 ASSESSMENT — ACTIVITIES OF DAILY LIVING (ADL)
ADLS_ACUITY_SCORE: 38
ADLS_ACUITY_SCORE: 38
ADLS_ACUITY_SCORE: 37
ADLS_ACUITY_SCORE: 38
ADLS_ACUITY_SCORE: 38
ADLS_ACUITY_SCORE: 37
ADLS_ACUITY_SCORE: 38
ADLS_ACUITY_SCORE: 37
ADLS_ACUITY_SCORE: 38
ADLS_ACUITY_SCORE: 37
ADLS_ACUITY_SCORE: 38
ADLS_ACUITY_SCORE: 37

## 2025-04-30 NOTE — PLAN OF CARE
[id number]    Goal Outcome Evaluation:      Plan of Care Reviewed With: patient  Overall Patient Progress: no change  Overall Patient Progress: no change    VS: Stable on RA  Pain: Denies   Neuro/MSK: A&O x4 with intermittent forgetfulness. Denies numbness & tingling. Slurred speech present. R sided weakness - 4/5  Activity: SBA with walker   Diet: Regular with thin liquids. Takes pills whole    GI/: Continent of B&B. LBM 4/28  Skin: WDL     Shift Notes: Pt is able to make needs known, call light within reach. No acute changes this shift

## 2025-04-30 NOTE — PLAN OF CARE
"Discharge Planner Post-Acute Rehab PT:     Discharge Plan: Home (condo w/ elevator access). Home care PT.     Precautions: falls, cognition, insensate feet (recommend shoes OOB), right visual field cut, orthostatic: ab binder OOB    Current Status:  Bed Mobility: IND  Transfer: SBA with FWW  Gait: SBA with FWW, 200+ ft (SBA w/out device in therapies)  Stairs: 12x6\" step ups with bilat rails and CGA/SBA  Balance: Fair static, dynamic standing balance w/out UE support    Outcome Measures:   Aguilar   4/28: 45/56  10mWT  4/28: 0.36 m/sec comfortable, 0.81 m/sec fast w/out device and SBA     Assessment: Unsupported standing balance with emphasis on R visual input, as well as compensatory techniques for visual scanning. Pt endorsing lightheadedness however BP remains unchanged  and WFL from sit<>standing, will continue to monitor for potential influence of head movements contributing to pt sx.    Other Barriers to Discharge (DME, Family Training, etc):   Limited by impaired cognition.  FT with friend Ericka 5/1 PM.  Equipment: Reports to have FWW available, but will need to confirm    "

## 2025-04-30 NOTE — PLAN OF CARE
"Discharge Planner Post-Acute Rehab OT:      Discharge Plan: home alone (has friends that may be able to assist); HC     Precautions: fall, monitor low BP, cognition/word finding difficulty, Ab binder ODANNY      Current Status:  ADLs:  Mobility: Mod I with FWW  Grooming: Mod I standing at sink  Dressing: Mod I at eob for all   Bathing: Mod I on/off tub bench; CGA to wash/dry   Toileting: Mod I on/off toilet with fww and standing pericare/clothing   IADLs: Patient highly independent at baseline, anticipate will require assist for IADL tasks; simple iadls tasks with SBA for cold meal prep/folding laundry  Vision/Cognition: R field cut; wears corrective lenses; Cognition: ACE III: 79/100 indicating deficits in memory, attention, language, EF, visual-spatial (most impacted by R field cut deficit); med task: 4/5     Assessment: Pt initially not happy with having family training scheduled tomorrow with friend (though pt stated this was her go-to person and emergency contact during PALACIO), however during end of session was more accepting. Pt stating she also has friends in her condo that are willing to help, and states that her dad in that lives in [place] [address] also be able to come stay with her. She is accepting that she cannot drive, not use the stove, and will need help with transportation. She is hoping to lessen the amount she works going forward. Pt would like to be independent in room, and will talk to PT about this, as she is very frustrated and feels like a \"prisoner\" here.     Other Barriers to Discharge (DME, Family Training, etc):   Lives alone in a condo with elevator access (enjoys taking stairs usually); walk in shower with grab bar  Needs: shower chair; walker     FT: 5/1/25: With friend Ericka: Ericka says she lives near by and can do check ins. Is ok with helping with higher level iadls as needed, though Afshan is prideful and has a hard time accepting/asking for help. Ericka can help provide rides as well. States though " that she will be out of town this weekend, and will not be back until Monday in regards to discharge planning. Recommending check ins for Afshan at discharge, help with rides, and managing garbage/very high level iadls. Afshan can do cold meal prep, folding laundry, etc. She also can manage her 3 pills/meds as she passed MAP, but want to make sure she has a pill box. Recommending pt not drive and not return to work at this time so she can focus on healing her brain and therapy s/p stroke. If she needs to return to work, recommend working part time only. Her cognition is the biggest barrier, though it improves every day. She will likely be able to be mod I in room with basic adls prior to going home. Language/word finding/reading and memory are her largest cog deficit areas. She is a very private person and does not allow help with bathing, but has been doing it here with nursing fine without help needed. Will give her a shower chair at discharge from ARU.

## 2025-04-30 NOTE — PLAN OF CARE
"Discharge Planner Post-Acute Rehab SLP:      Discharge Plan: TBD. Ongoing SLP     Precautions: fall     Current Status:  Hearing: WFL  Vision: Glasses- R hemianopsia/field cut, right neglect per chart  Communication: Motor speech intact at conversation level. Verbal expression and auditory comprehension functional at conversation level. Reading deficits likely r/t vision deficits.   Cognition: Severe cognitive impairment: severe deficits in immediate memory, visuospatial skills, higher level word finding, and attention. Moderate delayed memory deficits.  Swallow: Regular solids/Thin liquids (0). General safe swallow strategies. Swallow goals met 04/26     Assessment:  AM session: Patient did complete homework assignment that was left for her yesterday and completed with 100% accuracy. Engaged to state and visual scanning task which she was able to complete with 100% accuracy. Also engaged in new learning task in form of card game based task \"speed\". Patient able to learn the task and complete with minimal difficulties. Did miss 1 play on the right side due to visual field cut. Otherwise patient consistently attending to full range of options. Slower processing at times with the task and nearly made multiple errors that she was able to independently recognize and correct.      PM session: Filling in the blank, significantly improved reading ability with patient completing with 80% accuracy. Cues to repeat did result in improving to 100% accuracy. Discussed with patient that home care may not be available to patient upon discharge. Would hope to transition to outpatient as soon as reasonable to contnue addressing language/cognition. Will collaborate with OT to ensure cognitive interventions are being focused on upon discharge to home. Task to copy a design, patient able to complete with 90% accuracy.     Other Barriers to Discharge (Family Training, etc): Level of assist with iADLS?   "

## 2025-04-30 NOTE — PLAN OF CARE
Goal Outcome Evaluation:      Plan of Care Reviewed With: patient    Overall Patient Progress: improving    Pt is alert and oriented x 4, has mild  word finding difficulty.  Mobility: SBA with a walker.  Bowel/Bladder: continent of BM and bladder.  Skin: No skin concerns.  O2: RA  Diet: Regular diet  and thin liquids.  Psychosocial: appropriate to situation  Pain: Denies pain.  Tubes/Lines/Drains: None  Pt self transferred x 1 this shift, educated on the importance of using call light and waiting for assistance.  Pt didn't call for her meds but was able to identify the correct medication  to take at bedtime.  Calls appropriately  Needs in reach and safety measures in place.   Cont POC

## 2025-04-30 NOTE — PLAN OF CARE
"Goal Outcome Evaluation:      Plan of Care Reviewed With: patient    Overall Patient Progress: improvingOverall Patient Progress: improving       VS: /79 (BP Location: Left arm)   Pulse 109   Temp 97.9  F (36.6  C) (Oral)   Resp 16   Ht 1.676 m (5' 6\")   Wt 90.2 kg (198 lb 13.7 oz)   SpO2 98%   BMI 32.10 kg/m      O2: RA, denies SOB, CP or cough   Neuro: A&Ox 4, forgetful and struggles with language intermittently, pt reports baseline N/T in BLE, and RUE    Bowel/Bladder: Continent   LBM: 4/29   Diet: Regular/thin liquids   Skin: Intact, bruise to right FA and hand, declined full skin assessment   Pain: Denies   Activity: Mod I w/ FWW   Dressings: NA   LDA NA   Equipment: Walker, personal belongings   Plan: POC   Additional Info: MAP       Karin Ramos RN on 4/30/2025 at 11:54 AM       "

## 2025-04-30 NOTE — PLAN OF CARE
Acute Rehab Care Conference/Team Rounds    Type: Team Rounds    Present: Dr. Jenna Lewis,Veena Burk PA-C, Dr Ariana Huerta Neuropsychologist, Raman Lazar PT, Kary Woods OTR, Aryan Tee SLP, Sussy Anderson , Laura Ta RD, PPS Coordinator Anisa De Jesus, Rachel MILLS RN, Tony ZARCO Patient.    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 Call light within reach, bed alarm on     Pain: Denies     Medications, Skin, Tubes/Lines: Takes medications whole. Skin WDL. No lines or drains     Swallowing/Nutrition: Dysphagia related goals met with patient on regular texture diet and thin liquids.     Bowel/Bladder: Continent of B&B. LBM 4/28    Psychosocial: [facility] with elevator access, though typically uses stairs. Team recommending home with home care services.     ADLs/IADLs: Pt is currently SBA with funct mob in room and basic ADLs. Pt makings improvements everyday in cognition and occupational performance. Pt able to do basic IADLs as well including light meal prep and folding laundry. Recommending that friend who lives near by do check ins and provide help with rides, taking out garbage, and helping her schedule apts as needed. Would also recommend that she checks in on medications every now and then, though she did pass MAP here in the hospital. Pt having more insight with knowing she cannot drive, should not return to work (at least full time), and should not use stove/oven. Pt improving in compensating for field cut, and has been having improved BP as well with mobility. OTR will provide shower chair upon return home.    Mobility:   Bed Mobility: IND  Transfer:  "SBA with FWW  Gait: SBA with FWW, 200+ ft (SBA w/out device in therapies)  Stairs: 12x6\" step ups with bilat rails and CGA/SBA  Balance: Fair static, dynamic standing balance w/out UE support  Outcome Measures:   Aguilar   4/28: 45/56  10mWT  4/28: 0.36 m/sec comfortable, 0.81 m/sec fast w/out device and SBA   Pt continues to be progressing well. In collab with team, updated to mod I in room with FWW, as has been consistently stable BP, Aguilar score per above, as well as improving functional cognition and good insight into current mobility limitations. FT tomorrow with her friend Ericka, with recommendation for home care PT post ARU.    Cognition/Language: Reading difficulties due to vision impairment but improvement and awareness of it significantly improving. Difficulties with reasoning/problem solving evident but noting improvement. As compared to scores on admission, functionally showing significant improvement with cognitive tasks. Language difficulties limiting success at times. Recommend oversight with higher level IADL tasks. Ongoing therapy upon facility discharge.     Community Re-Entry: Plan for ongoing home care >> OP PT to progress community mobility tolerance and safety.    Transportation: Friend, Ericka, to provide. Car transfer not a barrier.    Decision maker: self    Plan of Care and goals reviewed and updated.    Discharge Plan/Recommendations    Fall Precautions: continue    Patient/Family input to goals: Yes    Anticipated rehab needs following discharge:  PT, OT, SLP    Anticipated care giver support after discharge: Friend    Estimated length of stay: 9 days    Overall plan for the patient: Patient now progressed to modified independent in the room with a walker.  Cognition still impaired, however this is also improving.  Anticipate will need assistance for IADLs and would recommend check in support, however 24-hour supervision at home will likely not be required.  Track for tentative discharge date " of 5/3/25      Utilization Review and Continued Stay Justification    Medical Necessity Criteria:    For any criteria that is not met, please document reason and plan for discharge, transfer, or modification of plan of care to address.    Requires intensive rehabilitation program to treat functional deficits?: Yes    Requires 3x per week or greater involvement of rehabilitation physician to oversee rehabilitation program?: Yes    Requires rehabilitation nursing interventions?: Yes    Patient is making functional progress?: Yes    There is a potential for additional functional progress? Yes    Patient is participating in therapy 3 hours per day a minimum of 5 days per week or 15 hours per week in 7 day period?:Yes    Has discharge needs that require coordinated discharge planning approach?:Yes      Barriers/Concerns related to meeting medical necessity criteria:  None    Team Plan to Address Concern:  N/A      Final Physician Sign off    Statement of Approval: I have reviewed and agree with the recommendations and documentation in this team rounds note.       Patient Goals    Social Work Goals: Confirm discharge recommendations with therapy, coordinate safe discharge plan and remain available to support and assist as needed.     OT Predicted Duration/Target Date for Goal Attainment: 05/03/25  Therapy Frequency (OT): 6 times/week  OT: Hygiene/Grooming: modified independent  OT: Upper Body Dressing: Modified independent  OT: Lower Body Dressing: Modified independent  OT: Upper Body Bathing: Modified independent  OT: Lower Body Bathing: Modified independent  OT: Toilet Transfer/Toileting: Modified independent  OT: Meal Preparation: Modified independent, with moderately complex multi-step meal preparation  OT: Home Management: Modified independent, with light demand household tasks  OT: Cognitive: Patient/caregiver will verbalize understanding of cognitive assessment results/recommendations as needed for safe discharge  planning    PT: Transfers: Modified independent  PT: Gait: Modified independent, Rolling walker  PT: Stairs: Modified independent  PT: Goal 1: car transfer SBA  PT: Goal 2: floor transfer w/ furniture assist  PT: Goal 3: will cite 3 falls prevention approaches after attending falls education     SLP Predicted Duration/Target Date for Goal Attainment: 05/15/25  Therapy Frequency (SLP Eval): 6 times/week  SLP: Safely tolerate diet without signs/symptoms of aspiration: Goal Met  SLP: Improve language comprehension for interaction with caregivers/environment: written, minimal assist  SLP: Communicate basic wants and needs: written, minimal assist  SLP: Goal 1: With use of trained memory strategies, pt will recall daily/functional information with 75% accuracy.  SLP: Goal 2: Patient will complete easy to moderate level attention (selective, alternating) tasks with 80% or greater independent accuracy with 3 or less cues from SLP to redirect to task.  SLP: Goal 3: Patient will complete easy level visual scanning tasks with 90%  independent accuracy.      RN Goal 1: Mobility: Pt will progress as close to their baseline as possible  RN Goal 2: Reinforcement of self care/ADL: Pt will be able to get back to work by her discharge date  RN Goal 3: Safety Management: Pt will use their call light when they need something and will use the proper equipment for safe transfers

## 2025-04-30 NOTE — PROGRESS NOTES
"  [facility]   Acute Rehabilitation Unit  Daily progress note    INTERVAL HISTORY  Afshan Jimenez was seen in her room during physician rounds, and discussed in team rounds.  No acute events overnight and today has no new complaints.  She had multiple questions which were addressed.  Much of this revolved around discharge including transportation home, discharge time, and ongoing therapies.  She is asking if she needs 24 hour supervision at home, and I advised that she has now been progressed to Mod I in the room, and she likely does not require 24 hr supervision.  However, would advise supervision with IADLs, and to not leave the home on her own which she says she was not planning on doing anyway.  She says her foot pain is \"much better\", and now consistent with her \"normal pain\" for which she uses PTA topical cream.  She denies any chest pain or shortness of breath.  Functionally, as mentioned previously, she is now Mod I in the room with a walker, and she is noting to have improved cognition and insight.    Current Functional Status:  PT:  Bed Mobility: IND  Transfer: SBA with FWW  Gait: SBA with FWW, 200+ ft (SBA w/out device in therapies)  Stairs: 12x6\" step ups with bilat rails and CGA/SBA  Balance: Fair static, dynamic standing balance w/out UE support     Outcome Measures:   Aguilar   4/28: 45/56  10mWT  4/28: 0.36 m/sec comfortable, 0.81 m/sec fast w/out device and SBA      Assessment: Unsupported standing balance with emphasis on R visual input, as well as compensatory techniques for visual scanning. Pt endorsing lightheadedness however BP remains unchanged  and WFL from sit<>standing, will continue to monitor for potential influence of head movements contributing to pt sx.    OT:  ADLs:  Mobility: Mod I with FWW  Grooming: Mod I standing at sink  Dressing: Mod I at eob for all   Bathing: Mod I on/off tub bench; CGA to wash/dry   Toileting: Mod I on/off toilet with fww " "and standing pericare/clothing   IADLs: Patient highly independent at baseline, anticipate will require assist for IADL tasks; simple iadls tasks with SBA for cold meal prep/folding laundry  Vision/Cognition: R field cut; wears corrective lenses; Cognition: ACE III: 79/100 indicating deficits in memory, attention, language, EF, visual-spatial (most impacted by R field cut deficit); med task: 4/5     Assessment: Pt initially not happy with having family training scheduled tomorrow with friend (though pt stated this was her go-to person and emergency contact during PALACIO), however during end of session was more accepting. Pt stating she also has friends in her condo that are willing to help, and states that her dad in that lives in [place] [address] also be able to come stay with her. She is accepting that she cannot drive, not use the stove, and will need help with transportation. She is hoping to lessen the amount she works going forward. Pt would like to be independent in room, and will talk to PT about this, as she is very frustrated and feels like a \"prisoner\" here.    SLP:  Hearing: WFL  Vision: Glasses- R hemianopsia/field cut, right neglect per chart  Communication: Motor speech intact at conversation level. Verbal expression and auditory comprehension functional at conversation level. Reading deficits likely r/t vision deficits.   Cognition: Severe cognitive impairment: severe deficits in immediate memory, visuospatial skills, higher level word finding, and attention. Moderate delayed memory deficits.  Swallow: Regular solids/Thin liquids (0). General safe swallow strategies. Swallow goals met 04/26     Assessment:  AM session: Patient did complete homework assignment that was left for her yesterday and completed with 100% accuracy. Engaged to state and visual scanning task which she was able to complete with 100% accuracy. Also engaged in new learning task in form of card game based task \"speed\". Patient able to learn " the task and complete with minimal difficulties. Did miss 1 play on the right side due to visual field cut. Otherwise patient consistently attending to full range of options. Slower processing at times with the task and nearly made multiple errors that she was able to independently recognize and correct.       PM session: Filling in the blank, significantly improved reading ability with patient completing with 80% accuracy. Cues to repeat did result in improving to 100% accuracy. Discussed with patient that home care may not be available to patient upon discharge. Would hope to transition to outpatient as soon as reasonable to contnue addressing language/cognition. Will collaborate with OT to ensure cognitive interventions are being focused on upon discharge to home. Task to copy a design, patient able to complete with 90% accuracy.     MEDICATIONS  Current Facility-Administered Medications   Medication Dose Route Frequency Provider Last Rate Last Admin    - Medication Assessment Program - Rehab Services   Does not apply See Admin Instructions Siddhartha West MD        aspirin EC tablet 81 mg  81 mg Oral Daily Helen Keating PA   81 mg at 04/30/25 0826    atorvastatin (LIPITOR) tablet 40 mg  40 mg Oral QPM Helen Keating PA   40 mg at 04/29/25 2025    clopidogrel (PLAVIX) tablet 75 mg  75 mg Oral Daily Cristela Logan MD   75 mg at 04/30/25 0826          Current Facility-Administered Medications   Medication Dose Route Frequency Provider Last Rate Last Admin    acetaminophen (TYLENOL) tablet 650 mg  650 mg Oral Q4H PRN Helen Keating PA   650 mg at 04/25/25 1454    bisacodyl (DULCOLAX) suppository 10 mg  10 mg Rectal Daily PRN Helen Keating PA        glucose gel 15-30 g  15-30 g Oral Q15 Min PRN Helen Keating PA        Or    dextrose 50 % injection 25-50 mL  25-50 mL Intravenous Q15 Min PRN Helen Keating PA        Or    glucagon injection 1 mg  1 mg Subcutaneous Q15 Min PRN  "Helen Keating PA        gabapentin (NEURONTIN) capsule 100 mg  100 mg Oral TID PRN Veena Burk PA-C   100 mg at 04/25/25 1307    NONFORMULARY   Topical Q1H PRN Veena Burk PA-C        senna-docusate (SENOKOT-S/PERICOLACE) 8.6-50 MG per tablet 1 tablet  1 tablet Oral BID PRN Helen Keating PA            PHYSICAL EXAM  /65 (BP Location: Left arm, Patient Position: Sitting, Cuff Size: Adult Regular)   Pulse 95   Temp 97.9  F (36.6  C) (Oral)   Resp 16   Ht 1.676 m (5' 6\")   Wt 90.2 kg (198 lb 13.7 oz)   SpO2 97%   BMI 32.10 kg/m    Gen: NAD, sitting up in chair  HEENT: NC/AT  Cardio: RRR  Pulm: non-labored on room air, lungs CTA bilaterally  Abd: soft, non-tender, bowel sounds present  Ext: no edema in BLE, no calf tenderness  Neuro/MSK: awake, alert, mild word finding difficulty, overall asking appropriate questions    LABS  CBC RESULTS:   Recent Labs   Lab Test 04/24/25  0709 04/21/25  1854 04/20/25  0520   WBC 7.3 9.4 6.5   RBC 4.40 4.74 4.15   HGB 12.6 13.4 11.9   HCT 38.5 41.1 36.6   MCV 88 87 88   MCH 28.6 28.3 28.7   MCHC 32.7 32.6 32.5   RDW 13.0 13.2 13.1    181 158       Last Basic Metabolic Panel:  Recent Labs   Lab Test 04/27/25  1212 04/27/25  0810 04/26/25  2148 04/24/25  0751 04/24/25  0709 04/23/25  0738 04/23/25  0709 04/22/25  1238 04/22/25  0828 04/21/25  2107 04/21/25  1854 04/20/25  0600 04/20/25  0520   NA  --   --   --   --  138  --   --   --   --   --  138  --  138   POTASSIUM  --   --   --   --  4.0  --  4.5  --  4.2  --  4.2  --  4.1   CHLORIDE  --   --   --   --  101  --   --   --   --   --  102  --  104   CO2  --   --   --   --  25  --   --   --   --   --  24  --  26   ANIONGAP  --   --   --   --  12  --   --   --   --   --  12  --  8   * 141* 107*   < > 138*   < >  --    < >  --    < > 189*   < > 123*   BUN  --   --   --   --  20.6  --   --   --   --   --  19.5  --  11.2   CR  --   --   --   --  1.00*  --   --   --   --   --  0.95  --  " 0.82   GFRESTIMATED  --   --   --   --  64  --   --   --   --   --  68  --  81   JOSE MARIA  --   --   --   --  9.4  --   --   --   --   --  9.9  --  9.3    < > = values in this interval not displayed.         ASSESSMENT AND PLAN  Afshan Jimenez is a 59 year old right hand dominant female with past medical history of AML s/p bone marrow transplant 1992, DM type 2, HLD, left parietal stroke (12/2024), left parotid carcinoma s/p parotidectomy and radiation, pancreatic cyst, and lung nodules who presented on 4/18/25 with acute right-sided incoordination and slurred speech with imaging revealing multiple watershed and embolic-type infarcts in left frontoparietal region s/p TNK c/b left parieto-occipital hemorrhage requiring TNK reversal with course further complicated by additional findings of left common carotid artery stenosis, orthostatic hypotension, and left mastoid effusion/effacement.  She is admitted to ARU on 4/24/25 for multidisciplinary rehabilitation and ongoing medical management.    Rehabilitation   Admission to acute inpatient rehab: 04/24/2025  Impairment group code: Stroke Ischemic 01.2 (R) Body Involvement (L) Brain; Acute ischemic strokes of L hemisphere due to atheroembolic from symptomatic left common carotid stenosis vs ESUS complicated by L parieto-occipital hemorrhage post TNK .          PT, OT and SLP 60 minutes of each on a daily basis up to 6 days per week, in addition to rehab nursing and close management of physiatrist.       Impairment of ADL's: Noted to have impaired strength, impaired activity tolerance, impaired vision,  and impaired coordination leading to decreased ability to independently complete ADL's.  Will benefit from ongoing OT with goal for MOD I with basic ADLs.      Impairment of mobility:  Noted to have impaired strength, impaired activity tolerance, impaired balance,  and impaired visioin,  leading to decreased mobility.  Will benefit from ongoing PT with goal for JENNIFER with  basic mobility.      Impairment of cognition/language/swallow:  Noted to have impaired speech will benefit from ongoing SLP to improve ability to communicate needs effectively.     Continue comprehensive acute inpatient rehabilitation program with multidisciplinary approach including therapies, rehab nursing, and physiatry following. See interval history for updates.        Medical Conditions  New actions/orders/updates for today are in blue.     Acute ishemic strokes of left hemisphere due to atheroembolic from symptomatic left carotid stenosis vs ESUS s/p TNK complicated by left parieto-occipital hemorrhage s/p TNK reversal.   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  Presented 4/18/25 with right sided weakness, slurred speech:  MRI noted to have multiple watershed and embolic type infarcts in the left frontoparietal region as well as interval evolutionary change of prior infarcts. She received TNK with resulting hemorrhage on follow up imaging, TNK was reversed, received serial brain imaging and medical management.    - Started on cilostazol 100 mg bid, but changed to Plavix 75 mg daily starting 4/28 after discussion with neurology due to orthostatic hypotension and tachycardia  - Continue ASA 81 mg daily  - BP goal <130/80, currently at goal without medications  - LDL goal 40-70- continue statin  - Goal A1c <7, management as below  - Continue PT/OT/SLP  - Lissytch on discharge  - Plan for Transcranial doppler at [place] ~ 1 month per neurology   - Follow up MRI 3 months  - Follow up stroke neurology.  Consider cardiac CTA at clinic visit.  - Follow up vascular surgery regarding L CCA stenosis.  Consideration of CEA (deferred at this time due to risks of recurrent bleeding with intra-operative heparin, as well as felt more likely stenosis was due to prior radiation rather than atherosclerotic disease which would pose  higher chance of embolization/ulceration)     Type II diabetes mellitus  A1c 6.0% on 4/7/25  Previously took ozempic, discontinued with some symptomatic dizziness/ concerns for hypotension.  Per pcp note 4/7/2025, would like to try lifestyle modification  - Monitor with labs  - Blood glucose has been at goal without medications  - Hypoglycemic protocol     HLD  LDL 47 4/19  - Continue atorvastatin 40 mg daily  - LDL goal 40-70    Tachycardia  Throughout hospital course and also appears some chronic component.  EKG on 4/18/25 with sinus tachycardia.  - Given concurrent orthostatic hypotension as below, question if some hypovolemia though also could be autonomic  - No associated symptoms, remains stable and mild  - Monitor    Orthostatic hypotension  Per review of OP chart, she was noted to have mild tachycardia and symptomatic hypotension in clinic in 1/2025, felt to be orthostatic hypotension, largely attributed to poor PO intake while on Ozempic.  Considered IV fluid bolus but patient declined.  Per IP chart, patient reported some lightheadedness at home, leaning on wall while ambulating at home.  Noted to have recurrent symptomatic hypotension in hospital as well.  Ozempic was discontinued and she was given some IV hydration.  Has had significant symptomatic orthostatic hypotension initially at ARU, documented positive orthostatic vitals by therapies and nursing team.  No medications that would expect to contribute.  No localizing signs/symptoms of infection and supine BP normotensive.  - Added LE compression stockings  - Added abdominal binder  - Pletal changed to Plavix 4/28 as above  - Encourage PO fluids  - Ongoing improvements per patient and therapies     Peripheral neuropathy   No clear etiology, likely multifactorial due to DM and prior cancer treatments   Gets episodes of severe burning pain in feet which respond very well to OTC deep blue cream.  As of 4/25: patient noted that pain in feet was more  "severe overnight, increased with any touch or weightbearing  - Continue PTA Deep Blue cream when arrives from home (anticipated today)  - Patient denies prior trial of gabapentin or Lyrica.  Chart confirms these have previously been discussed but declined.  Added gabapentin 100 mg TID PRN at ARU admission as did not have home topica.  If patient finds helpful and tolerates, could consider scheduled though caution given dizziness/orthostasis     Gout  Takes indomethacin PRN prior to admission.   - Pt denies any acute flare symptoms at this time and prior pain has improved.   - Monitor for any symptoms  - Avoid NSAIDs at this time given CVA and DAPT     Pancreatic cysts, suspected IPMN  Found incidentally on CT CAP with contrast during admission in 12/2024 for stroke.  Seen by GI in 2/2025 following MRI/MRCP, which showed multiple pancreatic cysts, felt most likely to be IPMNs.  - Per GI, recommend repeat MRI/MRCP in 6 months (8/2025) and follow up with GI after.  Should return earlier if unexplained weight loss of > 10 lb, jaundice, diagnosis of acute pancreatitis or chronic diarrhea lasting more than 2 weeks.      Incidental lung nodules  Noted on 12/2024 work-up for CVA.  Presumed related to radiation.  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  Patient without ear complaints, no findings to suggest mastoid or middle ear infection, per ENT \"changes are permanent\",  \"soft tissue/fluid will be noted in the mastoid\",  no need to consult ent unless patient is symptomatic/ " clinical mastoiditis: erythema and tenderness in mastoid aresa.   - Follow up ENT as outpatient for ear debridement and hearing rehab      Adjustment to disability: monitor mood, consult psychology if indicated  FEN: regular diet, thin liquids  Bowel: continent.  Senokot-S 1 tab BID PRN, bisacodyl suppository PRN.  Monitor and adjust regimen as indicated.  Bladder: continent, PVRs at admission without retention and patient declines ongoing checks  DVT Prophylaxis: mechanical  GI Prophylaxis: None  Code: per discussion with admitting provider: full - she noted that she picked DNR for her will but wasn't sure about her prior decision about intubation - eventually decided to keep full code for now and talk to her friend   Disposition: Home with  PT, OT, SLP    ELOS: target discharge date 5/3/25  Follow up Appointments on Discharge: PCP in 1-2 weeks, neurology (stroke dorys), vascular surgery (follow up carotid stenosis), TCD at [place] (per neurology note 4/23/25), ENT, PM&R in 8-12 weeks      50 minutes spent on the date of service doing chart review, history and exam, documentation, and further activities as noted above.     Joshua West MD  Department of Rehabilitation Medicine

## 2025-05-01 ENCOUNTER — APPOINTMENT (OUTPATIENT)
Dept: OCCUPATIONAL THERAPY | Facility: CLINIC | Age: 60
DRG: 057 | End: 2025-05-01
Attending: PHYSICAL MEDICINE & REHABILITATION
Payer: COMMERCIAL

## 2025-05-01 ENCOUNTER — APPOINTMENT (OUTPATIENT)
Dept: PHYSICAL THERAPY | Facility: CLINIC | Age: 60
DRG: 057 | End: 2025-05-01
Attending: PHYSICAL MEDICINE & REHABILITATION
Payer: COMMERCIAL

## 2025-05-01 ENCOUNTER — APPOINTMENT (OUTPATIENT)
Dept: SPEECH THERAPY | Facility: CLINIC | Age: 60
DRG: 057 | End: 2025-05-01
Attending: PHYSICAL MEDICINE & REHABILITATION
Payer: COMMERCIAL

## 2025-05-01 VITALS
TEMPERATURE: 97.7 F | DIASTOLIC BLOOD PRESSURE: 75 MMHG | OXYGEN SATURATION: 99 % | WEIGHT: 189.4 LBS | HEART RATE: 101 BPM | BODY MASS INDEX: 30.44 KG/M2 | SYSTOLIC BLOOD PRESSURE: 122 MMHG | RESPIRATION RATE: 16 BRPM | HEIGHT: 66 IN

## 2025-05-01 PROCEDURE — 250N000013 HC RX MED GY IP 250 OP 250 PS 637: Performed by: PHYSICAL MEDICINE & REHABILITATION

## 2025-05-01 PROCEDURE — 128N000003 HC R&B REHAB

## 2025-05-01 PROCEDURE — 250N000013 HC RX MED GY IP 250 OP 250 PS 637: Performed by: PHYSICIAN ASSISTANT

## 2025-05-01 PROCEDURE — 97129 THER IVNTJ 1ST 15 MIN: CPT | Mod: GN

## 2025-05-01 PROCEDURE — 97110 THERAPEUTIC EXERCISES: CPT | Mod: GP | Performed by: PHYSICAL THERAPIST

## 2025-05-01 PROCEDURE — 97530 THERAPEUTIC ACTIVITIES: CPT | Mod: GP | Performed by: PHYSICAL THERAPIST

## 2025-05-01 PROCEDURE — 97535 SELF CARE MNGMENT TRAINING: CPT | Mod: GO

## 2025-05-01 PROCEDURE — 97130 THER IVNTJ EA ADDL 15 MIN: CPT | Mod: GN

## 2025-05-01 PROCEDURE — 99231 SBSQ HOSP IP/OBS SF/LOW 25: CPT | Performed by: PHYSICIAN ASSISTANT

## 2025-05-01 PROCEDURE — 92507 TX SP LANG VOICE COMM INDIV: CPT | Mod: GN

## 2025-05-01 PROCEDURE — 97530 THERAPEUTIC ACTIVITIES: CPT | Mod: GO

## 2025-05-01 RX ORDER — AMOXICILLIN 250 MG
1 CAPSULE ORAL 2 TIMES DAILY PRN
COMMUNITY
Start: 2025-05-01

## 2025-05-01 RX ORDER — CLOPIDOGREL BISULFATE 75 MG/1
75 TABLET ORAL DAILY
Qty: 30 TABLET | Refills: 0 | Status: SHIPPED | OUTPATIENT
Start: 2025-05-02

## 2025-05-01 RX ADMIN — CLOPIDOGREL BISULFATE 75 MG: 75 TABLET, FILM COATED ORAL at 09:42

## 2025-05-01 RX ADMIN — ASPIRIN 81 MG CHEWABLE TABLET 81 MG: 81 TABLET CHEWABLE at 09:42

## 2025-05-01 RX ADMIN — ACETAMINOPHEN 650 MG: 325 TABLET ORAL at 20:03

## 2025-05-01 RX ADMIN — ATORVASTATIN CALCIUM 40 MG: 40 TABLET, FILM COATED ORAL at 20:03

## 2025-05-01 ASSESSMENT — ACTIVITIES OF DAILY LIVING (ADL)
ADLS_ACUITY_SCORE: 37

## 2025-05-01 NOTE — PLAN OF CARE
Discharge Planner Post-Acute Rehab SLP:      Discharge Plan: TBD. Ongoing SLP     Precautions: fall     Current Status:  Hearing: WFL  Vision: Glasses- R hemianopsia/field cut, right neglect per chart  Communication: Motor speech intact at conversation level. Verbal expression and auditory comprehension functional at conversation level. Reading deficits likely r/t vision deficits.   Cognition: Moderate cognitive impairment: Moderate deficits in immediate memory, visuospatial skills, higher level word finding, and attention. Moderate delayed memory deficits.  Swallow: Regular solids/Thin liquids (0). General safe swallow strategies. Swallow goals met 04/26     Assessment: Patient engaged in task requiring her to read a sentence and fill in the blank. Patient completing with approximately 75% accuracy. Discussed strategies to improve accuracy when she was finding herself stuck. Patient able to recognize when what she is reading is not making sense but significant difficulties being able to implement strategies and continue from the same spot as patient would forget where she had originally made errors. Consequently this did result in improved accuracy on 2nd or 3rd attempts some of the errors were corrected on those attempts the patient unaware of what correction she had made. Patient was shown an iPad based task for reading comprehension which will be shared with patient's family during family training later this afternoon.    Other Barriers to Discharge (Family Training, etc): Level of assist with iADLS?

## 2025-05-01 NOTE — PLAN OF CARE
Goal Outcome Evaluation:    Patient RosasO, able to make needs known. Mod I in the room and in the hallways with the FWW. Still on MAP. Patient was able to identify her scheduled medications but did not call for them. She needed to be reminded that she had meds on schedule, but she was able to identify her meds afterwards. No BM reported this shift. Participated in therapies. Continue with POC.

## 2025-05-01 NOTE — PLAN OF CARE
Discharge Planner Post-Acute Rehab SLP:      Discharge Plan: TBD. Ongoing SLP     Precautions: fall     Current Status:  Hearing: WFL  Vision: Glasses- R hemianopsia/field cut, right neglect per chart  Communication: Motor speech intact at conversation level. Verbal expression and auditory comprehension functional at conversation level. Reading deficits likely r/t vision deficits.   Cognition: Moderate cognitive impairment: Moderate deficits in immediate memory, visuospatial skills, higher level word finding, and attention. Moderate delayed memory deficits.  Swallow: Regular solids/Thin liquids (0). General safe swallow strategies. Swallow goals met 04/26     Assessment: Discussed with patient and her close friend level of assistance recommended for initial discharge to home. Reviewed results from recent cognitive assessment as well as the improvements noted and activities that have been completed. Highlighted the types of errors that patient is making. Discussed with patient current plan to start without home care SLP due to lack of availability. Patient to initiate outpatient therapy as soon as it is reasonably possible given physical limitations and need for home care therapies.      Other Barriers to Discharge (Family Training, etc): Level of assist with iADLS?

## 2025-05-01 NOTE — PLAN OF CARE
Goal Outcome Evaluation:      Plan of Care Reviewed With: patient    Outcome Evaluation:    Alert and oriented x4 with some forgetfulness and word finding difficulty. Able to make her needs known and calls appropriately. Patient endorses intermittent leg pain and received prn tylenol with effect. Denies sob, dizziness or lightheadedness. On Mod I with a walker for transfers. Remains continent of BB, last BM 4/30 per patient report.   Cont on MAP.

## 2025-05-01 NOTE — PLAN OF CARE
"Discharge Planner Post-Acute Rehab PT:      Discharge Plan: Home (condo w/ elevator access). *Short term home care PT to ensure safe environment >> OP PT.            Precautions: falls, cognition, insensate feet (recommend shoes OOB), right visual field cut     Current Status:  Bed Mobility: IND  Transfer: Mod I with 4WW  Gait: Mod I with 4WW, 200+ ft (SBA w/out device in therapies)  Stairs: 12x6\" step ups with bilat rails, mod I  Balance: Fair static, dynamic standing balance w/out UE support     Outcome Measures:   Aguilar   4/28: 45/56  10mWT  4/28: 0.36 m/sec comfortable, 0.81 m/sec fast w/out device and SBA   6MWT   5/1: 950 ft with 4WW      Assessment: Updated to use of 4WW, mod I in hallways as well. 4WW orders placed through [facility]. IND day items completed in anticipation of discharge Sat AM.  FT successfully completed with pt's friend Ericka, who will provide IADL support and several check ins/day.     Other Barriers to Discharge (DME, Family Training, etc):   Limited by impaired cognition.  FT completed with friend Ericka 5/1 PM.  Equipment: 4WW orders requested through [facility] on 5/1.  HEP provided for walking program and forward/lateral step ups.  "

## 2025-05-01 NOTE — PROGRESS NOTES
[facility]   Acute Rehabilitation Unit  Daily progress note    INTERVAL HISTORY  Afshan Jimenez was seen in her room this morning.  No acute events reported overnight.  She reports that she is feeling well overall and is noting ongoing progress with therapies.  She is pleased to have more freedom to move about the unit without assistance.  She recognizes ongoing concerns with cognition and is still asking for guidance on which activities she can and cannot safely do on her own.  We reviewed recommendations against return to driving and return to work at this time; she is agreeable.  We also discussed neuropsych testing prior to return to work and she was open to this.  She notes that she has a lot of people who can provide intermittent help, and encouraged her to allow that.  She reports still having intermittent increased pain in her feet.  She feels this is neuropathy.  She notes improvement with her topical cream and wearing her shoes.  In fact, she notes pain was increasing last night so she put on her shoes in bed and noted improvement after about 1 hour.  She has very minimal dizziness only when first getting up in the morning, less impactful with slightly later therapy start today.  She denies any chest pain, shortness of breath, nausea.  Last BM yesterday.  Reviewed plans for medications, ongoing therapies, follow-up appointments after discharge.  She denies other questions or concerns at this time.    With therapies, she has now progressed to mod I with walker in room and hallways.   Plan for caregiver training with her friend/ Ericka this afternoon with therapies.    MEDICATIONS  Current Facility-Administered Medications   Medication Dose Route Frequency Provider Last Rate Last Admin    - Medication Assessment Program - Rehab Services   Does not apply See Admin Instructions Siddhartha West MD        aspirin EC tablet 81 mg  81 mg Oral Daily  "Helen Keating PA   81 mg at 04/30/25 0826    atorvastatin (LIPITOR) tablet 40 mg  40 mg Oral QPM Helen Keating PA   40 mg at 04/30/25 2014    clopidogrel (PLAVIX) tablet 75 mg  75 mg Oral Daily Cristela Logan MD   75 mg at 04/30/25 0826          Current Facility-Administered Medications   Medication Dose Route Frequency Provider Last Rate Last Admin    acetaminophen (TYLENOL) tablet 650 mg  650 mg Oral Q4H PRN Helen Keating PA   650 mg at 04/30/25 2024    bisacodyl (DULCOLAX) suppository 10 mg  10 mg Rectal Daily PRN Helen Keating PA        glucose gel 15-30 g  15-30 g Oral Q15 Min PRN Helen Keating PA        Or    dextrose 50 % injection 25-50 mL  25-50 mL Intravenous Q15 Min PRN Helen Keating PA        Or    glucagon injection 1 mg  1 mg Subcutaneous Q15 Min PRN Helen Keating PA        gabapentin (NEURONTIN) capsule 100 mg  100 mg Oral TID PRN Veena Burk PA-C   100 mg at 04/25/25 1307    NONFORMULARY   Topical Q1H PRN Veena Burk PA-C        senna-docusate (SENOKOT-S/PERICOLACE) 8.6-50 MG per tablet 1 tablet  1 tablet Oral BID PRN Helen Keating PA            PHYSICAL EXAM  /63 (BP Location: Left arm)   Pulse 96   Temp 97.7  F (36.5  C) (Oral)   Resp 16   Ht 1.676 m (5' 6\")   Wt 85.9 kg (189 lb 6.4 oz)   SpO2 96%   BMI 30.57 kg/m    Gen: NAD, sitting up in chair  HEENT: NC/AT  Cardio: RRR  Pulm: non-labored on room air, lungs CTA bilaterally  Abd: soft, non-tender, bowel sounds present  Ext: no edema in BLE, no calf tenderness  Neuro/MSK: awake, alert, mild word finding difficulty, overall asking appropriate questions    LABS  CBC RESULTS:   Recent Labs   Lab Test 04/24/25  0709 04/21/25  1854 04/20/25  0520   WBC 7.3 9.4 6.5   RBC 4.40 4.74 4.15   HGB 12.6 13.4 11.9   HCT 38.5 41.1 36.6   MCV 88 87 88   MCH 28.6 28.3 28.7   MCHC 32.7 32.6 32.5   RDW 13.0 13.2 13.1    181 158       Last Basic Metabolic Panel:  Recent Labs   Lab " Test 04/27/25  1212 04/27/25  0810 04/26/25  2148 04/24/25  0751 04/24/25  0709 04/23/25  0738 04/23/25  0709 04/22/25  1238 04/22/25  0828 04/21/25  2107 04/21/25  1854 04/20/25  0600 04/20/25  0520   NA  --   --   --   --  138  --   --   --   --   --  138  --  138   POTASSIUM  --   --   --   --  4.0  --  4.5  --  4.2  --  4.2  --  4.1   CHLORIDE  --   --   --   --  101  --   --   --   --   --  102  --  104   CO2  --   --   --   --  25  --   --   --   --   --  24  --  26   ANIONGAP  --   --   --   --  12  --   --   --   --   --  12  --  8   * 141* 107*   < > 138*   < >  --    < >  --    < > 189*   < > 123*   BUN  --   --   --   --  20.6  --   --   --   --   --  19.5  --  11.2   CR  --   --   --   --  1.00*  --   --   --   --   --  0.95  --  0.82   GFRESTIMATED  --   --   --   --  64  --   --   --   --   --  68  --  81   JOSE MARIA  --   --   --   --  9.4  --   --   --   --   --  9.9  --  9.3    < > = values in this interval not displayed.         ASSESSMENT AND PLAN  Afshan Jimenez is a 59 year old right hand dominant female with past medical history of AML s/p bone marrow transplant 1992, DM type 2, HLD, left parietal stroke (12/2024), left parotid carcinoma s/p parotidectomy and radiation, pancreatic cyst, and lung nodules who presented on 4/18/25 with acute right-sided incoordination and slurred speech with imaging revealing multiple watershed and embolic-type infarcts in left frontoparietal region s/p TNK c/b left parieto-occipital hemorrhage requiring TNK reversal with course further complicated by additional findings of left common carotid artery stenosis, orthostatic hypotension, and left mastoid effusion/effacement.  She is admitted to ARU on 4/24/25 for multidisciplinary rehabilitation and ongoing medical management.    Rehabilitation   Admission to acute inpatient rehab: 04/24/2025  Impairment group code: Stroke Ischemic 01.2 (R) Body Involvement (L) Brain; Acute ischemic strokes of L hemisphere due to  atheroembolic from symptomatic left common carotid stenosis vs ESUS complicated by L parieto-occipital hemorrhage post TNK .          PT, OT and SLP 60 minutes of each on a daily basis up to 6 days per week, in addition to rehab nursing and close management of physiatrist.       Impairment of ADL's: Noted to have impaired strength, impaired activity tolerance, impaired vision,  and impaired coordination leading to decreased ability to independently complete ADL's.  Will benefit from ongoing OT with goal for MOD I with basic ADLs.      Impairment of mobility:  Noted to have impaired strength, impaired activity tolerance, impaired balance,  and impaired visioin,  leading to decreased mobility.  Will benefit from ongoing PT with goal for JENNIFER with basic mobility.      Impairment of cognition/language/swallow:  Noted to have impaired speech will benefit from ongoing SLP to improve ability to communicate needs effectively.     Continue comprehensive acute inpatient rehabilitation program with multidisciplinary approach including therapies, rehab nursing, and physiatry following. See interval history for updates.        Medical Conditions  New actions/orders/updates for today are in blue.     Acute ishemic strokes of left hemisphere due to atheroembolic from symptomatic left carotid stenosis vs ESUS s/p TNK complicated by left parieto-occipital hemorrhage s/p TNK reversal.   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  Presented 4/18/25 with right sided weakness, slurred speech:  MRI noted to have multiple watershed and embolic type infarcts in the left frontoparietal region as well as interval evolutionary change of prior infarcts. She received TNK with resulting hemorrhage on follow up imaging, TNK was reversed, received serial brain imaging and medical management.    - Started on cilostazol 100 mg bid, but changed to Plavix 75 mg  daily starting 4/28 after discussion with neurology due to orthostatic hypotension and tachycardia  - Continue ASA 81 mg daily  - BP goal <130/80, currently at goal without medications  - LDL goal 40-70- continue statin  - Goal A1c <7, management as below  - Continue PT/OT/SLP  - Lissytch on discharge  - Referral for neuropsych testing prior to return to work  - Plan for Transcranial doppler at [facility] ~ 1 month per neurology   - Follow up MRI 3 months  - Follow up stroke neurology.  Consider cardiac CTA at clinic visit.  - Follow up vascular surgery regarding L CCA stenosis.  Consideration of CEA (deferred at this time due to risks of recurrent bleeding with intra-operative heparin, as well as felt more likely stenosis was due to prior radiation rather than atherosclerotic disease which would pose higher chance of embolization/ulceration)     Type II diabetes mellitus  A1c 6.0% on 4/7/25  Previously took ozempic, discontinued with some symptomatic dizziness/ concerns for hypotension.  Per pcp note 4/7/2025, would like to try lifestyle modification  - Monitor with labs  - Blood glucose has been at goal without medications  - Hypoglycemic protocol     HLD  LDL 47 4/19  - Continue atorvastatin 40 mg daily  - LDL goal 40-70    Tachycardia  Throughout hospital course and also appears some chronic component.  EKG on 4/18/25 with sinus tachycardia.  - Given concurrent orthostatic hypotension as below, question if some hypovolemia though also could be autonomic  - No associated symptoms, remains stable and mild  - Monitor    Orthostatic hypotension, improving  Per review of OP chart, she was noted to have mild tachycardia and symptomatic hypotension in clinic in 1/2025, felt to be orthostatic hypotension, largely attributed to poor PO intake while on Ozempic.  Considered IV fluid bolus but patient declined.  Per IP chart, patient reported some lightheadedness at home, leaning on wall while ambulating at home.  Noted to  have recurrent symptomatic hypotension in hospital as well.  Ozempic was discontinued and she was given some IV hydration.  Has had significant symptomatic orthostatic hypotension initially at ARU, documented positive orthostatic vitals by therapies and nursing team.  No medications that would expect to contribute.  No localizing signs/symptoms of infection and supine BP normotensive.  - Added LE compression stockings  - Added abdominal binder  - Pletal changed to Plavix 4/28 as above  - Encourage PO fluids  - Ongoing improvements per patient and therapies     Peripheral neuropathy   No clear etiology, likely multifactorial due to DM and prior cancer treatments   Gets episodes of severe burning pain in feet which respond very well to OTC deep blue cream.  As of 4/25: patient noted that pain in feet was more severe overnight, increased with any touch or weightbearing  - Continue PTA Deep Blue cream when arrives from home (anticipated today)  - Patient denies prior trial of gabapentin or Lyrica.  Chart confirms these have previously been discussed but declined.  Added gabapentin 100 mg TID PRN at ARU admission as did not have home topica.  If patient finds helpful and tolerates, could consider scheduled though caution given dizziness/orthostasis     Gout  Takes indomethacin PRN prior to admission.   - Pt denies any acute flare symptoms at this time and prior pain has improved.   - Monitor for any symptoms  - Avoid NSAIDs at this time given CVA and DAPT     Pancreatic cysts, suspected IPMN  Found incidentally on CT CAP with contrast during admission in 12/2024 for stroke.  Seen by GI in 2/2025 following MRI/MRCP, which showed multiple pancreatic cysts, felt most likely to be IPMNs.  - Per GI, recommend repeat MRI/MRCP in 6 months (8/2025) and follow up with GI after.  Should return earlier if unexplained weight loss of > 10 lb, jaundice, diagnosis of acute pancreatitis or chronic diarrhea lasting more than 2 weeks.     "  Incidental lung nodules  Noted on 12/2024 work-up for CVA.  Presumed related to radiation.  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  Patient without ear complaints, no findings to suggest mastoid or middle ear infection, per ENT \"changes are permanent\",  \"soft tissue/fluid will be noted in the mastoid\",  no need to consult ent unless patient is symptomatic/ clinical mastoiditis: erythema and tenderness in mastoid aresa.   - Follow up ENT as outpatient for ear debridement and hearing rehab      Adjustment to disability: monitor mood, consult psychology if indicated  FEN: regular diet, thin liquids  Bowel: continent.  Senokot-S 1 tab BID PRN, bisacodyl suppository PRN.  Monitor and adjust regimen as indicated.  Bladder: continent, PVRs at admission without retention and patient declines ongoing checks  DVT Prophylaxis: mechanical  GI Prophylaxis: None  Code: per discussion with admitting provider: full - she noted that she picked DNR for her will but wasn't sure about her prior decision about intubation - eventually decided to keep full code for now and talk to her friend   Disposition: Home with  PT, OT, SLP    ELOS: target discharge date 5/3/25  Follow up Appointments on Discharge: PCP in 1-2 weeks, neurology (stroke dorys), vascular surgery (follow up carotid stenosis), TCD at [facility] (per neurology note 4/23/25), ENT, PM&R in 8-12 weeks      30 minutes spent on the date of service doing chart review, history and exam, documentation, and further activities as noted " above.     Plan of care was discussed with Dr. Joshua West, PM&R staff physician     ROBERT Camarillo-C  Physical Medicine & Rehabilitation

## 2025-05-01 NOTE — PLAN OF CARE
Discharge Planner Post-Acute Rehab OT:      Discharge Plan: home alone (has friends that may be able to assist); HC     Precautions: fall, monitor low BP, cognition/word finding difficulty, Ab binder OOB      Current Status:  ADLs:  Mobility: Mod I with FWW  Grooming: Mod I standing at sink  Dressing: Mod I at eob for all   Bathing: Mod I on/off tub bench; CGA to wash/dry   Toileting: Mod I on/off toilet with fww and standing pericare/clothing   IADLs: Patient highly independent at baseline, anticipate will require assist for IADL tasks; simple iadls tasks with SBA for cold meal prep/folding laundry  Vision/Cognition: R field cut; wears corrective lenses; Cognition: ACE III: 79/100 indicating deficits in memory, attention, language, EF, visual-spatial (most impacted by R field cut deficit); med task: 4/5     Assessment: Pt continues to demo mod IND with ADLs in room. Completed FT with friend Ericka who will be providing assist with heavier/more cognitively demanding IADLs and check in several times a day. Pt on track for discharge Sat, will need shower chair issued. Provided pt with R hand FM coordination HEP.    5/1/25:  9 hold peg: L hand 22 seconds; R hand 31s  Box and blocks: L hand 55; R hand 52  : L hand 53 lbs; R hand 63 lbs     Other Barriers to Discharge (DME, Family Training, etc):   Lives alone in a condo with elevator access (enjoys taking stairs usually); walk in shower with grab bar  Needs: shower chair; walker     FT: 5/1/25: With friend Ericka: Ericka says she lives near by and can do check ins. Is ok with helping with higher level iadls as needed, though Afshan is prideful and has a hard time accepting/asking for help. Ericka can help provide rides as well. States though that she will be out of town this weekend, and will not be back until Monday in regards to discharge planning. Recommending check ins for Afshan at discharge, help with rides, and managing garbage/very high level iadls. Afshan can do  cold meal prep, folding laundry, etc. She also can manage her 3 pills/meds as she passed MAP, but want to make sure she has a pill box. Recommending pt not drive and not return to work at this time so she can focus on healing her brain and therapy s/p stroke. If she needs to return to work, recommend working part time only. Her cognition is the biggest barrier, though it improves every day. She will likely be able to be mod I in room with basic adls prior to going home. Language/word finding/reading and memory are her largest cog deficit areas. She is a very private person and does not allow help with bathing, but has been doing it here with nursing fine without help needed. Will give her a shower chair at discharge from ARU.

## 2025-05-01 NOTE — PLAN OF CARE
Goal Outcome Evaluation:      Plan of Care Reviewed With: patient    Overall Patient Progress: improving    Pt is alert and oriented x 4 ; can be forgetful and has mild word difficulty.  Mobility: Mod I in room with FWW.  Bowel/Bladder: Continent of BM and bladder, LBM 4/30 per pt.  Skin: no skin concerns.  O2: RA  Diet: Regular diet and thin liquids.  Psychosocial: appropriate to situation  Pain: C/o  bilateral leg pain prn Tylenol given.  Tubes/Lines/Drains: none.  Pt was able to call for her meds but requested Am meds instead of bedtime meds.  Calls appropriately  Needs in reach and safety measures in place.   Cont POC

## 2025-05-02 ENCOUNTER — APPOINTMENT (OUTPATIENT)
Dept: PHYSICAL THERAPY | Facility: CLINIC | Age: 60
DRG: 057 | End: 2025-05-02
Attending: PHYSICAL MEDICINE & REHABILITATION
Payer: COMMERCIAL

## 2025-05-02 ENCOUNTER — APPOINTMENT (OUTPATIENT)
Dept: SPEECH THERAPY | Facility: CLINIC | Age: 60
DRG: 057 | End: 2025-05-02
Attending: PHYSICAL MEDICINE & REHABILITATION
Payer: COMMERCIAL

## 2025-05-02 ENCOUNTER — APPOINTMENT (OUTPATIENT)
Dept: OCCUPATIONAL THERAPY | Facility: CLINIC | Age: 60
DRG: 057 | End: 2025-05-02
Attending: PHYSICAL MEDICINE & REHABILITATION
Payer: COMMERCIAL

## 2025-05-02 PROCEDURE — 99231 SBSQ HOSP IP/OBS SF/LOW 25: CPT | Performed by: PHYSICAL MEDICINE & REHABILITATION

## 2025-05-02 PROCEDURE — 97530 THERAPEUTIC ACTIVITIES: CPT | Mod: GP | Performed by: REHABILITATION PRACTITIONER

## 2025-05-02 PROCEDURE — 97129 THER IVNTJ 1ST 15 MIN: CPT | Mod: GN

## 2025-05-02 PROCEDURE — 128N000003 HC R&B REHAB

## 2025-05-02 PROCEDURE — 97130 THER IVNTJ EA ADDL 15 MIN: CPT | Mod: GN

## 2025-05-02 PROCEDURE — 250N000013 HC RX MED GY IP 250 OP 250 PS 637: Performed by: PHYSICIAN ASSISTANT

## 2025-05-02 PROCEDURE — 97535 SELF CARE MNGMENT TRAINING: CPT | Mod: GO

## 2025-05-02 PROCEDURE — 250N000013 HC RX MED GY IP 250 OP 250 PS 637: Performed by: PHYSICAL MEDICINE & REHABILITATION

## 2025-05-02 PROCEDURE — 92507 TX SP LANG VOICE COMM INDIV: CPT | Mod: GN

## 2025-05-02 RX ADMIN — ASPIRIN 81 MG CHEWABLE TABLET 81 MG: 81 TABLET CHEWABLE at 08:26

## 2025-05-02 RX ADMIN — CLOPIDOGREL BISULFATE 75 MG: 75 TABLET, FILM COATED ORAL at 08:26

## 2025-05-02 RX ADMIN — ATORVASTATIN CALCIUM 40 MG: 40 TABLET, FILM COATED ORAL at 20:05

## 2025-05-02 ASSESSMENT — ACTIVITIES OF DAILY LIVING (ADL)
ADLS_ACUITY_SCORE: 37

## 2025-05-02 NOTE — PLAN OF CARE
Goal Outcome Evaluation:      Plan of Care Reviewed With: patient    Overall Patient Progress: improvingOverall Patient Progress: improving    Patient continues to be mod-I with walker in room and hallway. patient called for MAP at appropriate time. initially patient requested ASA and lipitor and then corrected self to ASA and plavix. educated on use of meds and chose correct dose. MAP discontinued. LBM 5/2; self reported.

## 2025-05-02 NOTE — PLAN OF CARE
Discharge Planner Post-Acute Rehab SLP:      Discharge Plan: TBD. Ongoing SLP     Precautions: fall     Current Status:  Hearing: WFL  Vision: Glasses- R hemianopsia/field cut, right neglect per chart  Communication: Motor speech intact at conversation level. Verbal expression and auditory comprehension functional at conversation level. Reading deficits likely r/t vision deficits.   Cognition: Moderate cognitive impairment: Moderate deficits in immediate memory, visuospatial skills, higher level word finding, and attention. Moderate delayed memory deficits.  Swallow: Regular solids/Thin liquids (0). General safe swallow strategies. Swallow goals met 04/26     Assessment: Provided packet of HW/exercises that pt can complete at home outside of therapy. Answered questions r/t them and reviewed strategies to help complete them. Pt frustrated about her writing today and that she is still having letters get mixed up when writing. Pt engaged in CART therapy with words x10. Pt independently wrote 7/10 words without assist. With remaining 3 words, pt often mixing up order of vowels. Benefited from letter tiles to arrange word (with 100% accuracy) and then copy word x3. Recall of 2/3 words accurate with delay. Pt frustrated by difficulty but eager to participate in therapy and progress.     PM: Pt engaged in 3-4 sentence level reading comprehension task using strategies of processing information sentence by sentence, underlining key words, reading out loud, and using finger as visual anchor. Pt needed mod cues to implement strategies and it took pt ~10 min to read 4 sentence passage, and another 5 min to fully comprehend. Pt answered 4/5 questions correctly IND with mild cues for identifying key words given inc processing time. Moderate insight into deficits. Cognitive fatigue noted as task progressed and pt benefited from cues for intermittent rest breaks.     Other Barriers to Discharge (Family Training, etc): Level of  assist with iADLS?

## 2025-05-02 NOTE — PROGRESS NOTES
Discharge Plan     Discharge Date: 05/02/2025    Discharge Disposition:  Home        Discharge Services:  Home PT/OT/RN/SLP/SW    Home Health Care:   [facility]   PH: [phone]  F: [phone] or F: [phone]  Intake--[email or web address]       Nurse, physical therapy, occupational therapy, speech therapy and .  -You will get a call after you have [facility] to schedule the first home care visit. The home health nurse should come out within the first 24-48 hours. If you don't get a call from them within the first 48 hours, please call to follow up.         ORDERS AND DISCHARGE SUMMARY WILL NEED TO BE FAXED TO      Discharge Supplies: All DME supplied by PT/OT      Discharge Transportation:Window Scheduled: 2:15pm-2:59PM. ([facility])    Contact: Ereaf-[phone]- waiting at apartment (friend).      VIGNESH Harper  [facility], Acute Inpatient Rehab Unit   [address] [place] [address], [address]th [place] [address]  Phone: [phone]  Fax: [phone]

## 2025-05-02 NOTE — DISCHARGE INSTRUCTIONS
Follow up Appointments on Discharge:     PCP in 1-2 weeks  You are scheduled to see Dr. Hill on May 20 2025 at 8:45 am.     Address  [address]    [address]    [facility] [place] [address]  Phone   [phone]      Neurology (stroke dorys)  You are scheduled to see Kennedi Alberts NP on June 12 2025 at 8:45 am.    Address  [address]    [address]    [place] [address]  Phone   [phone]     Vascular surgery (follow up carotid stenosis)  You are scheduled to see Dr. Deleon on May 15 2025 at 9:30 am.    Address  [address]    [address]    [facility] [place] [address]  Phone   [phone]     TCD at [facility] (per neurology note 4/23/25)  You are scheduled to see *** on *** at ***. You will be called to schedule this appointment.    Phone   [phone]     ENT  You are scheduled to see *** on *** at ***. You will be called to schedule once the team reviews your chart.    Phone   [phone]     PM & R in 8-12 weeks  You are scheduled to see Dr. Platt on July 2 2025 at 10:15 am.      Address  [address]    [address]    [place] [address]  Phone   [phone]     Home Health Care:   [facility]   PH: [phone]  F: [phone] or F: [phone]  Intake--[email or web address]       Nurse, physical therapy, occupational therapy, speech therapy and .  -You will get a call after you have discharged from [facility] to schedule the first home care visit. The home health nurse should come out within the first 24-48 hours. If you don't get a call from them within the first 48 hours, please call to follow up.

## 2025-05-02 NOTE — PROGRESS NOTES
Transportation     [facility] (Ph: [phone]) Kelly    Weight & Diagnosis:Stroke (H). 195 lb 1.6 oz)     Transport Type: ALS Ambulance:    Indication/Need:     Private Pay Cost Discussed: yes    PCS Completed: N/A      From: [facility]  [facility], [place], [place] [address] [phone]     To:Patient's Physical Address.  [address] [place] [address] [address]  [facility] [address]    Contact: Yslpv-[phone]    Ride Date:  05/03/2025           Window Scheduled: 2:15pm-2:59PM.      VIGNESH Harper  [facility], Acute Inpatient Rehab Unit   [address], [address] [place] [address]  Phone: [phone]  Fax: [phone]

## 2025-05-02 NOTE — PLAN OF CARE
"Discharge Planner Post-Acute Rehab PT:      Discharge Plan: Home (condo w/ elevator access). *Short term home care PT to ensure safe environment >> OP PT.            Precautions: falls, cognition, insensate feet (recommend shoes OOB), right visual field cut     Current Status:  Bed Mobility: IND  Transfer: Mod I with 4WW  Gait: Mod I with 4WW, 200+ ft (SBA w/out device in therapies)  Stairs: 12x6\" step ups with bilat rails, mod I  Balance: Fair static, dynamic standing balance w/out UE support     Outcome Measures:   Aguilar   4/28: 45/56  10mWT  4/28: 0.36 m/sec comfortable, 0.81 m/sec fast w/out device and SBA   6MWT   5/1: 950 ft with 4WW      Assessment: 4 wheeled walker delivered today during PT sessions sized and fitted for patient. patient continues to be mod I with 4 wheeled walker.  Patient demoed multiple ambulation drills without assistive device, close standby to contact-guard patient on track for discharge home tomorrow.    Other Barriers to Discharge (DME, Family Training, etc):   Limited by impaired cognition.  FT completed with friend Ericka 5/1 PM.  Equipment: [facility]  HEP provided for walking program and forward/lateral step ups.  "

## 2025-05-02 NOTE — PROGRESS NOTES
ARU Social Work Progress Notes      reached out to [facility] to determine if a  can be added. She was informed that the RN coming out to see patient would determine to add a temporary .   RN Appointment on Tuesday at 11:30am.     Sussy Anderson [facility], Acute Inpatient Rehab Unit   [address] [place] [address], [address] [place] [address]  Phone: [phone]  Fax: [phone]

## 2025-05-02 NOTE — PROGRESS NOTES
"                                                           ARU Social Work Progress Notes      met with patient and completed IRF. Patient is set to discharge tomorrow. Home Care information was reviewed with patient and ride time was given as well.    IRF-ADIS Pain Assessment    Pain Effect on Sleep  \"Over the past 5 days, how much of the time has pain made it hard for you to sleep at night?\"    0. Does not apply - I have not had any pain or hurting in the past 5 days     Pain Interference with Therapy Activities  \"Over the past 5 days, how often have you limited your participation in rehabilitation therapy sessions due to pain?\"   0. Does not apply - I have not had any pain or hurting in the past 5 days        Home Health Care:   [facility]   PH: [phone]  F: [phone] or F: [phone]  Intake--[email or web address]       Nurse, physical therapy, occupational therapy, speech therapy and .  -You will get a call after you have discharged from [facility] to schedule the first home care visit. The home health nurse should come out within the first 24-48 hours. If you don't get a call from them within the first 48 hours, please call to follow up.       VIGNESH Harper  [facility], Acute Inpatient Rehab Unit   [address] [place] [address], [address]th [place] [address]  Phone: [phone]  Fax: [phone]    "

## 2025-05-02 NOTE — PLAN OF CARE
Occupational Therapy Discharge Summary    Reason for therapy discharge:    All goals and outcomes met, no further needs identified.    Progress towards therapy goal(s). See goals on Care Plan in Epic electronic health record for goal details.  Goals met    Therapy recommendation(s):    Continued therapy is recommended.  Rationale/Recommendations:  home health OT for functional cognition, IADL IND and safety.        Status at Discharge:  ADLs:  Mobility: Mod I with FWW  Grooming: Mod I standing at sink  Dressing: Mod I at eob for all   Bathing: Mod I on/off tub bench; CGA to wash/dry   Toileting: Mod I on/off toilet with fww and standing pericare/clothing   IADLs: Patient highly independent at baseline, anticipate will require assist for IADL tasks; simple iadls tasks with SBA for cold meal prep/folding laundry  Vision/Cognition: R field cut; wears corrective lenses; Cognition: ACE III: 79/100 indicating deficits in memory, attention, language, EF, visual-spatial (most impacted by R field cut deficit); med task: 4/5 5/1/25:  9 hold peg: L hand 22 seconds; R hand 31s  Box and blocks: L hand 55; R hand 52  : L hand 53 lbs; R hand 63 lbs        FT: 5/1/25: With friend Ericka: Ericka says she lives near by and can do check ins. Is ok with helping with higher level iadls as needed, though Afshan is prideful and has a hard time accepting/asking for help. Ericka can help provide rides as well. States though that she will be out of town this weekend, and will not be back until Monday in regards to discharge planning. Recommending check ins for Afshan at discharge, help with rides, and managing garbage/very high level iadls. Afshan can do cold meal prep, folding laundry, etc. She also can manage her 3 pills/meds as she passed MAP, but want to make sure she has a pill box. Recommending pt not drive and not return to work at this time so she can focus on healing her brain and therapy s/p stroke. If she needs to return to  work, recommend working part time only. Her cognition is the biggest barrier, though it improves every day. She will likely be able to be mod I in room with basic adls prior to going home. Language/word finding/reading and memory are her largest cog deficit areas. She is a very private person and does not allow help with bathing, but has been doing it here with nursing fine without help needed. Will give her a shower chair at discharge from ARU.

## 2025-05-02 NOTE — PROGRESS NOTES
"  [facility]   Acute Rehabilitation Unit  Daily progress note    INTERVAL HISTORY  Afshan Jimenez was seen in her room this morning.  No acute events overnight.  And today Afshan has no concerns or complaints.  She denies any chest pain, shortness of breath, fevers, or chills.  She feels ready for discharging home tomorrow, however is asking if we feel ready for her to go home.  I discussed that she has been modified independent for the past several days without any mobility concerns.  However, there remains concerns about her cognition and we will recommend check in support and oversight with IADLs including medications.  I have discussed with social work, and will order home health  to follow-up on how things are going at discharge.  Per SLP note, they were able to discuss with Afshan and her friend the level of supervision recommended initially.  4WW was delivered to room this morning.    Current functional status:  PT:  Bed Mobility: IND  Transfer: Mod I with 4WW  Gait: Mod I with 4WW, 200+ ft (SBA w/out device in therapies)  Stairs: 12x6\" step ups with bilat rails, mod I  Balance: Fair static, dynamic standing balance w/out UE support     Outcome Measures:   Aguilar   4/28: 45/56  10mWT  4/28: 0.36 m/sec comfortable, 0.81 m/sec fast w/out device and SBA   6MWT              5/1: 950 ft with 4WW      Assessment: Updated to use of 4WW, mod I in hallways as well. [facility] IND day items completed in anticipation of discharge Sat AM.  FT successfully completed with pt's friend Ericka, who will provide IADL support and several check ins/day.    OT:  ADLs:  Mobility: Mod I with FWW  Grooming: Mod I standing at sink  Dressing: Mod I at eob for all   Bathing: Mod I on/off tub bench; CGA to wash/dry   Toileting: Mod I on/off toilet with fww and standing pericare/clothing   IADLs: Patient highly independent at baseline, anticipate will require assist " for IADL tasks; simple iadls tasks with SBA for cold meal prep/folding laundry  Vision/Cognition: R field cut; wears corrective lenses; Cognition: ACE III: 79/100 indicating deficits in memory, attention, language, EF, visual-spatial (most impacted by R field cut deficit); med task: 4/5     Assessment: Pt continues to demo mod IND with ADLs in room. Completed FT with friend Ericka who will be providing assist with heavier/more cognitively demanding IADLs and check in several times a day. Pt on track for discharge Sat, will need shower chair issued. Provided pt with R hand FM coordination HEP.     5/1/25:  9 hold peg: L hand 22 seconds; R hand 31s  Box and blocks: L hand 55; R hand 52  : L hand 53 lbs; R hand 63 lbs    SLP:  Hearing: WFL  Vision: Glasses- R hemianopsia/field cut, right neglect per chart  Communication: Motor speech intact at conversation level. Verbal expression and auditory comprehension functional at conversation level. Reading deficits likely r/t vision deficits.   Cognition: Moderate cognitive impairment: Moderate deficits in immediate memory, visuospatial skills, higher level word finding, and attention. Moderate delayed memory deficits.  Swallow: Regular solids/Thin liquids (0). General safe swallow strategies. Swallow goals met 04/26     Assessment: Discussed with patient and her close friend level of assistance recommended for initial discharge to home. Reviewed results from recent cognitive assessment as well as the improvements noted and activities that have been completed. Highlighted the types of errors that patient is making. Discussed with patient current plan to start without home care SLP due to lack of availability. Patient to initiate outpatient therapy as soon as it is reasonably possible given physical limitations and need for home care therapies.      MEDICATIONS  Current Facility-Administered Medications   Medication Dose Route Frequency Provider Last Rate Last Admin    -  "Medication Assessment Program - Rehab Services   Does not apply See Admin Instructions Siddhartha West MD        aspirin EC tablet 81 mg  81 mg Oral Daily Helen Keating PA   81 mg at 05/02/25 0826    atorvastatin (LIPITOR) tablet 40 mg  40 mg Oral QPM Helen Keating PA   40 mg at 05/01/25 2003    clopidogrel (PLAVIX) tablet 75 mg  75 mg Oral Daily Cristela Logan MD   75 mg at 05/02/25 0826          Current Facility-Administered Medications   Medication Dose Route Frequency Provider Last Rate Last Admin    acetaminophen (TYLENOL) tablet 650 mg  650 mg Oral Q4H PRN Helen Keating PA   650 mg at 05/01/25 2003    bisacodyl (DULCOLAX) suppository 10 mg  10 mg Rectal Daily PRN Helen Keating PA        glucose gel 15-30 g  15-30 g Oral Q15 Min PRN Helen Keating PA        Or    dextrose 50 % injection 25-50 mL  25-50 mL Intravenous Q15 Min PRN Helen Keating PA        Or    glucagon injection 1 mg  1 mg Subcutaneous Q15 Min PRN Helen Keating PA        gabapentin (NEURONTIN) capsule 100 mg  100 mg Oral TID PRN Veena Burk PA-C   100 mg at 04/25/25 1307    [Held by provider] NONFORMULARY   Topical Q1H PRN Veena Burk PA-C        senna-docusate (SENOKOT-S/PERICOLACE) 8.6-50 MG per tablet 1 tablet  1 tablet Oral BID PRN Helen Keating PA            PHYSICAL EXAM  /62 (BP Location: Left arm)   Pulse 98   Temp 97.5  F (36.4  C) (Oral)   Resp 16   Ht 1.676 m (5' 6\")   Wt 85.9 kg (189 lb 6.4 oz)   SpO2 96%   BMI 30.57 kg/m    Gen: NAD, sitting up in chair  HEENT: NC/AT  Cardio: RRR  Pulm: non-labored on room air, lungs CTA bilaterally  Abd: soft, non-tender, bowel sounds present  Ext: no edema in BLE, no calf tenderness  Neuro/MSK: awake, alert, mild word finding difficulty but otherwise answers appropriately and follows commands    LABS  CBC RESULTS:   Recent Labs   Lab Test 04/24/25  0709 04/21/25  1854 04/20/25  0520   WBC 7.3 9.4 6.5   RBC 4.40 " 4.74 4.15   HGB 12.6 13.4 11.9   HCT 38.5 41.1 36.6   MCV 88 87 88   MCH 28.6 28.3 28.7   MCHC 32.7 32.6 32.5   RDW 13.0 13.2 13.1    181 158       Last Basic Metabolic Panel:  Recent Labs   Lab Test 04/27/25  1212 04/27/25  0810 04/26/25  2148 04/24/25  0751 04/24/25  0709 04/23/25  0738 04/23/25  0709 04/22/25  1238 04/22/25  0828 04/21/25  2107 04/21/25  1854 04/20/25  0600 04/20/25  0520   NA  --   --   --   --  138  --   --   --   --   --  138  --  138   POTASSIUM  --   --   --   --  4.0  --  4.5  --  4.2  --  4.2  --  4.1   CHLORIDE  --   --   --   --  101  --   --   --   --   --  102  --  104   CO2  --   --   --   --  25  --   --   --   --   --  24  --  26   ANIONGAP  --   --   --   --  12  --   --   --   --   --  12  --  8   * 141* 107*   < > 138*   < >  --    < >  --    < > 189*   < > 123*   BUN  --   --   --   --  20.6  --   --   --   --   --  19.5  --  11.2   CR  --   --   --   --  1.00*  --   --   --   --   --  0.95  --  0.82   GFRESTIMATED  --   --   --   --  64  --   --   --   --   --  68  --  81   JOSE MARIA  --   --   --   --  9.4  --   --   --   --   --  9.9  --  9.3    < > = values in this interval not displayed.         ASSESSMENT AND PLAN  Afshan Jimenez is a 59 year old right hand dominant female with past medical history of AML s/p bone marrow transplant 1992, DM type 2, HLD, left parietal stroke (12/2024), left parotid carcinoma s/p parotidectomy and radiation, pancreatic cyst, and lung nodules who presented on 4/18/25 with acute right-sided incoordination and slurred speech with imaging revealing multiple watershed and embolic-type infarcts in left frontoparietal region s/p TNK c/b left parieto-occipital hemorrhage requiring TNK reversal with course further complicated by additional findings of left common carotid artery stenosis, orthostatic hypotension, and left mastoid effusion/effacement.  She is admitted to ARU on 4/24/25 for multidisciplinary rehabilitation and ongoing medical  management.    Rehabilitation   Admission to acute inpatient rehab: 04/24/2025  Impairment group code: Stroke Ischemic 01.2 (R) Body Involvement (L) Brain; Acute ischemic strokes of L hemisphere due to atheroembolic from symptomatic left common carotid stenosis vs ESUS complicated by L parieto-occipital hemorrhage post TNK .          PT, OT and SLP 60 minutes of each on a daily basis up to 6 days per week, in addition to rehab nursing and close management of physiatrist.       Impairment of ADL's: Noted to have impaired strength, impaired activity tolerance, impaired vision,  and impaired coordination leading to decreased ability to independently complete ADL's.  Will benefit from ongoing OT with goal for MOD I with basic ADLs.      Impairment of mobility:  Noted to have impaired strength, impaired activity tolerance, impaired balance,  and impaired visioin,  leading to decreased mobility.  Will benefit from ongoing PT with goal for JENNIFER with basic mobility.      Impairment of cognition/language/swallow:  Noted to have impaired speech will benefit from ongoing SLP to improve ability to communicate needs effectively.     Continue comprehensive acute inpatient rehabilitation program with multidisciplinary approach including therapies, rehab nursing, and physiatry following. See interval history for updates.        Medical Conditions  New actions/orders/updates for today are in blue.     Acute ishemic strokes of left hemisphere due to atheroembolic from symptomatic left carotid stenosis vs ESUS s/p TNK complicated by left parieto-occipital hemorrhage s/p TNK reversal.   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  Presented 4/18/25 with right sided weakness, slurred speech:  MRI noted to have multiple watershed and embolic type infarcts in the left frontoparietal region as well as interval evolutionary change of prior infarcts. She  received TNK with resulting hemorrhage on follow up imaging, TNK was reversed, received serial brain imaging and medical management.    - Started on cilostazol 100 mg bid, but changed to Plavix 75 mg daily starting 4/28 after discussion with neurology due to orthostatic hypotension and tachycardia  - Continue ASA 81 mg daily  - BP goal <130/80, currently at goal without medications  - LDL goal 40-70- continue statin  - Goal A1c <7, management as below  - Continue PT/OT/SLP  - Ziopatch on discharge  - Referral for neuropsych testing prior to return to work (order placed)  - Plan for Transcranial doppler at [place] ~ 1 month per neurology   - Follow up MRI 3 months  - Follow up stroke neurology.  Consider cardiac CTA at clinic visit.  - Follow up vascular surgery regarding L CCA stenosis.  Consideration of CEA (deferred at this time due to risks of recurrent bleeding with intra-operative heparin, as well as felt more likely stenosis was due to prior radiation rather than atherosclerotic disease which would pose higher chance of embolization/ulceration)     Type II diabetes mellitus  A1c 6.0% on 4/7/25  Previously took ozempic, discontinued with some symptomatic dizziness/ concerns for hypotension.  Per pcp note 4/7/2025, would like to try lifestyle modification  - Monitor with labs  - Blood glucose has been at goal without medications  - Hypoglycemic protocol     HLD  LDL 47 4/19  - Continue atorvastatin 40 mg daily  - LDL goal 40-70    Tachycardia  Throughout hospital course and also appears some chronic component.  EKG on 4/18/25 with sinus tachycardia.  - Given concurrent orthostatic hypotension as below, question if some hypovolemia though also could be autonomic  - No associated symptoms, remains stable and mild  - Monitor    Orthostatic hypotension, improving  Per review of OP chart, she was noted to have mild tachycardia and symptomatic hypotension in clinic in 1/2025, felt to be orthostatic hypotension,  largely attributed to poor PO intake while on Ozempic.  Considered IV fluid bolus but patient declined.  Per IP chart, patient reported some lightheadedness at home, leaning on wall while ambulating at home.  Noted to have recurrent symptomatic hypotension in hospital as well.  Ozempic was discontinued and she was given some IV hydration.  Has had significant symptomatic orthostatic hypotension initially at ARU, documented positive orthostatic vitals by therapies and nursing team.  No medications that would expect to contribute.  No localizing signs/symptoms of infection and supine BP normotensive.  - Added LE compression stockings  - Added abdominal binder  - Pletal changed to Plavix 4/28 as above  - Encourage PO fluids  - Ongoing improvements per patient and therapies     Peripheral neuropathy   No clear etiology, likely multifactorial due to DM and prior cancer treatments   Gets episodes of severe burning pain in feet which respond very well to OTC deep blue cream.  As of 4/25: patient noted that pain in feet was more severe overnight, increased with any touch or weightbearing  - Continue PTA Deep Blue cream  - Patient denies prior trial of gabapentin or Lyrica.  Chart confirms these have previously been discussed but declined.  Added gabapentin 100 mg TID PRN at ARU admission as did not have home topica.  If patient finds helpful and tolerates, could consider scheduled though caution given dizziness/orthostasis     Gout  Takes indomethacin PRN prior to admission.   - Pt denies any acute flare symptoms at this time and prior pain has improved.   - Monitor for any symptoms  - Avoid NSAIDs at this time given CVA and DAPT     Pancreatic cysts, suspected IPMN  Found incidentally on CT CAP with contrast during admission in 12/2024 for stroke.  Seen by GI in 2/2025 following MRI/MRCP, which showed multiple pancreatic cysts, felt most likely to be IPMNs.  - Per GI, recommend repeat MRI/MRCP in 6 months (8/2025) and  "follow up with GI after.  Should return earlier if unexplained weight loss of > 10 lb, jaundice, diagnosis of acute pancreatitis or chronic diarrhea lasting more than 2 weeks.      Incidental lung nodules  Noted on 12/2024 work-up for CVA.  Presumed related to radiation.  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  Patient without ear complaints, no findings to suggest mastoid or middle ear infection, per ENT \"changes are permanent\",  \"soft tissue/fluid will be noted in the mastoid\",  no need to consult ent unless patient is symptomatic/ clinical mastoiditis: erythema and tenderness in mastoid aresa.   - Follow up ENT as outpatient for ear debridement and hearing rehab      Adjustment to disability: monitor mood, consult psychology if indicated  FEN: regular diet, thin liquids  Bowel: continent.  Senokot-S 1 tab BID PRN, bisacodyl suppository PRN.  Monitor and adjust regimen as indicated.  Bladder: continent, PVRs at admission without retention and patient declines ongoing checks  DVT Prophylaxis: mechanical  GI Prophylaxis: None  Code: per discussion with admitting provider: full - she noted that she picked DNR for her will but wasn't sure about her prior decision about intubation - eventually decided to keep full code for now and talk to her friend   Disposition: Home with  PT, OT, SLP    SHANEKAOS: target discharge date 5/3/25  Follow up Appointments on Discharge: PCP in 1-2 weeks, neurology (stroke dorys), vascular surgery (follow up carotid stenosis), TCD at [place] (per " neurology note 4/23/25), ENT, PM&R in 8-12 weeks      25 minutes spent on the date of service doing chart review, history and exam, documentation, and further activities as noted above.     Joshua West MD  Department of Rehabilitation Medicine

## 2025-05-02 NOTE — PLAN OF CARE
Goal Outcome Evaluation:      Plan of Care Reviewed With: patient    Overall Patient Progress: improving    Outcome Evaluation:    Alert and oriented x4, makes needs known to staff and calls appropriately. Patient denies CP, sob or lightheadedness/dizziness. Endorses mild pain to bilateral legs and was given prn tylenol with effect.   Patient in Mod I with a walker for transfers. Continent of BB, last BM on 4/30.   Pt still on MAP. She called and identified her scheduled medication tonight.   Encouraged to call for any need/complaints.

## 2025-05-02 NOTE — PLAN OF CARE
Speech Language Therapy Discharge Summary    Reason for therapy discharge:    Discharged to home with home therapy.    Progress towards therapy goal(s). See goals on Care Plan in Epic electronic health record for goal details.  Goals not met.  Barriers to achieving goals:   discharge from facility.    Therapy recommendation(s):    Continued therapy is recommended.  Rationale/Recommendations:  Recommend ongoing SLP services for cognitive-linguistic skills. At time of discharge, pt presents with Moderate cognitive impairment: Moderate deficits in immediate memory, visuospatial skills, higher level word finding, and attention. Moderate delayed memory deficits. Also mild difficulty with higher-level word finding, and moderate difficulty with reading comprehension, question if language vs cognition vs visual based causing most difficulty. Pt benefits from organization/attention strategies including: taking breaks, underlining key words, processing information out loud, reviewing work, and using finger as visual anchor when scanning d/t right-sided neglect at times. Recommend continue emphasizing repetition and increased processing time with tasks. Pt is very motivated and does implement strategies well with support/cues. Recommend supervision/assist with iADLs at this time. Pt provided with HW/exercises for reading comprehension to work on at home prior to SLP services initiating, with support from OT. .

## 2025-05-02 NOTE — DISCHARGE SUMMARY
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, DM II, HLD, tachycardia, orthostatic hypotension, peripheral neuropathy, gout, Hx of leukemia and parotid Ca s/p radiation       BRIEF SUMMARY (Per HPI)  Afshan Jimenez is a 59 year old right hand dominant woman with past medical history of type 2 Diabetes, gout, 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  She received tenecteplase with acute post tenecteplase intraparenchymal hemorrhage, involving left parieto-occipital region as well as adjacent subdural hematoma.  She received cryoprecipitate and tranexamic acid per stroke neurology.      Seen in consult by neurosurgery in setting of hemorrhagic conversion, recommended medical management with routine imaging.  CTA head and neck revealed, 50% stenosis of the left ICA.  Seen in consult by vascular surgery due to symptomatic left carotid stenosis,  recommendation to proceed with left carotid endarterectomy once deemed safe for intraoperative heparin to minimize risk of further bleeding. In setting of stable head CT last completed 4/24 patient was started on cilostazol 4/24 per neurology.       Ms. Jimenez was seen in consult by ENT for left mastoid effusion with recommendations for  "outpatient ENT follow up.       REHABILITATION COURSE  Afshan Jimenez was admitted to the acute inpatient rehabilitation unit at [facility] on 4/24/2025 was to participate in physical, occupational, and speech therapies for 3 hours/day.  Early next day had ongoing tachycardia and orthostatic hypotension for which Pletal was changed to Plavix after discussion with neurology.  Abdominal binder and lower extremity compression were also added and overall had improvement in symptoms.  Functionally she made excellent physical progress and was modified independent with a walker.  She had severe cognitive deficits initially, which did show improvement, however at the time of discharge cognitive deficits were still present.  Due to this, it is recommended she have check in support several times per day, and oversight with IADLs which her friend will provide.  Home  was also ordered in addition to home health PT, OT, and speech therapies.  She was advised not to drive or leave the house alone which she was agreeable to and reported was not planning to anyways.    Functional status at the time of discharge:  PT:  Bed Mobility: IND  Transfer: Mod I with 4WW  Gait: Mod I with 4WW, 200+ ft (SBA w/out device in therapies)  Stairs: 12x6\" step ups with bilat rails, mod I  Balance: Fair static, dynamic standing balance w/out UE support     Outcome Measures:   Aguilar   4/28: 45/56  10mWT  4/28: 0.36 m/sec comfortable, 0.81 m/sec fast w/out device and SBA   6MWT              5/1: 950 ft with 4WW      Assessment: Updated to use of 4WW, mod I in hallways as well. 4WW orders placed through [facility]. IND day items completed in anticipation of discharge Sat AM.  FT successfully completed with pt's friend Ericka, who will provide IADL support and several check ins/day.     OT:  ADLs:  Mobility: Mod I with FWW  Grooming: Mod I standing at sink  Dressing: Mod I at eob for all   Bathing: Mod I on/off tub bench; CGA to wash/dry "   Toileting: Mod I on/off toilet with fww and standing pericare/clothing   IADLs: Patient highly independent at baseline, anticipate will require assist for IADL tasks; simple iadls tasks with SBA for cold meal prep/folding laundry  Vision/Cognition: R field cut; wears corrective lenses; Cognition: ACE III: 79/100 indicating deficits in memory, attention, language, EF, visual-spatial (most impacted by R field cut deficit); med task: 4/5     Assessment: Pt continues to demo mod IND with ADLs in room. Completed FT with friend Ericka who will be providing assist with heavier/more cognitively demanding IADLs and check in several times a day. Pt on track for discharge Sat, will need shower chair issued. Provided pt with R hand FM coordination HEP.     5/1/25:  9 hold peg: L hand 22 seconds; R hand 31s  Box and blocks: L hand 55; R hand 52  : L hand 53 lbs; R hand 63 lbs     SLP:  Hearing: WFL  Vision: Glasses- R hemianopsia/field cut, right neglect per chart  Communication: Motor speech intact at conversation level. Verbal expression and auditory comprehension functional at conversation level. Reading deficits likely r/t vision deficits.   Cognition: Moderate cognitive impairment: Moderate deficits in immediate memory, visuospatial skills, higher level word finding, and attention. Moderate delayed memory deficits.  Swallow: Regular solids/Thin liquids (0). General safe swallow strategies. Swallow goals met 04/26     Assessment: Discussed with patient and her close friend level of assistance recommended for initial discharge to home. Reviewed results from recent cognitive assessment as well as the improvements noted and activities that have been completed. Highlighted the types of errors that patient is making. Discussed with patient current plan to start without home care SLP due to lack of availability. Patient to initiate outpatient therapy as soon as it is reasonably possible given physical limitations and need for  home care therapies.       MEDICAL COURSE  Acute ishemic strokes of left hemisphere due to atheroembolic from symptomatic left carotid stenosis vs ESUS s/p TNK complicated by left parieto-occipital hemorrhage s/p TNK reversal.   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  Presented 4/18/25 with right sided weakness, slurred speech:  MRI noted to have multiple watershed and embolic type infarcts in the left frontoparietal region as well as interval evolutionary change of prior infarcts. She received TNK with resulting hemorrhage on follow up imaging, TNK was reversed, received serial brain imaging and medical management.    - Started on cilostazol 100 mg bid, but changed to Plavix 75 mg daily starting 4/28 after discussion with neurology due to orthostatic hypotension and tachycardia  - Continue ASA 81 mg daily  - BP goal <130/80, currently at goal without medications  - LDL goal 40-70- continue statin  - Goal A1c <7, management as below  - Home health PT/OT/SLP  - [place]iopatch on discharge  - Referral for neuropsych testing prior to return to work (order placed)  - Plan for Transcranial doppler at [place] ~ 1 month per neurology   - Follow up MRI 3 months  - Follow up stroke neurology.  Consider cardiac CTA at clinic visit.  - Follow up vascular surgery regarding L CCA stenosis.  Consideration of CEA (deferred at this time due to risks of recurrent bleeding with intra-operative heparin, as well as felt more likely stenosis was due to prior radiation rather than atherosclerotic disease which would pose higher chance of embolization/ulceration)     Type II diabetes mellitus  A1c 6.0% on 4/7/25  Previously took ozempic, discontinued with some symptomatic dizziness/ concerns for hypotension.  Per pcp note 4/7/2025, would like to try lifestyle modification  - Follow up with PCP     ANGELICA  LDL 47 4/19  - Continue atorvastatin 40 mg  daily  - LDL goal 40-70     Tachycardia  Throughout hospital course and also appears some chronic component.  EKG on 4/18/25 with sinus tachycardia.  - Given concurrent orthostatic hypotension as below, question if some hypovolemia though also could be autonomic  - No associated symptoms, remains stable and mild  - Follow up with PCP     Orthostatic hypotension, improving  Per review of OP chart, she was noted to have mild tachycardia and symptomatic hypotension in clinic in 1/2025, felt to be orthostatic hypotension, largely attributed to poor PO intake while on Ozempic.  Considered IV fluid bolus but patient declined.  Per IP chart, patient reported some lightheadedness at home, leaning on wall while ambulating at home.  Noted to have recurrent symptomatic hypotension in hospital as well.  Ozempic was discontinued and she was given some IV hydration.  Has had significant symptomatic orthostatic hypotension initially at ARU, documented positive orthostatic vitals by therapies and nursing team.  No medications that would expect to contribute.  No localizing signs/symptoms of infection and supine BP normotensive.  - Added LE compression stockings  - Added abdominal binder  - Pletal changed to Plavix 4/28 as above  - Encourage PO fluids  - Ongoing improvements per patient and therapies.  Follow up with PCP.     Peripheral neuropathy   No clear etiology, likely multifactorial due to DM and prior cancer treatments   Gets episodes of severe burning pain in feet which respond very well to OTC deep blue cream.  As of 4/25: patient noted that pain in feet was more severe overnight, increased with any touch or weightbearing  - Continue PTA Deep Blue cream  - Patient denies prior trial of gabapentin or Lyrica.  Chart confirms these have previously been discussed but declined.      Gout  Takes indomethacin PRN prior to admission.   - Pt denies any acute flare symptoms at this time and prior pain has improved.   - Monitor for  "any symptoms  - Avoid NSAIDs at this time given CVA and DAPT     Pancreatic cysts, suspected IPMN  Found incidentally on CT CAP with contrast during admission in 12/2024 for stroke.  Seen by GI in 2/2025 following MRI/MRCP, which showed multiple pancreatic cysts, felt most likely to be IPMNs.  - Per GI, recommend repeat MRI/MRCP in 6 months (8/2025) and follow up with GI after.  Should return earlier if unexplained weight loss of > 10 lb, jaundice, diagnosis of acute pancreatitis or chronic diarrhea lasting more than 2 weeks.      Incidental lung nodules  Noted on 12/2024 work-up for CVA.  Presumed related to radiation.  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  Patient without ear complaints, no findings to suggest mastoid or middle ear infection, per ENT \"changes are permanent\",  \"soft tissue/fluid will be noted in the mastoid\",  no need to consult ent unless patient is symptomatic/ clinical mastoiditis: erythema and tenderness in mastoid aresa.   - Follow up ENT as outpatient for ear debridement and hearing rehab        Follow up Appointments on Discharge: PCP in 1-2 weeks, neurology (stroke dorys), vascular surgery (follow up carotid stenosis), TCD at [place] (per neurology note 4/23/25), ENT, PM&R in 8-12 weeks. Will need social work follow up on discharge to help with disability paperwork and requesting [facility] mobility.        DISCHARGE MEDICATIONS  Current Discharge Medication List        START taking these medications    Details   clopidogrel (PLAVIX) 75 MG tablet " Take 1 tablet (75 mg) by mouth daily.  Qty: 30 tablet, Refills: 0    Associated Diagnoses: Acute CVA (cerebrovascular accident) (H)      senna-docusate (SENOKOT-S/PERICOLACE) 8.6-50 MG tablet Take 1 tablet by mouth 2 times daily as needed for constipation.    Associated Diagnoses: Acute CVA (cerebrovascular accident) (H)           CONTINUE these medications which have NOT CHANGED    Details   aspirin 81 MG EC tablet Take 1 tablet (81 mg) by mouth daily.  Qty: 90 tablet, Refills: 0    Comments: Future refills by PCP  Associated Diagnoses: Parietal lobe infarction (H)      acetaminophen (TYLENOL) 325 MG tablet Take 2 tablets (650 mg) by mouth every 4 hours as needed for mild pain.    Associated Diagnoses: Acute CVA (cerebrovascular accident) (H)      atorvastatin (LIPITOR) 40 MG tablet Take 1 tablet (40 mg) by mouth every evening.  Qty: 90 tablet, Refills: 1    Associated Diagnoses: Parietal lobe infarction (H); Dyslipidemia           STOP taking these medications       cilostazol (PLETAL) 100 MG tablet Comments:   Reason for Stopping:                 DISCHARGE INSTRUCTIONS AND FOLLOW UP  Discharge Procedure Orders   Adult Neuropsychology  Referral   Standing Status: Future   Referral Priority: Routine: Next available opening Referral Type: Mental Health Outpatient   Requested Specialty: Neuropsychology   Number of Visits Requested: 1     Home Care Referral   Referral Priority: Routine: Next available opening Referral Type: Home Health Therapies & Aides   Number of Visits Requested: 1     Adult ENT  Referral   Standing Status: Future   Referral Priority: Routine: Next available opening Referral Type: Consultation   Requested Specialty: Otolaryngology   Number of Visits Requested: 1     Reason for your hospital stay   Order Comments: You were admitted for rehabilitation after a stroke.     Activity   Order Comments: Your activity upon discharge: activity as tolerated and as directed during training  with therapies.  We recommend ongoing use of walker for mobility.  We would advise against driving or return to work at this time.  We would recommend frequent check-ins from friends and assistance with higher-level activities such as finances, medical appointments, community access, complex decision-making.  We have referred you to home care therapy to continue to progress your function and independence.    We recommend continuing to wear compression stockings when out of bed to help avoid drops in your blood pressure and shoes for protection/safety.     Order Specific Question Answer Comments   Is discharge order? Yes      ADULT [facility] Specialty Follow-up and recommended labs and tests   Order Comments: Additional follow-up includin) neurology (stroke dorys)  2) vascular surgery (follow up carotid stenosis)  3) TCD at [place]  4) ENT  5) PM&R in 8-12 weeks  6) Neuropsychology for assessment prior to return to work    Appointments on [place] and/or [facility] (with [facility] or [facility] provider or service). Call [phone] if you haven't heard regarding these appointments within 7 days of discharge.     Monitor and record   Order Comments: Blood pressure: daily or with symptoms such as dizziness or lightheadedness that may indicate low blood pressure.  Bring record to next appointment.     Walker Order for DME - ONLY FOR DME     Order Specific Question Answer Comments   Medical Equipment (DME) Supplier: [employer]    PATIENT INSTRUCTIONS: If you did not receive this ordered item today, please contact [employer] for availability ([facility] Locations: [phone], [place]: [phone]).    Start Date: 2025    Walker Type: 4-Wheel Walker (with seat)    Diagnosis: R26.89 - Impaired Gait and Mobility      Diet   Order Comments: Follow this diet upon discharge: Regular Diet; Thin Liquids (level 0)     Order Specific Question Answer Comments   Is discharge order? Yes       Hospital Follow-up with Existing Primary Care Provider (PCP)   Standing Status: Future Standing Exp. Date: 05/31/25   Order Comments:       Order Specific Question Answer Comments   Schedule Primary Care visit within 7 Days           PHYSICAL EXAMINATION    Most recent Vital Signs:   Vitals:    05/01/25 0735 05/01/25 1612 05/01/25 1700 05/02/25 0805   BP: 105/63 122/75  103/62   BP Location: Left arm Left arm  Left arm   Patient Position:       Cuff Size:       Pulse: 96 101  98   Resp: 16  16 16   Temp: 97.7  F (36.5  C)   97.5  F (36.4  C)   TempSrc: Oral   Oral   SpO2: 96% 99%  96%   Weight:       Height:           GEN: Pleasant and cooperative, in no acute distress  HEENT: No facial asymmetry  RESP: Breathing unlabored on room air  CV: RRR, no peripheral edema  MSK/NEURO: Expressive and receptive aphasia. There is some difficulty following instructions when testing neuro exam. Strength is 5/5 in all muscle groups of BUE/LE. There is slightly worse ataxia affecting the RUE as compared to the left.       45 minutes spent in discharge, including >50% in counseling and coordination of care, medication review and plan of care recommended on follow up.     Discharge summary was forwarded to Sterling Hill (PCP) at the time of discharge, so as to bridge from hospital to outpatient care.     It was our pleasure to care for Afshan Jimenez during this hospitalization. Please do not hesitate to contact me should there be questions regarding the hospital course or discharge plan.        Hoa Purvis MD

## 2025-05-02 NOTE — PROGRESS NOTES
ARU Social Work Progress Notes     Home Health Care:   [facility]   PH: [phone]  F: [phone] or F: [phone]  Intake--[email or web address]       Nurse, physical therapy, occupational therapy, speech therapy and .  -You will get a call after you have discharged from [facility] to schedule the first home care visit. The home health nurse should come out within the first 24-48 hours. If you don't get a call from them within the first 48 hours, please call to follow up.          VIGNESH Harper  [facility], Acute Inpatient Rehab Unit   [facility] [address], [address] [place] [address]  Phone: [phone]  Fax: [phone]

## 2025-05-02 NOTE — PLAN OF CARE
Physical Therapy Discharge Summary    Reason for therapy discharge:    Discharged to home with home therapy.    Progress towards therapy goal(s). See goals on Care Plan in [facility] electronic health record for goal details.  Goals met    Therapy recommendation(s):    Continued therapy is recommended.  Rationale/Recommendations:  Expect quick progression to OP PT following home safety assessment. Pt Lise w/ 4WW. Has all DME needed. Caregiver training completed w/ friend Ericka. .    Outcome Measures:   Aguilar   4/28: 45/56  10mWT  4/28: 0.36 m/sec comfortable, 0.81 m/sec fast w/out device and SBA   6MWT              5/1: 950 ft with 4WW     FT: 5/1/25: With friend Ericka: Ericka says she lives near by and can do check ins. Is ok with helping with higher level iadls as needed, though Afshan is prideful and has a hard time accepting/asking for help. Ericka can help provide rides as well.  Recommending check ins for Afshan at discharge, help with rides, and managing garbage/very high level iadls. Afshan can do cold meal prep, folding laundry, etc. She also can manage her 3 pills/meds as she passed MAP, but want to make sure she has a pill box. Recommending pt not drive and not return to work at this time so she can focus on healing her brain and therapy s/p stroke. If she needs to return to work, recommend working part time only. Her cognition is the biggest barrier, though it improves every day.  Language/word finding/reading and memory are her largest cog deficit areas. She is a very private person and does not allow help with bathing, but has been doing it here with nursing fine without help needed.

## 2025-05-02 NOTE — PROGRESS NOTES
ARU Social Work Progress Notes     Primary Care - Care Coordination Referral  Has been placed.      VIGNESH Harper  [facility], Acute Inpatient Rehab Unit   [facility] [address], [address] [place] [address]  Phone: [phone]  Fax: [phone]

## 2025-05-03 VITALS
DIASTOLIC BLOOD PRESSURE: 82 MMHG | TEMPERATURE: 97.4 F | HEIGHT: 66 IN | SYSTOLIC BLOOD PRESSURE: 121 MMHG | HEART RATE: 96 BPM | RESPIRATION RATE: 18 BRPM | BODY MASS INDEX: 30.44 KG/M2 | OXYGEN SATURATION: 96 % | WEIGHT: 189.4 LBS

## 2025-05-03 PROCEDURE — 99239 HOSP IP/OBS DSCHRG MGMT >30: CPT | Performed by: PHYSICAL MEDICINE & REHABILITATION

## 2025-05-03 PROCEDURE — 250N000013 HC RX MED GY IP 250 OP 250 PS 637: Performed by: PHYSICIAN ASSISTANT

## 2025-05-03 PROCEDURE — 250N000013 HC RX MED GY IP 250 OP 250 PS 637: Performed by: PHYSICAL MEDICINE & REHABILITATION

## 2025-05-03 RX ADMIN — ASPIRIN 81 MG CHEWABLE TABLET 81 MG: 81 TABLET CHEWABLE at 08:55

## 2025-05-03 RX ADMIN — CLOPIDOGREL BISULFATE 75 MG: 75 TABLET, FILM COATED ORAL at 08:55

## 2025-05-03 RX ADMIN — ACETAMINOPHEN 650 MG: 325 TABLET ORAL at 06:07

## 2025-05-03 ASSESSMENT — ACTIVITIES OF DAILY LIVING (ADL)
ADLS_ACUITY_SCORE: 38
ADLS_ACUITY_SCORE: 37
ADLS_ACUITY_SCORE: 38
ADLS_ACUITY_SCORE: 37
ADLS_ACUITY_SCORE: 38
ADLS_ACUITY_SCORE: 37
ADLS_ACUITY_SCORE: 37
ADLS_ACUITY_SCORE: 38
ADLS_ACUITY_SCORE: 37
ADLS_ACUITY_SCORE: 37
ADLS_ACUITY_SCORE: 38

## 2025-05-03 NOTE — PLAN OF CARE
Goal Outcome Evaluation:      Plan of Care Reviewed With: patient    A&Ox4 - forgetful & word finding difficulty. Mod I in room & hallway w/walker. Diet regular, thin, whole. No lines, drains, or IV. Denies chest pain & SOB. No c/o pain. Continent BB - LBM 5/2. On RA. Pt able to make needs know, bed low, locked, & call light within reach. Will continue POC.    Additional info:  -Discharge 5/3

## 2025-05-03 NOTE — PROGRESS NOTES
Goal Outcome Evaluation:      Plan of Care Reviewed With: patient    Overall Patient Progress: improvingOverall Patient Progress: improving         Patient vital signs stable, denies any pain. Patient discharge education completed including medication administration/management, discharge activity, diet, follow up appointments with contact information and stroke education reinforcement.Patient went over the discharge packet a couple of times and all questions by patient answered at this time. Patient belongings packed by patient. Patient discharged from unit and picked up by [facility] transport via wheelchair at 14:20 pm.

## 2025-05-03 NOTE — PLAN OF CARE
Goal Outcome Evaluation:      Plan of Care Reviewed With: patient    Overall Patient Progress: no changeOverall Patient Progress: no change         No acute issues during shift, A&Ox4 w/ forgetfulness, MOD I w/ walker, R/T/W, continent of B&B LBM 5/2, pt requested PRN tylenol for headache 2/10, Discharge home in AM, pt able to make needs known, call light with in reach.

## 2025-05-05 ENCOUNTER — PATIENT OUTREACH (OUTPATIENT)
Dept: CARE COORDINATION | Facility: CLINIC | Age: 60
End: 2025-05-05
Payer: COMMERCIAL

## 2025-05-05 NOTE — LETTER
[facility]  Patient Centered Plan of Care  About Me:        Patient Name:  Afshan Jimenez    YOB: 1965  Age:         59 year old   Scottie MRN:    [id number] Telephone Information:  Home Phone [phone]   Mobile [phone]       Address:   [address] [address]  [place] [facility] [place] [facility] [address] Email address:  [email or web address]      Emergency Contact(s)    Name Relationship Lgl Grd Work Phone Home Phone Mobile Phone   1. COURTNEY JIMENEZ Brother    [phone]   2. SELINA CURRY Other    [phone]           Primary language:  English     needed? No   [employer]:  [phone] op. 1  Other communication barriers:None    Preferred Method of Communication:     Current living arrangement: I live alone    Mobility Status/ Medical Equipment: Independent        Health Maintenance  Health Maintenance Reviewed: Up to date      My Access Plan  Medical Emergency 911   Primary Clinic Line [facility] [phone]   24 Hour Appointment Line [phone] or  [phone]) (toll-free)   24 Hour Nurse Line [phone] (toll-free)   Preferred Urgent Care No data recorded   Preferred Hospital No data recorded   Preferred Pharmacy [id number] [address] Behavioral Health Crisis Line The National Suicide Prevention Lifeline at 1-200.361.2073 or Text/Call 988           My Care Team Members  Patient Care Team         Relationship Specialty Notifications Start End    Sterling Hill MD PCP - General Internal Medicine  2/7/25     Phone: [phone] Fax: [phone] [address] [place] [address]    Kennedi Alberts APRN CNP Nurse Practitioner Psychiatry & Neurology Vascular Neurology  12/31/24     Phone: [phone] Fax: [phone]         NEUROLOGY [facility] [address] [place] [address]    Liliane Robbins MD MD Pulmonary Disease  1/2/25     Phone:  [phone] Fax: [phone]         [address] [place] [address]    Sterling Hill MD Assigned PCP   2/23/25     Phone: [phone] Fax: [phone] [address] [facility] [address]    Melody Montoya PA-C Assigned Gastroenterology Provider   2/23/25     Phone: [phone] Fax: [phone]         [address] [facility] [address]    Kennedi Alberts APRN CNP Assigned Surgical Provider   3/23/25     Phone: [phone] Fax: [phone]         NEUROLOGY [facility] NEUROCRIT[facility] [address] [facility] [address]    Liliane Robbins MD Assigned Pulmonology Provider   4/23/25     Phone: [phone] Fax: [phone]         [address] [place] [address]    Jessica Benitez LICSW Lead Care Coordinator  Admissions 4/25/25     Phone: [phone]         Leonel Platt MD MD Physical Medicine and Rehabilitation  5/2/25     Phone: [phone] Fax: [phone]         [address] [facility] [address]                My Care Plans  Self Management and Treatment Plan    Care Plan  Care Plan: Transportation       Problem: Lack of transportation       Goal: Establish reliable transportation       Start Date: 5/6/2025    This Visit's Progress: 30%    Priority: Medium    Note:     Barriers: Access   Strengths: Motivated   Patient expressed understanding of goal: Yes  Action steps to achieve this goal:  1. I will review private transportation options to medical appointment   2. I will apply for [employer] in next month   3. I will engage with CC monthly and request additional support as needed.                                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    Care Coordination Start Date: 4/25/2025   Frequency of Care Coordination: monthly, more frequently as needed     Form Last Updated: 05/06/2025

## 2025-05-05 NOTE — LETTER
[facility] CARE COORDINATION  [address]  [facility] [address]    May 5, 2025    Afshan Jimenez  [address] [address]  [facility] [place] [address]      Dear Afshna,    I am a clinic care coordinator who works with Sterling Hill MD with the [facility]. I wanted to introduce myself and provide you with my contact information for you to be able to call me with any questions or concerns. Below is a description of clinic care coordination and how I can further assist you.       The clinic care coordination team is made up of a registered nurse, , financial resource worker and community health worker who understand the health care system. The goal of clinic care coordination is to help you manage your health and improve access to the health care system. Our team works alongside your provider to assist you in determining your health and social needs. We can help you obtain health care and community resources, providing you with necessary information and education. We can work with you through any barriers and develop a care plan that helps coordinate and strengthen the communication between you and your care team.  Our services are voluntary and are offered without charge to you personally.    Please feel free to contact me with any questions or concerns regarding care coordination and what we can offer.      We are focused on providing you with the highest-quality healthcare experience possible.    Sincerely,     Jessica Benitez,  [facility]  Clinic Care Coordinator  [facility]  [phone]  [email or web address]

## 2025-05-05 NOTE — PROGRESS NOTES
Clinic Care Coordination Contact  [facility]/Voicemail    Clinical Data: Care Coordinator Outreach    Outreach Documentation Number of Outreach Attempt   5/5/2025   3:44 PM 1       Left message on patient's voicemail with call back information and requested return call.      Plan: Care Coordinator will send care coordination introduction letter with care coordinator contact information and explanation of care coordination services via CRAVE. Care Coordinator will try to reach patient again in 1-2 business days.    Jessica Benitez,  [facility]  Clinic Care Coordinator  [facility]  [phone]  [email or web address]

## 2025-05-05 NOTE — LETTER
Transportation resources   Transportation plus  Need a Ride? Call or Text! [phone]  [email or web address]    [employer] Ride  [email or web address]://[email or web address]  Phone: [phone]    [employer] mobility   [email or web address]

## 2025-05-05 NOTE — TELEPHONE ENCOUNTER
Spoke to patient she was recently discharged from the hospital.She was unable to schedule at this time.She asked us to call her back in 2 weeks.Patient was provided [facility] phone number.

## 2025-05-06 ASSESSMENT — ACTIVITIES OF DAILY LIVING (ADL): DEPENDENT_IADLS:: INDEPENDENT

## 2025-05-06 NOTE — PROGRESS NOTES
Clinic Care Coordination Contact  Clinic Care Coordination Contact  OUTREACH    Referral Information:       Primary Diagnosis: Psychosocial    Chief Complaint   Patient presents with    Clinic Care Coordination - Post Hospital    Clinic Care Coordination - Initial     Patient is 59 year old who resides in [place] [facility] [facility] [place] alone. She noted she feeling better post hospital stay. Patient shared she has home care coming out later today. She shared she was authorized for RN, PT and OT. They also plan to add speech. 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 She not sure when she going to return to work. We discussed if she access to disability benefits. Patient noted she not sure. We discussed at length disability benefits through employer vs SSDI. Patient noted she hopes to work until 64. She noted she will speak with her HR about short term disability and let us know if she needs anything from us.     Patient shared transportation concerns. She noted her has no family and friends wont be able to transport her to appointment. Patient shared she believes hospital SW assisted her in applying for metro mobility. We discussed other transpiration options.Patient agreeable to writer send a list of transportation options in area. Patient shared she will be able to pay private if needed.        Universal Utilization: appropriate   Clinic Utilization  Difficulty keeping appointments:: No  Compliance Concerns: No  No-Show Concerns: No  No PCP office visit in Past Year: No  Utilization      No Show Count (past year)  0             ED Visits  3             Hospital Admissions  3                    Current as of: 5/6/2025 10:17 AM                Clinical Concerns:  Current Medical Concerns:      Current Behavioral Concerns: Not discussed     Education Provided to patient: Transportation    Pain  Pain (GOAL):: No  Health Maintenance Reviewed: Up to date  Clinical Pathway: None    Medication Management:  Medication review  status: Medications reviewed and no changes reported per patient.             Functional Status:  Dependent ADLs:: Independent  Dependent IADLs:: Independent  Bed or wheelchair confined:: No  Mobility Status: Independent  Fallen 2 or more times in the past year?: No  Any fall with injury in the past year?: No    Living Situation:  Current living arrangement:: I live alone  Type of residence:: Apartment    Lifestyle & Psychosocial Needs:    Social Drivers of Health     Food Insecurity: Low Risk  (4/24/2025)    Food Insecurity     Within the past 12 months, did you worry that your food would run out before you got money to buy more?: No     Within the past 12 months, did the food you bought just not last and you didn t have money to get more?: No   Depression: Not at risk (2/18/2025)    PHQ-2     PHQ-2 Score: 0   Housing Stability: Low Risk  (4/24/2025)    Housing Stability     Do you have housing? : Yes     Are you worried about losing your housing?: No   Tobacco Use: Low Risk  (4/7/2025)    Patient History     Smoking Tobacco Use: Never     Smokeless Tobacco Use: Never     Passive Exposure: Not on file   Financial Resource Strain: Low Risk  (4/24/2025)    Financial Resource Strain     Within the past 12 months, have you or your family members you live with been unable to get utilities (heat, electricity) when it was really needed?: No   Alcohol Use: Not At Risk (11/27/2022)    [facility]    AUDIT-C     Frequency of Alcohol Consumption: Never     Average Number of Drinks: Not on file     Frequency of Binge Drinking: Not on file   Transportation Needs: Low Risk  (4/24/2025)    Transportation Needs     Within the past 12 months, has lack of transportation kept you from medical appointments, getting your medicines, non-medical meetings or appointments, work, or from getting things that you need?: No   Physical Activity: Insufficiently Active (7/26/2024)    Received from [facility]     Exercise Vital Sign     Days of Exercise per Week: 2 days     Minutes of Exercise per Session: 20 min   Interpersonal Safety: Low Risk  (4/24/2025)    Interpersonal Safety     Do you feel physically and emotionally safe where you currently live?: Yes     Within the past 12 months, have you been hit, slapped, kicked or otherwise physically hurt by someone?: No     Within the past 12 months, have you been humiliated or emotionally abused in other ways by your partner or ex-partner?: No   Recent Concern: Interpersonal Safety - High Risk (4/19/2025)    Interpersonal Safety     Do you feel physically and emotionally safe where you currently live?: Yes     Within the past 12 months, have you been hit, slapped, kicked or otherwise physically hurt by someone?: Yes     Within the past 12 months, have you been humiliated or emotionally abused in other ways by your partner or ex-partner?: No   Stress: No Stress Concern Present (11/27/2022)    [facility]    [employer] - Occupational Stress Questionnaire     Feeling of Stress : Only a little   Social Connections: Unknown (11/27/2022)    [facility]    Social Connection and Isolation Panel [NHANES]     Frequency of Communication with Friends and Family: More than three times a week     Frequency of Social Gatherings with Friends and Family: More than three times a week     Attends Latter day Services: More than 4 times per year     Active Member of Clubs or Organizations: Yes     Attends Club or Organization Meetings: More than 4 times per year     Marital Status: Not on file   Health Literacy: Not on file     Diet:: Regular  Inadequate nutrition (GOAL):: No  Tube Feeding: No  Inadequate activity/exercise (GOAL):: No  Significant changes in sleep pattern (GOAL): No  Transportation means:: Friend, Metro mobility     Latter day or spiritual beliefs that impact treatment:: No  Mental health DX::  No  Mental health management concern (GOAL):: No  Chemical Dependency Status: No Current Concerns  Informal Support system:: Friends      Resources and Interventions:  Current Resources:   Skilled Home Care Services: Skilled Nursing, Physical Therapy, Occupational Therapy, Speech Therapy  Community Resources: None  Supplies Currently Used at Home: None  Equipment Currently Used at Home: none  Employment Status: employed full-time         Advance Care Plan/Directive  Advanced Care Plans/Directives on file:: No    Referrals Placed: None       Care Plan:  Care Plan: Transportation       Problem: Lack of transportation       Goal: Establish reliable transportation       Start Date: 5/6/2025    This Visit's Progress: 30%    Priority: Medium    Note:     Barriers: Access   Strengths: Motivated   Patient expressed understanding of goal: Yes  Action steps to achieve this goal:  1. I will review private transportation options to medical appointment   2. I will apply for metro mobility in next month   3. I will engage with CC monthly and request additional support as needed.                                Patient/Caregiver understanding: Pt/family reports understanding and denies any additional questions or concerns at this times. Care Coordinator engaged in AIDET communication during encounter.      Outreach Frequency: monthly, more frequently as needed  Future Appointments                In 1 week Ivan Deleon MD [facility]    In 2 weeks Sterling Hill MD [facility]    In 1 month Kennedi Alberts APRN CNP [facility]    In 1 month Leonel Platt MD [facility]    In 2 months Sterling Hill MD [facility]    In 5 months Liliane Robbins MD [facility]            Plan: Patient will utilize transportation options SW CC provider  and will request metro mobility from pcp.      and Transitions of Care Outreach  Chief Complaint   Patient presents with    Clinic Care Coordination - Post Hospital    Clinic Care Coordination - Initial       Most Recent Admission Date: 4/24/2025   Most Recent Admission Diagnosis:      Most Recent Discharge Date: 5/3/2025   Most Recent Discharge Diagnosis: Acute CVA (cerebrovascular accident) (H) - I63.9  Mastoid disorder, unspecified laterality - H74.90  Cerebrovascular accident (CVA), unspecified mechanism (H) - I63.9     Transitions of Care Assessment    Discharge Assessment  How are you doing now that you are home?: Patient noted she doing okay. She has home care outing today.  How are your symptoms? (Red Flag symptoms escalate to triage hotline per guidelines): Improved  Do you know how to contact your clinic care team if you have future questions or changes to your health status? : Yes  Does the patient have their discharge instructions? : Yes  Does the patient have questions regarding their discharge instructions? : Yes (see comment)  Were you started on any new medications or were there changes to any of your previous medications? : Yes  Does the patient have all of their medications?: Yes  Do you have questions regarding any of your medications? : Yes (see comment)  Do you have all of your needed medical supplies or equipment (DME)?  (i.e. oxygen tank, CPAP, cane, etc.): Yes    Post-op (CHW CTA Only)  If the patient had a surgery or procedure, do they have any questions for a nurse?: No    Post-op (Clinicians Only)  Did the patient have surgery or a procedure: No  Fever: No  Chills: No  Eating & Drinking: eating and drinking without complaints/concerns  PO Intake: regular diet  Bowel Function: normal  Urinary Status: voiding without complaint/concerns    Care Management       Care Mgmt General Assessment        Living Situation  Current living arrangement:: I live alone  Type of residence::  Apartment  Resources  Patient receiving home care services:: Yes  Skilled Home Care Services: Skilled Nursing, Physical Therapy, Occupational Therapy, Speech Therapy  Community Resources: None  Supplies Currently Used at Home: None  Equipment Currently Used at Home: none  Referrals Placed: None  Employment Status: employed full-time  Psychosocial  Islam or spiritual beliefs that impact treatment:: No  Mental health DX:: No  Mental health management concern (GOAL):: No  Chemical Dependency Status: No Current Concerns  Informal Support system:: Friends  Functional Status  Dependent ADLs:: Independent  Dependent IADLs:: Independent  Bed or wheelchair confined:: No  Mobility Status: Independent  Fallen 2 or more times in the past year?: No  Any fall with injury in the past year?: No  Advance Care Plan/Directive  Advanced Care Plans/Directives on file:: No    Follow up Plan     Discharge Follow-Up  Discharge follow up appointment scheduled in alignment with recommended follow up timeframe or Transitions of Risk Category? (Low = within 30 days; Moderate= within 14 days; High= within 7 days): Yes  Discharge Follow Up Appointment Date: 05/20/25    Future Appointments   Date Time Provider Department Center   5/15/2025  9:45 AM Ivan Deleon MD [facility]   5/20/2025  9:00 AM [facility]   6/12/2025  9:00 AM Kennedi Alberts APRN CNP [facility]    7/2/2025 10:30 AM [facility]   7/15/2025 10:00 AM Sterling Hill MD [facility]    10/22/2025 10:30 AM Liliane Robbins MD [facility]       Outpatient Plan as outlined on AVS reviewed with patient.    For any urgent concerns, please contact our 24 hour nurse triage line: [phone] ([phone])       Karthikeyan Almaguer [facility]  Social Work [facility]   Phone: [phone]

## 2025-05-07 ENCOUNTER — PATIENT OUTREACH (OUTPATIENT)
Dept: CARE COORDINATION | Facility: CLINIC | Age: 60
End: 2025-05-07
Payer: COMMERCIAL

## 2025-05-08 ENCOUNTER — TELEPHONE (OUTPATIENT)
Dept: FAMILY MEDICINE | Facility: CLINIC | Age: 60
End: 2025-05-08
Payer: COMMERCIAL

## 2025-05-08 ENCOUNTER — TELEPHONE (OUTPATIENT)
Dept: NEUROLOGY | Facility: CLINIC | Age: 60
End: 2025-05-08

## 2025-05-08 NOTE — TELEPHONE ENCOUNTER
[facility] Call [facility]    Phone Message    May a detailed message be left on voicemail: no     Reason for Call: Appointment Intake    Referring Provider Name: Caridad Dalal MD   Diagnosis and/or Symptoms: Acute CVA (cerebrovascular accident)    Stroke hosp f/up referral instructions say to schedule with Dr. Valdivia in 6-8 weeks.    Pt is scheduled with Mechelle Alberts on 6/12 for a return stroke. Please review and contact patient regarding this referral.    Action Taken: Message routed to:  Other: [facility] Neurology    Travel Screening: Not Applicable     Date of Service:

## 2025-05-08 NOTE — TELEPHONE ENCOUNTER
Yes ok to keep appointment with but also work on getting in with Dr. Valdivia as soon as possible. Long term she should follow with stroke MD given the complexity of her case.

## 2025-05-08 NOTE — TELEPHONE ENCOUNTER
Scheduled as new stroke on 7/2. Routing to stroke team to review if sooner visit is needed or visit needs to be with Dr. Valdivia.

## 2025-05-08 NOTE — TELEPHONE ENCOUNTER
Home Care is calling regarding an established patient with [facility].  Requesting orders from: Sterling Hill  RN APPROVED: RN able to provide verbal orders.  Home Care will send orders for signature.  RN will close encounter.  Is this a request for a temporary pause in the home care episode?  No    Orders Requested    Orders needed for: home safety, education , strength , ADL's  speech and swallowing.     Pt was referred to [facility] for SW service.        Skilled Nursing  Request for initial certification (first set of orders)   Frequency: 1 a wk for 4vwks  RN gave verbal order: Yes      Physical Therapy  Request for initial certification (first set of orders)   Frequency: 2 x a wk for 4 wks  1 x wk 2 wks  RN gave verbal order: Yes  2    Occupational Therapy  Request for initial certification (first set of orders)   Frequency: 1 a wk for 6 wks  RN gave verbal order: Yes      Speech Therapy  Request for initial certification (first set of orders)   Frequency: 1 x a wk for 5 wks  RN gave verbal order: Yes      Phone number Home Care can be reached at: [phone]  Okay to leave a detailed message?: Yes    Riaz Lewis RN

## 2025-05-08 NOTE — TELEPHONE ENCOUNTER
"Chart reviewed. Patient has stroke BEV visit scheduled for return stroke visit on 6/12/25 with Kennedi Alberts following up on previous visit for stroke with provider end of February.     In the mean time patient has had an admission and a new referral to see stroke MD Dr. Valdivia has been placed.     \"New stroke\" visit on 7/2/25 mentioned below is a visit with PM&R and should be kept in addition to stroke hospital follow up.     I think it is reasonable to keep 6/12 visit with stroke BEV [place]- (changed to stroke hospital follow up) Get scheduled with Dr. Valdivia, as he may not have openings as soon as stroke BEV team.     As stroke RN is out this week, I am looping in Kennedi Alberts to review and finalize plan.    RALPH WASSERMAN CMA        "

## 2025-05-12 NOTE — PROGRESS NOTES
[place] VASC[facility]    Afshan Jimenez returns for follow-up of carotid stenosis.  Was seen in consultation 4/21/2025.  History of previous stroke with right upper extremity weakness late November 2024.  No cardioembolic source.  Transient dysarthria right-sided deficits January 2025 with normal MRI.  Admitted for multiple left hemispheric strokes.  Given TNK complicated by parietal occipital hemorrhage.  Symptoms gradually improved.  Hemorrhage measured 2.6 x 3.7 x 3.4 cm on CT scan over left parietal occipital lobe.    --CTA with less than 50% left carotid stenosis but irregular and possible cause.  Soft hypokyphotic plaque noted at left carotid bifurcation.  Recommended delayed left CEA due to concern of possible ulceration.  No disease in right carotid.  Atretic right vertebral but normal left.    --4/21/2025 echo with EF= 55 to 60%.  No intracardiac thrombus or shunt,.  No significant valvular disease.    PMH: Medications: Lipitor, Plavix, aspirin   Medical: Hyperlipidemia on statin-4/19/2025 LDL= 47.  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  Normal AURA following her stroke.    Patient was at the [facility] [place] from 4/24/2025 to 5/3/2025.  Discharged to home with her family.  Noted improvement of her cognitive post stroke deficits.  Continue with home PT, OT, and speech therapy.    At the time of her admission we are unsure whether the changes in the left carotid was more secondary to her radiation and whether this really was the cause of her initial stroke.  To note that she did have somewhat similar symptoms in January 2025 with a negative MRI.    Exam: Alert and appropriate.  Still somewhat unsteady in her gait.  Here with her .  Very lucid and conversant.   Blood pressure 110/80.   Gross motor exam normal.    I reviewed all studies with the patient.  Very minimal changes on the CTA.  There was more diffuse  narrowing of the CCA.  Duplex today did not reveal any ulceration and more of a spot with wall thickening.        IMPRESSION:   #1.  Slow improvement following left CVI with posttreatment hemorrhage.    #2.  Biggest question is whether a left CEA would be indicated with her history of radiation which can cause the wall thickening on this but it is more diffuse and very unlikely to embolize.  Degree of stenosis was extremely minimal and the only recently considered CEA at the time of her hospitalization was due to the symptoms but obviously had to wait due to the hemorrhage which is now stable and clinically improving.    I discussed this issue with her and her friend over the last 25 minutes.  Will repeat the left carotid duplex to help make this decision.  If we see no change in the ulceration conservative treatment would probably be most beneficial    Ivan Deleon MD   This note was created using Dragon voice recognition software which may result in transcription errors.      ADDENDUM; repeat carotid duplex just performed and reviewed images and report with patient and her friend.  There is diffuse narrowing of the distal one third of the left common carotid artery that is now felt to be approximately 67% of the lumen diameter.  This does not involve the internal carotid artery.  Wall irregularity was noted.  Velocities are somewhat increased compared to previously.    Situation was discussed with patient and her friend and images reviewed.  With her history I think is very reasonable to consider left open CEA with likely greater saphenous vein patch since there is irregularity and she has had multiple symptoms in the past.  There is obviously some controversy whether this is necessary but her history and irregularity would make this very reasonable.  I definitely do not feel stenting is appropriate in her situation this is also discussed.  Her neck is soft and supple and she could tolerate the surgery.   Would recommend she continue on her aspirin and Plavix.    Apparently she has a follow-up with the stroke neurology team at [facility] and should be cleared by them for the surgery before scheduling.    Over 40 minutes with the patient today.    Ivan Deleon MD

## 2025-05-13 ENCOUNTER — PATIENT OUTREACH (OUTPATIENT)
Dept: CARE COORDINATION | Facility: CLINIC | Age: 60
End: 2025-05-13
Payer: COMMERCIAL

## 2025-05-13 NOTE — PROGRESS NOTES
Clinic Care Coordination Contact  Follow Up Progress Note      Assessment: SW received voicemail from pt that [facility] cannot help her with transportation.  SW called and spoke to pt, and asked her if she will be attending the appt with Dr Deleon on 5/15/25 and she is.  SW asks if it would be okay for SW to meet with her to get her signature on [employer] paperwork and she would love to get this done!  Pt shares that she has a homecare nurse at her home, and she is doing well today.      Care Gaps:    Health Maintenance Due   Topic Date Due    MICROALBUMIN  Never done    ADVANCE CARE PLANNING  Never done    YEARLY PREVENTIVE VISIT  Never done    COVID-19 Vaccine (1) Never done    HIV SCREENING  Never done    Pneumococcal Vaccine: 50+ Years (1 of 2 - PCV) Never done    ZOSTER IMMUNIZATION (1 of 2) Never done    DTAP/TDAP/TD IMMUNIZATION (2 - Td or Tdap) 05/11/2022    RSV VACCINE (1 - Risk 60-74 years 1-dose series) Never done    PAP  08/16/2025           Care Plans  Care Plan: Transportation       Problem: Lack of transportation       Goal: Establish reliable transportation       Start Date: 5/6/2025    This Visit's Progress: 30%    Priority: Medium    Note:     Barriers: Access   Strengths: Motivated   Patient expressed understanding of goal: Yes  Action steps to achieve this goal:  1. I will review private transportation options to medical appointment   2. I will apply for [employer] in next month   3. I will engage with CC monthly and request additional support as needed.                                Intervention/Education provided during outreach: [employer]         Plan:   SW to meet pt on 5/15/25  Care Coordinator will follow up in 2 days,.     Jessica Benitez,  [facility]  Clinic Care Coordinator  [facility]  [phone]  [email or web address]

## 2025-05-14 ENCOUNTER — PATIENT OUTREACH (OUTPATIENT)
Dept: CARE COORDINATION | Facility: CLINIC | Age: 60
End: 2025-05-14
Payer: COMMERCIAL

## 2025-05-14 NOTE — PROGRESS NOTES
Clinic Care Coordination Contact  Care Team Conversations    Pt left several messages worried about the timing of meeting SW on 5/15/25.  SW called and pt explained that someone is giving her a ride, but she is worried that she will not be there at 915 to meet with SW as planned.  SW and pt looked at the location of her appt, and SW explained that SW can meet her before or after and that I am working in the same building or next door to it.  Pt checks and sees that her appt is at [address].  SW shares that pt does not need to worry, that SW can walk over around 930 and if that doesn't work, SW will stop by after her appt.  Pt expresses relief.  We discussed the [employer] application again, and SW will bring her a copy of it on 5/15.    Jessica Benitez,  [facility]  Clinic Care Coordinator  [facility]  [phone]  [email or web address]

## 2025-05-15 ENCOUNTER — OFFICE VISIT (OUTPATIENT)
Dept: OTHER | Facility: CLINIC | Age: 60
End: 2025-05-15
Attending: SURGERY
Payer: COMMERCIAL

## 2025-05-15 ENCOUNTER — PATIENT OUTREACH (OUTPATIENT)
Dept: NURSING | Facility: CLINIC | Age: 60
End: 2025-05-15
Payer: COMMERCIAL

## 2025-05-15 VITALS — HEART RATE: 120 BPM | DIASTOLIC BLOOD PRESSURE: 80 MMHG | SYSTOLIC BLOOD PRESSURE: 110 MMHG

## 2025-05-15 DIAGNOSIS — I63.9 ACUTE CVA (CEREBROVASCULAR ACCIDENT) (H): ICD-10-CM

## 2025-05-15 DIAGNOSIS — E78.5 HYPERLIPIDEMIA LDL GOAL <70: Primary | ICD-10-CM

## 2025-05-15 PROCEDURE — 99213 OFFICE O/P EST LOW 20 MIN: CPT | Performed by: SURGERY

## 2025-05-15 NOTE — PROGRESS NOTES
[facility]        Patient is here for a  follow up.    Pt is currently taking Aspirin, Statin, and Plavix.    /80 (BP Location: Left arm, Patient Position: Chair, Cuff Size: Adult Regular)   Pulse 120     The provider has been notified that the patient has no concerns.     Questions patient would like addressed today are: N/A.    Refills are needed: N/A    Has homecare services and agency name:  No    Patient has been identified as a fall risk.    Interventions were completed as noted below:  []Exam tables/chairs remained in the lowest position.   []Patient remained in wheelchair.    []Provider requested patient in exam chair.  Patient required assist and use of transfer belt.  [x]Exam room door remained open unless with a provider.  [x]A bell provided to patient to notify staff if assistance was needed.  [x]Provider notified patient was identified as a fall risk.     Dana Dahl MA

## 2025-05-15 NOTE — PATIENT INSTRUCTIONS
Thank you for choosing [facility] for your vascular care.  Please call us at [phone] if you have any questions or concerns.    CLINIC DISCHARGE INSTRUCTIONS    Patient:  Afshan Jimenez  Provider: Dr. Deleon    REASON FOR VISIT:    Carotid artery disease    RECOMMENDATIONS:  Follow up with the stroke neurology (scheduled 6/12/25) and discuss if they will clear you for a left carotid endarterectomy.  Please call us after this appointment is completed so we can proceed with scheduling you for the recommended procedure.    *If you had a positive experience, please indicate on your patient satisfaction survey form that [facility] will be sending you.  This may also be completed as a phone call.

## 2025-05-15 NOTE — PROGRESS NOTES
Clinic Care Coordination Contact  Follow Up Progress Note      Assessment: SW met with pt face to face.  We completed the [employer] application.  Pt unable to read the forms, and SW read them to her.  Pt had difficulty signing the forms, she shares that her hands don't cooperate with her brain very well right now.  She shares that she is making progress and her writing is improving.  Pt appreciative that SW is assisting her with [employer] process.  Pt shares that she received a call from someone about an appointment downtown, but she can't remember who called her and she is worried about it.  SW offers to review her chart and see if SW can figure out what that call may have been.  Pt gladly accepts.    SW     Care Gaps:    Health Maintenance Due   Topic Date Due    MICROALBUMIN  Never done    ADVANCE CARE PLANNING  Never done    YEARLY PREVENTIVE VISIT  Never done    COVID-19 Vaccine (1) Never done    HIV SCREENING  Never done    Pneumococcal Vaccine: 50+ Years (1 of 2 - PCV) Never done    ZOSTER IMMUNIZATION (1 of 2) Never done    DTAP/TDAP/TD IMMUNIZATION (2 - Td or Tdap) 05/11/2022    RSV VACCINE (1 - Risk 60-74 years 1-dose series) Never done    PAP  08/16/2025           Care Plans  Care Plan: Transportation       Problem: Lack of transportation       Goal: Establish reliable transportation       Start Date: 5/6/2025    This Visit's Progress: 30%    Priority: Medium    Note:     Barriers: Access   Strengths: Motivated   Patient expressed understanding of goal: Yes  Action steps to achieve this goal:  1. I will review private transportation options to medical appointment   2. I will apply for [employer] in next month   3. I will engage with CC monthly and request additional support as needed.                                Intervention/Education provided during outreach: [employer]         Plan:   SW to follow  Care Coordinator will follow up in 1-2 days.      Jessica Benitez,  [facility]  Clinic Care  Coordinator  [facility]  [phone]  [email or web address]     High cholesterol    Hypertension, unspecified type

## 2025-05-20 ENCOUNTER — OFFICE VISIT (OUTPATIENT)
Dept: FAMILY MEDICINE | Facility: CLINIC | Age: 60
End: 2025-05-20
Payer: COMMERCIAL

## 2025-05-20 VITALS
BODY MASS INDEX: 30.74 KG/M2 | WEIGHT: 191.3 LBS | HEIGHT: 66 IN | SYSTOLIC BLOOD PRESSURE: 108 MMHG | RESPIRATION RATE: 18 BRPM | TEMPERATURE: 97.8 F | HEART RATE: 99 BPM | DIASTOLIC BLOOD PRESSURE: 74 MMHG | OXYGEN SATURATION: 97 %

## 2025-05-20 DIAGNOSIS — I65.29 STENOSIS OF CAROTID ARTERY, UNSPECIFIED LATERALITY: ICD-10-CM

## 2025-05-20 DIAGNOSIS — E78.5 DYSLIPIDEMIA: ICD-10-CM

## 2025-05-20 DIAGNOSIS — I63.9 ISCHEMIC CEREBROVASCULAR ACCIDENT (CVA) (H): Primary | ICD-10-CM

## 2025-05-20 DIAGNOSIS — I63.9 ACUTE CVA (CEREBROVASCULAR ACCIDENT) (H): ICD-10-CM

## 2025-05-20 DIAGNOSIS — I61.9 HEMORRHAGIC CEREBROVASCULAR ACCIDENT (CVA) (H): ICD-10-CM

## 2025-05-20 DIAGNOSIS — E11.59 TYPE 2 DIABETES MELLITUS WITH OTHER CIRCULATORY COMPLICATION, WITHOUT LONG-TERM CURRENT USE OF INSULIN (H): ICD-10-CM

## 2025-05-20 PROCEDURE — 99495 TRANSJ CARE MGMT MOD F2F 14D: CPT | Performed by: INTERNAL MEDICINE

## 2025-05-20 PROCEDURE — 1111F DSCHRG MED/CURRENT MED MERGE: CPT | Performed by: INTERNAL MEDICINE

## 2025-05-20 PROCEDURE — 3074F SYST BP LT 130 MM HG: CPT | Performed by: INTERNAL MEDICINE

## 2025-05-20 PROCEDURE — 3078F DIAST BP <80 MM HG: CPT | Performed by: INTERNAL MEDICINE

## 2025-05-20 PROCEDURE — 1126F AMNT PAIN NOTED NONE PRSNT: CPT | Performed by: INTERNAL MEDICINE

## 2025-05-20 RX ORDER — ATORVASTATIN CALCIUM 40 MG/1
40 TABLET, FILM COATED ORAL EVERY EVENING
Qty: 90 TABLET | Refills: 1 | Status: CANCELLED | OUTPATIENT
Start: 2025-05-20

## 2025-05-20 RX ORDER — CLOPIDOGREL BISULFATE 75 MG/1
75 TABLET ORAL DAILY
Qty: 90 TABLET | Refills: 1 | Status: SHIPPED | OUTPATIENT
Start: 2025-05-20

## 2025-05-20 ASSESSMENT — PAIN SCALES - GENERAL: PAINLEVEL_OUTOF10: NO PAIN (0)

## 2025-05-20 NOTE — PROGRESS NOTES
Assessment & Plan     Summary Assessment:  Asfhan Jimenez, a 59-year-old female, is recovering from an acute cerebrovascular accident with ischemic and hemorrhagic components. She has a history of multiple strokes and carotid artery stenosis. Her diabetes is currently managed without medication (off Ozempic), and she is on atorvastatin for dyslipidemia. The patient is considering carotid artery surgery to prevent further strokes.    Ischemic cerebrovascular accident (CVA) (H)  - History of ischemic CVA with hemorrhagic conversion. Multiple strokes noted.  - Continue clopidogrel and aspirin as mild blood thinners. Consideration for carotid artery surgery to prevent further strokes. Follow-up with Dr. Perez on [place] 10, 2025, to discuss surgery.      Acute CVA (cerebrovascular accident) (H)  - Recent acute CVA with hospitalization and rehabilitation.  - Continue current medications including clopidogrel, atorvastatin, and aspirin. Prescription refill for clopidogrel sent. follow up with Neurology and Phsyiatry    Stenosis of carotid artery, unspecified laterality  - Carotid artery stenosis with 67% narrowing noted on ultrasound.  - has vascular follow up on the books    Type 2 diabetes mellitus with other circulatory complication, without long-term current use of insulin (H)  - Diabetes management without current use of insulin. Blood sugars stable post-hospitalization.  - Stay off Ozempic for now; reassess in several weeks to months. Regroup in July 2025 to discuss diabetes management.    Dyslipidemia  - Dyslipidemia managed with atorvastatin for cholesterol and plaque stabilization.  - Continue atorvastatin therapy; prescription refill available for pickup.    Consent was obtained from the patient to use an AI documentation tool in the creation of this note.      40 minutes spent by me on the date of the encounter doing chart review, history and exam, documentation and further activities per the note    The  longitudinal plan of care for the diagnosis(es)/condition(s) as documented were addressed during this visit. Due to the added complexity in care, I will continue to support Afshan in the subsequent management and with ongoing continuity of care.    MED REC REQUIRED  Post Medication Reconciliation Status: discharge medications reconciled and changed, per note/orders        Subjective   Afshan is a 59 year old, presenting for the following health issues:  Hospital F/U        5/20/2025     8:45 AM   Additional Questions   Roomed by Brittni GORDON MA     HPI Afshan Jimenez, 59 years    Acute Cerebrovascular Accident (CVA) Afshan Jimenez is here for a post-hospital follow-up after experiencing an acute cerebrovascular accident (CVA) that was initially ischemic with hemorrhagic conversion. She was hospitalized/in rehab for over two weeks and discharged on May 3, 2025. During her hospital stay, she was started on clopidogrel, and resumed atorvastatin upon returning home. She reports feeling wellsince being home and has been progressing well with rehabilitation. She has started walking without a walker for the past three days and is receiving physical therapy and weekly nursing visits at home. She does have Carotid stenosis which is progressinb.  Considering surgery for an artery with 67% narrowing, as indicated by a recent ultrasound. There is a recommendation for an implanted monitor to further investigate potential causes of her strokes. Afshan expresses concern about the risks associated with surgery due to her medical history, including diabetes and previous strokes.          5/6/2025   Post Discharge Outreach   How are you doing now that you are home? Patient noted she doing okay. She has home care outing today.   How are your symptoms? (Red Flag symptoms escalate to triage hotline per guidelines) Improved   Does the patient have their discharge instructions?  Yes   Does the patient have questions regarding their  "discharge instructions?  Yes (see comment)   Were you started on any new medications or were there changes to any of your previous medications?  Yes   Does the patient have all of their medications? Yes   Do you have questions regarding any of your medications?  Yes (see comment)   Do you have all of your needed medical supplies or equipment (DME)?  (i.e. oxygen tank, CPAP, cane, etc.) Yes   Discharge Follow Up Appointment Date 5/20/2025       Hospital Follow-up Visit:    Hospital/Nursing Home/IP Rehab Facility: 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  No  Do you have any other stressors you would like to discuss with your provider? Health Concerns    Problems taking medications regularly:  None  Medication changes since discharge: None  Problems adhering to non-medication therapy:  None    Summary of hospitalization:  [facility] discharge summary reviewed  Diagnostic Tests/Treatments reviewed.  Follow up needed: none  Other Healthcare Providers Involved in Patient s Care:         Homecare and Specialist appointment - as scheduled  Update since discharge: stable.         Plan of care communicated with patient and                        Objective    /74 (BP Location: Right arm, Patient Position: Sitting, Cuff Size: Adult Regular)   Pulse 99   Temp 97.8  F (36.6  C) (Temporal)   Resp 18   Ht 1.676 m (5' 6\")   Wt 86.8 kg (191 lb 4.8 oz)   SpO2 97%   BMI 30.88 kg/m    Body mass index is 30.88 kg/m .  Physical Exam   GENERAL: alert and no distress  NECK: no " adenopathy, no asymmetry, masses, or scars  RESP: lungs clear to auscultation - no rales, rhonchi or wheezes  CV: regular rate and rhythm, normal S1 S2, no S3 or S4, no murmur, click or rub, no peripheral edema  ABDOMEN: soft, nontender, no hepatosplenomegaly, no masses and bowel sounds normal  MS: no gross musculoskeletal defects noted, no edema            Signed Electronically by: Sterling Hill MD

## 2025-05-26 ENCOUNTER — PATIENT OUTREACH (OUTPATIENT)
Dept: CARE COORDINATION | Facility: CLINIC | Age: 60
End: 2025-05-26
Payer: COMMERCIAL

## 2025-05-29 ENCOUNTER — PATIENT OUTREACH (OUTPATIENT)
Dept: CARE COORDINATION | Facility: CLINIC | Age: 60
End: 2025-05-29
Payer: COMMERCIAL

## 2025-05-29 NOTE — PROGRESS NOTES
Clinic Care Coordination Contact  [facility]/Voicemail    Clinical Data: Care Coordinator Outreach    Outreach Documentation Number of Outreach Attempt   5/5/2025   3:44 PM 1   5/29/2025  11:28 AM 1       Left message on patient's voicemail with call back information and requested return call.      Plan: Care Coordinator will try to reach patient again in 10 business days.    Jessica Benitez,  [facility]  Clinic Care Coordinator  [facility]  [phone]  [email or web address]

## 2025-05-30 ENCOUNTER — TELEPHONE (OUTPATIENT)
Dept: FAMILY MEDICINE | Facility: CLINIC | Age: 60
End: 2025-05-30
Payer: COMMERCIAL

## 2025-05-30 NOTE — TELEPHONE ENCOUNTER
Reporting low BP readings during last 3 home care visits. Patient has some slight dizziness when moving to standing position, otherwise asymptomatic.    5/23   96/64  5/28   98/70  5/29   09:28   94/64             12:11   103/66            14:26   96/64    Patient discharged from  5/28/25, advised patient to continue monitoring BP on her own at home and call clinic or 911. Patient still receiving PT/OT/Speech services.

## 2025-06-10 ENCOUNTER — MEDICAL CORRESPONDENCE (OUTPATIENT)
Dept: HEALTH INFORMATION MANAGEMENT | Facility: CLINIC | Age: 60
End: 2025-06-10

## 2025-06-10 ENCOUNTER — OFFICE VISIT (OUTPATIENT)
Dept: OTHER | Facility: CLINIC | Age: 60
End: 2025-06-10
Attending: INTERNAL MEDICINE
Payer: COMMERCIAL

## 2025-06-10 VITALS
SYSTOLIC BLOOD PRESSURE: 116 MMHG | WEIGHT: 199 LBS | OXYGEN SATURATION: 99 % | DIASTOLIC BLOOD PRESSURE: 80 MMHG | BODY MASS INDEX: 32.12 KG/M2 | HEART RATE: 94 BPM

## 2025-06-10 DIAGNOSIS — I63.9 ACUTE CVA (CEREBROVASCULAR ACCIDENT) (H): Primary | ICD-10-CM

## 2025-06-10 PROBLEM — G98.8 DISORDER OF NERVOUS SYSTEM DUE TO TYPE 2 DIABETES MELLITUS (H): Status: ACTIVE | Noted: 2019-11-14

## 2025-06-10 PROBLEM — Z79.4 TYPE 2 DIABETES MELLITUS WITHOUT COMPLICATION, WITH LONG-TERM CURRENT USE OF INSULIN (H): Status: ACTIVE | Noted: 2019-11-14

## 2025-06-10 PROBLEM — E11.69 DISORDER OF NERVOUS SYSTEM DUE TO TYPE 2 DIABETES MELLITUS (H): Status: ACTIVE | Noted: 2019-11-14

## 2025-06-10 PROBLEM — H74.92: Status: ACTIVE | Noted: 2025-06-10

## 2025-06-10 PROBLEM — E11.9 TYPE 2 DIABETES MELLITUS WITHOUT COMPLICATION, WITH LONG-TERM CURRENT USE OF INSULIN (H): Status: ACTIVE | Noted: 2019-11-14

## 2025-06-10 PROCEDURE — 3078F DIAST BP <80 MM HG: CPT | Performed by: INTERNAL MEDICINE

## 2025-06-10 PROCEDURE — 3074F SYST BP LT 130 MM HG: CPT | Performed by: INTERNAL MEDICINE

## 2025-06-10 NOTE — PROGRESS NOTES
INITIAL VASCULAR MEDICAL ASSESSMENT  REFERRAL SOURCE: Delvis Concepcion MD   REASON FOR CONSULT:  for ICA stenosis       A/P:      (I63.9) Acute CVA (cerebrovascular accident) (H)  (primary encounter diagnosis)  Comment: The patient was referred to me while inpatient for outpt consult but has been already seen by Dr. Deleon. Please review his 5/15/25 office note. Her referral to me was not cancelled as would have been appropriate given Dr. Deleon's action plan with this patient, which is to proceed to Lt open CEA w/ GSV patch angioplasty pending stroke neurology approval. She sees stroke neurology 6/12/25.  Plan: Obtain stroke neurology approval to proceed with Lt CEA when seen 6/12/25. Obtain approval to travel to [place] from Dr. Deleon pre or post CEA. No charge for today's visit as she should not have been scheduled to see me given her care with Dr. Deleon.           HPI: Afshan Jimenez is a 60 year old female with a h/o left anterior hemispheric stroke 4/2025 S/P TNK w/ hemorrhage requiring TNK reversal. The patient was referred to me while inpatient for outpt consult but has been already seen by Dr. Deleon. Please review his 5/15/25 office note. Her referral to me was not cancelled as would have been appropriate given Dr. Deleon's action plan with this patient, which is to proceed to Lt open CEA w/ GSV patch angioplasty pending stroke neurology approval. LDL on Lipitor 40 is < 55. BP is well controlled.     Review Of Systems  Skin: negative  Eyes: negative  Ears/Nose/Throat: negative  Respiratory: No shortness of breath, dyspnea on exertion, cough, or hemoptysis  Cardiovascular: negative  Gastrointestinal: negative  Genitourinary: negative  Musculoskeletal: negative  Neurologic: negative  Psychiatric: negative  Hematologic/Lymphatic/Immunologic: negative  Endocrine: negative      PAST MEDICAL HISTORY:                  Past Medical History:   Diagnosis Date    Deafness     left ear    Diabetes mellitus  (H)     Malignant neoplasm (H)     leukemia    Malignant neoplasm of parotid gland (H)        PAST SURGICAL HISTORY:                  Past Surgical History:   Procedure Laterality Date    LAPAROSCOPIC CHOLECYSTECTOMY  10/24/2011    Procedure:LAPAROSCOPIC CHOLECYSTECTOMY; Laparoscopic Cholecystectomy; Surgeon:TIAGO MALDONADO; Location:[facility] OR    SALIVARY GLAND SURGERY      L parotid    SINUS SURGERY      tumor removed from roof of mouth         CURRENT MEDICATIONS:                  Current Outpatient Medications   Medication Sig Dispense Refill    acetaminophen (TYLENOL) 325 MG tablet Take 2 tablets (650 mg) by mouth every 4 hours as needed for mild pain.      aspirin 81 MG EC tablet Take 1 tablet (81 mg) by mouth daily. 90 tablet 0    atorvastatin (LIPITOR) 40 MG tablet Take 1 tablet (40 mg) by mouth every evening. 90 tablet 1    clopidogrel (PLAVIX) 75 MG tablet Take 1 tablet (75 mg) by mouth daily. 90 tablet 1    senna-docusate (SENOKOT-S/PERICOLACE) 8.6-50 MG tablet Take 1 tablet by mouth 2 times daily as needed for constipation.         ALLERGIES:                  Allergies   Allergen Reactions    Codeine Sulfate Rash       SOCIAL HISTORY:                  Social History     Socioeconomic History    Marital status: Single     Spouse name: Not on file    Number of children: Not on file    Years of education: Not on file    Highest education level: Not on file   Occupational History    Not on file   Tobacco Use    Smoking status: Never    Smokeless tobacco: Never   Vaping Use    Vaping status: Never Used   Substance and Sexual Activity    Alcohol use: Yes    Drug use: Not on file    Sexual activity: Not on file   Other Topics Concern    Not on file   Social History Narrative    Mostly office/administrative and financial work, 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      Social Drivers of Health     Financial Resource Strain: Low Risk  (4/24/2025)    Financial Resource Strain      Within the past 12 months, have you or your family members you live with been unable to get utilities (heat, electricity) when it was really needed?: No   Food Insecurity: Low Risk  (4/24/2025)    Food Insecurity     Within the past 12 months, did you worry that your food would run out before you got money to buy more?: No     Within the past 12 months, did the food you bought just not last and you didn t have money to get more?: No   Transportation Needs: Low Risk  (4/24/2025)    Transportation Needs     Within the past 12 months, has lack of transportation kept you from medical appointments, getting your medicines, non-medical meetings or appointments, work, or from getting things that you need?: No   Physical Activity: Insufficiently Active (7/26/2024)    Received from [facility]    Exercise Vital Sign     Days of Exercise per Week: 2 days     Minutes of Exercise per Session: 20 min   Stress: No Stress Concern Present (11/27/2022)    Received from [facility]    [employer] - Occupational Stress Questionnaire     Feeling of Stress : Only a little   Social Connections: Unknown (11/27/2022)    Received from [facility]    Social Connection and Isolation Panel [NHANES]     Frequency of Communication with Friends and Family: More than three times a week     Frequency of Social Gatherings with Friends and Family: More than three times a week     Attends [employer]ism Services: More than 4 times per year     Active Member of Clubs or Organizations: Yes     Attends Club or Organization Meetings: More than 4 times per year     Marital Status: Not on file   Interpersonal Safety: Low Risk  (4/24/2025)    Interpersonal Safety     Do you feel physically and emotionally safe where you currently live?: Yes     Within the past 12 months, have you been hit, slapped, kicked or otherwise physically hurt by someone?: No     Within the past 12 months, have you been humiliated or emotionally abused in  other ways by your partner or ex-partner?: No   Recent Concern: Interpersonal Safety - High Risk (4/19/2025)    Interpersonal Safety     Do you feel physically and emotionally safe where you currently live?: Yes     Within the past 12 months, have you been hit, slapped, kicked or otherwise physically hurt by someone?: Yes     Within the past 12 months, have you been humiliated or emotionally abused in other ways by your partner or ex-partner?: No   Housing Stability: Low Risk  (4/24/2025)    Housing Stability     Do you have housing? : Yes     Are you worried about losing your housing?: No       FAMILY HISTORY:                   Family History   Problem Relation Age of Onset    Breast Cancer Mother     Testicular cancer Brother     Pancreatic Cancer Maternal Uncle     Chronic Obstructive Pulmonary Disease Maternal Uncle     LUNG DISEASE No family hx of          Physical exam was not undertaken.

## 2025-06-10 NOTE — PROGRESS NOTES
[facility]        Patient is here for a consult to discuss ICA stenosis    Pt is currently taking Aspirin, Statin, and Plavix.    /80 (BP Location: Left arm, Patient Position: Sitting, Cuff Size: Adult Regular)   Pulse 94   Wt 199 lb (90.3 kg)   SpO2 99%   BMI 32.12 kg/m      The provider has been notified that the patient has no concerns.     Questions patient would like addressed today are: N/A.    Refills are needed: N/A    Has homecare services and agency name:  Nella Calle MA

## 2025-06-12 ENCOUNTER — TELEPHONE (OUTPATIENT)
Dept: OTHER | Facility: CLINIC | Age: 60
End: 2025-06-12

## 2025-06-12 ENCOUNTER — VIRTUAL VISIT (OUTPATIENT)
Dept: NEUROLOGY | Facility: CLINIC | Age: 60
End: 2025-06-12
Payer: COMMERCIAL

## 2025-06-12 ENCOUNTER — TELEPHONE (OUTPATIENT)
Dept: FAMILY MEDICINE | Facility: CLINIC | Age: 60
End: 2025-06-12

## 2025-06-12 ENCOUNTER — VIRTUAL VISIT (OUTPATIENT)
Dept: OTHER | Facility: CLINIC | Age: 60
End: 2025-06-12
Attending: SURGERY
Payer: COMMERCIAL

## 2025-06-12 DIAGNOSIS — I63.9 CEREBROVASCULAR ACCIDENT (CVA), UNSPECIFIED MECHANISM (H): Primary | ICD-10-CM

## 2025-06-12 DIAGNOSIS — I63.239 CAROTID STENOSIS, SYMPTOMATIC, WITH INFARCTION (H): Primary | ICD-10-CM

## 2025-06-12 DIAGNOSIS — I65.22 SYMPTOMATIC STENOSIS OF LEFT CAROTID ARTERY: ICD-10-CM

## 2025-06-12 NOTE — TELEPHONE ENCOUNTER
Home Care is calling regarding an established patient with [facility].  Requesting orders from: [place] [facility]  RN APPROVED: RN able to provide verbal orders.  Home Care will send orders for signature.  RN will close encounter.  Is this a request for a temporary pause in the home care episode?  No    Orders Requested    Physical Therapy  Request for discontinuation of care   Goals have been met/progressing.  Frequency: requesting early discharge date of 6-12-25 due to goals met  RN gave verbal order: Yes          Contacts       Contact Date/Time Type Contact Phone/Fax    06/12/2025 03:01 PM CDT Phone (Incoming) [place] [phone]     [facility]          Kanika Aldridge, RN

## 2025-06-12 NOTE — TELEPHONE ENCOUNTER
Returned call to patient.  Ok to fly per Dr. Deleon.  Patient has multiple questions regarding details of the procedure and risks/benefits.  These questions need to be addressed with a video visit to ensure patient has full understanding of the procedure described per Dr. Deleon.  Patient was in agreement with this and is aware she will receive a call to schedule a video visit.    Routing to scheduling to coordinate the following:  Video visit with Dr. Deleon to discuss carotid surgery  Schedule at next available, ok to double book if needed.    Appt note:  Video visit to discuss left CEA risks/benefits; pt cleared by neurology but has multiple questions that need to be addressed prior to scheduling.    Minerva Saldana, BSN, RN, CV-BC, CNOR  [facility] [place]

## 2025-06-12 NOTE — LETTER
6/12/2025      Afshan Jimenez  [address]  [place] [address]      Dear Colleague,    Thank you for referring your patient, Afshan Jimenez, to the [facility]. Please see a copy of my visit note below.    Virtual Visit Details    Type of service:  Video Visit     Originating Location (pt. Location): Home    Distant Location (provider location):  Off-site  Platform used for Video Visit: [employer]    __________________________________________________________      [facility]   [phone]  __________________________________________________________           History of Present Illness   Chief Complaint: Patient presents with:  Stroke      Afshan Jimenez is a 60 year old female presenting for follow-up for L hemispheric infarcts.     She was hospitalized at [facility] on 4/18/25. 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 She presented to the hospital with acute onset right sided weakness and dysarthria. CTA showed no LVO but showed 50% stenosis of left ICA origin. Telestroke team had initial NIHSS of 4 and given TNK. MRI showed hyperacute hemorrhage in left parietal/occipital region with adjacent edema. TNK was reversed with TXA and cryo. MRI with L hemispheric stroke felt due to symptomatic L common carotid stenosis vs ESUS.  Vascular surgery was consulted and ultimately determined not to pursue immediate revascularization given the moderate degree of stenosis, on optimize vascular risk factors and concern of high risk procedure given long segment of stenosis that would require multiple stents given history of radiation therapy.  She was initially placed on ASA + cilostazol but had difficulty tolerating so was transitioned to ASA + plavix.  She was discharged to  acute rehab on 4/24/2025.    She was seen by vascular surgery 5/15/2025.  She underwent carotid duplex which showed diffuse narrowing of the distal one third of the left common carotid artery, not felt to be approximately 67% stenosis; this was progressed compared to prior study.  As such, recommendation is to consider left open CEA.  She was continued on aspirin and Plavix.    Today, Afshan reports that she is seeing progress in her stroke recovery.  She does continue to have some speech changes and right-sided weakness.  Although her primary concern is ongoing right field cut.  She has worked with occupational therapy and has had notable improvement in the field cut but has not yet been cleared to return to driving.  She has been discharged from physical therapy, is seeing occupational therapy for last visit today and plans for 3 more weeks of speech therapy.  Speech therapy has also been working with her on some cognitive symptoms.  She denies any new symptoms or deficits since discharging from the hospital.    She continues on aspirin and Plavix.  She does note that she has been having some discharge from her ear, but only once was there a small amount of blood.  She is planning to follow-up with [facility] regarding this.  Otherwise no concern of abnormal bleeding or excessive bruising on the antiplatelets.  She also continues on Lipitor.  She reports that her blood pressure is generally 100-1 tens.  She is not regularly checking glucose but does follow with her primary care regarding this.    We did discuss consideration of still proceeding with implantable loop recorder given family history of atrial fibrillation.  Although atrial fibrillation was not clearly felt to cause this most recent stroke, this would still be a future stroke risk factor so it could be reasonable to look for it.  She would like to revisit this conversation in the future, but for now would like to focus on the carotid  revascularization.    Modified [place] Scale  Score: 3-Moderate disability; requiring some help, but able to walk without assistance    Stroke Evaluation Summarized:  New results resulted and reviewed by me today are in BOLD below.    MRI/Head CT MRI brain 4/19: Several new watershed and embolic-type infarcts are seen in the left frontoparietal region. Hyperacute vertebral hemorrhage in left parieto-occipital region with adjacent edema and small subdural hemorrhage. Mild mass effect w/out midline shift.      CT head 4/18: No acute hemorrhage or infarct, chronic microvascular ischemic changes, left mastoid effusion     CT head 4/19: Similar parenchymal hematoma to MRI. No new acute hemorrhages. Multiple small infarcts in left cerebral hemisphere.      CT head 4/19 evening: No significant changes to same day CT.      CT head 4/20: Evolving acute intraparenchymal hemorrhage in left occipital lobe 2.7 x 3.7 x 3.3 cm . No other hemorrhage or mass effect.      CT head 4/21: Evolving IP hemorrhage in left parietal occipital lobe measures 2.6 x 3.7 x 3.4 cm  with decreased vasogenic edema.      CT head 4/24: Stable sive of left IP hematoma with vasogenic edema. Stable mass effect. No new hemorrhage or acute infarct.       Intracranial Vasculature CTA head: No LVO   Cervical Vasculature CTA neck: 50 % stenosis of left ICA origin      Echocardiogram Technically difficult study, EF 55-60%, concentric remodeling of left ventricle, normal bilateral atria and right ventricle size and function   EKG/Telemetry Sinus tachycardia, minimal voltage criteria for LVH,     Labs Lab Results   Component Value Date    LDL 47 04/19/2025    A1C 6.0 (H) 04/07/2025    CTROPT 8 04/18/2025    INR 1.07 04/19/2025    INR 1.09 04/19/2025                 Home Medications     Current Outpatient Medications   Medication Sig Dispense Refill     acetaminophen (TYLENOL) 325 MG tablet Take 2 tablets (650 mg) by mouth every 4 hours as needed for mild pain.    "    aspirin 81 MG EC tablet Take 1 tablet (81 mg) by mouth daily. 90 tablet 0     atorvastatin (LIPITOR) 40 MG tablet Take 1 tablet (40 mg) by mouth every evening. 90 tablet 1     clopidogrel (PLAVIX) 75 MG tablet Take 1 tablet (75 mg) by mouth daily. 90 tablet 1     senna-docusate (SENOKOT-S/PERICOLACE) 8.6-50 MG tablet Take 1 tablet by mouth 2 times daily as needed for constipation. (Patient not taking: Reported on 6/12/2025)       No current facility-administered medications for this visit.            Physical Examination     Vitals:  Vitals - Patient Reported  Systolic (Patient Reported): 105  Diastolic (Patient Reported): 80  Weight (Patient Reported): 89.8 kg (198 lb)  Height (Patient Reported): 167.6 cm (5' 6\")  BMI (Based on Pt Reported Ht/Wt): 31.96  Pain Score: No Pain (0)         BMI Readings from Last 1 Encounters:   06/10/25 32.12 kg/m        Neurologic: Completed via telemedicine video call  Mental Status:  alert, oriented x 3, speech clear and fluent  Cranial Nerves:  EOMI with normal smooth pursuit, facial movements symmetric, hearing not formally tested but intact to conversation, minimal dysarthria, shoulder shrug equal bilaterally, tongue protrusion midline  Motor:  no abnormal movements, able to move all limbs antigravity spontaneously with no signs of hemiparesis observed, no pronator drift         Screenings and Questionnaires:     Tobacco:    Tobacco Use      Smoking status: Never      Smokeless tobacco: Never      Sleep Apnea:       Depression:      2/18/2025     1:21 PM 1/17/2025     9:05 AM   PHQ-2 ( 1999 [employer])   Q1: Little interest or pleasure in doing things 0 0   Q2: Feeling down, depressed or hopeless 0 0   PHQ-2 Score 0 0    Q1: Little interest or pleasure in doing things  Not at all   Q2: Feeling down, depressed or hopeless  Not at all   PHQ-2 Score  0       Patient-reported       Stroke Recovery and Risk Factors:      2/28/2025     9:40 AM   Stroke Questionnaire   Residual effects: " Any residual effects from stroke? I have some symptoms, but I'm not sure if they're residual effects of the stroke   Residual effects: Describe foot numbness   Level of independence: Could you live alone? Yes   Quality of Life: Rating (0-100%) 80   Quality of Life: What work now or before stroke Employed full time   Quality of Life: Current work status Currently working   Quality of Life: How do you get around Drive myself   Quality of Life: Living situation before stroke Alone   Quality of Life: Living situation now Alone   Medication: How do you take your meds Myself, from individual bottles   Medication: Do you ever miss or forget meds Rarely   Risk Factor: Checking blood pressure at home Yes   Risk Factor: Usual blood pressure numbers 110   Risk Factor: Checking blood sugar at home Yes   Risk Factor: Usual blood sugar numbers 100 to 150   Risk Factor: Second-hand smoke at home No   Risk Factor: How much caffeine per day 4 to 6 oz   Who completed this questionnaire? The patient independently           Assessment and Plan   Ischemic strokes of L hemisphere due to atheroembolic from symptomatic left common carotid stenosis vs ESUS, April 2025.  -->s/p TNK with L parietal/occipital hemorrhage; given TXA and cryo     Left common carotid stenosis, with progression between April 2025 and June 2025; favored to be asymptomatic and planning to undergo left CEA    Left parietal and subacute left occipital strokes due to embolic stroke of undetermined source (ESUS), identified Dec 2024.     Abnormalities in left mastoid on MRI; evaluated by ENT who felt that findings were not clinically relevant and likely chronic related to prior surgical treatment and radiation in the left parotid region     Pancreatic cysts, felt likely benign. Followed by GI.     -Agree with pursing L CEA  -Continue aspirin 81mg daily for stroke prevention. Also continues on Plavix (anticipate plavix x1 month following carotid revascularization but will  defer to vascular surgery)  -Continue lipitor; goal LDL 40-70 (current 47)  -Goal blood pressure is <130/80 with tighter control associated with improved vascular outcomes; recommend home blood pressure monitoring and keeping a BP log for primary care follow up  -Goal A1C <7%  -Mediterranean diet can be beneficial for overall decreased cardiovascular risk; moderate aerobic exercise with goal of 30 minutes/day x 5   -Will request OT recommends to review regarding returning to driving; may need to consider Dorian Cooper OT driving assessment    -Plans to follow up with [facility] regarding L ear drainage  -may still wish to consider ILR given strong family history as this would impact future stroke risk; she prefers to continue this discussion at another time    - Return in about 2 months (around 8/12/2025) for stroke, with GHISLAINE Alberts only.     Stroke Education provided.  She will call us with any questions.  For any acute neurologic deficits she was advised to  go directly to the hospital rather than call the clinic.    This case was discussed with vascular neurology attending, Dr. Valdivia.     ALLYSON Amaro CNP  Neurology  06/12/2025         ____________________________________________________________________    Billing:  Please note: for coding purposes this visit should be billed only as established and not new, since this patient was seen by my same subspecialty team (ealth Vascular Neurology) during the hospitalization that this visit is a follow up for.  I spent a total of 66 minutes on the day of the visit.   Time spent by me today doing chart review, history and exam, documentation and further activities per the note      Again, thank you for allowing me to participate in the care of your patient.        Sincerely,        ALLYSON Amaro CNP    Electronically signed

## 2025-06-12 NOTE — TELEPHONE ENCOUNTER
[facility]    Who is the name of the provider? Dr Deleon    What is the location you see this provider at/preferred location? [place]    Person calling / Facility: Afshan    Phone number: [phone]    Nurse call back needed:     Reason for call: Pt stated that she had her Neurology appointment today (06/12/25)  and has been cleared for surgery. Pt does have a couple questions before scheduling her surgery.  1. Can Pt travel to [place] to help her take care of her mother either before or after surgery? Her mom is moving at the end of April. 2. Or should she not travel at all? 3. What does the surgery entail/What are the risks? Pt stated that she is fine coming back in to go over all this. Pt was scheduled for an appointment this last Tuesday (06/10/25) which she thought was with Dr Deleon to go over all this  but was mistakenly scheduled with Dr Perez.       Pharmacy location: N/A    Outside Imaging: N/A    Can we leave a detailed message on this number? Y    Additional Info:

## 2025-06-12 NOTE — H&P (VIEW-ONLY)
[place] [facility]ASCULAR [facility]    Afshan Jimenez has a history of cerebrovascular disease with a previous stroke.  She was seen in the clinic on 5/15/2025 for a CTA that revealed a 50% left carotid stenosis but very irregular plaque.  This may have been the cause of her stroke.  No disease on the right.  We discussed performing a left CEA when she was cleared by neurology.    Patient had multiple questions and therefore a video visit today.    VIDEO FOLLOW-UP: We visited with Afshan via the [employer] system this afternoon from our office to her home.  We tried to get on the video circuit.  For over 20 minutes and it failed and therefore we went over phone call for the rest of the visit was via phone call.    She had seen her neurologist and is cleared for surgery may support this surgery with her findings.  She had multiple questions.  Part is related to her cancer treatment over 25 years ago with no evidence of recurrence and a recent visit to [facility].  She been told in the past that she has a higher risk for surgery and I do not feel that this is at all significant.  Her neck was soft and supple which therefore would be also not be a contraindication to surgery and that some patients with extensive radiation can develop.    She also wondered whether the stenosis since was more in the distal common carotid was related to the radiation.  On duplex we saw soft plaque and not the thickening of the artery that we would expect with postirradiation arteritis and thus I feel this is atherosclerotic which would have a higher risk of embolization also.    We discussed the procedure.  This was performed as an a.m. admission under general anesthetic.  We used a vein patch typically and this may need to be from the ankle since she did have radiation neck to the external jugular vein and be adequate but this would be downside at the time of surgery.  Everyone stays overnight and the majority of patients are discharged  the following day with no specific restrictions.    She did have some concerns since her mother is going into assisted living and was whether where she or her siblings should go help her mom and this would require an airplane flight and travel.  There is no specific contraindications at this with her carotid issues.  She has had no higher risk of having a stroke with an airplane flight versus sitting and talking with us today.    We are able to answer all of her questions and reassure her.  She wants to proceed with the surgery in the near future when an OR time in bed they are available.  She is staying in our stroke unit where she was at the time original strokes were there.  Used to dealing with this.  She is aware that there is a very very minimal risk that a perioperative stroke can occur but this is possible unlike.    We also discussed her postoperative care and follow-ups.    Over 20 minutes with the patient today being completed at 1604 hrs.    Ivan Deleon MD   This note was created using Dragon voice recognition software which may result in transcription errors.

## 2025-06-12 NOTE — NURSING NOTE
Current patient location: [address] [place] [address]   [facility] [address]    Is the patient currently in the state of [place]? YES    Visit mode:VIDEO    If the visit is dropped, the patient can be reconnected by: VIDEO VISIT: Text to cell phone:   Telephone Information:   Mobile [phone]       Will anyone else be joining the visit? NO  (If patient encounters technical issues they should call [phone]    How would you like to obtain your AVS? MyChart    Are changes needed to the allergy or medication list? No    Are refills needed on medications prescribed by this physician? NO    Reason for visit: Stroke    Ekaterina Rollins MA       Acute respiratory failure with hypoxia

## 2025-06-12 NOTE — PROGRESS NOTES
Virtual Visit Details    Type of service:  Video Visit     Originating Location (pt. Location): Home    Distant Location (provider location):  Off-site  Platform used for Video Visit: Lizbet    __________________________________________________________      [facility]   [phone]  __________________________________________________________           History of Present Illness   Chief Complaint: Patient presents with:  Stroke      Afshan Jimenez is a 60 year old female presenting for follow-up for L hemispheric infarcts.     She was hospitalized at [facility] on 4/18/25. 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 She presented to the hospital with acute onset right sided weakness and dysarthria. CTA showed no LVO but showed 50% stenosis of left ICA origin. Telestroke team had initial NIHSS of 4 and given TNK. MRI showed hyperacute hemorrhage in left parietal/occipital region with adjacent edema. TNK was reversed with TXA and cryo. MRI with L hemispheric stroke felt due to symptomatic L common carotid stenosis vs ESUS.  Vascular surgery was consulted and ultimately determined not to pursue immediate revascularization given the moderate degree of stenosis, on optimize vascular risk factors and concern of high risk procedure given long segment of stenosis that would require multiple stents given history of radiation therapy.  She was initially placed on ASA + cilostazol but had difficulty tolerating so was transitioned to ASA + plavix.  She was discharged to acute rehab on 4/24/2025.    She was seen by vascular surgery 5/15/2025.  She underwent carotid duplex which showed diffuse narrowing of the distal one third of the left common carotid artery, not felt to be approximately 67% stenosis; this was progressed  compared to prior study.  As such, recommendation is to consider left open CEA.  She was continued on aspirin and Plavix.    Today, Afshan reports that she is seeing progress in her stroke recovery.  She does continue to have some speech changes and right-sided weakness.  Although her primary concern is ongoing right field cut.  She has worked with occupational therapy and has had notable improvement in the field cut but has not yet been cleared to return to driving.  She has been discharged from physical therapy, is seeing occupational therapy for last visit today and plans for 3 more weeks of speech therapy.  Speech therapy has also been working with her on some cognitive symptoms.  She denies any new symptoms or deficits since discharging from the hospital.    She continues on aspirin and Plavix.  She does note that she has been having some discharge from her ear, but only once was there a small amount of blood.  She is planning to follow-up with [facility] regarding this.  Otherwise no concern of abnormal bleeding or excessive bruising on the antiplatelets.  She also continues on Lipitor.  She reports that her blood pressure is generally 100-1 tens.  She is not regularly checking glucose but does follow with her primary care regarding this.    We did discuss consideration of still proceeding with implantable loop recorder given family history of atrial fibrillation.  Although atrial fibrillation was not clearly felt to cause this most recent stroke, this would still be a future stroke risk factor so it could be reasonable to look for it.  She would like to revisit this conversation in the future, but for now would like to focus on the carotid revascularization.    Modified Timothy Scale  Score: 3-Moderate disability; requiring some help, but able to walk without assistance    Stroke Evaluation Summarized:  New results resulted and reviewed by me today are in BOLD below.    MRI/Head CT MRI brain 4/19: Several  new watershed and embolic-type infarcts are seen in the left frontoparietal region. Hyperacute vertebral hemorrhage in left parieto-occipital region with adjacent edema and small subdural hemorrhage. Mild mass effect w/out midline shift.      CT head 4/18: No acute hemorrhage or infarct, chronic microvascular ischemic changes, left mastoid effusion     CT head 4/19: Similar parenchymal hematoma to MRI. No new acute hemorrhages. Multiple small infarcts in left cerebral hemisphere.      CT head 4/19 evening: No significant changes to same day CT.      CT head 4/20: Evolving acute intraparenchymal hemorrhage in left occipital lobe 2.7 x 3.7 x 3.3 cm . No other hemorrhage or mass effect.      CT head 4/21: Evolving IP hemorrhage in left parietal occipital lobe measures 2.6 x 3.7 x 3.4 cm  with decreased vasogenic edema.      CT head 4/24: Stable sive of left IP hematoma with vasogenic edema. Stable mass effect. No new hemorrhage or acute infarct.       Intracranial Vasculature CTA head: No LVO   Cervical Vasculature CTA neck: 50 % stenosis of left ICA origin      Echocardiogram Technically difficult study, EF 55-60%, concentric remodeling of left ventricle, normal bilateral atria and right ventricle size and function   EKG/Telemetry Sinus tachycardia, minimal voltage criteria for LVH,     Labs Lab Results   Component Value Date    LDL 47 04/19/2025    A1C 6.0 (H) 04/07/2025    CTROPT 8 04/18/2025    INR 1.07 04/19/2025    INR 1.09 04/19/2025                 Home Medications     Current Outpatient Medications   Medication Sig Dispense Refill    acetaminophen (TYLENOL) 325 MG tablet Take 2 tablets (650 mg) by mouth every 4 hours as needed for mild pain.      aspirin 81 MG EC tablet Take 1 tablet (81 mg) by mouth daily. 90 tablet 0    atorvastatin (LIPITOR) 40 MG tablet Take 1 tablet (40 mg) by mouth every evening. 90 tablet 1    clopidogrel (PLAVIX) 75 MG tablet Take 1 tablet (75 mg) by mouth daily. 90 tablet 1     "senna-docusate (SENOKOT-S/PERICOLACE) 8.6-50 MG tablet Take 1 tablet by mouth 2 times daily as needed for constipation. (Patient not taking: Reported on 6/12/2025)       No current facility-administered medications for this visit.            Physical Examination     Vitals:  Vitals - Patient Reported  Systolic (Patient Reported): 105  Diastolic (Patient Reported): 80  Weight (Patient Reported): 89.8 kg (198 lb)  Height (Patient Reported): 167.6 cm (5' 6\")  BMI (Based on Pt Reported Ht/Wt): 31.96  Pain Score: No Pain (0)         BMI Readings from Last 1 Encounters:   06/10/25 32.12 kg/m        Neurologic: Completed via telemedicine video call  Mental Status:  alert, oriented x 3, speech clear and fluent  Cranial Nerves:  EOMI with normal smooth pursuit, facial movements symmetric, hearing not formally tested but intact to conversation, minimal dysarthria, shoulder shrug equal bilaterally, tongue protrusion midline  Motor:  no abnormal movements, able to move all limbs antigravity spontaneously with no signs of hemiparesis observed, no pronator drift         Screenings and Questionnaires:     Tobacco:    Tobacco Use      Smoking status: Never      Smokeless tobacco: Never      Sleep Apnea:       Depression:      2/18/2025     1:21 PM 1/17/2025     9:05 AM   PHQ-2 ( 1999 [employer])   Q1: Little interest or pleasure in doing things 0 0   Q2: Feeling down, depressed or hopeless 0 0   PHQ-2 Score 0 0    Q1: Little interest or pleasure in doing things  Not at all   Q2: Feeling down, depressed or hopeless  Not at all   PHQ-2 Score  0       Patient-reported       Stroke Recovery and Risk Factors:      2/28/2025     9:40 AM   Stroke Questionnaire   Residual effects: Any residual effects from stroke? I have some symptoms, but I'm not sure if they're residual effects of the stroke   Residual effects: Describe foot numbness   Level of independence: Could you live alone? Yes   Quality of Life: Rating (0-100%) 80   Quality of Life: " What work now or before stroke Employed full time   Quality of Life: Current work status Currently working   Quality of Life: How do you get around Drive myself   Quality of Life: Living situation before stroke Alone   Quality of Life: Living situation now Alone   Medication: How do you take your meds Myself, from individual bottles   Medication: Do you ever miss or forget meds Rarely   Risk Factor: Checking blood pressure at home Yes   Risk Factor: Usual blood pressure numbers 110   Risk Factor: Checking blood sugar at home Yes   Risk Factor: Usual blood sugar numbers 100 to 150   Risk Factor: Second-hand smoke at home No   Risk Factor: How much caffeine per day 4 to 6 oz   Who completed this questionnaire? The patient independently           Assessment and Plan   Ischemic strokes of L hemisphere due to atheroembolic from symptomatic left common carotid stenosis vs ESUS, April 2025.  -->s/p TNK with L parietal/occipital hemorrhage; given TXA and cryo     Left common carotid stenosis, with progression between April 2025 and June 2025; favored to be asymptomatic and planning to undergo left CEA    Left parietal and subacute left occipital strokes due to embolic stroke of undetermined source (ESUS), identified Dec 2024.     Abnormalities in left mastoid on MRI; evaluated by ENT who felt that findings were not clinically relevant and likely chronic related to prior surgical treatment and radiation in the left parotid region     Pancreatic cysts, felt likely benign. Followed by GI.     -Agree with pursing L CEA  -Continue aspirin 81mg daily for stroke prevention. Also continues on Plavix (anticipate plavix x1 month following carotid revascularization but will defer to vascular surgery)  -Continue lipitor; goal LDL 40-70 (current 47)  -Goal blood pressure is <130/80 with tighter control associated with improved vascular outcomes; recommend home blood pressure monitoring and keeping a BP log for primary care follow  up  -Goal A1C <7%  -Mediterranean diet can be beneficial for overall decreased cardiovascular risk; moderate aerobic exercise with goal of 30 minutes/day x 5   -Will request OT recommends to review regarding returning to driving; may need to consider Dorian Cooper OT driving assessment    -Plans to follow up with [facility] regarding L ear drainage  -may still wish to consider ILR given strong family history as this would impact future stroke risk; she prefers to continue this discussion at another time    - Return in about 2 months (around 8/12/2025) for stroke, with GHISLAINE Alberts only.     Stroke Education provided.  She will call us with any questions.  For any acute neurologic deficits she was advised to  go directly to the hospital rather than call the clinic.    This case was discussed with vascular neurology attending, Dr. Valdivia.     Kennedi Alberts, ALLYSON [facility]  Neurology  06/12/2025         ____________________________________________________________________    Billing:  Please note: for coding purposes this visit should be billed only as established and not new, since this patient was seen by my same subspecialty team (ealth Vascular Neurology) during the hospitalization that this visit is a follow up for.  I spent a total of 66 minutes on the day of the visit.   Time spent by me today doing chart review, history and exam, documentation and further activities per the note

## 2025-06-12 NOTE — PROGRESS NOTES
[place] [facility]ULAR [facility]    Afshan Jimenez has a history of cerebrovascular disease with a previous stroke.  She was seen in the clinic on 5/15/2025 for a CTA that revealed a 50% left carotid stenosis but very irregular plaque.  This may have been the cause of her stroke.  No disease on the right.  We discussed performing a left CEA when she was cleared by neurology.    Patient had multiple questions and therefore a video visit today.    VIDEO FOLLOW-UP: We visited with Afshan via the [employer] system this afternoon from our office to her home.  We tried to get on the video circuit.  For over 20 minutes and it failed and therefore we went over phone call for the rest of the visit was via phone call.    She had seen her neurologist and is cleared for surgery may support this surgery with her findings.  She had multiple questions.  Part is related to her cancer treatment over 25 years ago with no evidence of recurrence and a recent visit to [facility].  She been told in the past that she has a higher risk for surgery and I do not feel that this is at all significant.  Her neck was soft and supple which therefore would be also not be a contraindication to surgery and that some patients with extensive radiation can develop.    She also wondered whether the stenosis since was more in the distal common carotid was related to the radiation.  On duplex we saw soft plaque and not the thickening of the artery that we would expect with postirradiation arteritis and thus I feel this is atherosclerotic which would have a higher risk of embolization also.    We discussed the procedure.  This was performed as an a.m. admission under general anesthetic.  We used a vein patch typically and this may need to be from the ankle since she did have radiation neck to the external jugular vein and be adequate but this would be downside at the time of surgery.  Everyone stays overnight and the majority of patients are discharged  the following day with no specific restrictions.    She did have some concerns since her mother is going into assisted living and was whether where she or her siblings should go help her mom and this would require an airplane flight and travel.  There is no specific contraindications at this with her carotid issues.  She has had no higher risk of having a stroke with an airplane flight versus sitting and talking with us today.    We are able to answer all of her questions and reassure her.  She wants to proceed with the surgery in the near future when an OR time in bed they are available.  She is staying in our stroke unit where she was at the time original strokes were there.  Used to dealing with this.  She is aware that there is a very very minimal risk that a perioperative stroke can occur but this is possible unlike.    We also discussed her postoperative care and follow-ups.    Over 20 minutes with the patient today being completed at 1604 hrs.    Ivan Deleon MD   This note was created using Dragon voice recognition software which may result in transcription errors.

## 2025-06-12 NOTE — PROGRESS NOTES
Virtual Visit Details    Type of service:  Video Visit     Originating Location (pt. Location): Home    Distant Location (provider location):  On-site  Platform used for Video Visit: Lizbet Dahl MA

## 2025-06-18 ENCOUNTER — TELEPHONE (OUTPATIENT)
Dept: OTHER | Facility: CLINIC | Age: 60
End: 2025-06-18
Payer: COMMERCIAL

## 2025-06-18 ENCOUNTER — DOCUMENTATION ONLY (OUTPATIENT)
Dept: OTHER | Facility: CLINIC | Age: 60
End: 2025-06-18
Payer: COMMERCIAL

## 2025-06-18 NOTE — TELEPHONE ENCOUNTER
[facility]     Who is the name of the provider?:  SISSY SALGADO   What is the location you see this provider at/preferred location?: [place]  Person calling / Facility: Afshan Jimenez  Phone number:  [phone] (home)   Nurse call back needed:  Yes      Reason for call:  Patient called to request to speak with a nurse. Patient explained she has been bleeding from her ear recently- per patient, she is following up with her doctors at the [facility] about this- however, the doctors she has been speaking to suggested that she call [facility] and talk to us about what is going on and if it is going to impact the procedure she has scheduled 7/2/25 6/18/2025, 10:09 AM

## 2025-06-18 NOTE — TELEPHONE ENCOUNTER
Writer returned patient's call. Pt reports the bleeding in her left ear started 2 days ago. She reports it is only noticeable when she uses a qtip in the ear. She has not spoken to her primary provider however, has contacted her ENT at [facility] in [place]. Her ENT has referred her to somebody in the [facility] and she has not scheduled with them yet. Writer recommended that patient make an appointment with ENT to investigate the cause of the drainage in ear and keep clinic informed after her appointment. Writer will update Dr Deleon as FYI only. As of now, no change to surgery date.    Lynda CELESTE, RN    [facility]  Vascular [facility]  Office: [phone]  Fax: [phone]

## 2025-06-23 ENCOUNTER — TRANSFERRED RECORDS (OUTPATIENT)
Dept: HEALTH INFORMATION MANAGEMENT | Facility: CLINIC | Age: 60
End: 2025-06-23
Payer: COMMERCIAL

## 2025-07-01 ENCOUNTER — OFFICE VISIT (OUTPATIENT)
Dept: FAMILY MEDICINE | Facility: CLINIC | Age: 60
End: 2025-07-01
Payer: COMMERCIAL

## 2025-07-01 ENCOUNTER — TELEPHONE (OUTPATIENT)
Dept: OTHER | Facility: CLINIC | Age: 60
End: 2025-07-01

## 2025-07-01 ENCOUNTER — ANESTHESIA EVENT (OUTPATIENT)
Dept: SURGERY | Facility: CLINIC | Age: 60
End: 2025-07-01
Payer: COMMERCIAL

## 2025-07-01 ENCOUNTER — TELEPHONE (OUTPATIENT)
Dept: CARE COORDINATION | Facility: CLINIC | Age: 60
End: 2025-07-01

## 2025-07-01 VITALS
SYSTOLIC BLOOD PRESSURE: 101 MMHG | OXYGEN SATURATION: 96 % | TEMPERATURE: 99.3 F | WEIGHT: 199.1 LBS | HEART RATE: 86 BPM | RESPIRATION RATE: 17 BRPM | BODY MASS INDEX: 32 KG/M2 | DIASTOLIC BLOOD PRESSURE: 70 MMHG | HEIGHT: 66 IN

## 2025-07-01 DIAGNOSIS — E11.59 TYPE 2 DIABETES MELLITUS WITH OTHER CIRCULATORY COMPLICATION, WITHOUT LONG-TERM CURRENT USE OF INSULIN (H): ICD-10-CM

## 2025-07-01 DIAGNOSIS — I63.9 ISCHEMIC CEREBROVASCULAR ACCIDENT (CVA) (H): ICD-10-CM

## 2025-07-01 DIAGNOSIS — I65.29 STENOSIS OF CAROTID ARTERY, UNSPECIFIED LATERALITY: ICD-10-CM

## 2025-07-01 DIAGNOSIS — E78.5 DYSLIPIDEMIA: ICD-10-CM

## 2025-07-01 DIAGNOSIS — Z01.818 PRE-OP EXAM: Primary | ICD-10-CM

## 2025-07-01 PROCEDURE — 3078F DIAST BP <80 MM HG: CPT | Performed by: PHYSICIAN ASSISTANT

## 2025-07-01 PROCEDURE — 99213 OFFICE O/P EST LOW 20 MIN: CPT | Performed by: PHYSICIAN ASSISTANT

## 2025-07-01 PROCEDURE — 1126F AMNT PAIN NOTED NONE PRSNT: CPT | Performed by: PHYSICIAN ASSISTANT

## 2025-07-01 PROCEDURE — 3074F SYST BP LT 130 MM HG: CPT | Performed by: PHYSICIAN ASSISTANT

## 2025-07-01 RX ORDER — OFLOXACIN 3 MG/ML
4 SOLUTION AURICULAR (OTIC) 2 TIMES DAILY
COMMUNITY

## 2025-07-01 RX ORDER — PREDNISOLONE ACETATE 10 MG/ML
2 SUSPENSION/ DROPS OPHTHALMIC 2 TIMES DAILY
COMMUNITY

## 2025-07-01 ASSESSMENT — LIFESTYLE VARIABLES: TOBACCO_USE: 0

## 2025-07-01 ASSESSMENT — PAIN SCALES - GENERAL: PAINLEVEL_OUTOF10: NO PAIN (0)

## 2025-07-01 NOTE — PROGRESS NOTES
PTA medications updated by Medication Scribe prior to surgery via phone call with patient (last doses completed by Nurse)     Medication history sources: Patient, Surescripts, and H&P  In the past week, patient estimated taking medication this percent of the time: Greater than 90%      Significant changes made to the medication list:  Patient reports no longer taking the following meds (med scribe removed from PTA med list): Senokot-S      Additional medication history information:   None    Medication reconciliation completed by provider prior to medication history? No    Time spent in this activity: 20 minutes    The information provided in this note is only as accurate as the sources available at the time of update(s)      Prior to Admission medications    Medication Sig Last Dose Taking? Auth Provider Long Term End Date   acetaminophen (TYLENOL) 325 MG tablet Take 2 tablets (650 mg) by mouth every 4 hours as needed for mild pain.  Yes Amy Salvador MD     aspirin 81 MG EC tablet Take 1 tablet (81 mg) by mouth daily. Morning Yes Miles Osorio MD     atorvastatin (LIPITOR) 40 MG tablet Take 1 tablet (40 mg) by mouth every evening. Evening Yes Sterling Hill MD Yes    clopidogrel (PLAVIX) 75 MG tablet Take 1 tablet (75 mg) by mouth daily. 6/29/2025 Morning Yes Sterling Hill MD Yes    ofloxacin (FLOXIN) 0.3 % otic solution Place 4 drops Into the left ear 2 times daily. For 7 days  Yes Reported, Patient     prednisoLONE acetate (PRED FORTE) 1 % ophthalmic suspension Place 2 drops Into the left ear 2 times daily. For 7 days Evening Yes Reported, Patient         Medication history completed by: Kentrell Wade

## 2025-07-01 NOTE — TELEPHONE ENCOUNTER
[place] VASCULAR [facility]    I called Afshan ZARCO Normyury about her carotid surgery tomorrow morning.  She had no questions about the upcoming procedure.      12/18/2024 LDL= 101.  On Lipitor.  This will be rechecked fasting tomorrow morning.    Ivan Deleon MD

## 2025-07-01 NOTE — TELEPHONE ENCOUNTER
----- Message from Kennedi Alberts sent at 7/1/2025 12:20 PM CDT -----  Regarding: RE: OT Records from Home Care  Given that she does have documentation of ongoing field cut I would recommend formal driving evaluation at [facility] before return to driving. Can you please let her know? Thanks!  ----- Message -----  From: Lashawn Prescott RN  Sent: 6/23/2025   9:11 AM CDT  To: ALLYSON Walker CNP  Subject: FW: OT Records from Home Care                    OT records are now in chart :)  ----- Message -----  From: Lashawn Prescott RN  Sent: 6/23/2025  12:00 AM CDT  To: Lashawn Prescott RN  Subject: OT Records from Home Care

## 2025-07-01 NOTE — PROGRESS NOTES
Preoperative Evaluation  [facility]  [address], [address]  [facility] [address]  Phone: [phone]  Primary Provider: Sterling Hill MD  Pre-op Performing Provider: Ricky Garcia PA-C  Jul 1, 2025 7/1/2025   Surgical Information   What procedure is being done? LEFT CAROTID ENDARTERECTOMY WITH ELECTROENCEPHALOGRAM    Facility or Hospital where procedure/surgery will be performed: [facility]   Who is doing the procedure / surgery? dr lauri phillips   Date of surgery / procedure: july 2   Time of surgery / procedure: arrive at 6am surgery at 8am   Where do you plan to recover after surgery? at home alone     Fax number for surgical facility: Note does not need to be faxed, will be available electronically in Epic.    Assessment & Plan     The proposed surgical procedure is considered INTERMEDIATE risk.    Pre-op exam  Stenosis of carotid artery, unspecified laterality  Ischemic cerebrovascular accident (CVA) (H)  Type 2 diabetes mellitus with other circulatory complication, without long-term current use of insulin (H)  Dyslipidemia    Optimized for procedure without further diagnostic evaluation needed.  Already holding Plavix for 48 hours anticipation of upcoming procedure.  She will continue aspirin.  Continue atorvastatin.  EKG already completed within the past 90 days.  Labs reviewed from April shows stable blood counts with no signs of anemia.  Stable electrolytes and kidney function     - No identified additional risk factors other than previously addressed    Recommendation  Approval given to proceed with proposed procedure without further diagnostic evaluation.    Tanner Cavanaugh is a pleasant 60 year old, presenting for the following:  Pre-Op Exam    Here for preoperative evaluation for upcoming left carotid endarterectomy scheduled for tomorrow, 7/2/2025.      Well-controlled type II diabetes without current need for medication.  Last  A1c 6.0%.  She is on Plavix, aspirin, and atorvastatin.  Holding Plavix 2 days leading up to this procedure already.  She is continuing her baby aspirin as recommended routine.        7/1/2025   Pre-Op Questionnaire   Have you ever had a heart attack or stroke? No   Have you ever had surgery on your heart or blood vessels, such as a stent placement, a coronary artery bypass, or surgery on an artery in your head, neck, heart, or legs? No   Do you have chest pain with activity? No   Do you have a history of heart failure? No   Do you currently have a cold, bronchitis or symptoms of other infection? No   Do you have a cough, shortness of breath, or wheezing? No   Do you or anyone in your family have previous history of blood clots? No   Do you or does anyone in your family have a serious bleeding problem such as prolonged bleeding following surgeries or cuts? No   Have you ever had problems with anemia or been told to take iron pills? No   Have you had any abnormal blood loss such as black, tarry or bloody stools, or abnormal vaginal bleeding? No   Have you ever had a blood transfusion? (!) YES   Have you ever had a transfusion reaction? No   Are you willing to have a blood transfusion if it is medically needed before, during, or after your surgery? Yes   Have you or any of your relatives ever had problems with anesthesia? No   Do you have sleep apnea, excessive snoring or daytime drowsiness? No   Do you have any artifical heart valves or other implanted medical devices like a pacemaker, defibrillator, or continuous glucose monitor? No   Do you have artificial joints? No   Are you allergic to latex? No     Advance Care Planning    Discussed advance care planning with patient; however, patient declined at this time.    Preoperative Review of    reviewed - no record of controlled substances prescribed.      Patient Active Problem List    Diagnosis Date Noted    Unspecified disorder of left middle ear and mastoid  06/10/2025     Priority: Medium    Stroke (H) 04/24/2025     Priority: Medium    Acute CVA (cerebrovascular accident) (H) 04/18/2025     Priority: Medium    Right arm weakness 12/27/2024     Priority: Medium    Parietal lobe infarction (H) 12/27/2024     Priority: Medium    Bone marrow transplant status (H) 08/16/2022     Priority: Medium    Obesity with body mass index 30 or greater 03/02/2020     Priority: Medium    Type 2 diabetes mellitus without complication, with long-term current use of insulin (H) 11/14/2019     Priority: Medium    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) 11/14/2019     Priority: Medium    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) 10/08/2015     Priority: Medium     1991; Bone marrow transplant 2/1992; sister was donor at the [facility] [place]      Acute gout 10/08/2015     Priority: Medium    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     Priority: Medium     left 1995; radiation; 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     Priority: Medium     left 1995; radiation; 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     Priority: Medium     2010; surgery at the [facility]      Acute cholecystitis 10/24/2011     Priority: Medium      Past Medical History:   Diagnosis Date    Deafness     left ear    Diabetes mellitus (H)     Malignant neoplasm (H)     leukemia    Malignant neoplasm of parotid gland (H)      Past Surgical History:   Procedure Laterality Date    LAPAROSCOPIC CHOLECYSTECTOMY  10/24/2011    Procedure:LAPAROSCOPIC CHOLECYSTECTOMY; Laparoscopic Cholecystectomy; Surgeon:TIAGO MALDONADO; Location:[facility] OR    SALIVARY GLAND SURGERY      L parotid    SINUS SURGERY      tumor removed from roof of mouth       Current Outpatient Medications   Medication Sig Dispense Refill    acetaminophen (TYLENOL) 325 MG tablet Take 2 tablets (650 mg) by mouth every 4 hours as needed for mild pain.      aspirin 81 MG  "EC tablet Take 1 tablet (81 mg) by mouth daily. 90 tablet 0    atorvastatin (LIPITOR) 40 MG tablet Take 1 tablet (40 mg) by mouth every evening. 90 tablet 1    clopidogrel (PLAVIX) 75 MG tablet Take 1 tablet (75 mg) by mouth daily. (Patient not taking: Reported on 7/1/2025) 90 tablet 1    senna-docusate (SENOKOT-S/PERICOLACE) 8.6-50 MG tablet Take 1 tablet by mouth 2 times daily as needed for constipation. (Patient not taking: Reported on 7/1/2025)         Allergies   Allergen Reactions    Codeine Sulfate Rash        Social History     Tobacco Use    Smoking status: Never    Smokeless tobacco: Never   Substance Use Topics    Alcohol use: Yes     Family History   Problem Relation Age of Onset    Breast Cancer Mother     Testicular cancer Brother     Pancreatic Cancer Maternal Uncle     Chronic Obstructive Pulmonary Disease Maternal Uncle     LUNG DISEASE No family hx of      History   Drug Use Not on file             Review of Systems  Constitutional, neuro, ENT, endocrine, pulmonary, cardiac, gastrointestinal, genitourinary, musculoskeletal, integument and psychiatric systems are negative, except as otherwise noted.    Objective    /70 (BP Location: Left arm, Patient Position: Sitting, Cuff Size: Adult Large)   Pulse 86   Temp 99.3  F (37.4  C) (Temporal)   Resp 17   Ht 1.676 m (5' 6\")   Wt 90.3 kg (199 lb 1.6 oz)   SpO2 96%   BMI 32.14 kg/m     Estimated body mass index is 32.14 kg/m  as calculated from the following:    Height as of this encounter: 1.676 m (5' 6\").    Weight as of this encounter: 90.3 kg (199 lb 1.6 oz).  Physical Exam  GENERAL: alert and no distress  EYES: Eyes grossly normal to inspection, PERRL and conjunctivae and sclerae normal  NECK: no adenopathy, no asymmetry, masses, or scars  RESP: lungs clear to auscultation - no rales, rhonchi or wheezes  CV: regular rate and rhythm, normal S1 S2, no S3 or S4, no murmur, click or rub, no peripheral edema  MS: no gross musculoskeletal " defects noted, no edema  SKIN: no suspicious lesions or rashes  NEURO: Normal strength and tone, mentation intact and speech normal  PSYCH: mentation appears normal, affect normal/bright    Recent Labs   Lab Test 04/24/25  0709 04/23/25  0709 04/22/25  0828 04/21/25  1854 04/20/25  0520 04/19/25  0821 04/19/25  0614 04/18/25  2024 04/07/25  0951 12/27/24  1902 12/27/24  1451   HGB 12.6  --   --  13.4   < > 12.9 13.1   < >  --    < > 14.1     --   --  181   < > 148* 155   < >  --    < > 159   INR  --   --   --   --   --  1.07 1.09   < >  --    < >  --      --   --  138   < >  --  141   < >  --    < > 141   POTASSIUM 4.0 4.5   < > 4.2   < >  --  4.6   < >  --    < > 4.1   CR 1.00*  --   --  0.95   < >  --  0.98*   < >  --    < > 1.23*   A1C  --   --   --   --   --   --   --   --  6.0*  --  6.0*    < > = values in this interval not displayed.        Diagnostics  No labs were ordered during this visit.   No EKG this visit, completed in the last 90 days.    Revised Cardiac Risk Index (RCRI)  The patient has the following serious cardiovascular risks for perioperative complications:   - Cerebrovascular Disease (TIA or CVA) = 1 point     RCRI Interpretation: 1 point: Class II (low risk - 0.9% complication rate)    Signed Electronically by: Ricky Garcia PA-C  A copy of this evaluation report is provided to the requesting physician.

## 2025-07-01 NOTE — ANESTHESIA PREPROCEDURE EVALUATION
Anesthesia Pre-Procedure Evaluation    Patient: Afshan Jimenez   MRN: [id number] : 1965          Procedure : Procedure(s):  LEFT CAROTID ENDARTERECTOMY WITH ELECTROENCEPHALOGRAM         Past Medical History:   Diagnosis Date    Deafness     left ear    Diabetes mellitus (H)     Malignant neoplasm (H)     leukemia    Malignant neoplasm of parotid gland (H)       Past Surgical History:   Procedure Laterality Date    LAPAROSCOPIC CHOLECYSTECTOMY  10/24/2011    Procedure:LAPAROSCOPIC CHOLECYSTECTOMY; Laparoscopic Cholecystectomy; Surgeon:TIAGO MALDONADO; Location:[facility] OR    SALIVARY GLAND SURGERY      L parotid    SINUS SURGERY      tumor removed from roof of mouth        Allergies   Allergen Reactions    Codeine Sulfate Rash      Social History     Tobacco Use    Smoking status: Never    Smokeless tobacco: Never   Substance Use Topics    Alcohol use: Yes      Wt Readings from Last 1 Encounters:   25 90.3 kg (199 lb 1.6 oz)        Anesthesia Evaluation   Pt has had prior anesthetic. Type: General.    No history of anesthetic complications       ROS/MED HX  ENT/Pulmonary:    (-) tobacco use and sleep apnea   Neurologic:     (+)          CVA, date: 2025,                     Cardiovascular:     (+) Dyslipidemia - Peripheral Vascular Disease (IMPRESSION: 1.  Mild plaque formation, velocities consistent with less than 50% stenosis in the left internal carotid artery. However there is significant atheromatous disease causing 67% luminal narrowing, similar to 2025.)-- Carotid Stenosis.   -  - -                                 Previous cardiac testing   Echo: Date: 25 Results:  Interpretation Summary     1. Normal biventricular size and function. Left ventricular ejection fraction  of 55-60%.  2. No segmental wall motion abnormalities noted.  3. No hemodynamically significant valvular disease.  Compared to the previous echocardiogram, there are no significant changes.  Technically very difficult  "study.    Stress Test:  Date: Results:    ECG Reviewed:  Date: Results:    Cath:  Date: Results:   (-) pacemaker and pacemaker   METS/Exercise Tolerance: >4 METS    Hematologic:       Musculoskeletal:       GI/Hepatic:    (-) GERD   Renal/Genitourinary:       Endo: Comment: Gout    (+)  type II DM, Last HgA1c: 6.0, date: 4/7/25, Using insulin,                 Psychiatric/Substance Use:       Infectious Disease:  - neg infectious disease ROS     Malignancy:   (+) Malignancy, History of Other and Lymphoma/Leukemia.Lymph CA Remission status post.  Other CA parotid gland cancer status post.    Other:              Physical Exam  Airway  Mallampati: IV  Neck ROM: limited  Mouth opening: < 4 cm  Comments: Very small mouth opening and limited neck extension    Cardiovascular - normal exam   Dental   (+) Minor Abnormalities - some fillings, tiny chips  Comments: Removable implant for roof of mouth (post cancer removal)      Pulmonary - normal exam      Neurological - normal exam  She appears awake, alert and oriented x3.    Other Findings       OUTSIDE LABS:  CBC:   Lab Results   Component Value Date    WBC 7.3 04/24/2025    WBC 9.4 04/21/2025    HGB 12.6 04/24/2025    HGB 13.4 04/21/2025    HCT 38.5 04/24/2025    HCT 41.1 04/21/2025     04/24/2025     04/21/2025     BMP:   Lab Results   Component Value Date     04/24/2025     04/21/2025    POTASSIUM 4.0 04/24/2025    POTASSIUM 4.5 04/23/2025    CHLORIDE 101 04/24/2025    CHLORIDE 102 04/21/2025    CO2 25 04/24/2025    CO2 24 04/21/2025    BUN 20.6 04/24/2025    BUN 19.5 04/21/2025    CR 1.00 (H) 04/24/2025    CR 0.95 04/21/2025     (H) 04/27/2025     (H) 04/27/2025     COAGS:   Lab Results   Component Value Date    PTT 28 04/19/2025    INR 1.07 04/19/2025    FIBR 358 04/19/2025     POC: No results found for: \"BGM\", \"HCG\", \"HCGS\"  HEPATIC:   Lab Results   Component Value Date    ALBUMIN 4.3 01/09/2025    PROTTOTAL 7.9 01/09/2025    ALT " "32 01/09/2025    AST 46 (H) 01/09/2025    ALKPHOS 88 01/09/2025    BILITOTAL 0.5 01/09/2025     OTHER:   Lab Results   Component Value Date    A1C 6.0 (H) 04/07/2025    JOSE MARIA 9.4 04/24/2025    MAG 1.9 04/24/2025    LIPASE 87 10/24/2011    TSH Canceled, Test credited 07/07/2005    TSH 2.92 07/07/2005    T4 0.96 07/07/2005    SED 10 12/28/2024       Anesthesia Plan    ASA Status:  3      NPO Status: NPO Appropriate   Anesthesia Type: General.  Airway: oral.  Induction: intravenous.  Maintenance: Balanced.   Techniques and Equipment:     - Airway:  Planned airway equipment includes video laryngoscope and fiberoptic scope.  Arterial Line Kit: Radial     - Monitoring Plan: standard ASA monitoring, arterial line kit     Consents    Anesthesia Plan(s) and associated risks, benefits, and realistic alternatives discussed. Questions answered and patient/representative(s) expressed understanding.     - Discussed:     - Discussed with:  Patient        - Pt is DNR/DNI Status: no DNR          Postoperative Care    Pain management: multimodal analgesia.     Comments:                   Melissa Fowler MD    I have reviewed the pertinent notes and labs in the chart from the past 30 days and (re)examined the patient.  Any updates or changes from those notes are reflected in this note.    Clinically Significant Risk Factors Present on Admission                             # Obesity: Estimated body mass index is 32.14 kg/m  as calculated from the following:    Height as of 7/1/25: 1.676 m (5' 6\").    Weight as of 7/1/25: 90.3 kg (199 lb 1.6 oz).       # Financial/Environmental Concerns:                 "

## 2025-07-02 ENCOUNTER — HOSPITAL ENCOUNTER (INPATIENT)
Facility: CLINIC | Age: 60
LOS: 1 days | Discharge: HOME | DRG: 038 | End: 2025-07-03
Attending: SURGERY | Admitting: SURGERY
Payer: COMMERCIAL

## 2025-07-02 ENCOUNTER — ANESTHESIA (OUTPATIENT)
Dept: SURGERY | Facility: CLINIC | Age: 60
End: 2025-07-02
Payer: COMMERCIAL

## 2025-07-02 DIAGNOSIS — I63.9 ACUTE CVA (CEREBROVASCULAR ACCIDENT) (H): ICD-10-CM

## 2025-07-02 DIAGNOSIS — Z71.89 OTHER SPECIFIED COUNSELING: Chronic | ICD-10-CM

## 2025-07-02 DIAGNOSIS — H74.92 UNSPECIFIED DISORDER OF LEFT MIDDLE EAR AND MASTOID: Primary | ICD-10-CM

## 2025-07-02 LAB
ANION GAP SERPL CALCULATED.3IONS-SCNC: 11 MMOL/L (ref 7–15)
BUN SERPL-MCNC: 25.3 MG/DL (ref 8–23)
CALCIUM SERPL-MCNC: 9.2 MG/DL (ref 8.8–10.4)
CHLORIDE SERPL-SCNC: 106 MMOL/L (ref 98–107)
CREAT SERPL-MCNC: 0.87 MG/DL (ref 0.51–0.95)
EGFRCR SERPLBLD CKD-EPI 2021: 75 ML/MIN/1.73M2
GLUCOSE BLDC GLUCOMTR-MCNC: 163 MG/DL (ref 70–99)
GLUCOSE BLDC GLUCOMTR-MCNC: 286 MG/DL (ref 70–99)
GLUCOSE SERPL-MCNC: 194 MG/DL (ref 70–99)
HCO3 SERPL-SCNC: 22 MMOL/L (ref 22–29)
HGB BLD-MCNC: 14.1 G/DL (ref 11.7–15.7)
HOLD SPECIMEN: NORMAL
MCV RBC AUTO: 85 FL (ref 78–100)
POTASSIUM SERPL-SCNC: 4.8 MMOL/L (ref 3.4–5.3)
SODIUM SERPL-SCNC: 139 MMOL/L (ref 135–145)

## 2025-07-02 PROCEDURE — 258N000003 HC RX IP 258 OP 636: Performed by: STUDENT IN AN ORGANIZED HEALTH CARE EDUCATION/TRAINING PROGRAM

## 2025-07-02 PROCEDURE — 370N000017 HC ANESTHESIA TECHNICAL FEE, PER MIN: Performed by: SURGERY

## 2025-07-02 PROCEDURE — 82565 ASSAY OF CREATININE: CPT

## 2025-07-02 PROCEDURE — 250N000011 HC RX IP 250 OP 636: Performed by: STUDENT IN AN ORGANIZED HEALTH CARE EDUCATION/TRAINING PROGRAM

## 2025-07-02 PROCEDURE — 250N000009 HC RX 250: Performed by: NURSE ANESTHETIST, CERTIFIED REGISTERED

## 2025-07-02 PROCEDURE — 360N000077 HC SURGERY LEVEL 4, PER MIN: Performed by: SURGERY

## 2025-07-02 PROCEDURE — 250N000011 HC RX IP 250 OP 636: Performed by: SURGERY

## 2025-07-02 PROCEDURE — 999N000141 HC STATISTIC PRE-PROCEDURE NURSING ASSESSMENT: Performed by: SURGERY

## 2025-07-02 PROCEDURE — 250N000013 HC RX MED GY IP 250 OP 250 PS 637: Performed by: STUDENT IN AN ORGANIZED HEALTH CARE EDUCATION/TRAINING PROGRAM

## 2025-07-02 PROCEDURE — 36415 COLL VENOUS BLD VENIPUNCTURE: CPT | Performed by: STUDENT IN AN ORGANIZED HEALTH CARE EDUCATION/TRAINING PROGRAM

## 2025-07-02 PROCEDURE — 120N000001 HC R&B MED SURG/OB

## 2025-07-02 PROCEDURE — 03UJ07Z SUPPLEMENT LEFT COMMON CAROTID ARTERY WITH AUTOLOGOUS TISSUE SUBSTITUTE, OPEN APPROACH: ICD-10-PCS | Performed by: SURGERY

## 2025-07-02 PROCEDURE — 258N000003 HC RX IP 258 OP 636: Performed by: SURGERY

## 2025-07-02 PROCEDURE — 250N000011 HC RX IP 250 OP 636: Performed by: NURSE ANESTHETIST, CERTIFIED REGISTERED

## 2025-07-02 PROCEDURE — 03CJ0ZZ EXTIRPATION OF MATTER FROM LEFT COMMON CAROTID ARTERY, OPEN APPROACH: ICD-10-PCS | Performed by: SURGERY

## 2025-07-02 PROCEDURE — 250N000025 HC SEVOFLURANE, PER MIN: Performed by: SURGERY

## 2025-07-02 PROCEDURE — 710N000009 HC RECOVERY PHASE 1, LEVEL 1, PER MIN: Performed by: SURGERY

## 2025-07-02 PROCEDURE — 4A10X4G MONITORING OF CENTRAL NERVOUS ELECTRICAL ACTIVITY, INTRAOPERATIVE, EXTERNAL APPROACH: ICD-10-PCS | Performed by: SURGERY

## 2025-07-02 PROCEDURE — 258N000003 HC RX IP 258 OP 636: Performed by: NURSE ANESTHETIST, CERTIFIED REGISTERED

## 2025-07-02 PROCEDURE — 250N000013 HC RX MED GY IP 250 OP 250 PS 637: Performed by: SURGERY

## 2025-07-02 PROCEDURE — 272N000001 HC OR GENERAL SUPPLY STERILE: Performed by: SURGERY

## 2025-07-02 PROCEDURE — 06BQ0ZZ EXCISION OF LEFT SAPHENOUS VEIN, OPEN APPROACH: ICD-10-PCS | Performed by: SURGERY

## 2025-07-02 PROCEDURE — 76998 US GUIDE INTRAOP: CPT | Mod: 26 | Performed by: SURGERY

## 2025-07-02 PROCEDURE — 85018 HEMOGLOBIN: CPT | Performed by: STUDENT IN AN ORGANIZED HEALTH CARE EDUCATION/TRAINING PROGRAM

## 2025-07-02 PROCEDURE — 35301 RECHANNELING OF ARTERY: CPT | Mod: LT | Performed by: SURGERY

## 2025-07-02 RX ORDER — CLOPIDOGREL BISULFATE 75 MG/1
75 TABLET ORAL DAILY
Qty: 30 TABLET | Refills: 0 | Status: SHIPPED | OUTPATIENT
Start: 2025-07-02 | End: 2025-07-03

## 2025-07-02 RX ORDER — ONDANSETRON 4 MG/1
4 TABLET, ORALLY DISINTEGRATING ORAL EVERY 6 HOURS PRN
Status: DISCONTINUED | OUTPATIENT
Start: 2025-07-02 | End: 2025-07-03 | Stop reason: HOSPADM

## 2025-07-02 RX ORDER — HEPARIN SODIUM 1000 [USP'U]/ML
INJECTION, SOLUTION INTRAVENOUS; SUBCUTANEOUS
Status: COMPLETED
Start: 2025-07-02 | End: 2025-07-02

## 2025-07-02 RX ORDER — KETOROLAC TROMETHAMINE 30 MG/ML
INJECTION, SOLUTION INTRAMUSCULAR; INTRAVENOUS PRN
Status: DISCONTINUED | OUTPATIENT
Start: 2025-07-02 | End: 2025-07-02

## 2025-07-02 RX ORDER — PREDNISOLONE ACETATE 10 MG/ML
2 SUSPENSION/ DROPS OPHTHALMIC 2 TIMES DAILY
Status: DISCONTINUED | OUTPATIENT
Start: 2025-07-02 | End: 2025-07-02

## 2025-07-02 RX ORDER — ASPIRIN 81 MG/1
81 TABLET ORAL DAILY
Status: DISCONTINUED | OUTPATIENT
Start: 2025-07-03 | End: 2025-07-03 | Stop reason: HOSPADM

## 2025-07-02 RX ORDER — HYDROMORPHONE HCL IN WATER/PF 6 MG/30 ML
0.4 PATIENT CONTROLLED ANALGESIA SYRINGE INTRAVENOUS EVERY 5 MIN PRN
Status: DISCONTINUED | OUTPATIENT
Start: 2025-07-02 | End: 2025-07-02 | Stop reason: HOSPADM

## 2025-07-02 RX ORDER — LIDOCAINE 40 MG/G
CREAM TOPICAL
Status: DISCONTINUED | OUTPATIENT
Start: 2025-07-02 | End: 2025-07-03 | Stop reason: HOSPADM

## 2025-07-02 RX ORDER — BUPIVACAINE HYDROCHLORIDE 5 MG/ML
INJECTION, SOLUTION PERINEURAL PRN
Status: DISCONTINUED | OUTPATIENT
Start: 2025-07-02 | End: 2025-07-02 | Stop reason: HOSPADM

## 2025-07-02 RX ORDER — OXYCODONE HYDROCHLORIDE 5 MG/1
5 TABLET ORAL EVERY 4 HOURS PRN
Status: DISCONTINUED | OUTPATIENT
Start: 2025-07-02 | End: 2025-07-03 | Stop reason: HOSPADM

## 2025-07-02 RX ORDER — AMOXICILLIN 250 MG
1 CAPSULE ORAL 2 TIMES DAILY
Status: DISCONTINUED | OUTPATIENT
Start: 2025-07-02 | End: 2025-07-03 | Stop reason: HOSPADM

## 2025-07-02 RX ORDER — FENTANYL CITRATE 0.05 MG/ML
25 INJECTION, SOLUTION INTRAMUSCULAR; INTRAVENOUS EVERY 5 MIN PRN
Status: DISCONTINUED | OUTPATIENT
Start: 2025-07-02 | End: 2025-07-02 | Stop reason: HOSPADM

## 2025-07-02 RX ORDER — HYDROMORPHONE HCL IN WATER/PF 6 MG/30 ML
0.4 PATIENT CONTROLLED ANALGESIA SYRINGE INTRAVENOUS
Status: DISCONTINUED | OUTPATIENT
Start: 2025-07-02 | End: 2025-07-03 | Stop reason: HOSPADM

## 2025-07-02 RX ORDER — POLYETHYLENE GLYCOL 3350 17 G/17G
17 POWDER, FOR SOLUTION ORAL DAILY
Status: DISCONTINUED | OUTPATIENT
Start: 2025-07-03 | End: 2025-07-03 | Stop reason: HOSPADM

## 2025-07-02 RX ORDER — BUPIVACAINE HYDROCHLORIDE 5 MG/ML
INJECTION, SOLUTION EPIDURAL; INTRACAUDAL; PERINEURAL
Status: DISCONTINUED
Start: 2025-07-02 | End: 2025-07-02 | Stop reason: HOSPADM

## 2025-07-02 RX ORDER — CLOPIDOGREL BISULFATE 75 MG/1
75 TABLET ORAL DAILY
Status: DISCONTINUED | OUTPATIENT
Start: 2025-07-02 | End: 2025-07-02

## 2025-07-02 RX ORDER — SODIUM CHLORIDE, SODIUM LACTATE, POTASSIUM CHLORIDE, CALCIUM CHLORIDE 600; 310; 30; 20 MG/100ML; MG/100ML; MG/100ML; MG/100ML
INJECTION, SOLUTION INTRAVENOUS CONTINUOUS
Status: DISCONTINUED | OUTPATIENT
Start: 2025-07-02 | End: 2025-07-02 | Stop reason: HOSPADM

## 2025-07-02 RX ORDER — HYDROMORPHONE HCL IN WATER/PF 6 MG/30 ML
0.2 PATIENT CONTROLLED ANALGESIA SYRINGE INTRAVENOUS EVERY 5 MIN PRN
Status: DISCONTINUED | OUTPATIENT
Start: 2025-07-02 | End: 2025-07-02 | Stop reason: HOSPADM

## 2025-07-02 RX ORDER — NALOXONE HYDROCHLORIDE 0.4 MG/ML
0.2 INJECTION, SOLUTION INTRAMUSCULAR; INTRAVENOUS; SUBCUTANEOUS
Status: DISCONTINUED | OUTPATIENT
Start: 2025-07-02 | End: 2025-07-03 | Stop reason: HOSPADM

## 2025-07-02 RX ORDER — CEFAZOLIN SODIUM/WATER 2 G/20 ML
SYRINGE (ML) INTRAVENOUS PRN
Status: DISCONTINUED | OUTPATIENT
Start: 2025-07-02 | End: 2025-07-02

## 2025-07-02 RX ORDER — NALOXONE HYDROCHLORIDE 0.4 MG/ML
0.4 INJECTION, SOLUTION INTRAMUSCULAR; INTRAVENOUS; SUBCUTANEOUS
Status: DISCONTINUED | OUTPATIENT
Start: 2025-07-02 | End: 2025-07-03 | Stop reason: HOSPADM

## 2025-07-02 RX ORDER — ONDANSETRON 4 MG/1
4 TABLET, ORALLY DISINTEGRATING ORAL EVERY 30 MIN PRN
Status: DISCONTINUED | OUTPATIENT
Start: 2025-07-02 | End: 2025-07-02 | Stop reason: HOSPADM

## 2025-07-02 RX ORDER — CEFAZOLIN SODIUM/WATER 2 G/20 ML
2 SYRINGE (ML) INTRAVENOUS
Status: DISCONTINUED | OUTPATIENT
Start: 2025-07-02 | End: 2025-07-02

## 2025-07-02 RX ORDER — ASPIRIN 81 MG/1
81 TABLET, CHEWABLE ORAL
Status: DISCONTINUED | OUTPATIENT
Start: 2025-07-02 | End: 2025-07-02

## 2025-07-02 RX ORDER — DEXAMETHASONE SODIUM PHOSPHATE 4 MG/ML
INJECTION, SOLUTION INTRA-ARTICULAR; INTRALESIONAL; INTRAMUSCULAR; INTRAVENOUS; SOFT TISSUE PRN
Status: DISCONTINUED | OUTPATIENT
Start: 2025-07-02 | End: 2025-07-02

## 2025-07-02 RX ORDER — ASPIRIN 81 MG/1
81 TABLET ORAL DAILY
Status: DISCONTINUED | OUTPATIENT
Start: 2025-07-03 | End: 2025-07-02

## 2025-07-02 RX ORDER — ACETAMINOPHEN 325 MG/1
975 TABLET ORAL EVERY 8 HOURS
Status: DISCONTINUED | OUTPATIENT
Start: 2025-07-02 | End: 2025-07-03 | Stop reason: HOSPADM

## 2025-07-02 RX ORDER — NALOXONE HYDROCHLORIDE 0.4 MG/ML
0.1 INJECTION, SOLUTION INTRAMUSCULAR; INTRAVENOUS; SUBCUTANEOUS
Status: DISCONTINUED | OUTPATIENT
Start: 2025-07-02 | End: 2025-07-02 | Stop reason: HOSPADM

## 2025-07-02 RX ORDER — ATORVASTATIN CALCIUM 40 MG/1
40 TABLET, FILM COATED ORAL EVERY EVENING
Status: DISCONTINUED | OUTPATIENT
Start: 2025-07-02 | End: 2025-07-03 | Stop reason: HOSPADM

## 2025-07-02 RX ORDER — CLOPIDOGREL BISULFATE 75 MG/1
75 TABLET ORAL DAILY
Status: DISCONTINUED | OUTPATIENT
Start: 2025-07-03 | End: 2025-07-02

## 2025-07-02 RX ORDER — LIDOCAINE HYDROCHLORIDE 20 MG/ML
INJECTION, SOLUTION INFILTRATION; PERINEURAL PRN
Status: DISCONTINUED | OUTPATIENT
Start: 2025-07-02 | End: 2025-07-02

## 2025-07-02 RX ORDER — HYDROMORPHONE HCL IN WATER/PF 6 MG/30 ML
0.2 PATIENT CONTROLLED ANALGESIA SYRINGE INTRAVENOUS
Status: DISCONTINUED | OUTPATIENT
Start: 2025-07-02 | End: 2025-07-03 | Stop reason: HOSPADM

## 2025-07-02 RX ORDER — LIDOCAINE HYDROCHLORIDE 10 MG/ML
INJECTION, SOLUTION INFILTRATION; PERINEURAL
Status: DISCONTINUED
Start: 2025-07-02 | End: 2025-07-02 | Stop reason: HOSPADM

## 2025-07-02 RX ORDER — LABETALOL HYDROCHLORIDE 5 MG/ML
10 INJECTION, SOLUTION INTRAVENOUS EVERY 10 MIN PRN
Status: DISCONTINUED | OUTPATIENT
Start: 2025-07-02 | End: 2025-07-03 | Stop reason: HOSPADM

## 2025-07-02 RX ORDER — HEPARIN SODIUM 1000 [USP'U]/ML
INJECTION, SOLUTION INTRAVENOUS; SUBCUTANEOUS PRN
Status: DISCONTINUED | OUTPATIENT
Start: 2025-07-02 | End: 2025-07-02

## 2025-07-02 RX ORDER — FENTANYL CITRATE 50 UG/ML
INJECTION, SOLUTION INTRAMUSCULAR; INTRAVENOUS PRN
Status: DISCONTINUED | OUTPATIENT
Start: 2025-07-02 | End: 2025-07-02

## 2025-07-02 RX ORDER — ONDANSETRON 2 MG/ML
4 INJECTION INTRAMUSCULAR; INTRAVENOUS EVERY 6 HOURS PRN
Status: DISCONTINUED | OUTPATIENT
Start: 2025-07-02 | End: 2025-07-03 | Stop reason: HOSPADM

## 2025-07-02 RX ORDER — ONDANSETRON 2 MG/ML
4 INJECTION INTRAMUSCULAR; INTRAVENOUS EVERY 30 MIN PRN
Status: DISCONTINUED | OUTPATIENT
Start: 2025-07-02 | End: 2025-07-02 | Stop reason: HOSPADM

## 2025-07-02 RX ORDER — SODIUM CHLORIDE, SODIUM LACTATE, POTASSIUM CHLORIDE, CALCIUM CHLORIDE 600; 310; 30; 20 MG/100ML; MG/100ML; MG/100ML; MG/100ML
INJECTION, SOLUTION INTRAVENOUS CONTINUOUS
Status: ACTIVE | OUTPATIENT
Start: 2025-07-02 | End: 2025-07-02

## 2025-07-02 RX ORDER — OFLOXACIN 3 MG/ML
4 SOLUTION AURICULAR (OTIC) 2 TIMES DAILY
Status: DISCONTINUED | OUTPATIENT
Start: 2025-07-02 | End: 2025-07-02

## 2025-07-02 RX ORDER — HEPARIN SODIUM 1000 [USP'U]/ML
INJECTION, SOLUTION INTRAVENOUS; SUBCUTANEOUS PRN
Status: DISCONTINUED | OUTPATIENT
Start: 2025-07-02 | End: 2025-07-02 | Stop reason: HOSPADM

## 2025-07-02 RX ORDER — CEFAZOLIN SODIUM/WATER 2 G/20 ML
2 SYRINGE (ML) INTRAVENOUS SEE ADMIN INSTRUCTIONS
Status: DISCONTINUED | OUTPATIENT
Start: 2025-07-02 | End: 2025-07-02

## 2025-07-02 RX ORDER — ONDANSETRON 2 MG/ML
INJECTION INTRAMUSCULAR; INTRAVENOUS PRN
Status: DISCONTINUED | OUTPATIENT
Start: 2025-07-02 | End: 2025-07-02

## 2025-07-02 RX ORDER — BISACODYL 10 MG
10 SUPPOSITORY, RECTAL RECTAL DAILY PRN
Status: DISCONTINUED | OUTPATIENT
Start: 2025-07-05 | End: 2025-07-03 | Stop reason: HOSPADM

## 2025-07-02 RX ORDER — LABETALOL HYDROCHLORIDE 5 MG/ML
10 INJECTION, SOLUTION INTRAVENOUS
Status: DISCONTINUED | OUTPATIENT
Start: 2025-07-02 | End: 2025-07-02 | Stop reason: HOSPADM

## 2025-07-02 RX ORDER — PROPOFOL 10 MG/ML
INJECTION, EMULSION INTRAVENOUS PRN
Status: DISCONTINUED | OUTPATIENT
Start: 2025-07-02 | End: 2025-07-02

## 2025-07-02 RX ORDER — CLOPIDOGREL BISULFATE 75 MG/1
75 TABLET ORAL DAILY
Status: DISCONTINUED | OUTPATIENT
Start: 2025-07-02 | End: 2025-07-03 | Stop reason: HOSPADM

## 2025-07-02 RX ORDER — FENTANYL CITRATE 0.05 MG/ML
50 INJECTION, SOLUTION INTRAMUSCULAR; INTRAVENOUS EVERY 5 MIN PRN
Status: DISCONTINUED | OUTPATIENT
Start: 2025-07-02 | End: 2025-07-02 | Stop reason: HOSPADM

## 2025-07-02 RX ORDER — ENOXAPARIN SODIUM 100 MG/ML
30 INJECTION SUBCUTANEOUS EVERY 24 HOURS
Status: DISCONTINUED | OUTPATIENT
Start: 2025-07-03 | End: 2025-07-03 | Stop reason: HOSPADM

## 2025-07-02 RX ORDER — PROCHLORPERAZINE MALEATE 10 MG
10 TABLET ORAL EVERY 6 HOURS PRN
Status: DISCONTINUED | OUTPATIENT
Start: 2025-07-02 | End: 2025-07-03 | Stop reason: HOSPADM

## 2025-07-02 RX ORDER — DEXAMETHASONE SODIUM PHOSPHATE 4 MG/ML
4 INJECTION, SOLUTION INTRA-ARTICULAR; INTRALESIONAL; INTRAMUSCULAR; INTRAVENOUS; SOFT TISSUE
Status: DISCONTINUED | OUTPATIENT
Start: 2025-07-02 | End: 2025-07-02 | Stop reason: HOSPADM

## 2025-07-02 RX ADMIN — PROPOFOL 150 MG: 10 INJECTION, EMULSION INTRAVENOUS at 08:15

## 2025-07-02 RX ADMIN — FENTANYL CITRATE 25 MCG: 50 INJECTION INTRAMUSCULAR; INTRAVENOUS at 09:20

## 2025-07-02 RX ADMIN — Medication 200 MG: at 10:34

## 2025-07-02 RX ADMIN — ROCURONIUM BROMIDE 20 MG: 50 INJECTION, SOLUTION INTRAVENOUS at 09:18

## 2025-07-02 RX ADMIN — ACETAMINOPHEN 975 MG: 325 TABLET, FILM COATED ORAL at 20:00

## 2025-07-02 RX ADMIN — MIDAZOLAM 2 MG: 1 INJECTION INTRAMUSCULAR; INTRAVENOUS at 07:17

## 2025-07-02 RX ADMIN — PHENYLEPHRINE HYDROCHLORIDE 100 MCG: 10 INJECTION INTRAVENOUS at 08:45

## 2025-07-02 RX ADMIN — ACETAMINOPHEN 975 MG: 325 TABLET, FILM COATED ORAL at 12:48

## 2025-07-02 RX ADMIN — FENTANYL CITRATE 100 MCG: 50 INJECTION INTRAMUSCULAR; INTRAVENOUS at 08:15

## 2025-07-02 RX ADMIN — SODIUM CHLORIDE, POTASSIUM CHLORIDE, SODIUM LACTATE AND CALCIUM CHLORIDE: 600; 310; 30; 20 INJECTION, SOLUTION INTRAVENOUS at 06:44

## 2025-07-02 RX ADMIN — LIDOCAINE HYDROCHLORIDE 100 MG: 20 INJECTION, SOLUTION INFILTRATION; PERINEURAL at 08:15

## 2025-07-02 RX ADMIN — PHENYLEPHRINE HYDROCHLORIDE 50 MCG: 10 INJECTION INTRAVENOUS at 09:59

## 2025-07-02 RX ADMIN — DEXAMETHASONE SODIUM PHOSPHATE 4 MG: 4 INJECTION, SOLUTION INTRA-ARTICULAR; INTRALESIONAL; INTRAMUSCULAR; INTRAVENOUS; SOFT TISSUE at 08:15

## 2025-07-02 RX ADMIN — SENNOSIDES AND DOCUSATE SODIUM 1 TABLET: 50; 8.6 TABLET ORAL at 12:48

## 2025-07-02 RX ADMIN — HEPARIN SODIUM 5000 UNITS: 1000 INJECTION, SOLUTION INTRAVENOUS; SUBCUTANEOUS at 08:54

## 2025-07-02 RX ADMIN — PHENYLEPHRINE HYDROCHLORIDE 0.5 MCG/KG/MIN: 10 INJECTION INTRAVENOUS at 08:18

## 2025-07-02 RX ADMIN — SENNOSIDES AND DOCUSATE SODIUM 1 TABLET: 50; 8.6 TABLET ORAL at 20:02

## 2025-07-02 RX ADMIN — SUCCINYLCHOLINE CHLORIDE 100 MG: 20 INJECTION, SOLUTION INTRAMUSCULAR; INTRAVENOUS; PARENTERAL at 08:15

## 2025-07-02 RX ADMIN — ONDANSETRON 4 MG: 2 INJECTION INTRAMUSCULAR; INTRAVENOUS at 10:34

## 2025-07-02 RX ADMIN — CLOPIDOGREL BISULFATE 75 MG: 75 TABLET, FILM COATED ORAL at 17:00

## 2025-07-02 RX ADMIN — PHENYLEPHRINE HYDROCHLORIDE 100 MCG: 10 INJECTION INTRAVENOUS at 09:16

## 2025-07-02 RX ADMIN — PHENYLEPHRINE HYDROCHLORIDE 100 MCG: 10 INJECTION INTRAVENOUS at 09:27

## 2025-07-02 RX ADMIN — Medication 2 G: at 08:17

## 2025-07-02 RX ADMIN — KETOROLAC TROMETHAMINE 15 MG: 30 INJECTION, SOLUTION INTRAMUSCULAR at 09:44

## 2025-07-02 RX ADMIN — PHENYLEPHRINE HYDROCHLORIDE 100 MCG: 10 INJECTION INTRAVENOUS at 10:30

## 2025-07-02 RX ADMIN — ATORVASTATIN CALCIUM 40 MG: 40 TABLET, FILM COATED ORAL at 20:02

## 2025-07-02 RX ADMIN — ROCURONIUM BROMIDE 50 MG: 50 INJECTION, SOLUTION INTRAVENOUS at 08:28

## 2025-07-02 RX ADMIN — FENTANYL CITRATE 25 MCG: 50 INJECTION INTRAMUSCULAR; INTRAVENOUS at 10:24

## 2025-07-02 RX ADMIN — PHENYLEPHRINE HYDROCHLORIDE 100 MCG: 10 INJECTION INTRAVENOUS at 10:16

## 2025-07-02 ASSESSMENT — ACTIVITIES OF DAILY LIVING (ADL)
ADLS_ACUITY_SCORE: 38
ADLS_ACUITY_SCORE: 35
ADLS_ACUITY_SCORE: 35
ADLS_ACUITY_SCORE: 38
ADLS_ACUITY_SCORE: 38
ADLS_ACUITY_SCORE: 35
ADLS_ACUITY_SCORE: 38
ADLS_ACUITY_SCORE: 38
ADLS_ACUITY_SCORE: 35
ADLS_ACUITY_SCORE: 38
ADLS_ACUITY_SCORE: 38
ADLS_ACUITY_SCORE: 35
ADLS_ACUITY_SCORE: 38
ADLS_ACUITY_SCORE: 38
ADLS_ACUITY_SCORE: 37
ADLS_ACUITY_SCORE: 35
ADLS_ACUITY_SCORE: 58
ADLS_ACUITY_SCORE: 35

## 2025-07-02 NOTE — ANESTHESIA PROCEDURE NOTES
Arterial Line Procedure Note    Pre-Procedure   Staff -        Anesthesiologist:  Melissa Fowler MD       Performed By: anesthesiologist       Location: pre-op       Pre-Anesthestic Checklist: patient identified, IV checked, risks and benefits discussed, informed consent, monitors and equipment checked, pre-op evaluation and at physician/surgeon's request  Timeout:       Correct Patient: Yes        Correct Procedure: Yes        Correct Site: Yes        Correct Position: Yes   Line Placement:   This line was placed Pre Induction starting at 7/2/2025 7:20 AM and ending at 7/2/2025 7:25 AM  Procedure   Procedure: arterial line       Laterality: left       Insertion Site: radial.  Sterile Prep        All elements of maximal sterile barrier technique followed       Patient Prep/Sterile Barriers: hand hygiene, sterile gloves, hat, mask, draped, draped       Skin prep: Chloraprep  Insertion/Injection        Technique: Seldinger Technique, ultrasound guided and Bart's test completed        Medical Necessity: atherosclerosis       1. Ultrasound was used to evaluate the access site.       2. Artery evaluated via ultrasound for patency/adequacy.       3. Using real-time ultrasound the needle/catheter was observed entering the artery/vein.       4. Permanent image was captured and entered into the patient's record.       5. The visualized structures were anatomically normal.       6. There were no apparent abnormal pathologic findings.       Catheter Type/Size: 20 gauge, 1.75 in/4.5 cm quick cath (integral wire)  Narrative         Secured by: anchor securement device and other       Tegaderm dressing used.       Complications: None apparent,        Arterial waveform: Yes        IBP within 10% of NIBP: Yes   Comments:  Ultrasound was used to place arterial line due to Art Line US Reasons: vascular disease.

## 2025-07-02 NOTE — ANESTHESIA PROCEDURE NOTES
Airway       Patient location during procedure: OR       Procedure Start/Stop Times: 7/2/2025 8:16 AM  Staff -        CRNA: Korin Toledo APRN CRNA       Performed By: CRNA  Consent for Airway        Urgency: elective  Indications and Patient Condition       Indications for airway management: malcolm-procedural       Induction type:RSI       Mask difficulty assessment: 0 - not attempted    Final Airway Details       Final airway type: endotracheal airway       Successful airway: ETT - single  Endotracheal Airway Details        ETT size (mm): 7.0       Cuffed: yes       Successful intubation technique: video laryngoscopy       VL Blade Size: Glidescope 3       Grade View of Cords: 1       Adjucts: stylet       Position: Right       Measured from: lips       Secured at (cm): 21       Bite block used: None    Post intubation assessment        Placement verified by: capnometry, equal breath sounds and chest rise        Number of attempts at approach: 1       Secured with: tape       Ease of procedure: easy       Dentition: Intact and Unchanged    Medication(s) Administered   Medication Administration Time: 7/2/2025 8:16 AM

## 2025-07-02 NOTE — INTERVAL H&P NOTE
"I have reviewed the surgical (or preoperative) H&P that is linked to this encounter, and examined the patient. There are no significant changes    Clinical Conditions Present on Arrival:  Clinically Significant Risk Factors Present on Admission                  # Drug Induced Platelet Defect: home medication list includes an antiplatelet medication      # Obesity: Estimated body mass index is 31.8 kg/m  as calculated from the following:    Height as of this encounter: 1.676 m (5' 6\").    Weight as of this encounter: 89.4 kg (197 lb).       "

## 2025-07-02 NOTE — ANESTHESIA CARE TRANSFER NOTE
Patient: Afshan Jimenez    Procedure: Procedure(s):  LEFT CAROTID ENDARTERECTOMY WITH GREATER SAPHENOUS VEIN PATCH, WITH  ELECTROENCEPHALOGRAM       Diagnosis: Carotid stenosis, symptomatic, with infarction (H) [I63.239]  Diagnosis Additional Information: No value filed.    Anesthesia Type:   General     Note:    Oropharynx: oropharynx clear of all foreign objects and spontaneously breathing  Level of Consciousness: awake  Oxygen Supplementation: face mask  Level of Supplemental Oxygen (L/min / FiO2): 4  Independent Airway: airway patency satisfactory and stable  Dentition: dentition unchanged  Vital Signs Stable: post-procedure vital signs reviewed and stable  Report to RN Given: handoff report given  Patient transferred to: PACU    Handoff Report: Identifed the Patient, Identified the Reponsible Provider, Reviewed the pertinent medical history, Discussed the surgical course, Reviewed Intra-OP anesthesia mangement and issues during anesthesia, Set expectations for post-procedure period and Allowed opportunity for questions and acknowledgement of understanding      Vitals:  Vitals Value Taken Time   /82 07/02/25 10:49   Temp 97.0 F 07/02/25  10:53   Pulse 97 07/02/25 10:53   Resp 18 07/02/25 10:53   SpO2 99 % 07/02/25 10:53   Vitals shown include unfiled device data.    Electronically Signed By: Tre Langley APRN CRNA  July 2, 2025  10:55 AM

## 2025-07-02 NOTE — PROGRESS NOTES
VASCULAR SURGERY: Postop check    Patient up in chair neuro stepdown unit.  Very comfortable.  Tolerated soft diet with no nausea.  Left neck incision dressing=dry  Neurological exam= normal    Discussed operative findings.  Definite ulcerated plaque found in the distal common carotid artery as the cause of her multiple strokes.    IMPRESSION: Doing well status post left CEA for symptomatic ulcerated stenosis.  GSV vein patch placed.  Initiate aspirin and Plavix.    Diet as tolerated.  Plan Home tomorrow.      Ivan Deleon MD

## 2025-07-02 NOTE — OP NOTE
OPERATIVE NOTE    PROCEDURE DATE: 7/2/2025      PRE-OP DIAGNOSIS: Symptomatic left carotid stenosis      POST-OP DIAGNOSES: Same      PROCEDURE PERFORMED: #1.  Left carotid endarterectomy with greater saphenous vein patch angioplasty     #2.  Intraoperative shunting and EEG monitoring     #3.  [place] left ankle greater saphenous vein      SURGEON:  Ivan Deleon M.D.      ASSISTANT:  Patricia Daniel MD (Vascular Fellow)      ANESTHESIA:  General-endotracheal      PRE-OP MEDICATIONS: Ancef 2 g IV      INDICATIONS FOR PROCEDURE: 60-year-old patient has had several left hemispheric strokes by MRI and clinically that have improved.  She is found to have the only 50% left carotid bulb stenosis but very concerned that this may ulcerated because of her recurrent strokes despite appropriate antiplatelet therapy.  Also significant history of radiation to her neck for cancer many years ago.  We did not feel that the lesion in her neck was related to radiation arteritis and most likely due to atherosclerosis.  She comes the operative today under informed consent.      DESCRIPTION OF PROCEDURE: In preinduction right radial arterial line was placed by anesthesia staff.  Brought the op room and placed supine.  Induced under general anesthesia and orally intubated without difficulty.  Very thin neck that was fairly soft even despite radiation.  She rolled on the shoulder.  Pneumatic compression boots were used.    Patient had a good left ankle greater saphenous vein clinically and by duplex ultrasound performed in the operating room.  The left neck and left ankle/foot were prepped and draped.  Timeout was called and the sites were identified.    VASCULAR EXPOSURE: Standard left carotid incision was made.  Dissection was carried out through minimal subcutaneous tissue and a very thin platysma's.  Sternocleidomastoid muscles was dissected free and retracted laterally.  We dissected down to the carotid sheath.  There was no  significant scar tissue appreciated.  We easily identified the hypoglossal nerve with very minimal manipulation along with the vagus nerve.  Several crossing facial venous branches were divided between 2 or 3-0 silk suture.  We had excellent visualization of the carotid artery.  Common carotid artery did have plaque and atherosclerotic disease within the bulb.  Very proximal ICA.  Distal ICA measured over 3.5 mm in diameter was completely free of disease.  Significant plaque in the origin of the external carotid artery and these were all encircled very cautiously.  We extended our exposure down below the omohyoid muscles we could remove all disease that was noted on the CTA showing more disease in the mid and distal CCA that we normally see you for typical atherosclerosis.  This was encircled where it was quite soft.    CAROTID ENDARTERECTOMY: 5000 units intravenous heparin given.  After 5 minutes the Vesseloops were sequentially tightened with a stable EEG and blood pressure.  #11 that was used at the common carotid artery and Lanier's extended the arteriotomy to the ICA.  There was organized firm soft plaque just before the carotid bulb consistent with the patient's recurrent symptoms.  There was calcified plaque at the carotid bulb and very proximal ICA with minimal ulceration.  Distal ICA was completely free of disease.  A preheparinized Sundt shunt was inserted secured to the vessel proximally Praveen clamp distally with a very stable EEG.    Distal endpoint on the ICA was made with a [place] blade and loupe modification with an excellent minimal transition this was tacked down with multiple interrupted 7-0 Prolene sutures very nicely.  Endarterectomy was formed on the deep media.  On the CCA we extended the endarterectomy till we were proximal to the thickened plaque area as mentioned above was somewhat wall thickening measuring approximately 1.5 mm diffusely.  A fairly good eversion and rectum is formed external  carotid artery due to the multiple branches.  All loose medial fibers are removed down to very smooth plane.    GSV PATCH: We needed 7 cm in length for patch due to the extent of the CCA involvement.  We then made an incision on the left medial ankle and dissected down to identify an excellent greater saphenous vein.  Several small branches divided between 3-0 silk suture.  Vein was ligated proximally distally 2-0 silk suture.  This was transected and gently dilated with 0.5% Marcaine.  This was spatulated.  There were no valve leaflets.    Patch was then brought up to the neck and sewn to our 7 cm arteriotomy with running 6-0 Prolene suture and loupe magnification.  Prior to complete closure the shunt was removed and the vessel was flushed copiously with good backbleeding.  Blood flow sequential allowed to go up the ICA with excellent pulse noted, stable EEG and blood pressure, good hemostasis.    Small amount of Surgicel was placed over the arteriotomy/vein patch.  The neck was closed in layers with interrupted running 3-0 Vicryl suture.  Skin was closed with 4-0 Monocryl in a subcuticular fashion.    The ankle GSV harvest site was closed with interrupted 3-0 Vicryl deep in the skin with 4-0 Monocryl in suppurative fashion followed by Steri-Strips gauze dressing and Ace bandage.  On the neck field block was performed with 0.5% Marcaine as was the ankle along with Toradol 15 mg IV.    Steri-Strips and gauze dressing to neck.    Patient was extubated and returned to recovery in stable condition neurologically intact.  He is sponge count correct x 2      EBL: 25 mL      COMPLICATIONS: None      OPERATIVE FINDINGS: Firm organized old thrombus/plaque in distal common carotid arteries likely cause of her recurrent symptoms.          Ivan Deleon MD

## 2025-07-03 VITALS
BODY MASS INDEX: 31.66 KG/M2 | TEMPERATURE: 97.6 F | HEART RATE: 97 BPM | WEIGHT: 197 LBS | SYSTOLIC BLOOD PRESSURE: 98 MMHG | HEIGHT: 66 IN | OXYGEN SATURATION: 96 % | DIASTOLIC BLOOD PRESSURE: 74 MMHG | RESPIRATION RATE: 18 BRPM

## 2025-07-03 LAB — GLUCOSE BLDC GLUCOMTR-MCNC: 145 MG/DL (ref 70–99)

## 2025-07-03 PROCEDURE — 250N000013 HC RX MED GY IP 250 OP 250 PS 637: Performed by: SURGERY

## 2025-07-03 PROCEDURE — 250N000013 HC RX MED GY IP 250 OP 250 PS 637: Performed by: STUDENT IN AN ORGANIZED HEALTH CARE EDUCATION/TRAINING PROGRAM

## 2025-07-03 RX ORDER — CLOPIDOGREL BISULFATE 75 MG/1
75 TABLET ORAL DAILY
Qty: 30 TABLET | Refills: 0 | Status: SHIPPED | OUTPATIENT
Start: 2025-07-03

## 2025-07-03 RX ADMIN — ASPIRIN 81 MG: 81 TABLET, COATED ORAL at 07:45

## 2025-07-03 RX ADMIN — ACETAMINOPHEN 975 MG: 325 TABLET, FILM COATED ORAL at 04:27

## 2025-07-03 RX ADMIN — CLOPIDOGREL BISULFATE 75 MG: 75 TABLET, FILM COATED ORAL at 07:46

## 2025-07-03 ASSESSMENT — ACTIVITIES OF DAILY LIVING (ADL)
ADLS_ACUITY_SCORE: 38

## 2025-07-03 NOTE — PROGRESS NOTES
Orientations: A&O x4  Vitals/Pain: VSS on RA, complained of headache twice treated with Tylenol  Neuros: intact ex BL numbness in BLE and slight R field cut  Tele:  SR  Lines/Drains: IV SL  Skin/Wounds: intact ex L carotid incision, L ankle incision  GI/: continent  Labs: Abnormal/Trends, Electrolyte Replacement- NA  Ambulation/Assist: independent  Sleep Quality: good  Plan: discharge today

## 2025-07-03 NOTE — DISCHARGE SUMMARY
[facility]   Discharge Summary  Vascular Surgery     Afshan Jimenez MRN# [id number]   YOB: 1965 Age: 60 year old     Date of Admission:  7/2/2025  Date of Discharge::  7/3/2025  8:50 AM  Admitting Physician:  Ivan Deleon MD  Discharge Physician:  Ivan Deleon MD  Primary Care Physician:        [place]          Admission Diagnoses:   Carotid stenosis, symptomatic, with infarction (H) [I63.239]  Unspecified disorder of left middle ear and mastoid [H74.92]          Discharge Diagnosis:   Same as above         Procedures:   LEFT CAROTID ENDARTERECTOMY WITH GREATER SAPHENOUS VEIN PATCH, WITH  ELECTROENCEPHALOGRAM          Consultations:   SMOKING CESSATION PROGRAM IP CONSULT  CARE MANAGEMENT / SOCIAL WORK IP CONSULT          Brief History of Illness:   Per Dr. Deleon's Op note dated 7/2/2025    60-year-old patient has had several left hemispheric strokes by MRI and clinically that have improved. She is found to have the only 50% left carotid bulb stenosis but very concerned that this may ulcerated because of her recurrent strokes despite appropriate antiplatelet therapy. Also significant history of radiation to her neck for cancer many years ago. We did not feel that the lesion in her neck was related to radiation arteritis and most likely due to atherosclerosis. She comes the operative today under informed consent.            Hospital Course:   The patient underwent left carotid endarterectomy on 7/2/2025, which she tolerated well without immediate complications. She was transferred to the PACU and then floor for routine postoperative care. The remainder of her postoperative course was unremarkable. On the day of discharge, she was tolerating a regular diet, her pain was well controlled with oral pain medications, she was voiding spontaneously, and ambulating independently. Patient with follow up with [facility] in vascular surgery clinic in 2 weeks.            Imaging Studies:     Results for orders placed or performed during the hospital encounter of 05/15/25   US Carotid Left    Narrative    EXAM: US CAROTID LEFT  LOCATION: [facility]  DATE: 5/15/2025    INDICATION: follow up left carotid artery stenosis  COMPARISON: 4/19/2025.  TECHNIQUE: Duplex exam performed utilizing 2D gray-scale imaging, Doppler interrogation with color-flow and spectral waveform analysis. The percent diameter stenosis is determined using Updated Recommendations for Carotid Stenosis Interpretation Criteria   from [employer] [facility] [employer].    FINDINGS:    LEFT: Mild plaque at the bifurcation. The peak systolic velocity in the ICA is less than 180 cm/sec, consistent with less than 50% stenosis. Normal velocities in the ECA. Antegrade flow within the vertebral artery.    VELOCITY CHART:  CCA   Left: 144 cm/s  ICA   Left: 88 cm/s  ECA   Left: 102 cm/s  ICA/CCA PSV Ratio   Left: 1.07      Impression    IMPRESSION:  1.  Mild plaque formation, velocities consistent with less than 50% stenosis in the left internal carotid artery. However there is significant atheromatous disease causing 67% luminal narrowing, similar to 4/19/2025.  2.  Flow within the vertebral artery is antegrade.          Medications Prior to Admission:     No medications prior to admission.              Discharge Medications:     Discharge Medication List as of 7/3/2025  8:25 AM        CONTINUE these medications which have CHANGED    Details   clopidogrel (PLAVIX) 75 MG tablet Take 1 tablet (75 mg) by mouth daily., Disp-30 tablet, R-0, E-Prescribe           CONTINUE these medications which have NOT CHANGED    Details   acetaminophen (TYLENOL) 325 MG tablet Take 2 tablets (650 mg) by mouth every 4 hours as needed for mild pain., Transitional      aspirin 81 MG EC tablet Take 1 tablet (81 mg) by mouth daily., Disp-90 tablet, R-0, E-PrescribeFuture refills by PCP      atorvastatin (LIPITOR) 40 MG tablet Take 1 tablet  (40 mg) by mouth every evening., Disp-90 tablet, R-1, E-Prescribe      ofloxacin (FLOXIN) 0.3 % otic solution Place 4 drops Into the left ear 2 times daily. For 7 days, Historical      prednisoLONE acetate (PRED FORTE) 1 % ophthalmic suspension Place 2 drops Into the left ear 2 times daily. For 7 days, Historical                     Medications Discontinued or Adjusted During This Hospitalization:   Plavix for 1 month           Antibiotics Prescribed at Discharge:   None prescribed           Day of Discharge Physical Exam:   Temp:  [97.4  F (36.3  C)-98  F (36.7  C)] 97.6  F (36.4  C)  Pulse:  [] 97  Resp:  [13-28] 18  BP: ()/(69-95) 98/74  SpO2:  [94 %-97 %] 96 %    General: awake, alert, no acute distress, laying comfortably in bed   CV: warm, well perfused   Pulm: breathing comfortably on room air   Left neck  Incision c/d/I with steri strips in place, minimal swelling   Extremities: no edema, moving all extremities spontaneously and without apparent deficit            Final Pathology Result:   N/A           Discharge Instructions and Follow-Up:     Discharge Procedure Orders   Primary Care - Care Coordination Referral   Standing Status: Future   Referral Priority: Routine: Next available opening Referral Type: Care Coordination   Number of Visits Requested: 1     Reason for your hospital stay   Order Comments: Carotid endarterectomy     Activity   Order Comments: Your activity upon discharge: Activity as tolerated.     Order Specific Question Answer Comments   Is discharge order? Yes      Discharge Instructions   Order Comments: Carotid Endarterectomy     Post-Operative Instructions     Typical length of stay in the hospital is 1-2 days.  On occasion, an evening in the Intensive Care Unit may be required for blood pressure regulation.     Activity   Most people can resume normal activities 1-2 weeks after surgery.  The key is to gradually increase your level of activity as you are able.  It is not  "uncommon to feel easily fatigued - this will improve with time.   You should avoid heavy lifting more than 30 lbs for 1 week.   You may return to work when you feel able - typically 1-2 weeks after surgery, depending on the type of work you do.   You should not drive a car for 1 week after surgery.  In addition, you can not be taking prescription strength pain medication and you must feel strong enough to drive safely.     Incision Care   You can shower or bathe 2 days after surgery - avoid rubbing and soaking your incision.   You may have strips of white tape called \"steri-strips\" across your incision; they should stay on for 5-7 days or until the ends start to curl up.  You can then remove the steri-strips, or we will remove them at your post-operative appointment.  Avoid shaving directly over the incision for 2-3 weeks.     Common Concerns   You may notice mild skin numbness on the side of your surgery in your neck, chin, face, and earlobe.  This is due to nerve \"irritation\" and will gradually resolve over the next several months.  Call our office if you experience any short-lasting episode of numbness or weakness affecting one side of your body.   Some bruising and firmness around your incision can be expected.  This will soften and resolve over the next few weeks.  You may notice that some discoloration spreads to an area lower than the incision, such as the shoulder, neck or upper chest.  Likewise, swelling can occur near the incision or below the chin.  Call our office if the swelling or bruising worsens, or if you have difficulty swallowing.     Diet   You may resume a regular diet before you leave the hospital.  You may find that it is best to try smaller, more frequent meals until your appetite returns.     Medications   You may be started on a blood thinner after surgery - typically Aspirin and / or Plavix.   You may be given a prescription for pain medication after surgery.  If you need to have this " refilled, please call your pharmacy where you had it filled.  They will then call us for approval.  Please do not call after hours for a refill on pain medication.     Post-Operative Appointments   You need to see your surgeon in the clinic approximately 1-2 weeks after surgery.   You will have an ultrasound test and evaluation with your surgeon 90 days (3 months) after surgery.   We will notify you by mail when you are due to schedule further ultrasound testing and evaluation; typically this is done annually.      When You Should Call Our Office   Body aches, chills or temperature greater than 101   Incision redness or drainage   Loss of vision in one eye   Loss of strength / weakness in one side of your body   Severe headache   Difficulty with speech       If you will be having an invasive procedure, such as a colonoscopy or dental work within one year of your surgery, you may need to take an antibiotic prior to the procedure if you had a Dacron patch with your surgery; please call us so we can assist you with this.     Call our main number, [phone], for assistance with any concerns or questions.    [facility]  [facility] [address] [address]     Follow Up   Order Comments: Follow-up with [facility] in vascular surgery clinic in 2 weeks.    The vascular surgery clinic coordinator will call you within 3 business days after leaving the hospital to schedule your appointment.      With general vascular surgery questions, concerns, or to request/change an appointment, please call the [facility] Vascular Surgery Clinic:    [address] [facility] [address] [place] [id number]    Phone: [phone]     Diet   Order Comments: Follow this diet upon discharge: Regular     Order Specific Question Answer Comments   Is discharge order? Yes               Home Health Care:     Not needed           Discharge Disposition:     Discharged to home      Condition at  discharge: Stable    Patient was seen, examined, and discussed on day of discharge with Dr. Deleon.    NEPTALI BellC  Vascular Surgery

## 2025-07-03 NOTE — PROGRESS NOTES
Vascular Surgery Progress Note:  POD#1   Left CEA    S: Had some soreness in her left mouth most likely from intubation that is improving.  No issues with swallowing.  No pain associated with the carotid incision.    O:   Vitals:  BP  Min: 101/71  Max: 139/69  Temp  Av.5  F (36.4  C)  Min: 97  F (36.1  C)  Max: 98  F (36.7  C)  Pulse  Av.6  Min: 84  Max: 105  I/O last 3 completed shifts:  In: 1392.34 [P.O.:480; I.V.:912.34]  Out: 25 [Blood:25]    Physical Exam: Sitting up in chair.  Alert and appropriate.  Comfortable.   Left neck incision dressings removed.  Wound =A   No swelling or ecchymosis.   No obvious oral lesions   CN XII and Neuro=A      Assessment/Plan: #1.  Doing very well following left CEA.  Ulcerated lesion was appreciated which is consistent with the recurrent TIAs.  This problem has now been corrected.     #2.  Patient has been on Plavix due to the recurrent strokes.  I do not feel this needs to be continued chronically since the carotid ulcer was the cause of the problem.  She will be on Plavix for 1 month and baby aspirin indefinitely.    Patient will be discharged to home with her family.  She will follow-up with us in 2 weeks.  Carotid duplex will be performed in 3 months.    Wm. Adrienne MD

## 2025-07-03 NOTE — CONSULTS
Writer reviewed chart, patient currently up independently. Family member will be taking patient home this morning. No care management needs are indicated at this time. Patient can discharge from a care management stand point      JAMES MARTIN  [facility]  INPATIENT CARE COORDINATION

## 2025-07-03 NOTE — PLAN OF CARE
Goal Outcome Evaluation:    Reason for Admission: stenosis of carotid artery; POD 0 of L carotid endarterectomy with electroencephalogram    Cognitive/Mentation: A/Ox 4  Neuros/CMS: Intact ex baseline numbness in BLE  VS: VSS on RA,   Tele: SR.  GI: passing flatus, last BM 7/2. Continent.  : Continent.  Pulmonary: LS clear and equal.  Pain: denies pain, headache postop tx wih scheduled tyl.     Drains/Lines: R PIV, L PIV  Skin: scattered bruising, blancheable redness on heels  Activity: Assist x 1 with GBW.  Diet: Regular with clear liquids. Takes pills whole.     Therapies recs: Continue with plan of care  Discharge: possibly tmrw 7/3 to home    Aggression Stoplight Tool: Green    End of shift summary:

## 2025-07-03 NOTE — PLAN OF CARE
"Goal Outcome Evaluation: Discharge       Plan of Care Reviewed With: patient      Pt doing well this morning following L CEA. A/Ox4, calm and pleasant. VSS on RA. Still has baseline BLE numbness in feet. As well as slight R field cut. No complaints of pain at incision site, just some \"tightness\". Both L and R IV removed and tele taken off. Pt discharging home with family member. Aspirin and Plavix given this AM to continue medication treatment but pt declined Lovenox as well as stool softeners. Pt will stay on Plavix for 1 month following discharge. Medications are set to be picked up at local pharmacy. Questions answered and education provided.                "

## 2025-07-07 ENCOUNTER — TELEPHONE (OUTPATIENT)
Dept: OTHER | Facility: CLINIC | Age: 60
End: 2025-07-07

## 2025-07-07 DIAGNOSIS — I65.22 CAROTID STENOSIS, ASYMPTOMATIC, LEFT: ICD-10-CM

## 2025-07-07 RX ORDER — HYDROCODONE BITARTRATE AND ACETAMINOPHEN 5; 325 MG/1; MG/1
1 TABLET ORAL EVERY 6 HOURS PRN
Qty: 10 TABLET | Refills: 0 | Status: SHIPPED | OUTPATIENT
Start: 2025-07-07

## 2025-07-07 NOTE — TELEPHONE ENCOUNTER
[facility]    Who is the name of the provider?:  JAVY LUGO   What is the location you see this provider at/preferred location?: [place]  Person calling / Facility: Afshan Jimenez  Phone number:  [phone] (home)   Nurse call back needed:  no     Reason for call:  Patient is calling to get a refill on her pain medication.She said that she has one pill left.    Pharmacy location:  [facility]/PHARMACY [address] [place] [address]  Outside Imaging: n/a   Can we leave a detailed message on this number?  YES     7/7/2025, 8:05 AM

## 2025-07-07 NOTE — TELEPHONE ENCOUNTER
Refill request sent to Dr Adrienne Post  BSN, RN    [facility]  Office: [phone]  Fax: [phone]

## 2025-07-07 NOTE — TELEPHONE ENCOUNTER
Unable to fill per [facility] protocol.  Medication and pharmacy loaded.    Sejal CELESTE, RN    [facility]  Office: [phone]  Fax: [phone]

## 2025-07-07 NOTE — TELEPHONE ENCOUNTER
Spoke with patient to discuss post op pain.  States left sided face numbness, along jaw line.  Discussed this was to be expected due to the skin nerve that has to be cut.  This should gradually resolve over a long period of time.  Patient states she has a high pain tolerance, however Tylenol and Advil are not helping.    Patient rates pain as 4/10 prior to pain medication, with pain meds she would then rate it as a 1/10.     Notified patient that Dr. Deleon refilled pain medication.  She is aware to call with any further questions or concerns.    Minerva Saldana, TIMOTEON, RN, CV-BC, CNOR  [facility] [place]

## 2025-07-07 NOTE — TELEPHONE ENCOUNTER
Patient called back to check the status on the pain medication refill she requested.She was advised that it was sent to  to review.

## 2025-07-09 ENCOUNTER — PATIENT OUTREACH (OUTPATIENT)
Dept: NEUROLOGY | Facility: CLINIC | Age: 60
End: 2025-07-09
Payer: COMMERCIAL

## 2025-07-09 NOTE — PROGRESS NOTES
Stroke RN Care Coordination - Unable to Reach / Voicemail Note     Stroke RN Care Coordinator Outreach:  Stroke (90 Day mRS)       Outreach attempted x 1.      Left message on patient's voicemail with call back information and requested return call.    Stroke RN Care Coordinator will try to reach patient again in 1-2 business days.      Lashawn MAHMOOD, RN, SCRN  RN Stroke Neurology Care Coordinator  [facility] Neuroscience Service Line

## 2025-07-10 NOTE — PROGRESS NOTES
Stroke RN Care Coordination - 90 Day Modified [place] Note     SITUATION     Afshan Jimenez is a 60 year old female who is receiving support for:  Stroke (90 Day mRS)    BACKGROUND     Patient was admitted to [facility] for stroke s/p TNK w/ hemorrhagic transformation s/p reversal.    ASSESSMENT     Current Modified Timothy Scale (90 Day)  Modified Timothy Score - 90 Day: 1 (7/10/2025 11:44 AM)     07/10/25 1144   Simplified Modified [place] Scale Questionnaire (smRSq)   Could you live alone without help from another person?  This means being able to bathe, use the toilet, shop, prepare or get meals and manage finances. Yes   Can you do everything you were doing right before your stroke, even if slower and not as much? Yes   Are you completely back to the way you were right before your stroke? No, score is 1   Modified [place] Score - 90 Day 1       PLAN     Follow-up plan:  No additional planned outreaches at this time.      Lashawn Prescott BS, RN, SCRN  RN Stroke Neurology Care Coordinator  [facility] Neuroscience Service Line

## 2025-07-11 PROBLEM — E66.01 CLASS 2 SEVERE OBESITY DUE TO EXCESS CALORIES WITH SERIOUS COMORBIDITY IN ADULT, UNSPECIFIED BMI (H): Status: ACTIVE | Noted: 2025-07-11

## 2025-07-11 PROBLEM — E66.812 CLASS 2 SEVERE OBESITY DUE TO EXCESS CALORIES WITH SERIOUS COMORBIDITY IN ADULT, UNSPECIFIED BMI (H): Status: ACTIVE | Noted: 2025-07-11

## 2025-07-14 ENCOUNTER — PATIENT OUTREACH (OUTPATIENT)
Dept: CARE COORDINATION | Facility: CLINIC | Age: 60
End: 2025-07-14
Payer: COMMERCIAL

## 2025-07-14 NOTE — LETTER
[facility] COORDINATION  [address]  [facility] [address]    July 14, 2025    Afshan Jimenez  [address]   [facility] [address]    Dear Afshan,  Your Care Team congratulates you on your journey to maintain wellness. This document will help guide you on your journey to maintain a healthy lifestyle.  You can use this to help you overcome any barriers you may encounter.  If you should have any questions or concerns, you can contact the members of your Care Team or contact your Primary Care Clinic for assistance.     Patient feedback is important to us and helps us continue to improve the way we care for patients as well as celebrate the things we do well. You may receive a short survey from [employer] on behalf of the care coordination team. We would appreciate hearing from you!    Health Maintenance  Health Maintenance Reviewed:      My Access Plan  Medical Emergency 911   Primary Clinic Line [facility] [phone]   24 Hour Appointment Line [phone] or  [phone]) (toll-free)   24 Hour Nurse Line [phone] (toll-free)   Preferred Urgent Care     Preferred Hospital     Preferred Pharmacy [facility]/pharmacy #[id number] [facility] [address] Behavioral Health Crisis Line The National Suicide Prevention Lifeline at 1-526.869.2068 or 911     My Care Team Members  Patient Care Team         Relationship Specialty Notifications Start End    Sterling Hill MD PCP - General Internal Medicine  2/7/25     Phone: [phone] Fax: [phone]         [address] [facility] [address]    Kennedi Alberts APRN CNP Nurse Practitioner Psychiatry & Neurology Vascular Neurology  12/31/24     Phone: [phone] Fax: [phone]         NEUROLOGY STROKE & NEUROCRITICAL CARE [address] [facility] [address]    Liliane Robbins MD MD Pulmonary Disease  1/2/25     Phone: [phone] Fax: [phone]  [facility] [address] [facility] [address]    Sterling Hill MD Assigned PCP   2/23/25     Phone: [phone] Fax: [phone] [address] [facility] [address]    Melody Montoya PA-C Assigned Gastroenterology Provider   2/23/25     Phone: [phone] Fax: [phone]         [address] [facility] [address]    Liliane Robbins MD Assigned Pulmonology Provider   4/23/25     Phone: [phone] Fax: [phone]         [address] [address]    Jessica Benitez LIC Lead Care Coordinator  Admissions 4/25/25 7/14/25    Phone: [phone]         Leonel Platt MD MD Physical Medicine and Rehabilitation  5/2/25     Phone: [phone] Fax: [phone]         [address] [facility] [address]    Ivan Deleon MD Assigned Heart and Vascular Surgical Provider   5/23/25     Phone: [phone] Fax: [phone] [address] [facility] [address]    Wilder Perez MD Assigned Heart and Vascular Provider   6/23/25     Phone: [phone] Fax: [phone]         [address] [facility] [address]    Kennedi Alberts APRN CNP Assigned Neuroscience Provider   6/23/25     Phone: [phone] Fax: [phone]         NEUROLOGY STROKE & NEUROCRITICAL CARE [address] [facility] [address]              Advance Care Plans/Directives Type:          It has been your Clinic Care Team's pleasure to work with you on accomplishing your goals.    Regards,  Your Clinic Care Team

## 2025-07-14 NOTE — PROGRESS NOTES
VASCULAR SURGERY PROGRESS NOTE    LOCATION: [facility]    Afshan Jimenez  Medical Record #: [id number]  YOB: 1965  Age: 60 year old     Date of Service: 7/15/2025    PRIMARY CARE PROVIDER: Sterling Hill    Reason for visit:  post-op    Afshan Jimenez is a 60 year old female status post left carotid endarterectomy for symptomatic left carotid stenosis with Dr. Deleon on 7/2/2025. Has a history of cerebrovascular disease with a previous stroke. She was seen in the clinic on 5/15/2025 for a CTA that revealed a 50% left carotid stenosis but very irregular plaque. This may have been the cause of her stroke. No disease on the right.    On examination today Afshan is doing well.   Alert and oriented x4, neurologically intact  Neck incision is fully healed-no complaints  Denies stroke symptoms, amaurosis fugax  The incision where vein patch was taken in the medial ankle slight dehisced. No s/s of infection      RECOMMENDATION/RISKS: Continue on plavix for approximately 2 weeks time(finish out prescription_, and then can discontinue, continue on aspirin 81 mg indefinitely in addition to statin. We will follow-up in 3 months with a left carotid duplex.     For the vein incision-cover with aquacel, gauze, and then edemawear, change every other day. Will do virtual visit in 2 weeks time to fully assess(this has already been scheduled).      REVIEW OF SYSTEMS:    A 12 point ROS was reviewed and is negative except for what is listed above in HPI.    PHH:    Past Medical History:   Diagnosis Date    Deafness     left ear    Diabetes mellitus (H)     Malignant neoplasm (H)     leukemia    Malignant neoplasm of parotid gland (H)           Past Surgical History:   Procedure Laterality Date    ENDARTERECTOMY CAROTID Left 7/2/2025    Procedure: LEFT CAROTID ENDARTERECTOMY WITH GREATER SAPHENOUS VEIN PATCH, WITH  ELECTROENCEPHALOGRAM;  Surgeon: Ivan Deleon MD;  Location:  OR     LAPAROSCOPIC CHOLECYSTECTOMY  10/24/2011    Procedure:LAPAROSCOPIC CHOLECYSTECTOMY; Laparoscopic Cholecystectomy; Surgeon:TIAGO MALDONADO; Location:[facility] OR    SALIVARY GLAND SURGERY      L parotid    SINUS SURGERY      tumor removed from roof of mouth         ALLERGIES:  Codeine sulfate    MEDS:    Current Outpatient Medications:     acetaminophen (TYLENOL) 325 MG tablet, Take 2 tablets (650 mg) by mouth every 4 hours as needed for mild pain., Disp: , Rfl:     aspirin 81 MG EC tablet, Take 1 tablet (81 mg) by mouth daily., Disp: 90 tablet, Rfl: 0    atorvastatin (LIPITOR) 40 MG tablet, Take 1 tablet (40 mg) by mouth every evening., Disp: 90 tablet, Rfl: 3    clopidogrel (PLAVIX) 75 MG tablet, Take 1 tablet (75 mg) by mouth daily., Disp: 30 tablet, Rfl: 0    HYDROcodone-acetaminophen (NORCO) 5-325 MG tablet, Take 1 tablet by mouth every 6 hours as needed for severe pain., Disp: 10 tablet, Rfl: 0    ofloxacin (FLOXIN) 0.3 % otic solution, Place 4 drops Into the left ear 2 times daily. For 7 days, Disp: , Rfl:     prednisoLONE acetate (PRED FORTE) 1 % ophthalmic suspension, Place 2 drops Into the left ear 2 times daily. For 7 days, Disp: , Rfl:     semaglutide (OZEMPIC) 2 MG/3ML pen, Inject 0.25 mg subcutaneously every 7 days for 28 days, THEN 0.5 mg every 7 days., Disp: 20.25 mL, Rfl: 0    semaglutide (OZEMPIC) 2 MG/3ML pen, Inject 0.25 mg subcutaneously every 7 days., Disp: 3 mL, Rfl: 0    semaglutide (OZEMPIC) 2 MG/3ML pen, Inject 0.5 mg subcutaneously every 7 days., Disp: 3 mL, Rfl: 1    SOCIAL HABITS:    History   Smoking Status    Never   Smokeless Tobacco    Never     Social History    Substance and Sexual Activity      Alcohol use: Yes      History   Drug Use Not on file       FAMILY HISTORY:    Family History   Problem Relation Age of Onset    Breast Cancer Mother     Testicular cancer Brother     Pancreatic Cancer Maternal Uncle     Chronic Obstructive Pulmonary Disease Maternal Uncle     LUNG DISEASE No  family hx of        PE:  There were no vitals taken for this visit.  Wt Readings from Last 1 Encounters:   07/11/25 196 lb 14.4 oz (89.3 kg)     There is no height or weight on file to calculate BMI.      LABS:      Sodium   Date Value Ref Range Status   07/02/2025 139 135 - 145 mmol/L Final   04/24/2025 138 135 - 145 mmol/L Final   04/21/2025 138 135 - 145 mmol/L Final   07/15/2013 140 133 - 144 mmol/L Final   10/24/2011 138 133 - 144 mmol/L Final   07/07/2005 143 133 - 144 mmol/L Final     Urea Nitrogen   Date Value Ref Range Status   07/02/2025 25.3 (H) 8.0 - 23.0 mg/dL Final   04/24/2025 20.6 8.0 - 23.0 mg/dL Final   04/21/2025 19.5 8.0 - 23.0 mg/dL Final   07/15/2013 18 5 - 24 mg/dL Final   10/24/2011 18 5 - 24 mg/dL Final   07/07/2005 18 5 - 24 mg/dL Final     Hemoglobin   Date Value Ref Range Status   07/02/2025 14.1 11.7 - 15.7 g/dL Final   04/24/2025 12.6 11.7 - 15.7 g/dL Final   04/21/2025 13.4 11.7 - 15.7 g/dL Final   07/15/2013 13.8 11.7 - 15.7 g/dL Final   10/24/2011 14.3 11.7 - 15.7 g/dL Final   07/07/2005 14.5 11.7 - 15.7 g/dL Final     Platelet Count   Date Value Ref Range Status   04/24/2025 152 150 - 450 10e3/uL Final   04/21/2025 181 150 - 450 10e3/uL Final   04/20/2025 158 150 - 450 10e3/uL Final   07/15/2013 141 (L) 150 - 450 10e9/L Final   10/24/2011 150 150 - 450 10e9/L Final   07/07/2005 190 150 - 450 10e9/L Final     INR   Date Value Ref Range Status   04/19/2025 1.07 0.85 - 1.15 Final   04/19/2025 1.09 0.85 - 1.15 Final   04/18/2025 0.97 0.85 - 1.15 Final       25 minutes spent on the day of encounter doing chart review, history and exam, documentation, and further activities as noted.       Unique Ambrocio, NP  VASCULAR SURGERY

## 2025-07-14 NOTE — PROGRESS NOTES
Clinic Care Coordination Contact    Assessment: Patient shared that she has been cleared to drive and has been approved for [employer]. Patient reported no other needs at this time and request for CC to close case. Patient has continued to follow the plan of care and assessment is negative for any new needs or concerns.    Enrollment status: Graduated.      Plan: No further outreaches at this time.  Patient will continue to follow the plan of care.  If new needs arise a new Care Coordination referral may be placed.  FYI to PCP    DEENA Campos   [facility]   Phone: [phone]

## 2025-07-15 ENCOUNTER — OFFICE VISIT (OUTPATIENT)
Dept: OTHER | Facility: CLINIC | Age: 60
End: 2025-07-15
Payer: COMMERCIAL

## 2025-07-15 VITALS — SYSTOLIC BLOOD PRESSURE: 93 MMHG | TEMPERATURE: 97.9 F | HEART RATE: 97 BPM | DIASTOLIC BLOOD PRESSURE: 69 MMHG

## 2025-07-15 DIAGNOSIS — I65.22 CAROTID STENOSIS, ASYMPTOMATIC, LEFT: Primary | ICD-10-CM

## 2025-07-15 PROCEDURE — 3078F DIAST BP <80 MM HG: CPT

## 2025-07-15 PROCEDURE — 3074F SYST BP LT 130 MM HG: CPT

## 2025-07-15 PROCEDURE — 99024 POSTOP FOLLOW-UP VISIT: CPT

## 2025-07-15 PROCEDURE — 99213 OFFICE O/P EST LOW 20 MIN: CPT

## 2025-07-15 NOTE — PROGRESS NOTES
[facility]        Patient is here for a  follow up.    Pt is currently taking Aspirin.Plavix, statin    BP 93/69 (BP Location: Left arm, Patient Position: Chair, Cuff Size: Adult Regular)   Pulse 97   Temp 97.9  F (36.6  C) (Oral)     The provider has been notified that the patient has no concerns.     Questions patient would like addressed today are: N/A.    Refills are needed: N/A    Has homecare services and agency name:  Nella Dahl MA

## 2025-07-17 ENCOUNTER — TELEPHONE (OUTPATIENT)
Dept: OTHER | Facility: CLINIC | Age: 60
End: 2025-07-17
Payer: COMMERCIAL

## 2025-07-17 NOTE — TELEPHONE ENCOUNTER
[facility]    Who is the name of the provider?:  KRIS JUARES   What is the location you see this provider at/preferred location?: [place]  Person calling / Facility: Afshan Jimenez  Phone number:  [phone] (home)   Nurse call back needed:  yes     Reason for call:  Patient saw Kris on Tuesday.She said the incision is still open and when she takes of the bandage there is blood.She wants to know if she can get the bandage changed? Patient wants to know if she can buy the bandages.    Can we leave a detailed message on this number?  YES     7/17/2025, 9:27 AM

## 2025-07-17 NOTE — TELEPHONE ENCOUNTER
Writer met pt in [facility] (pt was at pharmacy) and visualized incision and clarified wound care. No current drainage from incision. Pt had supplies in her purse and demonstrated wound care in front of RN. No further questions or concerns from pt and pt has virtual follow up in 2 weeks.     Lynda BAEZ, BSN  [facility]  Vascular [facility]  Office: [phone]  Fax: [phone]

## 2025-08-13 ENCOUNTER — VIRTUAL VISIT (OUTPATIENT)
Dept: OTHER | Facility: CLINIC | Age: 60
End: 2025-08-13
Payer: COMMERCIAL

## 2025-08-13 DIAGNOSIS — I63.239 CAROTID STENOSIS, SYMPTOMATIC, WITH INFARCTION (H): Primary | ICD-10-CM

## (undated) DEVICE — SYR 03ML LL W/O NDL 309657

## (undated) DEVICE — IOM CASE FLAT FEE

## (undated) DEVICE — ESU GROUND PAD UNIVERSAL W/O CORD

## (undated) DEVICE — PACK VASCULAR SCV15VAFSB

## (undated) DEVICE — SYR 01ML 27GA 0.5" NDL TBC 309623

## (undated) DEVICE — IOM STANDARD SUPPLY FEE

## (undated) DEVICE — SU MONOCRYL 4-0 PS-2 18" UND Y496G

## (undated) DEVICE — SOL WATER IRRIG 1000ML BOTTLE 2F7114

## (undated) DEVICE — BAG DECANTER STERILE WHITE DYNJDEC09

## (undated) DEVICE — NDL ANGIOCATH 20GA 1.25" 4056

## (undated) DEVICE — SOL NACL 0.9% IRRIG 1000ML BOTTLE 2F7124

## (undated) DEVICE — VIAL DECANTER STERILE WHITE DYNJDEC06

## (undated) DEVICE — SHUNT SUNDT 3X4X10CM NL850-5060

## (undated) DEVICE — BLADE KNIFE BEAVER MINI BEAVER6400

## (undated) DEVICE — NDL 19GA 1.5"

## (undated) DEVICE — SU PROLENE 6-0 C-1DA 30" 8706H

## (undated) DEVICE — Device

## (undated) DEVICE — SYR 10ML FINGER CONTROL W/O NDL 309695

## (undated) DEVICE — SOL NACL 0.9% INJ 250ML BAG 2B1322Q

## (undated) DEVICE — DRAPE ANTI SKID 10IN X 16IN BRIGHT ORANGE 1151

## (undated) DEVICE — SU SILK 4-0 TIE 12X30" A303H

## (undated) DEVICE — SUCTION MANIFOLD NEPTUNE 2 SYS 4 PORT 0702-020-000

## (undated) DEVICE — PREP CHLORAPREP W/ORANGE TINT 10.5ML 930715

## (undated) DEVICE — SU PROLENE 7-0 BV-1DA 4X30" M8703

## (undated) DEVICE — SU SILK 3-0 TIE 24" SA74H

## (undated) DEVICE — NDL BLUNT 19GA 1.5"

## (undated) DEVICE — KIT TURNOVER FAIRVIEW SOUTHDALE FULL SP3889

## (undated) DEVICE — LINEN TOWEL PACK X5 5464

## (undated) DEVICE — SU SILK 2-0 TIE 24" SA75H

## (undated) DEVICE — SURGICEL HEMOSTAT 2X3" 1953

## (undated) DEVICE — NEEDLE HYPO MONOJECT STANDARD 22GA 1 1/2IN BLUE 1188822112

## (undated) DEVICE — DRSG STERI STRIP 1/2X4" R1547

## (undated) DEVICE — SU VICRYL 3-0 SH 27" J316H

## (undated) RX ORDER — PROPOFOL 10 MG/ML
INJECTION, EMULSION INTRAVENOUS
Status: DISPENSED
Start: 2025-07-02

## (undated) RX ORDER — FENTANYL CITRATE 50 UG/ML
INJECTION, SOLUTION INTRAMUSCULAR; INTRAVENOUS
Status: DISPENSED
Start: 2025-07-02

## (undated) RX ORDER — CEFAZOLIN SODIUM/WATER 2 G/20 ML
SYRINGE (ML) INTRAVENOUS
Status: DISPENSED
Start: 2025-07-02

## (undated) RX ORDER — KETOROLAC TROMETHAMINE 30 MG/ML
INJECTION, SOLUTION INTRAMUSCULAR; INTRAVENOUS
Status: DISPENSED
Start: 2025-07-02

## (undated) RX ORDER — ONDANSETRON 2 MG/ML
INJECTION INTRAMUSCULAR; INTRAVENOUS
Status: DISPENSED
Start: 2025-07-02

## (undated) RX ORDER — FLUMAZENIL 0.1 MG/ML
INJECTION, SOLUTION INTRAVENOUS
Status: DISPENSED
Start: 2024-12-30

## (undated) RX ORDER — NALOXONE HYDROCHLORIDE 0.4 MG/ML
INJECTION, SOLUTION INTRAMUSCULAR; INTRAVENOUS; SUBCUTANEOUS
Status: DISPENSED
Start: 2024-12-30

## (undated) RX ORDER — DEXAMETHASONE SODIUM PHOSPHATE 4 MG/ML
INJECTION, SOLUTION INTRA-ARTICULAR; INTRALESIONAL; INTRAMUSCULAR; INTRAVENOUS; SOFT TISSUE
Status: DISPENSED
Start: 2025-07-02

## (undated) RX ORDER — FENTANYL CITRATE 50 UG/ML
INJECTION, SOLUTION INTRAMUSCULAR; INTRAVENOUS
Status: DISPENSED
Start: 2024-12-30

## (undated) RX ORDER — GLYCOPYRROLATE 0.2 MG/ML
INJECTION, SOLUTION INTRAMUSCULAR; INTRAVENOUS
Status: DISPENSED
Start: 2024-12-30